# Patient Record
Sex: FEMALE | Race: BLACK OR AFRICAN AMERICAN | NOT HISPANIC OR LATINO | Employment: OTHER | URBAN - METROPOLITAN AREA
[De-identification: names, ages, dates, MRNs, and addresses within clinical notes are randomized per-mention and may not be internally consistent; named-entity substitution may affect disease eponyms.]

---

## 2017-04-04 ENCOUNTER — HOSPITAL ENCOUNTER (EMERGENCY)
Facility: HOSPITAL | Age: 67
Discharge: HOME | End: 2017-04-04
Attending: EMERGENCY MEDICINE
Payer: MEDICARE

## 2017-04-04 ENCOUNTER — NURSE TRIAGE (OUTPATIENT)
Dept: ADMINISTRATIVE | Facility: CLINIC | Age: 67
End: 2017-04-04

## 2017-04-04 VITALS
DIASTOLIC BLOOD PRESSURE: 67 MMHG | OXYGEN SATURATION: 100 % | RESPIRATION RATE: 16 BRPM | TEMPERATURE: 99 F | BODY MASS INDEX: 33.86 KG/M2 | SYSTOLIC BLOOD PRESSURE: 133 MMHG | HEIGHT: 72 IN | WEIGHT: 250 LBS | HEART RATE: 74 BPM

## 2017-04-04 DIAGNOSIS — I48.0 PAROXYSMAL ATRIAL FIBRILLATION: Primary | ICD-10-CM

## 2017-04-04 DIAGNOSIS — R07.9 CHEST PAIN, UNSPECIFIED TYPE: ICD-10-CM

## 2017-04-04 DIAGNOSIS — R00.2 PALPITATIONS: ICD-10-CM

## 2017-04-04 LAB
ALBUMIN SERPL BCP-MCNC: 3.7 G/DL
ALP SERPL-CCNC: 68 U/L
ALT SERPL W/O P-5'-P-CCNC: 33 U/L
ANION GAP SERPL CALC-SCNC: 10 MMOL/L
APTT BLDCRRT: 29.9 SEC
AST SERPL-CCNC: 26 U/L
BASOPHILS # BLD AUTO: 0.01 K/UL
BASOPHILS NFR BLD: 0.2 %
BILIRUB SERPL-MCNC: 0.4 MG/DL
BILIRUB UR QL STRIP: NEGATIVE
BNP SERPL-MCNC: 48 PG/ML
BUN SERPL-MCNC: 28 MG/DL
CALCIUM SERPL-MCNC: 9.6 MG/DL
CHLORIDE SERPL-SCNC: 104 MMOL/L
CLARITY UR: CLEAR
CO2 SERPL-SCNC: 23 MMOL/L
COLOR UR: YELLOW
CREAT SERPL-MCNC: 1.1 MG/DL
DIFFERENTIAL METHOD: ABNORMAL
EOSINOPHIL # BLD AUTO: 0.1 K/UL
EOSINOPHIL NFR BLD: 1.2 %
ERYTHROCYTE [DISTWIDTH] IN BLOOD BY AUTOMATED COUNT: 16.7 %
EST. GFR  (AFRICAN AMERICAN): 60 ML/MIN/1.73 M^2
EST. GFR  (NON AFRICAN AMERICAN): 52 ML/MIN/1.73 M^2
GLUCOSE SERPL-MCNC: 118 MG/DL
GLUCOSE UR QL STRIP: NEGATIVE
HCT VFR BLD AUTO: 33.7 %
HGB BLD-MCNC: 11.6 G/DL
HGB UR QL STRIP: NEGATIVE
INR PPP: 1
KETONES UR QL STRIP: NEGATIVE
LEUKOCYTE ESTERASE UR QL STRIP: NEGATIVE
LYMPHOCYTES # BLD AUTO: 1.4 K/UL
LYMPHOCYTES NFR BLD: 34.8 %
MCH RBC QN AUTO: 28.9 PG
MCHC RBC AUTO-ENTMCNC: 34.4 %
MCV RBC AUTO: 84 FL
MONOCYTES # BLD AUTO: 0.7 K/UL
MONOCYTES NFR BLD: 16.7 %
NEUTROPHILS # BLD AUTO: 1.9 K/UL
NEUTROPHILS NFR BLD: 46.9 %
NITRITE UR QL STRIP: NEGATIVE
PH UR STRIP: 6 [PH] (ref 5–8)
PLATELET # BLD AUTO: 243 K/UL
PMV BLD AUTO: 8.4 FL
POTASSIUM SERPL-SCNC: 4.7 MMOL/L
PROT SERPL-MCNC: 8.2 G/DL
PROT UR QL STRIP: NEGATIVE
PROTHROMBIN TIME: 10.7 SEC
RBC # BLD AUTO: 4.02 M/UL
SODIUM SERPL-SCNC: 137 MMOL/L
SP GR UR STRIP: 1.01 (ref 1–1.03)
TROPONIN I SERPL DL<=0.01 NG/ML-MCNC: 0.01 NG/ML
TROPONIN I SERPL DL<=0.01 NG/ML-MCNC: <0.006 NG/ML
TSH SERPL DL<=0.005 MIU/L-ACNC: 2.79 UIU/ML
URN SPEC COLLECT METH UR: NORMAL
UROBILINOGEN UR STRIP-ACNC: NEGATIVE EU/DL
WBC # BLD AUTO: 4.08 K/UL

## 2017-04-04 PROCEDURE — 85610 PROTHROMBIN TIME: CPT

## 2017-04-04 PROCEDURE — 83880 ASSAY OF NATRIURETIC PEPTIDE: CPT

## 2017-04-04 PROCEDURE — 80053 COMPREHEN METABOLIC PANEL: CPT

## 2017-04-04 PROCEDURE — 84484 ASSAY OF TROPONIN QUANT: CPT | Mod: 91

## 2017-04-04 PROCEDURE — 25000003 PHARM REV CODE 250: Performed by: EMERGENCY MEDICINE

## 2017-04-04 PROCEDURE — 99284 EMERGENCY DEPT VISIT MOD MDM: CPT | Mod: 25

## 2017-04-04 PROCEDURE — 84443 ASSAY THYROID STIM HORMONE: CPT

## 2017-04-04 PROCEDURE — 25000003 PHARM REV CODE 250: Performed by: NURSE PRACTITIONER

## 2017-04-04 PROCEDURE — 96376 TX/PRO/DX INJ SAME DRUG ADON: CPT

## 2017-04-04 PROCEDURE — 85730 THROMBOPLASTIN TIME PARTIAL: CPT

## 2017-04-04 PROCEDURE — 85025 COMPLETE CBC W/AUTO DIFF WBC: CPT

## 2017-04-04 PROCEDURE — 96361 HYDRATE IV INFUSION ADD-ON: CPT

## 2017-04-04 PROCEDURE — 93005 ELECTROCARDIOGRAM TRACING: CPT

## 2017-04-04 PROCEDURE — 96374 THER/PROPH/DIAG INJ IV PUSH: CPT

## 2017-04-04 PROCEDURE — 84484 ASSAY OF TROPONIN QUANT: CPT

## 2017-04-04 PROCEDURE — 81003 URINALYSIS AUTO W/O SCOPE: CPT

## 2017-04-04 RX ORDER — DEXTROMETHORPHAN HYDROBROMIDE, GUAIFENESIN 5; 100 MG/5ML; MG/5ML
650 LIQUID ORAL 2 TIMES DAILY PRN
Status: ON HOLD | COMMUNITY
End: 2020-01-02 | Stop reason: HOSPADM

## 2017-04-04 RX ORDER — DILTIAZEM HYDROCHLORIDE 30 MG/1
60 TABLET, FILM COATED ORAL
Status: COMPLETED | OUTPATIENT
Start: 2017-04-04 | End: 2017-04-04

## 2017-04-04 RX ORDER — ASPIRIN 325 MG
325 TABLET ORAL
Status: COMPLETED | OUTPATIENT
Start: 2017-04-04 | End: 2017-04-04

## 2017-04-04 RX ORDER — SODIUM CHLORIDE 9 MG/ML
1000 INJECTION, SOLUTION INTRAVENOUS
Status: COMPLETED | OUTPATIENT
Start: 2017-04-04 | End: 2017-04-04

## 2017-04-04 RX ORDER — DILTIAZEM HYDROCHLORIDE 5 MG/ML
20 INJECTION INTRAVENOUS
Status: COMPLETED | OUTPATIENT
Start: 2017-04-04 | End: 2017-04-04

## 2017-04-04 RX ORDER — DILTIAZEM HYDROCHLORIDE 60 MG/1
60 TABLET, FILM COATED ORAL EVERY 8 HOURS
Qty: 90 TABLET | Refills: 11 | Status: SHIPPED | OUTPATIENT
Start: 2017-04-04 | End: 2017-04-05

## 2017-04-04 RX ORDER — DILTIAZEM HYDROCHLORIDE 5 MG/ML
25 INJECTION INTRAVENOUS ONCE
Status: COMPLETED | OUTPATIENT
Start: 2017-04-04 | End: 2017-04-04

## 2017-04-04 RX ADMIN — DILTIAZEM HYDROCHLORIDE 20 MG: 5 INJECTION INTRAVENOUS at 01:04

## 2017-04-04 RX ADMIN — ASPIRIN 325 MG ORAL TABLET 325 MG: 325 PILL ORAL at 08:04

## 2017-04-04 RX ADMIN — DILTIAZEM HYDROCHLORIDE 60 MG: 30 TABLET, FILM COATED ORAL at 10:04

## 2017-04-04 RX ADMIN — DILTIAZEM HYDROCHLORIDE 25 MG: 5 INJECTION INTRAVENOUS at 08:04

## 2017-04-04 RX ADMIN — SODIUM CHLORIDE 1000 ML: 0.9 INJECTION, SOLUTION INTRAVENOUS at 08:04

## 2017-04-04 NOTE — ED NOTES
Patient has verified the spelling of their name and  on armband  LOC: The patient is awake, alert, and aware of environment with an appropriate affect, the patient is oriented x 3 and speaking appropriately.   APPEARANCE: Patient resting comfortably and in no acute distress, patient is clean and well groomed, patient's clothing is properly fastened.   SKIN: The skin is warm and dry, color consistent with ethnicity, patient has normal skin turgor and moist mucus membranes, skin intact, no breakdown or bruising noted.   :   Voids without difficulty  MUSCULOSKELETAL: Patient moving all extremities spontaneously, no obvious swelling or deformities noted.   RESPIRATORY: Airway is open and patent, respirations are spontaneous, patient has a normal effort and rate, no accessory muscle use noted, bilateral breath sounds clear, denies SOB   ABDOMEN: Soft and non tender to palpation, no distention noted, normoactive bowel sounds present in all four quadrants.   CARDIAC:   Sinus tach rate and rhythm, no peripheral edema noted, less then 3 second capillary refill, denies chest pain  COMPLAINT: palpitations began this morning, denies chest pain but describes a heaviness to her chest; currently on plavix for PAD and does not have a cardiologist

## 2017-04-04 NOTE — ED AVS SNAPSHOT
[facility]  [address]  [facility] [address]               Jayla Reagan   2017  8:22 AM   ED    Description:  Female : 1950   Department:  [facility]           Your Care was Coordinated By:     Provider Role From To    Guy J. Lefort, MD Attending Provider 17 --    ANAHI Neil Nurse Practitioner 17 --      Reason for Visit     Palpitations           Diagnoses this Visit        Comments    Paroxysmal atrial fibrillation    -  Primary     Palpitations         Chest pain, unspecified type           ED Disposition     None           To Do List           Follow-up Information     Follow up with Nasim Mccall MD In 1 day.    Specialties:  INTERVENTIONAL CARDIOLOGY, Cardiology    Why:  Even if well    Contact information:    [address]  [address]  [facility] [id number] [phone]          Follow up with [facility].    Specialty:  Emergency Medicine    Why:  If symptoms worsen or any other concerns    Contact information:    [address]  [facility] [address] [phone]        Follow up with Nicanor Michel MD In 1 day.    Specialty:  Family Medicine    Why:  If unable to see cardiology provider    Contact information:    [address] [facility] [address]  [phone]        [facility]     [facility] Nurse Care Line - 24/7 Assistance  Unless otherwise directed by your provider, please contact [facility], our nurse care line that is available for 24/7 assistance.     Registered nurses in the [facility] provide: appointment scheduling, clinical advisement, health education, and other advisory services.  Call: [phone] (toll free)               Medications           Message regarding Medications     Verify the changes and/or additions to your medication regime listed below are the same as discussed with your clinician today.  If  any of these changes or additions are incorrect, please notify your healthcare provider.        These medications were administered today        Dose Freq    diltiaZEM injection 25 mg 25 mg Once    Sig: Inject 5 mLs (25 mg total) into the vein once.    Class: Normal    Route: Intravenous    aspirin tablet 325 mg 325 mg ED 1 Time    Sig: Take 1 tablet (325 mg total) by mouth ED 1 Time.    Class: Normal    Route: Oral    0.9%  NaCl infusion 1,000 mL ED 1 Time    Sig: Inject 1,000 mLs into the vein ED 1 Time.    Class: Normal    Route: Intravenous    diltiaZEM tablet 60 mg 60 mg ED 1 Time    Sig: Take 2 tablets (60 mg total) by mouth ED 1 Time.    Class: Normal    Route: Oral    diltiaZEM injection 20 mg 20 mg ED 1 Time    Sig: Inject 4 mLs (20 mg total) into the vein ED 1 Time.    Class: Normal    Route: Intravenous      STOP taking these medications     TRIAMTERENE ORAL Take by mouth.    LISINOPRIL, BULK, MISC by Misc.(Non-Drug; Combo Route) route.    lisinopril (PRINIVIL,ZESTRIL) 20 MG tablet Take 20 mg by mouth once daily.    meloxicam (MOBIC) 7.5 MG tablet Take 2 tablets (15 mg total) by mouth once daily.    diazepam (VALIUM) 5 MG tablet Take 1 tablet (5 mg total) by mouth every 8 (eight) hours.           Verify that the below list of medications is an accurate representation of the medications you are currently taking.  If none reported, the list may be blank. If incorrect, please contact your healthcare provider. Carry this list with you in case of emergency.           Current Medications     acetaminophen (TYLENOL) 650 MG TbSR Take 650 mg by mouth 2 (two) times daily as needed.    clopidogrel (PLAVIX) 75 mg tablet Take 75 mg by mouth once daily.    fosinopril (MONOPRIL) 20 MG tablet Take 20 mg by mouth once daily.    metoprolol succinate (TOPROL-XL) 100 MG 24 hr tablet Take 100 mg by mouth 2 (two) times daily.    triamterene-hydrochlorothiazide 37.5-25 mg (MAXZIDE-25) 37.5-25 mg per tablet Take 1 tablet by  mouth once daily. Not sure of mg           Clinical Reference Information           Your Vitals Were     BP Pulse Temp Resp Height Weight    106/65 79 98.5 °F (36.9 °C) (Oral) 16 6' (1.829 m) 113.4 kg (250 lb)    SpO2 BMI             99% 33.91 kg/m2         Allergies as of 4/4/2017     No Known Allergies      Immunizations Administered on Date of Encounter - 4/4/2017     None      ED Micro, Lab, POCT     Start Ordered       Status Ordering Provider    04/04/17 1021 04/04/17 1021  Troponin I  STAT      Final result     04/04/17 0840 04/04/17 0839  APTT  STAT      Final result     04/04/17 0833 04/04/17 0833  CBC auto differential  STAT      Final result     04/04/17 0833 04/04/17 0833  Comprehensive metabolic panel  STAT      Final result     04/04/17 0833 04/04/17 0833  Protime-INR  STAT      Final result     04/04/17 0833 04/04/17 0833  Troponin I  STAT      Final result     04/04/17 0833 04/04/17 0833  TSH  STAT      Final result     04/04/17 0833 04/04/17 0833  Urinalysis  STAT      Final result     04/04/17 0833 04/04/17 0833  Brain natriuretic peptide  STAT      Final result       ED Imaging Orders     Start Ordered       Status Ordering Provider    04/04/17 0838 04/04/17 0838  X-Ray Chest 1 View  1 time imaging      Final result         Discharge Instructions         Discharge Instructions for Atrial Fibrillation  You have been diagnosed with an abnormal heart rhythm called atrial fibrillation. With this condition, your hearts 2 upper chambers quiver rather than squeeze the blood out in a normal pattern. This leads to an irregular and sometimes rapid heartbeat. Some people will develop associated symptoms such as a flip-flopping heartbeat, chest pain, lightheadedness, or shortness of breath. Other people may have no symptoms at all. Atrial fibrillation is serious because it affects the hearts ability to fill with blood as it should. Blood clots may form. This increases the risk for stroke. Untreated atrial  fibrillation can also lead to heart failure. Atrial fibrillation can be controlled. With treatment, most people with atrial fibrillation lead normal lives.  Treatment options  Recommended treatment for atrial fibrillation depends on your age, symptoms, how long you have had atrial fibrillation, and other factors. You will have a complete evaluation to find out if you have any abnormalities that caused your heart to go into atrial fibrillation. This might be blocked heart arteries or a thyroid problem. Your doctor will assess your particular case and discuss choices with you.  Treatment choices may include:  · Treating an underlying disorder that puts you at risk for atrial fibrillation. For example, correcting an abnormal thyroid or electrolyte problem, or treating a blocked heart artery.  · Restoring a normal heart rhythm with an electrical shock (cardioversion) or with an antiarrhythmic medicine (chemical cardioversion)  · Using medicine to control your heart rate in atrial fibrillation.  · Preventing the risk for blood clot and stroke using blood-thinning medicines. Your doctor will tell you what he or she recommends. Choices may include aspirin, clopidogrel, warfarin, dabigatran, rivaroxaban, apixaban, and edoxaban.  · Doing catheter ablation or a surgical maze procedure. These use different methods to destroy certain areas of heart tissue. This interrupts the electrical signals causing atrial fibrillation. One of these procedures may be a choice when medicines do not work, or as an alternative to long-term medicine.  · Other treatment choices may be recommended for you by your doctor.  Managing risk factors for stroke and preventing heart failure are important parts of any treatment plan for atrial fibrillation.  Home care  · Take your medicines exactly as directed. Dont skip doses.  · Work with your doctor to find the right medicines and doses for you.  · Learn to take your own pulse. Keep a record of your  results. Ask your doctor which pulse rates mean that you need medical attention. Slowing your pulse is often the goal of treatment. Ask your doctor if its OK for you to use an automatic machine to check your pulse at home. Sometimes these machines dont count the pulse correctly when you have atrial fibrillation.  · Limit your intake of coffee, tea, cola, and other beverages with caffeine. Talk with your doctor about whether you should eliminate caffeine.  · Avoid over-the-counter medicines that have caffeine in them.  · Let your doctor know what medicines you take, including prescription and over-the-counter medicines, as well as any supplements. They interfere with some medicines given for atrial fibrillation.  · Ask your doctor about whether you can drink alcohol. Some people need to avoid alcohol to better treat atrial fibrillation. If you are taking blood-thinner medicines, alcohol may interfere with them by increasing their effect.  · Never take stimulants such as amphetamines or cocaine. These drugs can speed up your heart rate and trigger atrial fibrillation.  Follow-up care  Follow up with your doctor, or as advised.     When should I call my healthcare provider  Call your healthcare provider right away if you have any of the following:  · Weakness  · Dizziness  · Fainting  · Fatigue  · Shortness of breath  · Chest pain with increased activity  · A change in the usual regularity of your heartbeat, or an unusually fast heartbeat   Date Last Reviewed: 4/23/2016  © 9446-2352 [employer]. [address] [place] [address], [place] [address]. All rights reserved. This information is not intended as a substitute for professional medical care. Always follow your healthcare professional's instructions.          Uncertain Causes of Chest Pain    Chest pain can happen for a number of reasons. Sometimes the cause can't be determined. If your condition does not seem serious, and your pain does not appear to be  coming from your heart, your healthcare provider may recommend watching it closely. Sometimes the signs of a serious problem take more time to appear. Many problems not related to your heart can cause chest pain.These include:  · Musculoskeletal. Costochondritis, an inflammation of the tissues around the ribs that can occur from trauma or overuse injuries  · Respiratory. Pneumonia, pneumothorax, or pneumonitis (inflammation of the lining of the chest and lungs)  · Gastrointestinal. Esophageal reflux, heartburn, or gallbladder disease  · Anxiety and panic disorders  · Nerve compression and neuritis  · Miscellaneous problems such as aortic aneurysm or pulmonary embolism (a blood clot in the lungs)  Home care  After your visit, follow these recommendations:  · Rest today and avoid strenuous activity.  · Take any prescribed medicine as directed.  · Be aware of any recurrent chest pain and notice any changes  Follow-up care  Follow up with your healthcare provider if you do not start to feel better within 24 hours, or as advised.  Call 911  Call 911 if any of these occur:  · A change in the type of pain: if it feels different, becomes more severe, lasts longer, or begins to spread into your shoulder, arm, neck, jaw or back  · Shortness of breath or increased pain with breathing  · Weakness, dizziness, or fainting  · Rapid heart beat  · Crushing sensation in your chest  When to seek medical advice  Call your healthcare provider right away if any of the following occur:  · Cough with dark colored sputum (phlegm) or blood  · Fever of 100.4ºF (38ºC) or higher, or as directed by your healthcare provider  · Swelling, pain or redness in one leg  · Shortness of breath  Date Last Reviewed: 12/30/2015 © 2000-2016 [employer]. [address] [place] [address], [place] [address]. All rights reserved. This information is not intended as a substitute for professional medical care. Always follow your healthcare professional's  instructions.          [facility] Sign-Up     Activating your [facility] account is as easy as 1-2-3!     1) Visit [email or web address], select Sign Up Now, enter this activation code and your date of birth, then select Next.  [email or web address]  Expires: 5/19/2017  1:58 PM      2) Create a username and password to use when you visit [facility] in the future and select a security question in case you lose your password and select Next.    3) Enter your e-mail address and click Sign Up!    Additional Information  If you have questions, please e-mail [email or web address] or call [phone] to talk to our [facility] staff. Remember, [facility] is NOT to be used for urgent needs. For medical emergencies, dial 911.         Smoking Cessation     If you would like to quit smoking:   You may be eligible for free services if you are a [place] resident and started smoking cigarettes before September 1, 1988.  Call the Smoking Cessation Trust (SCT) toll free at (442) 476-6109 or (560) 889-2717.   Call 7-784-QUIT-NOW if you do not meet the above criteria.   Contact us via email: [email or web address]   View our website for more information: [email or web address]         [facility] complies with applicable Federal civil rights laws and does not discriminate on the basis of race, color, national origin, age, disability, or sex.        Language Assistance Services     ATTENTION: Language assistance services are available, free of charge. Please call [phone].      ATENCIÓN: Si habla español, tiene a guadalupe disposición servicios gratuitos de asistencia lingüística. Llame al [phone].     CHÚ Ý: N?u b?n nói Ti?ng Vi?t, có các d?ch v? h? tr? ngôn ng? mi?n phí dành cho b?n. G?i s? [phone].

## 2017-04-04 NOTE — DISCHARGE INSTRUCTIONS
Discharge Instructions for Atrial Fibrillation  You have been diagnosed with an abnormal heart rhythm called atrial fibrillation. With this condition, your hearts 2 upper chambers quiver rather than squeeze the blood out in a normal pattern. This leads to an irregular and sometimes rapid heartbeat. Some people will develop associated symptoms such as a flip-flopping heartbeat, chest pain, lightheadedness, or shortness of breath. Other people may have no symptoms at all. Atrial fibrillation is serious because it affects the hearts ability to fill with blood as it should. Blood clots may form. This increases the risk for stroke. Untreated atrial fibrillation can also lead to heart failure. Atrial fibrillation can be controlled. With treatment, most people with atrial fibrillation lead normal lives.  Treatment options  Recommended treatment for atrial fibrillation depends on your age, symptoms, how long you have had atrial fibrillation, and other factors. You will have a complete evaluation to find out if you have any abnormalities that caused your heart to go into atrial fibrillation. This might be blocked heart arteries or a thyroid problem. Your doctor will assess your particular case and discuss choices with you.  Treatment choices may include:  · Treating an underlying disorder that puts you at risk for atrial fibrillation. For example, correcting an abnormal thyroid or electrolyte problem, or treating a blocked heart artery.  · Restoring a normal heart rhythm with an electrical shock (cardioversion) or with an antiarrhythmic medicine (chemical cardioversion)  · Using medicine to control your heart rate in atrial fibrillation.  · Preventing the risk for blood clot and stroke using blood-thinning medicines. Your doctor will tell you what he or she recommends. Choices may include aspirin, clopidogrel, warfarin, dabigatran, rivaroxaban, apixaban, and edoxaban.  · Doing catheter ablation or a surgical maze  procedure. These use different methods to destroy certain areas of heart tissue. This interrupts the electrical signals causing atrial fibrillation. One of these procedures may be a choice when medicines do not work, or as an alternative to long-term medicine.  · Other treatment choices may be recommended for you by your doctor.  Managing risk factors for stroke and preventing heart failure are important parts of any treatment plan for atrial fibrillation.  Home care  · Take your medicines exactly as directed. Dont skip doses.  · Work with your doctor to find the right medicines and doses for you.  · Learn to take your own pulse. Keep a record of your results. Ask your doctor which pulse rates mean that you need medical attention. Slowing your pulse is often the goal of treatment. Ask your doctor if its OK for you to use an automatic machine to check your pulse at home. Sometimes these machines dont count the pulse correctly when you have atrial fibrillation.  · Limit your intake of coffee, tea, cola, and other beverages with caffeine. Talk with your doctor about whether you should eliminate caffeine.  · Avoid over-the-counter medicines that have caffeine in them.  · Let your doctor know what medicines you take, including prescription and over-the-counter medicines, as well as any supplements. They interfere with some medicines given for atrial fibrillation.  · Ask your doctor about whether you can drink alcohol. Some people need to avoid alcohol to better treat atrial fibrillation. If you are taking blood-thinner medicines, alcohol may interfere with them by increasing their effect.  · Never take stimulants such as amphetamines or cocaine. These drugs can speed up your heart rate and trigger atrial fibrillation.  Follow-up care  Follow up with your doctor, or as advised.     When should I call my healthcare provider  Call your healthcare provider right away if you have any of the  following:  · Weakness  · Dizziness  · Fainting  · Fatigue  · Shortness of breath  · Chest pain with increased activity  · A change in the usual regularity of your heartbeat, or an unusually fast heartbeat   Date Last Reviewed: 4/23/2016 © 2000-2016 [employer]. [address] [place] [address]. All rights reserved. This information is not intended as a substitute for professional medical care. Always follow your healthcare professional's instructions.          Uncertain Causes of Chest Pain    Chest pain can happen for a number of reasons. Sometimes the cause can't be determined. If your condition does not seem serious, and your pain does not appear to be coming from your heart, your healthcare provider may recommend watching it closely. Sometimes the signs of a serious problem take more time to appear. Many problems not related to your heart can cause chest pain.These include:  · Musculoskeletal. Costochondritis, an inflammation of the tissues around the ribs that can occur from trauma or overuse injuries  · Respiratory. Pneumonia, pneumothorax, or pneumonitis (inflammation of the lining of the chest and lungs)  · Gastrointestinal. Esophageal reflux, heartburn, or gallbladder disease  · Anxiety and panic disorders  · Nerve compression and neuritis  · Miscellaneous problems such as aortic aneurysm or pulmonary embolism (a blood clot in the lungs)  Home care  After your visit, follow these recommendations:  · Rest today and avoid strenuous activity.  · Take any prescribed medicine as directed.  · Be aware of any recurrent chest pain and notice any changes  Follow-up care  Follow up with your healthcare provider if you do not start to feel better within 24 hours, or as advised.  Call 911  Call 911 if any of these occur:  · A change in the type of pain: if it feels different, becomes more severe, lasts longer, or begins to spread into your shoulder, arm, neck, jaw or back  · Shortness of breath  or increased pain with breathing  · Weakness, dizziness, or fainting  · Rapid heart beat  · Crushing sensation in your chest  When to seek medical advice  Call your healthcare provider right away if any of the following occur:  · Cough with dark colored sputum (phlegm) or blood  · Fever of 100.4ºF (38ºC) or higher, or as directed by your healthcare provider  · Swelling, pain or redness in one leg  · Shortness of breath  Date Last Reviewed: 12/30/2015 © 2000-2016 [employer]. [address] [place] [address]. All rights reserved. This information is not intended as a substitute for professional medical care. Always follow your healthcare professional's instructions.

## 2017-04-04 NOTE — ED PROVIDER NOTES
"Encounter Date: 4/4/2017       History     Chief Complaint   Patient presents with    Palpitations     woke up this morning with palpitations, then began feeling midsternal chest heaviness. Afib with RVR on EKG on arrival     Review of patient's allergies indicates:  No Known Allergies  Patient is a 67 y.o. female presenting with the following complaint: chest pain. The history is provided by the patient.   Chest Pain   The current episode started 1 to 2 hours ago. Duration of episode(s) is 2 hours. Chest pain occurs constantly. The chest pain is unchanged. Associated with: palpitations. At its most intense, the chest pain is at 5/10. The chest pain is currently at 5/10. The quality of the pain is described as pressure-like. The pain does not radiate. Primary symptoms include palpitations. Pertinent negatives for primary symptoms include no fever, no syncope, no shortness of breath, no wheezing, no abdominal pain, no nausea, no vomiting, no dizziness and no altered mental status.   The palpitations began just prior to arrival. An episode of palpitations lasts for more than 30 minutes. The palpitations have occurred 1 time(s). The palpitations occur while in bed. The palpitations did not occur with syncope, dizziness or shortness of breath.   Pertinent negatives for associated symptoms include no claudication, no diaphoresis, no lower extremity edema, no near-syncope, no numbness, no orthopnea, no paroxysmal nocturnal dyspnea and no weakness. Treatments tried: "gas-x" Risk factors include obesity.   Her past medical history is significant for arrhythmia (pt reports "irregular heart beat" previously diagnosed) and hypertension.   Pertinent negatives for past medical history include no CAD, no COPD, no CHF, no diabetes and no PE.     Past Medical History:   Diagnosis Date    Cataract     Hypertension     PAD (peripheral artery disease)     Peripheral artery disease      Past Surgical History:   Procedure " Laterality Date    BREAST SURGERY      CHOLECYSTECTOMY      ECTOPIC PREGNANCY SURGERY      SALPINGECTOMY       Family History   Problem Relation Age of Onset    Cataracts Mother     Cataracts Sister      Social History   Substance Use Topics    Smoking status: Former Smoker    Smokeless tobacco: None    Alcohol use No     Review of Systems   Constitutional: Negative for diaphoresis and fever.   Respiratory: Negative for shortness of breath and wheezing.    Cardiovascular: Positive for chest pain and palpitations. Negative for orthopnea, claudication, syncope and near-syncope.   Gastrointestinal: Negative for abdominal pain, nausea and vomiting.   Neurological: Negative for dizziness, syncope, weakness and numbness.   All other systems reviewed and are negative.      Physical Exam   Initial Vitals   BP Pulse Resp Temp SpO2   04/04/17 0831 04/04/17 0831 04/04/17 0831 04/04/17 0836 04/04/17 0831   157/85 123 18 98 °F (36.7 °C) 98 %     Physical Exam    Nursing note and vitals reviewed.  Constitutional: She appears well-developed and well-nourished. She is not diaphoretic. No distress.   HENT:   Head: Normocephalic and atraumatic.   Mouth/Throat: Oropharynx is clear and moist.   Eyes: Conjunctivae and EOM are normal. Pupils are equal, round, and reactive to light.   Neck: Normal range of motion. Neck supple.   Cardiovascular: Normal heart sounds and normal pulses. An irregularly irregular rhythm present.   Pulmonary/Chest: Breath sounds normal. No respiratory distress.   Abdominal: Soft. There is no tenderness.   Musculoskeletal: Normal range of motion. She exhibits no edema or tenderness.   Neurological: She is alert and oriented to person, place, and time. She has normal strength.   Skin: Skin is warm and dry.   Psychiatric: She has a normal mood and affect. Her behavior is normal.         ED Course   Procedures  Labs Reviewed   CBC W/ AUTO DIFFERENTIAL   COMPREHENSIVE METABOLIC PANEL   PROTIME-INR   TROPONIN  I   TSH   URINALYSIS   B-TYPE NATRIURETIC PEPTIDE   APTT     EKG Readings: (Independently Interpreted)   Initial Reading: No STEMI. Heart Rate: 134. Other Impression: AFIB RVR      Imaging Results     None            X-Rays:   Independently Interpreted Readings:   Other Readings:  A Chest X-Ray was ordered. My reading of this film is No acute process. (No comparison films available: pending review by Radiologist.)     Medical Decision Making:   Initial Assessment:   AFIB rvr, with CP.  Given diltiazem with marked improvement in rate. Now stable with rate control without CP.  Differential Diagnosis:   Afib, MI, CHF, PE, PNA, infectious, thyroid  ED Management:  CP free after resolution of tachycardia.  Rate increased, but pain did not return.  Given second dilt bolus after PO dilt.   Discussed with Dr. Mccall, who recommend clinic follow up if pain free, rate controlled, and negative workup; and to place on PO dilt 60 TID.  Discussed findings and plan with patient who will return for sx return and will seek cardiology follow up.                   ED Course     Clinical Impression:   The primary encounter diagnosis was Paroxysmal atrial fibrillation. Diagnoses of Palpitations and Chest pain, unspecified type were also pertinent to this visit.    Disposition:   Disposition: Discharged  Condition: Stable       Guy J. Lefort, MD  04/04/17 2044

## 2017-04-05 ENCOUNTER — OFFICE VISIT (OUTPATIENT)
Dept: CARDIOLOGY | Facility: CLINIC | Age: 67
End: 2017-04-05
Payer: MEDICARE

## 2017-04-05 ENCOUNTER — TELEPHONE (OUTPATIENT)
Dept: CARDIOLOGY | Facility: CLINIC | Age: 67
End: 2017-04-05

## 2017-04-05 ENCOUNTER — ANTI-COAG VISIT (OUTPATIENT)
Dept: CARDIOLOGY | Facility: CLINIC | Age: 67
End: 2017-04-05

## 2017-04-05 VITALS
HEART RATE: 63 BPM | WEIGHT: 250.06 LBS | HEIGHT: 72 IN | BODY MASS INDEX: 33.87 KG/M2 | DIASTOLIC BLOOD PRESSURE: 69 MMHG | SYSTOLIC BLOOD PRESSURE: 108 MMHG | OXYGEN SATURATION: 100 %

## 2017-04-05 DIAGNOSIS — I73.9 PVD (PERIPHERAL VASCULAR DISEASE): ICD-10-CM

## 2017-04-05 DIAGNOSIS — I10 HTN (HYPERTENSION), BENIGN: ICD-10-CM

## 2017-04-05 DIAGNOSIS — I48.0 PAROXYSMAL ATRIAL FIBRILLATION: ICD-10-CM

## 2017-04-05 DIAGNOSIS — I10 ESSENTIAL HYPERTENSION: ICD-10-CM

## 2017-04-05 DIAGNOSIS — Z79.01 LONG TERM (CURRENT) USE OF ANTICOAGULANTS: ICD-10-CM

## 2017-04-05 PROBLEM — I48.19 PERSISTENT ATRIAL FIBRILLATION: Status: ACTIVE | Noted: 2017-04-05

## 2017-04-05 PROCEDURE — 3078F DIAST BP <80 MM HG: CPT | Mod: S$GLB,,, | Performed by: INTERNAL MEDICINE

## 2017-04-05 PROCEDURE — 1160F RVW MEDS BY RX/DR IN RCRD: CPT | Mod: S$GLB,,, | Performed by: INTERNAL MEDICINE

## 2017-04-05 PROCEDURE — 1157F ADVNC CARE PLAN IN RCRD: CPT | Mod: S$GLB,,, | Performed by: INTERNAL MEDICINE

## 2017-04-05 PROCEDURE — 99999 PR PBB SHADOW E&M-EST. PATIENT-LVL III: CPT | Mod: PBBFAC,,, | Performed by: INTERNAL MEDICINE

## 2017-04-05 PROCEDURE — 1125F AMNT PAIN NOTED PAIN PRSNT: CPT | Mod: S$GLB,,, | Performed by: INTERNAL MEDICINE

## 2017-04-05 PROCEDURE — 3074F SYST BP LT 130 MM HG: CPT | Mod: S$GLB,,, | Performed by: INTERNAL MEDICINE

## 2017-04-05 PROCEDURE — 1159F MED LIST DOCD IN RCRD: CPT | Mod: S$GLB,,, | Performed by: INTERNAL MEDICINE

## 2017-04-05 PROCEDURE — 93000 ELECTROCARDIOGRAM COMPLETE: CPT | Mod: S$GLB,,, | Performed by: INTERNAL MEDICINE

## 2017-04-05 PROCEDURE — 99204 OFFICE O/P NEW MOD 45 MIN: CPT | Mod: S$GLB,,, | Performed by: INTERNAL MEDICINE

## 2017-04-05 RX ORDER — FEXOFENADINE HCL AND PSEUDOEPHEDRINE HCI 180; 240 MG/1; MG/1
1 TABLET, EXTENDED RELEASE ORAL DAILY
COMMUNITY
End: 2017-10-20

## 2017-04-05 RX ORDER — ERGOCALCIFEROL 1.25 MG/1
1000 CAPSULE ORAL
COMMUNITY
End: 2018-05-08 | Stop reason: CLARIF

## 2017-04-05 RX ORDER — WARFARIN 4 MG/1
4 TABLET ORAL DAILY
Qty: 30 TABLET | Refills: 11 | Status: SHIPPED | OUTPATIENT
Start: 2017-04-05 | End: 2017-08-02

## 2017-04-05 NOTE — PROGRESS NOTES
66yo F with PMHx: HTN, PAD and cataract.  Pt was seen in the ED on 4/4/2017 for chest pain and was found to be in Afib.  Per the pt and her med card, she was given warfarin 4mg tablets.  She took her last dose of Pradaxa on 4/5 and will start taking coumadin on 4/6.  She knows to take her warfarin in the evenings and has our contact info.  She will come into the [facility] for an INR and new pt education session on 4/10.

## 2017-04-05 NOTE — TELEPHONE ENCOUNTER
"  Reason for Disposition   Patient sounds very sick or weak to the triager    Answer Assessment - Initial Assessment Questions  1. LOCATION: "Where does it hurt?"        Heaviness in the middle of chest  2. RADIATION: "Does the pain go anywhere else?" (e.g., into neck, jaw, arms, back)      no  3. ONSET: "When did the chest pain begin?" (Minutes, hours or days)       Past morning and was seen in ED  4. PATTERN "Does the pain come and go, or has it been constant since it started?"  "Does it get worse with exertion?"       constant  5. DURATION: "How long does it last" (e.g., seconds, minutes, hours)      constant  6. SEVERITY: "How bad is the pain?"  (e.g., Scale 1-10; mild, moderate, or severe)     - MILD (1-3): doesn't interfere with normal activities      - MODERATE (4-7): interferes with normal activities or awakens from sleep     - SEVERE (8-10): excruciating pain, unable to do any normal activities        5  7. CARDIAC RISK FACTORS: "Do you have any history of heart problems or risk factors for heart disease?" (e.g., prior heart attack, angina; high blood pressure, diabetes, being overweight, high cholesterol, smoking, or strong family history of heart disease)      A fib diagnosed today  8. PULMONARY RISK FACTORS: "Do you have any history of lung disease?"  (e.g., blood clots in lung, asthma, emphysema, birth control pills)      no  9. CAUSE: "What do you think is causing the chest pain?"      Medication given today  10. OTHER SYMPTOMS: "Do you have any other symptoms?" (e.g., dizziness, nausea, vomiting, sweating, fever, difficulty breathing, cough)        no  11. PREGNANCY: "Is there any chance you are pregnant?" "When was your last menstrual period?"        no    Protocols used: ST CHEST PAIN-A-AH    "

## 2017-04-05 NOTE — TELEPHONE ENCOUNTER
----- Message from Gianna Jackson sent at 4/4/2017  2:57 PM CDT -----  Contact: Self/[phone]  Patient said she was in the ER for heart palpations. She needs a follow up appointment on 4/5/17. Please advise

## 2017-04-05 NOTE — MR AVS SNAPSHOT
[facility]  [address], [address]  [place] [address]  Phone: [phone]                  Jayla Reagan   2017 1:40 PM   Office Visit    Description:  Female : 1950   Provider:  Minh Hsieh MD   Department:  [facility]           Reason for Visit     Hospital Follow Up           Diagnoses this Visit        Comments    Paroxysmal atrial fibrillation         HTN (hypertension), benign         PVD (peripheral vascular disease)                To Do List           Future Appointments        Provider Department Dept Phone    2017 9:00 AM CARDIOLOGY, ECHO [facility] [phone]    2017 10:30 AM EKG, ANOOPHARJINDER BEARDEN [facility] [phone]    2017 9:00 AM Minh Hsieh MD [facility] [phone]      Goals (5 Years of Data)     None      Follow-Up and Disposition     Return in about 3 months (around 2017).    Follow-up and Disposition History       These Medications        Disp Refills Start End    warfarin (COUMADIN) 4 MG tablet 30 tablet 11 2017    Take 1 tablet (4 mg total) by mouth Daily. - Oral    Pharmacy: [facility] [address] [place] [address] AT [facility] Ph #: [phone]         [facility]     [facility] Nurse Care Line - 24/ Assistance  Unless otherwise directed by your provider, please contact [facility], our nurse care line that is available for  assistance.     Registered nurses in the [facility] provide: appointment scheduling, clinical advisement, health education, and other advisory services.  Call: [phone] (toll free)               Medications           Message regarding Medications     Verify the changes and/or additions to your medication regime listed below are the same as discussed with your clinician today.  If any of these changes or additions are incorrect, please  notify your healthcare provider.        START taking these NEW medications        Refills    warfarin (COUMADIN) 4 MG tablet 11    Sig: Take 1 tablet (4 mg total) by mouth Daily.    Class: Normal    Route: Oral      STOP taking these medications     clopidogrel (PLAVIX) 75 mg tablet Take 75 mg by mouth once daily.    diltiaZEM (CARDIZEM) 60 MG tablet Take 1 tablet (60 mg total) by mouth every 8 (eight) hours.           Verify that the below list of medications is an accurate representation of the medications you are currently taking.  If none reported, the list may be blank. If incorrect, please contact your healthcare provider. Carry this list with you in case of emergency.           Current Medications     acetaminophen (TYLENOL) 650 MG TbSR Take 650 mg by mouth 2 (two) times daily as needed.    ergocalciferol (VITAMIN D2) 50,000 unit Cap Take 1,000 Units by mouth every 7 days.    fexofenadine-pseudoephedrine (ALLEGRA-D 24) 180-240 mg per 24 hr tablet Take 1 tablet by mouth once daily.    fosinopril (MONOPRIL) 20 MG tablet Take 20 mg by mouth once daily.    metoprolol succinate (TOPROL-XL) 100 MG 24 hr tablet Take 100 mg by mouth 2 (two) times daily.    triamterene-hydrochlorothiazide 37.5-25 mg (MAXZIDE-25) 37.5-25 mg per tablet Take 1 tablet by mouth once daily. Not sure of mg    warfarin (COUMADIN) 4 MG tablet Take 1 tablet (4 mg total) by mouth Daily.           Clinical Reference Information           Your Vitals Were     BP Pulse Height Weight SpO2 BMI    108/69 63 6' (1.829 m) 113.4 kg (250 lb 0.8 oz) 100% 33.91 kg/m2      Blood Pressure          Most Recent Value    BP  108/69      Allergies as of 4/5/2017     No Known Allergies      Immunizations Administered on Date of Encounter - 4/5/2017     None      Orders Placed During Today's Visit      Normal Orders This Visit    Ambulatory consult to Anticoagulation Monitoring     Future Labs/Procedures Expected by Expires    2D Echo w/ Color Flow Doppler  As  directed 4/5/2018    Holter monitor - 48 hour  As directed 4/5/2018      [facility] Sign-Up     Activating your [facility] account is as easy as 1-2-3!     1) Visit [email or web address], select Sign Up Now, enter this activation code and your date of birth, then select Next.  [email or web address]  Expires: 5/19/2017  1:58 PM      2) Create a username and password to use when you visit [facility] in the future and select a security question in case you lose your password and select Next.    3) Enter your e-mail address and click Sign Up!    Additional Information  If you have questions, please e-mail [email or web address] or call [phone] to talk to our [facility] staff. Remember, [facility] is NOT to be used for urgent needs. For medical emergencies, dial 911.         Language Assistance Services     ATTENTION: Language assistance services are available, free of charge. Please call [phone].      ATENCIÓN: Si habla español, tiene a guadalupe disposición servicios gratuitos de asistencia lingüística. Llame al [phone].     CHÚ Ý: N?u b?n nói Ti?ng Vi?t, có các d?ch v? h? tr? ngôn ng? mi?n phí dành cho b?n. G?i s? [phone].         [facility] complies with applicable Federal civil rights laws and does not discriminate on the basis of race, color, national origin, age, disability, or sex.

## 2017-04-05 NOTE — PROGRESS NOTES
Subjective:   Patient ID:  Jayla Reagan is a 67 y.o. female who presents for evaluation of Hospital Follow Up      HPI: 68 y/o AA female with PMH of HTN and PAD present to ER last night with recurrent palpitations describe as heart racing. She had associated chest pressure with no radiation and mild in intensity. On presentation to ER she was found to be in Atrial fib with RVR. She was never diagnosed with Atrial fib before but has had palpitations. She was light headed last night on visit to ER but no syncope. She was started on Cardizem 60 mg po tid and HR is now controlled. BP is hypotensive. She was already on metoprolol. It appear patient is taking metoprolol 400 mg po daily as she was told for generic she should double dose.       No history of CAD/MI or CVA. No CHF. She is been treated with plavix for PAD by PCP. No CHANDLER. She denies using a lot of caffeine, etoh or energy drink.     Past Medical History:   Diagnosis Date    Cataract     Hypertension     PAD (peripheral artery disease)     Peripheral artery disease        Past Surgical History:   Procedure Laterality Date    BREAST SURGERY      CHOLECYSTECTOMY      ECTOPIC PREGNANCY SURGERY      SALPINGECTOMY         Social History   Substance Use Topics    Smoking status: Former Smoker    Smokeless tobacco: None    Alcohol use No       Family History   Problem Relation Age of Onset    Cataracts Mother     Cataracts Sister        Patient's Medications   New Prescriptions    WARFARIN (COUMADIN) 4 MG TABLET    Take 1 tablet (4 mg total) by mouth Daily.   Previous Medications    ACETAMINOPHEN (TYLENOL) 650 MG TBSR    Take 650 mg by mouth 2 (two) times daily as needed.    ERGOCALCIFEROL (VITAMIN D2) 50,000 UNIT CAP    Take 1,000 Units by mouth every 7 days.    FEXOFENADINE-PSEUDOEPHEDRINE (ALLEGRA-D 24) 180-240 MG PER 24 HR TABLET    Take 1 tablet by mouth once daily.    FOSINOPRIL (MONOPRIL) 20 MG TABLET    Take 20 mg by mouth once daily.     METOPROLOL SUCCINATE (TOPROL-XL) 100 MG 24 HR TABLET    Take 100 mg by mouth 2 (two) times daily.    TRIAMTERENE-HYDROCHLOROTHIAZIDE 37.5-25 MG (MAXZIDE-25) 37.5-25 MG PER TABLET    Take 1 tablet by mouth once daily. Not sure of mg   Modified Medications    No medications on file   Discontinued Medications    CLOPIDOGREL (PLAVIX) 75 MG TABLET    Take 75 mg by mouth once daily.    DILTIAZEM (CARDIZEM) 60 MG TABLET    Take 1 tablet (60 mg total) by mouth every 8 (eight) hours.        Review of patient's allergies indicates:  No Known Allergies    Review of Systems   Constitution: Negative.   HENT: Negative.    Eyes: Negative.    Cardiovascular: Positive for palpitations. Negative for dyspnea on exertion.   Respiratory: Negative.    Endocrine: Negative.    Hematologic/Lymphatic: Negative.    Skin: Negative.    Musculoskeletal: Negative.    Gastrointestinal: Negative.    Neurological: Negative.    Psychiatric/Behavioral: Negative.      Objective:   Physical Exam   Constitutional: She is oriented to person, place, and time. She appears well-developed and well-nourished. No distress.   Examination of the digits showed no clubbing or cyanosis   HENT:   Head: Normocephalic and atraumatic.   Eyes: Conjunctivae are normal. Pupils are equal, round, and reactive to light. Right eye exhibits no discharge.   Neck: Normal range of motion. Neck supple. No JVD present. No thyromegaly present.   No carotid bruits   Cardiovascular: Normal rate, regular rhythm, S1 normal, S2 normal, normal heart sounds, intact distal pulses and normal pulses.  PMI is not displaced.  Exam reveals no gallop, no friction rub and no opening snap.    No murmur heard.  Pulmonary/Chest: Effort normal and breath sounds normal. No respiratory distress. She has no wheezes. She has no rales. She exhibits no tenderness.   Abdominal: Soft. Bowel sounds are normal. She exhibits no distension and no mass. There is no tenderness. There is no guarding.   No  hepatosplenomegaly   Musculoskeletal: Normal range of motion. She exhibits no edema or tenderness.   Lymphadenopathy:     She has no cervical adenopathy.   Neurological: She is alert and oriented to person, place, and time.   Skin: Skin is warm. No rash noted. She is not diaphoretic. No erythema.   Psychiatric: She has a normal mood and affect.   Nursing note and vitals reviewed.      Lab Results   Component Value Date    WBC 4.08 04/04/2017    HGB 11.6 (L) 04/04/2017    HCT 33.7 (L) 04/04/2017    MCV 84 04/04/2017     04/04/2017         Chemistry        Component Value Date/Time     04/04/2017 0850    K 4.7 04/04/2017 0850     04/04/2017 0850    CO2 23 04/04/2017 0850    BUN 28 (H) 04/04/2017 0850    CREATININE 1.1 04/04/2017 0850     (H) 04/04/2017 0850        Component Value Date/Time    CALCIUM 9.6 04/04/2017 0850    ALKPHOS 68 04/04/2017 0850    AST 26 04/04/2017 0850    ALT 33 04/04/2017 0850    BILITOT 0.4 04/04/2017 0850              Lab Results   Component Value Date    TSH 2.795 04/04/2017       No results found for: HGBA1C    ECGs reviewed  LABS reviewed  Imaging including Echoes reviewed    Assessment:     1. Paroxysmal atrial fibrillation    2. HTN (hypertension), benign    3. PVD (peripheral vascular disease)        Plan:   Patient now in sinus rhythm  Stop cardizem   Continue metoprolol 200 mg po daily   Stop plavix  Start coumadin and enroll in coumadin clinic  2d echo complete  Holter 48 hr to evaluate tachycardic burden just on metoprolol 200 mg po daily  F/u in 3 months

## 2017-04-05 NOTE — TELEPHONE ENCOUNTER
Was seen in ED earlier for palpitations and was given 60mg diltiazem and is wanting to know if they are suppose to keep her awake. She can't sleep, feels weak and still having palpitations. Took medication at 7 pm. Not sure what her heart rate is.Pt instructed to use b/p cuff to get current heart rate.

## 2017-04-06 DIAGNOSIS — I10 ESSENTIAL HYPERTENSION: Primary | ICD-10-CM

## 2017-04-10 ENCOUNTER — ANTI-COAG VISIT (OUTPATIENT)
Dept: CARDIOLOGY | Facility: CLINIC | Age: 67
End: 2017-04-10
Payer: MEDICARE

## 2017-04-10 DIAGNOSIS — Z79.01 LONG TERM (CURRENT) USE OF ANTICOAGULANTS: Primary | ICD-10-CM

## 2017-04-10 DIAGNOSIS — I48.0 PAROXYSMAL ATRIAL FIBRILLATION: ICD-10-CM

## 2017-04-10 LAB — INR PPP: 2.4 (ref 2–3)

## 2017-04-10 PROCEDURE — 85610 PROTHROMBIN TIME: CPT | Mod: QW,S$GLB,,

## 2017-04-10 PROCEDURE — 99211 OFF/OP EST MAY X REQ PHY/QHP: CPT | Mod: 25,S$GLB,,

## 2017-04-10 NOTE — PROGRESS NOTES
Patient presents for new patient education.  Written and verbal instructions given to the patient on the importance of follow-up monitoring, compliance issues, dietary restrictions, and potential for adverse drug reactions and interactions. All questions were answered to her satisfaction and understanding.  She intends to avoid high vit k foods.  She reports rare EtOH (about once every 6 months), infrequent cranberry juice (about once/month), and no grapefruit.  Medications reconciled.  She was counseled on increased bleeding risk with NSAIDs and that APAP is preferred.  She is currently taking APAP about daily.  Correction to prior note, patient was on plavix, not pradaxa.  She confirms stopping plavix as advised.  INR responding quickly with initiation of warfarin.  Hold and lower warfarin dose.  Repeat INR 4/13 with other appointments.  Patient advised to contact clinic with any changes, questions, or concerns.

## 2017-04-11 ENCOUNTER — HOSPITAL ENCOUNTER (OUTPATIENT)
Dept: CARDIOLOGY | Facility: HOSPITAL | Age: 67
Discharge: HOME | End: 2017-04-11
Attending: INTERNAL MEDICINE
Payer: MEDICARE

## 2017-04-11 DIAGNOSIS — I48.0 PAROXYSMAL ATRIAL FIBRILLATION: ICD-10-CM

## 2017-04-11 PROCEDURE — 93225 XTRNL ECG REC<48 HRS REC: CPT

## 2017-04-11 PROCEDURE — 93227 XTRNL ECG REC<48 HR R&I: CPT | Mod: ,,, | Performed by: INTERNAL MEDICINE

## 2017-04-13 ENCOUNTER — TELEPHONE (OUTPATIENT)
Dept: CARDIOLOGY | Facility: CLINIC | Age: 67
End: 2017-04-13

## 2017-04-13 ENCOUNTER — HOSPITAL ENCOUNTER (OUTPATIENT)
Dept: CARDIOLOGY | Facility: HOSPITAL | Age: 67
Discharge: HOME | End: 2017-04-13
Attending: INTERNAL MEDICINE
Payer: MEDICARE

## 2017-04-13 ENCOUNTER — ANTI-COAG VISIT (OUTPATIENT)
Dept: CARDIOLOGY | Facility: CLINIC | Age: 67
End: 2017-04-13

## 2017-04-13 DIAGNOSIS — I48.0 PAROXYSMAL ATRIAL FIBRILLATION: ICD-10-CM

## 2017-04-13 DIAGNOSIS — Z79.01 LONG TERM (CURRENT) USE OF ANTICOAGULANTS: ICD-10-CM

## 2017-04-13 LAB
DIASTOLIC DYSFUNCTION: NO
ESTIMATED PA SYSTOLIC PRESSURE: 25.66
MITRAL VALVE MOBILITY: NORMAL
RETIRED EF AND QEF - SEE NOTES: 55 (ref 55–65)
TRICUSPID VALVE REGURGITATION: NORMAL

## 2017-04-13 PROCEDURE — 93306 TTE W/DOPPLER COMPLETE: CPT

## 2017-04-13 PROCEDURE — 93306 TTE W/DOPPLER COMPLETE: CPT | Mod: 26,,, | Performed by: INTERNAL MEDICINE

## 2017-04-18 ENCOUNTER — LAB VISIT (OUTPATIENT)
Dept: LAB | Facility: HOSPITAL | Age: 67
End: 2017-04-18
Attending: INTERNAL MEDICINE
Payer: MEDICARE

## 2017-04-18 ENCOUNTER — ANTI-COAG VISIT (OUTPATIENT)
Dept: CARDIOLOGY | Facility: CLINIC | Age: 67
End: 2017-04-18

## 2017-04-18 DIAGNOSIS — Z79.01 LONG TERM (CURRENT) USE OF ANTICOAGULANTS: ICD-10-CM

## 2017-04-18 DIAGNOSIS — I48.0 PAROXYSMAL ATRIAL FIBRILLATION: ICD-10-CM

## 2017-04-18 LAB
INR PPP: 4.8
PROTHROMBIN TIME: 48.5 SEC

## 2017-04-18 PROCEDURE — 36415 COLL VENOUS BLD VENIPUNCTURE: CPT

## 2017-04-18 PROCEDURE — 85610 PROTHROMBIN TIME: CPT

## 2017-04-21 ENCOUNTER — TELEPHONE (OUTPATIENT)
Dept: CARDIOLOGY | Facility: CLINIC | Age: 67
End: 2017-04-21

## 2017-04-21 ENCOUNTER — ANTI-COAG VISIT (OUTPATIENT)
Dept: CARDIOLOGY | Facility: CLINIC | Age: 67
End: 2017-04-21

## 2017-04-21 ENCOUNTER — LAB VISIT (OUTPATIENT)
Dept: LAB | Facility: HOSPITAL | Age: 67
End: 2017-04-21
Attending: INTERNAL MEDICINE
Payer: MEDICARE

## 2017-04-21 DIAGNOSIS — Z79.01 LONG TERM (CURRENT) USE OF ANTICOAGULANTS: ICD-10-CM

## 2017-04-21 DIAGNOSIS — Z79.01 LONG TERM (CURRENT) USE OF ANTICOAGULANTS: Primary | ICD-10-CM

## 2017-04-21 DIAGNOSIS — I48.0 PAROXYSMAL ATRIAL FIBRILLATION: ICD-10-CM

## 2017-04-21 LAB
INR PPP: 5.5
PROTHROMBIN TIME: 54.7 SEC

## 2017-04-21 PROCEDURE — 85610 PROTHROMBIN TIME: CPT

## 2017-04-21 PROCEDURE — 36415 COLL VENOUS BLD VENIPUNCTURE: CPT

## 2017-04-21 RX ORDER — WARFARIN 1 MG/1
TABLET ORAL
Qty: 60 TABLET | Refills: 3 | Status: ON HOLD | OUTPATIENT
Start: 2017-04-21 | End: 2017-08-03 | Stop reason: HOSPADM

## 2017-04-21 NOTE — TELEPHONE ENCOUNTER
----- Message from Liv Cespedes sent at 4/21/2017 10:23 AM CDT -----  Contact: self/[phone]  Patient would like to speak with you about some concerns she has about Warfarin she is taking.  Please advise

## 2017-04-21 NOTE — TELEPHONE ENCOUNTER
Patient advised to stop Coumadin immediately per Dr. Hsieh and she verbalized understanding.  Patient complaining of pain in legs and abd since starting the coumadin.  Per Dr. Hsieh patient will come in to follow up with him.

## 2017-04-21 NOTE — PROGRESS NOTES
The pt appears to be retaining her coumadin and sensitive to dose changes.  I am asking her to hold her coumadin until a repeat INR can be done on 4/24 with at least two portions of greens.  I have sent in a 1mg tablet of warfarin to her pharmacy, as the 4mg tablet she has at home appears to be too large for her.

## 2017-04-24 ENCOUNTER — ANTI-COAG VISIT (OUTPATIENT)
Dept: CARDIOLOGY | Facility: CLINIC | Age: 67
End: 2017-04-24
Payer: MEDICARE

## 2017-04-24 DIAGNOSIS — Z79.01 LONG TERM (CURRENT) USE OF ANTICOAGULANTS: Primary | ICD-10-CM

## 2017-04-24 DIAGNOSIS — I48.0 PAROXYSMAL ATRIAL FIBRILLATION: ICD-10-CM

## 2017-04-24 LAB — INR PPP: 4.4 (ref 2–3)

## 2017-04-24 PROCEDURE — 99211 OFF/OP EST MAY X REQ PHY/QHP: CPT | Mod: 25,S$GLB,,

## 2017-04-24 PROCEDURE — 85610 PROTHROMBIN TIME: CPT | Mod: QW,S$GLB,,

## 2017-04-24 NOTE — PROGRESS NOTES
INR remains elevated but is improving.  Patient denies signs/sx of bleeding except gum bleeding when flossing.  She states her gums have bled before but are worse with elevated INR.  Patient did not eat greens but held warfarin as advised.  She picked up warfarin 1mg tablet and brought to clinic today for verification.  Hold warfarin x 2 more days then begin reduced dose.  Patient advised to eat 1-2 servings of greens prior to repeat INR 4/28.  Patient advised to contact clinic with any changes, questions, or concerns.

## 2017-04-25 ENCOUNTER — OFFICE VISIT (OUTPATIENT)
Dept: CARDIOLOGY | Facility: CLINIC | Age: 67
End: 2017-04-25
Payer: MEDICARE

## 2017-04-25 VITALS
SYSTOLIC BLOOD PRESSURE: 124 MMHG | OXYGEN SATURATION: 100 % | BODY MASS INDEX: 34.25 KG/M2 | WEIGHT: 252.88 LBS | DIASTOLIC BLOOD PRESSURE: 73 MMHG | HEIGHT: 72 IN | HEART RATE: 62 BPM

## 2017-04-25 DIAGNOSIS — R79.1 ELEVATED INR: ICD-10-CM

## 2017-04-25 DIAGNOSIS — M79.605 LEG PAIN, BILATERAL: ICD-10-CM

## 2017-04-25 DIAGNOSIS — M79.604 LEG PAIN, BILATERAL: ICD-10-CM

## 2017-04-25 DIAGNOSIS — I73.9 PVD (PERIPHERAL VASCULAR DISEASE): ICD-10-CM

## 2017-04-25 DIAGNOSIS — I10 HTN (HYPERTENSION), BENIGN: ICD-10-CM

## 2017-04-25 DIAGNOSIS — I48.0 PAROXYSMAL ATRIAL FIBRILLATION: Primary | ICD-10-CM

## 2017-04-25 DIAGNOSIS — Z79.01 LONG TERM (CURRENT) USE OF ANTICOAGULANTS: ICD-10-CM

## 2017-04-25 PROCEDURE — 1160F RVW MEDS BY RX/DR IN RCRD: CPT | Mod: S$GLB,,, | Performed by: INTERNAL MEDICINE

## 2017-04-25 PROCEDURE — 3074F SYST BP LT 130 MM HG: CPT | Mod: S$GLB,,, | Performed by: INTERNAL MEDICINE

## 2017-04-25 PROCEDURE — 1125F AMNT PAIN NOTED PAIN PRSNT: CPT | Mod: S$GLB,,, | Performed by: INTERNAL MEDICINE

## 2017-04-25 PROCEDURE — 99214 OFFICE O/P EST MOD 30 MIN: CPT | Mod: S$GLB,,, | Performed by: INTERNAL MEDICINE

## 2017-04-25 PROCEDURE — 3078F DIAST BP <80 MM HG: CPT | Mod: S$GLB,,, | Performed by: INTERNAL MEDICINE

## 2017-04-25 PROCEDURE — 99999 PR PBB SHADOW E&M-EST. PATIENT-LVL III: CPT | Mod: PBBFAC,,, | Performed by: INTERNAL MEDICINE

## 2017-04-25 PROCEDURE — 1159F MED LIST DOCD IN RCRD: CPT | Mod: S$GLB,,, | Performed by: INTERNAL MEDICINE

## 2017-04-25 NOTE — PROGRESS NOTES
Subjective:   Patient ID:  Jayla Reagan is a 67 y.o. female who presents for follow-up of Claudication      Problem List Items Addressed This Visit        Cardiac    Paroxysmal atrial fibrillation - Primary    HTN (hypertension), benign    PVD (peripheral vascular disease)       Other    Long term (current) use of anticoagulants      Other Visit Diagnoses     Elevated INR              HPI: Patient here for brief f/u after supr therapeutic INR. She had abdominal pain and tingling in extremities last week that have since improved after INR decreased. She is being followed by coumadin clinic. No chest pain or dyspnea. BP and HR is controlled.   She has been having pain in legs with exertion describe as aching that is usually brought on by walking 1-2 blocks. Pain is better with rest.     Review of Systems   Constitution: Negative.   HENT: Negative.    Eyes: Negative.    Cardiovascular: Negative.    Respiratory: Negative.    Endocrine: Negative.    Hematologic/Lymphatic: Negative.    Skin: Negative.    Musculoskeletal: Negative.    Gastrointestinal: Negative.    Neurological: Negative.        Patient's Medications   New Prescriptions    No medications on file   Previous Medications    ACETAMINOPHEN (TYLENOL) 650 MG TBSR    Take 650 mg by mouth 2 (two) times daily as needed.    ERGOCALCIFEROL (VITAMIN D2) 50,000 UNIT CAP    Take 1,000 Units by mouth every 7 days.    FEXOFENADINE-PSEUDOEPHEDRINE (ALLEGRA-D 24) 180-240 MG PER 24 HR TABLET    Take 1 tablet by mouth once daily.    FOSINOPRIL (MONOPRIL) 20 MG TABLET    Take 20 mg by mouth once daily.    METOPROLOL SUCCINATE (TOPROL-XL) 100 MG 24 HR TABLET    Take 100 mg by mouth 2 (two) times daily.    TRIAMTERENE-HYDROCHLOROTHIAZIDE 37.5-25 MG (MAXZIDE-25) 37.5-25 MG PER TABLET    Take 1 tablet by mouth once daily. Not sure of mg    WARFARIN (COUMADIN) 1 MG TABLET    Take 1-2 pills by mouth daily or as directed per the [facility]    WARFARIN (COUMADIN) 4 MG TABLET     Take 1 tablet (4 mg total) by mouth Daily.   Modified Medications    No medications on file   Discontinued Medications    No medications on file       Objective:   Physical Exam   Constitutional: She is oriented to person, place, and time. She appears well-developed and well-nourished. No distress.   Examination of the digits showed no clubbing or cyanosis   HENT:   Head: Normocephalic and atraumatic.   Eyes: Conjunctivae are normal. Pupils are equal, round, and reactive to light. Right eye exhibits no discharge.   Neck: Normal range of motion. Neck supple. No JVD present. No thyromegaly present.   No carotid bruits   Cardiovascular: Normal rate, regular rhythm, S1 normal, S2 normal, normal heart sounds, intact distal pulses and normal pulses.  PMI is not displaced.  Exam reveals no gallop, no friction rub and no opening snap.    No murmur heard.  Pulmonary/Chest: Effort normal and breath sounds normal. No respiratory distress. She has no wheezes. She has no rales. She exhibits no tenderness.   Abdominal: Soft. Bowel sounds are normal. She exhibits no distension and no mass. There is no tenderness. There is no guarding.   No hepatosplenomegaly   Musculoskeletal: Normal range of motion. She exhibits no edema or tenderness.   Lymphadenopathy:     She has no cervical adenopathy.   Neurological: She is alert and oriented to person, place, and time.   Skin: Skin is warm. No rash noted. She is not diaphoretic. No erythema.   Psychiatric: She has a normal mood and affect.   Nursing note and vitals reviewed.      ECGs reviewed-NSR  LABS reviewed- INR 4.4 on recent labs  Imaging including Echoes reviewed-normal    Assessment:     1. Paroxysmal atrial fibrillation    2. HTN (hypertension), benign    3. PVD (peripheral vascular disease)    4. Long term (current) use of anticoagulants    5. Elevated INR        Plan:     ABIs  Continue coumadin with coumadin clinic monitoring. If INR continues to be difficult to control will  switch to xarelto or eliquis.  Continue current medications  F/u in 4 months. Phone review of results.

## 2017-04-25 NOTE — MR AVS SNAPSHOT
[facility]  [address], [address]  [place] [address]  Phone: [phone]                  Jayla Reagan   2017 9:00 AM   Office Visit    Description:  Female : 1950   Provider:  Minh Hsieh MD   Department:  [facility]           Reason for Visit     Claudication           Diagnoses this Visit        Comments    Paroxysmal atrial fibrillation    -  Primary     HTN (hypertension), benign         PVD (peripheral vascular disease)         Long term (current) use of anticoagulants         Elevated INR         Leg pain, bilateral                To Do List           Future Appointments        Provider Department Dept Phone    2017 11:30 AM [facility] [place] - Cou[facility] [phone]    2017 9:00 AM Minh Hsieh MD [facility] [phone]      Goals (5 Years of Data)     None      [facility]     [facility] Care [facility] -  Assistance  Unless otherwise directed by your provider, please contact [facility], our nurse care line that is available for  assistance.     Registered nurses in the [facility] Center provide: appointment scheduling, clinical advisement, health education, and other advisory services.  Call: [phone] (toll free)               Medications           Message regarding Medications     Verify the changes and/or additions to your medication regime listed below are the same as discussed with your clinician today.  If any of these changes or additions are incorrect, please notify your healthcare provider.             Verify that the below list of medications is an accurate representation of the medications you are currently taking.  If none reported, the list may be blank. If incorrect, please contact your healthcare provider. Carry this list with you in case of emergency.           Current Medications     acetaminophen (TYLENOL) 650 MG TbSR Take 650 mg by mouth 2 (two) times daily as needed.     ergocalciferol (VITAMIN D2) 50,000 unit Cap Take 1,000 Units by mouth every 7 days.    fexofenadine-pseudoephedrine (ALLEGRA-D 24) 180-240 mg per 24 hr tablet Take 1 tablet by mouth once daily.    fosinopril (MONOPRIL) 20 MG tablet Take 20 mg by mouth once daily.    metoprolol succinate (TOPROL-XL) 100 MG 24 hr tablet Take 100 mg by mouth 2 (two) times daily.    triamterene-hydrochlorothiazide 37.5-25 mg (MAXZIDE-25) 37.5-25 mg per tablet Take 1 tablet by mouth once daily. Not sure of mg    warfarin (COUMADIN) 1 MG tablet Take 1-2 pills by mouth daily or as directed per the [facility] Clinic    warfarin (COUMADIN) 4 MG tablet Take 1 tablet (4 mg total) by mouth Daily.           Clinical Reference Information           Your Vitals Were     BP Pulse Height Weight SpO2 BMI    124/73 62 6' (1.829 m) 114.7 kg (252 lb 14.4 oz) 100% 34.3 kg/m2      Blood Pressure          Most Recent Value    BP  124/73      Allergies as of 4/25/2017     No Known Allergies      Immunizations Administered on Date of Encounter - 4/25/2017     None      Orders Placed During Today's Visit     Future Labs/Procedures Expected by Expires    Cardiology Lab Ankle Brachial Indices W Stress  As directed 4/25/2018      [facility] Sign-Up     Activating your [facility] account is as easy as 1-2-3!     1) Visit [email or web address], select Sign Up Now, enter this activation code and your date of birth, then select Next.  [email or web address]  Expires: 5/19/2017  1:58 PM      2) Create a username and password to use when you visit [facility] in the future and select a security question in case you lose your password and select Next.    3) Enter your e-mail address and click Sign Up!    Additional Information  If you have questions, please e-mail [email or web address] or call [phone] to talk to our [facility] staff. Remember, [facility] is NOT to be used for urgent needs. For medical emergencies, dial 911.         Language Assistance Services     ATTENTION:  Language assistance services are available, free of charge. Please call [phone].      ATENCIÓN: Si habla adriana, tiene a guadalupe disposición servicios gratuitos de asistencia lingüística. Llame al [phone].     CHÚ Ý: N?u b?n nói Ti?ng Vi?t, có các d?ch v? h? tr? ngôn ng? mi?n phí dành cho b?n. G?i s? [phone].         [facility] complies with applicable Federal civil rights laws and does not discriminate on the basis of race, color, national origin, age, disability, or sex.

## 2017-04-28 ENCOUNTER — ANTI-COAG VISIT (OUTPATIENT)
Dept: CARDIOLOGY | Facility: CLINIC | Age: 67
End: 2017-04-28
Payer: MEDICARE

## 2017-04-28 DIAGNOSIS — I48.0 PAROXYSMAL ATRIAL FIBRILLATION: ICD-10-CM

## 2017-04-28 DIAGNOSIS — Z79.01 LONG TERM (CURRENT) USE OF ANTICOAGULANTS: Primary | ICD-10-CM

## 2017-04-28 LAB — INR PPP: 2.4 (ref 2–3)

## 2017-04-28 PROCEDURE — 99211 OFF/OP EST MAY X REQ PHY/QHP: CPT | Mod: 25,S$GLB,,

## 2017-04-28 PROCEDURE — 85610 PROTHROMBIN TIME: CPT | Mod: QW,S$GLB,,

## 2017-04-28 NOTE — PROGRESS NOTES
"Per Dr. Hsieh "Continue coumadin with coumadin clinic monitoring. If INR continues to be difficult to control will switch to xarelto or eliquis."     Today is a quick follow-up following her supra-therapeutic INR 4/24. INR reduced nicely with holding warfarin this past week. She will begin a new weekly dose with close monitoring. I advised her to contact us with any changes or problems.  "

## 2017-05-01 ENCOUNTER — HOSPITAL ENCOUNTER (OUTPATIENT)
Dept: CARDIOLOGY | Facility: HOSPITAL | Age: 67
Discharge: HOME | End: 2017-05-01
Attending: INTERNAL MEDICINE
Payer: MEDICARE

## 2017-05-01 DIAGNOSIS — M79.605 LEG PAIN, BILATERAL: ICD-10-CM

## 2017-05-01 DIAGNOSIS — I73.9 PVD (PERIPHERAL VASCULAR DISEASE): ICD-10-CM

## 2017-05-01 DIAGNOSIS — M79.604 LEG PAIN, BILATERAL: ICD-10-CM

## 2017-05-01 PROCEDURE — 93924 LWR XTR VASC STDY BILAT: CPT | Mod: 26,,, | Performed by: INTERNAL MEDICINE

## 2017-05-01 PROCEDURE — 93924 LWR XTR VASC STDY BILAT: CPT

## 2017-05-02 ENCOUNTER — TELEPHONE (OUTPATIENT)
Dept: CARDIOLOGY | Facility: CLINIC | Age: 67
End: 2017-05-02

## 2017-05-02 LAB — VASCULAR ANKLE BRACHIAL INDEX (ABI) LEFT: 1.16 (ref 0.9–1.2)

## 2017-05-04 ENCOUNTER — ANTI-COAG VISIT (OUTPATIENT)
Dept: CARDIOLOGY | Facility: CLINIC | Age: 67
End: 2017-05-04

## 2017-05-04 ENCOUNTER — LAB VISIT (OUTPATIENT)
Dept: LAB | Facility: HOSPITAL | Age: 67
End: 2017-05-04
Attending: INTERNAL MEDICINE
Payer: MEDICARE

## 2017-05-04 DIAGNOSIS — I48.0 PAROXYSMAL ATRIAL FIBRILLATION: ICD-10-CM

## 2017-05-04 DIAGNOSIS — Z79.01 LONG TERM (CURRENT) USE OF ANTICOAGULANTS: ICD-10-CM

## 2017-05-04 LAB
INR PPP: 2.4
INR PPP: 2.4
PROTHROMBIN TIME: 24.3 SEC

## 2017-05-04 PROCEDURE — 36415 COLL VENOUS BLD VENIPUNCTURE: CPT

## 2017-05-04 PROCEDURE — 85610 PROTHROMBIN TIME: CPT

## 2017-05-10 ENCOUNTER — TELEPHONE (OUTPATIENT)
Dept: CARDIOLOGY | Facility: CLINIC | Age: 67
End: 2017-05-10

## 2017-05-10 NOTE — TELEPHONE ENCOUNTER
----- Message from Ava Barr sent at 5/10/2017 12:45 PM CDT -----  Contact: [phone]  Pt requesting her test results/leg pressure. Please advise.

## 2017-05-12 ENCOUNTER — ANTI-COAG VISIT (OUTPATIENT)
Dept: CARDIOLOGY | Facility: CLINIC | Age: 67
End: 2017-05-12
Payer: MEDICARE

## 2017-05-12 DIAGNOSIS — Z79.01 LONG TERM (CURRENT) USE OF ANTICOAGULANTS: Primary | ICD-10-CM

## 2017-05-12 DIAGNOSIS — I48.0 PAROXYSMAL ATRIAL FIBRILLATION: ICD-10-CM

## 2017-05-12 LAB
CTP QC/QA: YES
INR PPP: 3.1 (ref 2–3)
PROTHROMBIN TIME, POC: NORMAL (ref 2–3)

## 2017-05-12 PROCEDURE — 85610 PROTHROMBIN TIME: CPT | Mod: QW,S$GLB,, | Performed by: INTERNAL MEDICINE

## 2017-05-12 NOTE — PROGRESS NOTES
INR is slightly supra therapeutic today but increased quite a bit since last week with no changes in diet, medications or health. She does not incorporate greens into her diet. Due to the rise in INR and no greens, I am going to decrease her weekly regimen and follow up in 1 week. Patient advised to contact clinic with any changes, questions, or concerns.

## 2017-05-19 ENCOUNTER — ANTI-COAG VISIT (OUTPATIENT)
Dept: CARDIOLOGY | Facility: CLINIC | Age: 67
End: 2017-05-19
Payer: MEDICARE

## 2017-05-19 DIAGNOSIS — Z79.01 LONG TERM (CURRENT) USE OF ANTICOAGULANTS: Primary | ICD-10-CM

## 2017-05-19 DIAGNOSIS — I48.0 PAROXYSMAL ATRIAL FIBRILLATION: ICD-10-CM

## 2017-05-19 LAB — INR PPP: 3.1 (ref 2–3)

## 2017-05-19 PROCEDURE — 99211 OFF/OP EST MAY X REQ PHY/QHP: CPT | Mod: 25,S$GLB,,

## 2017-05-19 PROCEDURE — 85610 PROTHROMBIN TIME: CPT | Mod: QW,S$GLB,,

## 2017-05-19 NOTE — PROGRESS NOTES
INR remained slightly elevated therefore she will begin a reduced weekly dose. She denies any changes to warrant this INR. I advised her to contact us with any changes or problems.

## 2017-06-02 ENCOUNTER — ANTI-COAG VISIT (OUTPATIENT)
Dept: CARDIOLOGY | Facility: CLINIC | Age: 67
End: 2017-06-02
Payer: MEDICARE

## 2017-06-02 DIAGNOSIS — Z79.01 LONG TERM (CURRENT) USE OF ANTICOAGULANTS: Primary | ICD-10-CM

## 2017-06-02 DIAGNOSIS — I48.0 PAROXYSMAL ATRIAL FIBRILLATION: ICD-10-CM

## 2017-06-02 LAB — INR PPP: 2.6 (ref 2–3)

## 2017-06-02 PROCEDURE — 85610 PROTHROMBIN TIME: CPT | Mod: QW,S$GLB,,

## 2017-06-02 PROCEDURE — 99211 OFF/OP EST MAY X REQ PHY/QHP: CPT | Mod: 25,S$GLB,,

## 2017-06-02 NOTE — PROGRESS NOTES
Patient came today for a quick follow-up appointment from her supratherapeutic INR 5/19. Her INR reduced nicely within range. She will continue this dose until follow-up. She denies any complications today. I advised her to contact us with any changes or problems.

## 2017-06-23 ENCOUNTER — ANTI-COAG VISIT (OUTPATIENT)
Dept: CARDIOLOGY | Facility: CLINIC | Age: 67
End: 2017-06-23
Payer: MEDICARE

## 2017-06-23 DIAGNOSIS — I48.0 PAROXYSMAL ATRIAL FIBRILLATION: ICD-10-CM

## 2017-06-23 DIAGNOSIS — Z79.01 LONG TERM (CURRENT) USE OF ANTICOAGULANTS: Primary | ICD-10-CM

## 2017-06-23 LAB — INR PPP: 2.1 (ref 2–3)

## 2017-06-23 PROCEDURE — 85610 PROTHROMBIN TIME: CPT | Mod: QW,S$GLB,,

## 2017-06-23 NOTE — PROGRESS NOTES
INR remains therapeutic.  Patient denies changes to diet, medications, or health that would affect INR.  Patient will continue weekly warfarin regimen at this time.  Repeat INR in another 3 weeks to assess possible downward trend.  Patient advised to contact clinic with any changes, questions, or concerns.

## 2017-06-29 DIAGNOSIS — M70.62 GREATER TROCHANTERIC BURSITIS OF LEFT HIP: Primary | ICD-10-CM

## 2017-06-30 ENCOUNTER — NURSE TRIAGE (OUTPATIENT)
Dept: ADMINISTRATIVE | Facility: CLINIC | Age: 67
End: 2017-06-30

## 2017-06-30 NOTE — TELEPHONE ENCOUNTER
Reason for Disposition   Caller has medication question about med not prescribed by PCP and triager unable to answer question (e.g., compatibility with other med, storage)    Protocols used: ST MEDICATION QUESTION CALL-A-AH  pt started taking mucinex this AM/ now sees on box that if taking coumadin she should consult a physician    Advised pt to contact [facility] pharmacy for information--verbalized understanding    Sugey Gu RN

## 2017-07-14 ENCOUNTER — ANTI-COAG VISIT (OUTPATIENT)
Dept: CARDIOLOGY | Facility: CLINIC | Age: 67
End: 2017-07-14
Payer: MEDICARE

## 2017-07-14 DIAGNOSIS — Z79.01 LONG TERM (CURRENT) USE OF ANTICOAGULANTS: Primary | ICD-10-CM

## 2017-07-14 DIAGNOSIS — I48.0 PAROXYSMAL ATRIAL FIBRILLATION: ICD-10-CM

## 2017-07-14 LAB — INR PPP: 2 (ref 2–3)

## 2017-07-14 PROCEDURE — 99211 OFF/OP EST MAY X REQ PHY/QHP: CPT | Mod: 25,S$GLB,,

## 2017-07-14 PROCEDURE — 85610 PROTHROMBIN TIME: CPT | Mod: QW,S$GLB,,

## 2017-07-14 NOTE — PROGRESS NOTES
INR remains therapeutic.  Patient reports minimal bruising from use.  She is taking tramadol as needed and reports infrequent use.  Continue warfarin dose and continue to assess for possible downward trend.  INR previously elevated on warfarin 6.5 mg/week so will not adjust today.  Patient advised to contact clinic with any changes, questions, or concerns.

## 2017-08-02 ENCOUNTER — HOSPITAL ENCOUNTER (INPATIENT)
Facility: HOSPITAL | Age: 67
LOS: 1 days | Discharge: HOME | DRG: 310 | End: 2017-08-03
Attending: EMERGENCY MEDICINE | Admitting: INTERNAL MEDICINE
Payer: MEDICARE

## 2017-08-02 DIAGNOSIS — I48.91 ATRIAL FIBRILLATION: ICD-10-CM

## 2017-08-02 DIAGNOSIS — I48.0 PAROXYSMAL ATRIAL FIBRILLATION: Primary | ICD-10-CM

## 2017-08-02 DIAGNOSIS — R07.9 CHEST PAIN, UNSPECIFIED TYPE: ICD-10-CM

## 2017-08-02 LAB
ALBUMIN SERPL BCP-MCNC: 3.5 G/DL
ALP SERPL-CCNC: 84 U/L
ALT SERPL W/O P-5'-P-CCNC: 23 U/L
ANION GAP SERPL CALC-SCNC: 9 MMOL/L
AST SERPL-CCNC: 19 U/L
BASOPHILS # BLD AUTO: 0.01 K/UL
BASOPHILS NFR BLD: 0.2 %
BILIRUB SERPL-MCNC: 0.5 MG/DL
BNP SERPL-MCNC: 469 PG/ML
BUN SERPL-MCNC: 18 MG/DL
CALCIUM SERPL-MCNC: 9.1 MG/DL
CHLORIDE SERPL-SCNC: 104 MMOL/L
CO2 SERPL-SCNC: 24 MMOL/L
CREAT SERPL-MCNC: 1.2 MG/DL
DIFFERENTIAL METHOD: ABNORMAL
EOSINOPHIL # BLD AUTO: 0 K/UL
EOSINOPHIL NFR BLD: 0.5 %
ERYTHROCYTE [DISTWIDTH] IN BLOOD BY AUTOMATED COUNT: 17.4 %
EST. GFR  (AFRICAN AMERICAN): 54 ML/MIN/1.73 M^2
EST. GFR  (NON AFRICAN AMERICAN): 47 ML/MIN/1.73 M^2
GLUCOSE SERPL-MCNC: 122 MG/DL
HCT VFR BLD AUTO: 35.3 %
HGB BLD-MCNC: 11.8 G/DL
INR PPP: 1.9
LYMPHOCYTES # BLD AUTO: 1.4 K/UL
LYMPHOCYTES NFR BLD: 34.3 %
MCH RBC QN AUTO: 27.6 PG
MCHC RBC AUTO-ENTMCNC: 33.4 G/DL
MCV RBC AUTO: 83 FL
MONOCYTES # BLD AUTO: 0.5 K/UL
MONOCYTES NFR BLD: 12.3 %
NEUTROPHILS # BLD AUTO: 2.1 K/UL
NEUTROPHILS NFR BLD: 52.5 %
PLATELET # BLD AUTO: 298 K/UL
PMV BLD AUTO: 8.2 FL
POTASSIUM SERPL-SCNC: 3.9 MMOL/L
PROT SERPL-MCNC: 7.7 G/DL
PROTHROMBIN TIME: 20.1 SEC
RBC # BLD AUTO: 4.27 M/UL
SODIUM SERPL-SCNC: 137 MMOL/L
TROPONIN I SERPL DL<=0.01 NG/ML-MCNC: <0.006 NG/ML
TROPONIN I SERPL DL<=0.01 NG/ML-MCNC: <0.006 NG/ML
WBC # BLD AUTO: 4.05 K/UL

## 2017-08-02 PROCEDURE — 96365 THER/PROPH/DIAG IV INF INIT: CPT

## 2017-08-02 PROCEDURE — 85025 COMPLETE CBC W/AUTO DIFF WBC: CPT

## 2017-08-02 PROCEDURE — 85610 PROTHROMBIN TIME: CPT

## 2017-08-02 PROCEDURE — 99285 EMERGENCY DEPT VISIT HI MDM: CPT | Mod: 25

## 2017-08-02 PROCEDURE — 21400001 HC TELEMETRY ROOM

## 2017-08-02 PROCEDURE — 25000003 PHARM REV CODE 250: Performed by: EMERGENCY MEDICINE

## 2017-08-02 PROCEDURE — 84484 ASSAY OF TROPONIN QUANT: CPT

## 2017-08-02 PROCEDURE — 94761 N-INVAS EAR/PLS OXIMETRY MLT: CPT

## 2017-08-02 PROCEDURE — 96376 TX/PRO/DX INJ SAME DRUG ADON: CPT

## 2017-08-02 PROCEDURE — 99223 1ST HOSP IP/OBS HIGH 75: CPT | Mod: ,,, | Performed by: INTERNAL MEDICINE

## 2017-08-02 PROCEDURE — 83880 ASSAY OF NATRIURETIC PEPTIDE: CPT

## 2017-08-02 PROCEDURE — 93005 ELECTROCARDIOGRAM TRACING: CPT

## 2017-08-02 PROCEDURE — 96366 THER/PROPH/DIAG IV INF ADDON: CPT

## 2017-08-02 PROCEDURE — 80053 COMPREHEN METABOLIC PANEL: CPT

## 2017-08-02 RX ORDER — DILTIAZEM HCL/D5W 125 MG/125
10 PLASTIC BAG, INJECTION (ML) INTRAVENOUS CONTINUOUS
Status: DISCONTINUED | OUTPATIENT
Start: 2017-08-02 | End: 2017-08-03

## 2017-08-02 RX ORDER — DILTIAZEM HCL 1 MG/ML
10 INJECTION, SOLUTION INTRAVENOUS
Status: COMPLETED | OUTPATIENT
Start: 2017-08-02 | End: 2017-08-02

## 2017-08-02 RX ORDER — DILTIAZEM HYDROCHLORIDE 30 MG/1
60 TABLET, FILM COATED ORAL
Status: DISPENSED | OUTPATIENT
Start: 2017-08-02 | End: 2017-08-03

## 2017-08-02 RX ORDER — DILTIAZEM HCL 1 MG/ML
5 INJECTION, SOLUTION INTRAVENOUS
Status: COMPLETED | OUTPATIENT
Start: 2017-08-02 | End: 2017-08-02

## 2017-08-02 RX ORDER — DILTIAZEM HYDROCHLORIDE 5 MG/ML
20 INJECTION INTRAVENOUS
Status: COMPLETED | OUTPATIENT
Start: 2017-08-02 | End: 2017-08-02

## 2017-08-02 RX ORDER — TRAMADOL HYDROCHLORIDE 50 MG/1
50 TABLET ORAL EVERY 6 HOURS PRN
COMMUNITY
End: 2017-10-20

## 2017-08-02 RX ADMIN — DILTIAZEM HYDROCHLORIDE 20 MG: 5 INJECTION INTRAVENOUS at 12:08

## 2017-08-02 RX ADMIN — DILTIAZEM HYDROCHLORIDE 10 MG/HR: 5 INJECTION INTRAVENOUS at 03:08

## 2017-08-02 RX ADMIN — DILTIAZEM HYDROCHLORIDE 10 MG/HR: 5 INJECTION INTRAVENOUS at 06:08

## 2017-08-02 RX ADMIN — DILTIAZEM HYDROCHLORIDE 10 MG/HR: 5 INJECTION INTRAVENOUS at 10:08

## 2017-08-02 RX ADMIN — DILTIAZEM HYDROCHLORIDE 20 MG: 5 INJECTION INTRAVENOUS at 01:08

## 2017-08-02 RX ADMIN — DILTIAZEM HYDROCHLORIDE 5 MG/HR: 5 INJECTION INTRAVENOUS at 01:08

## 2017-08-02 NOTE — ED TRIAGE NOTES
"Pt has hx of A fib on coumadin, complains of SOB with fatigue that began last night with "chest heaviness" pt states felt " like gas, then went to sleep woke up with continued chest pressure took gas ex with no relief, increase in SOB with activity.   "

## 2017-08-02 NOTE — ED PROVIDER NOTES
Encounter Date: 8/2/2017       History     Chief Complaint   Patient presents with    Shortness of Breath     Onset last night, worse this morning with heaviness in chest     68 y/o F with pmhx of paroxysmal atrial fib presents with palpitations and chest pressure that began at 2300 last night.  Patient took an extra dose of her metoprolol at 0000 to treat the palpitations and then went to sleep.  Symptoms have been constant and have worsened with exertion.  Chest pain is described as mild pressure, well localized over sternal region of anterior chest and non-radiating.  She felt the symptoms may be due to gas so she took a gas-ex this morning with no relief.   Pt reports fatigue that is exacerbated with exertion.  She denies cough, sob, hemoptysis.  No abd pain.  No BLE swelling.  Pt is on chronic coumadin and has not recently had any change in meds.            Review of patient's allergies indicates:  No Known Allergies  Past Medical History:   Diagnosis Date    Atrial fibrillation     Cataract     Hypertension     PAD (peripheral artery disease)     Peripheral artery disease      Past Surgical History:   Procedure Laterality Date    BREAST SURGERY      CHOLECYSTECTOMY      ECTOPIC PREGNANCY SURGERY      SALPINGECTOMY       Family History   Problem Relation Age of Onset    Cataracts Mother     Cataracts Sister      Social History   Substance Use Topics    Smoking status: Former Smoker    Smokeless tobacco: Never Used    Alcohol use No     Review of Systems   Constitutional: Positive for fatigue. Negative for activity change, appetite change, chills, diaphoresis and fever.   HENT: Negative for congestion and sore throat.    Eyes: Negative for pain and redness.   Respiratory: Positive for chest tightness. Negative for cough and shortness of breath.    Cardiovascular: Positive for chest pain and palpitations. Negative for leg swelling.   Gastrointestinal: Negative for abdominal pain, diarrhea, nausea  and vomiting.   Endocrine: Negative for polydipsia.   Genitourinary: Negative for difficulty urinating and dysuria.   Musculoskeletal: Negative for back pain and neck pain.   Skin: Negative for pallor and rash.   Allergic/Immunologic: Negative for immunocompromised state.   Neurological: Negative for dizziness, syncope, weakness and headaches.   Hematological: Bruises/bleeds easily.        On coumadin   Psychiatric/Behavioral: Negative for agitation and confusion.   All other systems reviewed and are negative.      Physical Exam     Initial Vitals   BP Pulse Resp Temp SpO2   08/02/17 1159 08/02/17 1134 08/02/17 1134 08/02/17 1134 08/02/17 1134   139/87 108 19 97.6 °F (36.4 °C) 98 %      MAP       08/02/17 1159       104.33         Physical Exam    Nursing note and vitals reviewed.  Constitutional: She appears well-developed and well-nourished. She is not diaphoretic. No distress.   HENT:   Head: Normocephalic and atraumatic.   Mouth/Throat: Oropharynx is clear and moist.   Eyes: Conjunctivae and EOM are normal. No scleral icterus.   Neck: Normal range of motion. Neck supple.   Cardiovascular: Normal heart sounds. Exam reveals no gallop and no friction rub.    No murmur heard.  Atrial fib at 135 bpm   Pulmonary/Chest: Breath sounds normal. No respiratory distress. She has no wheezes. She has no rhonchi. She has no rales.   Abdominal: Soft. Bowel sounds are normal. She exhibits no distension. There is no tenderness. There is no rebound and no guarding.   Musculoskeletal: Normal range of motion. She exhibits no edema.   Lymphadenopathy:     She has no cervical adenopathy.   Neurological: She is alert and oriented to person, place, and time. She has normal strength.   Skin: Skin is warm and dry. No rash noted. No erythema.   Psychiatric: She has a normal mood and affect. Her behavior is normal. Judgment and thought content normal.         ED Course   Critical Care  Date/Time: 8/2/2017 3:36 PM  Performed by: JORGE LUIS  YUSUF KAPLAN  Authorized by: YUSUF KANG   Direct patient critical care time: 15 minutes  Additional history critical care time: 5 minutes  Ordering / reviewing critical care time: 5 minutes  Documentation critical care time: 5 minutes  Consulting other physicians critical care time: 5 minutes  Consult with family critical care time: 5 minutes  Total critical care time (exclusive of procedural time) : 40 minutes  Critical care time was exclusive of separately billable procedures and treating other patients and teaching time.  Critical care was necessary to treat or prevent imminent or life-threatening deterioration of the following conditions: cardiac failure.  Critical care was time spent personally by me on the following activities: discussions with consultants, evaluation of patient's response to treatment, obtaining history from patient or surrogate, ordering and review of laboratory studies, pulse oximetry, review of old charts, re-evaluation of patient's condition, ordering and review of radiographic studies, ordering and performing treatments and interventions, examination of patient, development of treatment plan with patient or surrogate and interpretation of cardiac output measurements.        Labs Reviewed   CBC W/ AUTO DIFFERENTIAL   COMPREHENSIVE METABOLIC PANEL   TROPONIN I   TROPONIN I   B-TYPE NATRIURETIC PEPTIDE   PROTIME-INR     EKG Readings: (Independently Interpreted)   Initial Reading: No STEMI. Rhythm: Atrial Fibrillation. Heart Rate: 144. Ectopy: No Ectopy. Clinical Impression: Atrial Fibrillation with RVR          Medical Decision Making:   Initial Assessment:   66 y/o F with pmhx of paroxysmal atrial fib presents with palpitations and chest pressure that began at 2300 last night.  Patient took an extra dose of her metoprolol at 0000 to treat the palpitations and then went to sleep.  Symptoms have been constant and have worsened with exertion.  Chest pain is described as mild  pressure, well localized over sternal region of anterior chest and non-radiating.  She felt the symptoms may be due to gas so she took a gas-ex this morning with no relief.   Pt reports fatigue that is exacerbated with exertion.  She denies cough, sob, hemoptysis.  No abd pain.  No BLE swelling.  Pt is on chronic coumadin and has not recently had any change in meds.      On exam, pt is in no distress, atrial fib at 135 on the  Monitor.          Differential Diagnosis:   DDX: paroxysmal atrial fib, ACS, metabolic derangement, anemia  Independently Interpreted Test(s):   I have ordered and independently interpreted X-rays - see prior notes.  I have ordered and independently interpreted EKG Reading(s) - see prior notes  Clinical Tests:   Lab Tests: Ordered and Reviewed  The following lab test(s) were unremarkable: CBC, CMP and Troponin       <> Summary of Lab: Mildly elevated BNP  Radiological Study: Ordered and Reviewed  Medical Tests: Ordered and Reviewed  ED Management:  Pt treated with IV cardizem bolus in ED and po cardizem, she converted to NSR.  She reports improvement but not resolution of chest pressure.  On repeat EKG, pt is back in atrial fib and will be started on cardizem drip.     After a bolus and drip the patient HR is not controlled.  A second bolus given and the drip is restarted.  She is now controlled with HR of 100 and b/p of 100/ 60.  Pt states that she continues to have intermittent palpitations and very mild chest pressure.  No sob.  Vitals stable.  I have consulted Dr. Mccall (on call for Dr. Hsieh).  He is in a procedure but will call back.  Pt information given to nurse.                    ED Course     Clinical Impression:   The primary encounter diagnosis was Paroxysmal atrial fibrillation. Diagnoses of Atrial fibrillation and Chest pain, unspecified type were also pertinent to this visit.    Disposition:   Disposition: Admitted  Condition: Stable                        Ronna VALENZUELABowen  MD [place]  08/02/17 1536       Ronna Segura MD  08/02/17 6501

## 2017-08-02 NOTE — ED NOTES
Bed rails are up and call light is within patient reach.  Pt aware of plan of care to be admitted into hospital

## 2017-08-03 ENCOUNTER — ANTI-COAG VISIT (OUTPATIENT)
Dept: CARDIOLOGY | Facility: CLINIC | Age: 67
End: 2017-08-03

## 2017-08-03 VITALS
HEART RATE: 57 BPM | DIASTOLIC BLOOD PRESSURE: 53 MMHG | SYSTOLIC BLOOD PRESSURE: 100 MMHG | BODY MASS INDEX: 32.1 KG/M2 | WEIGHT: 237 LBS | TEMPERATURE: 98 F | HEIGHT: 72 IN | OXYGEN SATURATION: 97 % | RESPIRATION RATE: 18 BRPM

## 2017-08-03 DIAGNOSIS — I48.0 PAROXYSMAL ATRIAL FIBRILLATION: ICD-10-CM

## 2017-08-03 DIAGNOSIS — Z79.01 LONG TERM (CURRENT) USE OF ANTICOAGULANTS: ICD-10-CM

## 2017-08-03 PROCEDURE — 94761 N-INVAS EAR/PLS OXIMETRY MLT: CPT

## 2017-08-03 PROCEDURE — 99239 HOSP IP/OBS DSCHRG MGMT >30: CPT | Mod: ,,, | Performed by: INTERNAL MEDICINE

## 2017-08-03 PROCEDURE — 25000003 PHARM REV CODE 250: Performed by: INTERNAL MEDICINE

## 2017-08-03 PROCEDURE — 93005 ELECTROCARDIOGRAM TRACING: CPT

## 2017-08-03 PROCEDURE — 25000003 PHARM REV CODE 250: Performed by: NURSE PRACTITIONER

## 2017-08-03 RX ORDER — METOPROLOL SUCCINATE 50 MG/1
100 TABLET, EXTENDED RELEASE ORAL 2 TIMES DAILY
Status: DISCONTINUED | OUTPATIENT
Start: 2017-08-03 | End: 2017-08-03 | Stop reason: HOSPADM

## 2017-08-03 RX ORDER — ACETAMINOPHEN 325 MG/1
650 TABLET ORAL EVERY 8 HOURS PRN
Status: DISCONTINUED | OUTPATIENT
Start: 2017-08-03 | End: 2017-08-03 | Stop reason: HOSPADM

## 2017-08-03 RX ADMIN — APIXABAN 5 MG: 5 TABLET, FILM COATED ORAL at 09:08

## 2017-08-03 RX ADMIN — ACETAMINOPHEN 650 MG: 325 TABLET ORAL at 11:08

## 2017-08-03 RX ADMIN — METOPROLOL SUCCINATE 100 MG: 50 TABLET, EXTENDED RELEASE ORAL at 09:08

## 2017-08-03 NOTE — PLAN OF CARE
Future Appointments  Date Time Provider Department Center   8/4/2017 10:45 AM [facility] [place] [facility] [place]   8/14/2017 2:00 PM Adriana Gaviria PT [facility] OP [facility] [place]   8/15/2017 10:20 AM Minh Hsieh MD [facility] CARDIO [place] [facility]   8/29/2017 9:20 AM Minh Hsieh MD [facility] C[facility]IO [place] [facility]   9/11/2017 3:40 PM Nico Lagunas MD [facility] ARRHYT [place] [facility]          08/03/17 1146   Final Note   Assessment Type Final Discharge Note   Discharge Disposition Home   Discharge planning education complete? Yes   Hospital Follow Up  Appt(s) scheduled? Yes   Discharge plans and expectations educations in teach back method with documentation complete? Yes   Offered [facility] Bedside Delivery? Yes   Discharge/Hospital Encounter Summary to (non[facility]) PCP No   Referral to Outpatient Case Management complete? No   Referral to / orders for Home Health Complete? No   30 day supply of medicines given at discharge, if documented non-compliance / non-adherence? No   Any social issues identified prior to discharge? No   Right Care Referral Info   Post Acute Recommendation No Care     Yaima Howard, RN, CCM, CMSRN  RN Transition Navigator  [phone]

## 2017-08-03 NOTE — PLAN OF CARE
Problem: Cardiac: Heart Failure (Adult)  Intervention: Support Psychosocial Response to Heart Failure  Cardizem drip dc'd bp dropped  To  90/48.  MD informed.  No new orders.  Pt voiding and stating she does not have the chest heaviness she had before.  Ambulating in room.  Daughter bedside through night.  Pt afib on telemetery with HR in the 70's.  Will continue to monitor.

## 2017-08-03 NOTE — SUBJECTIVE & OBJECTIVE
Past Medical History:   Diagnosis Date    Atrial fibrillation     Cataract     Hypertension     PAD (peripheral artery disease)     Peripheral artery disease        Past Surgical History:   Procedure Laterality Date    BREAST SURGERY      CHOLECYSTECTOMY      ECTOPIC PREGNANCY SURGERY      SALPINGECTOMY         Review of patient's allergies indicates:  No Known Allergies    No current facility-administered medications on file prior to encounter.      Current Outpatient Prescriptions on File Prior to Encounter   Medication Sig    acetaminophen (TYLENOL) 650 MG TbSR Take 650 mg by mouth 2 (two) times daily as needed.    ergocalciferol (VITAMIN D2) 50,000 unit Cap Take 1,000 Units by mouth every 7 days.    fexofenadine-pseudoephedrine (ALLEGRA-D 24) 180-240 mg per 24 hr tablet Take 1 tablet by mouth once daily.    fosinopril (MONOPRIL) 20 MG tablet Take 20 mg by mouth once daily.    metoprolol succinate (TOPROL-XL) 100 MG 24 hr tablet Take 100 mg by mouth 2 (two) times daily.    triamterene-hydrochlorothiazide 37.5-25 mg (MAXZIDE-25) 37.5-25 mg per tablet Take 1 tablet by mouth once daily. Not sure of mg    [DISCONTINUED] warfarin (COUMADIN) 1 MG tablet Take 1-2 pills by mouth daily or as directed per the [facility] (Patient taking differently: Take 1-2 pills by mouth daily or as directed per the [facility].  1mg daily, 0.5mg on M&F)     Family History     Problem Relation (Age of Onset)    Cataracts Mother, Sister        Social History Main Topics    Smoking status: Former Smoker    Smokeless tobacco: Never Used    Alcohol use No    Drug use: Unknown    Sexual activity: Not on file     Review of Systems   Constitution: Positive for malaise/fatigue.   HENT: Negative.    Eyes: Negative.    Cardiovascular: Positive for chest pain (resolved), irregular heartbeat and palpitations. Negative for leg swelling, orthopnea and syncope.   Respiratory: Negative.    Endocrine: Negative.     Hematologic/Lymphatic: Negative.    Skin: Negative.    Musculoskeletal: Negative.    Gastrointestinal: Positive for bloating and flatus.   Neurological: Negative.    Psychiatric/Behavioral: Negative.      Objective:     Vital Signs (Most Recent):  Temp: 98.6 °F (37 °C) (08/03/17 0800)  Pulse: 68 (08/03/17 0915)  Resp: 18 (08/03/17 0915)  BP: (!) 113/55 (08/03/17 0800)  SpO2: 100 % (08/03/17 0836) Vital Signs (24h Range):  Temp:  [97.6 °F (36.4 °C)-98.8 °F (37.1 °C)] 98.6 °F (37 °C)  Pulse:  [] 68  Resp:  [13-24] 18  SpO2:  [93 %-100 %] 100 %  BP: ()/(48-87) 113/55     Weight: 107.5 kg (237 lb)  Body mass index is 32.14 kg/m².    SpO2: 100 %  O2 Device (Oxygen Therapy): room air      Intake/Output Summary (Last 24 hours) at 08/03/17 1126  Last data filed at 08/03/17 0728   Gross per 24 hour   Intake              180 ml   Output                0 ml   Net              180 ml       Lines/Drains/Airways     Peripheral Intravenous Line                 Peripheral IV - Single Lumen 08/02/17 1204 Right Forearm less than 1 day                Physical Exam   Constitutional: She is oriented to person, place, and time. She appears well-developed and well-nourished. No distress.   HENT:   Head: Normocephalic and atraumatic.   Eyes: Right eye exhibits no discharge. Left eye exhibits no discharge.   Neck: No JVD present.   Cardiovascular: Normal rate, regular rhythm and normal heart sounds.    Pulmonary/Chest: Effort normal and breath sounds normal.   Abdominal: Soft. Bowel sounds are normal.   Musculoskeletal: She exhibits no edema.   Neurological: She is alert and oriented to person, place, and time.   Skin: Skin is warm and dry. She is not diaphoretic.   Psychiatric: She has a normal mood and affect. Her behavior is normal. Judgment and thought content normal.       Significant Labs:   BMP:   Recent Labs  Lab 08/02/17  1204   *      K 3.9      CO2 24   BUN 18   CREATININE 1.2   CALCIUM 9.1   ,  CMP   Recent Labs  Lab 08/02/17  1204      K 3.9      CO2 24   *   BUN 18   CREATININE 1.2   CALCIUM 9.1   PROT 7.7   ALBUMIN 3.5   BILITOT 0.5   ALKPHOS 84   AST 19   ALT 23   ANIONGAP 9   ESTGFRAFRICA 54*   EGFRNONAA 47*   , CBC   Recent Labs  Lab 08/02/17  1204   WBC 4.05   HGB 11.8*   HCT 35.3*      , INR   Recent Labs  Lab 08/02/17  1204   INR 1.9*   , Troponin   Recent Labs  Lab 08/02/17  1204 08/02/17  1455   TROPONINI <0.006 <0.006    and All pertinent lab results from the last 24 hours have been reviewed.    Significant Imaging: Echocardiogram:   2D echo with color flow doppler:   Results for orders placed or performed during the hospital encounter of 04/13/17   2D Echo w/ Color Flow Doppler   Result Value Ref Range    EF 55 55 - 65    Diastolic Dysfunction No     Est. PA Systolic Pressure 25.66     Mitral Valve Mobility NORMAL     Tricuspid Valve Regurgitation TRIVIAL     and X-Ray: CXR: X-Ray Chest 1 View (CXR): No results found for this visit on 08/02/17.

## 2017-08-03 NOTE — H&P
[facility]  Cardiology  History and Physical     Patient Name: Jayla Reagan  MRN: [id number]  Admission Date: 8/2/2017  Code Status: Full Code   Attending Provider: Nasim Mccall MD   Primary Care Physician: Rambo Palmer MD  Principal Problem:Paroxysmal atrial fibrillation    Patient information was obtained from patient, past medical records and ER records.     Subjective:     Chief Complaint:  Palpitations and chest pressure      HPI:  68 y/o F with pmhx of paroxysmal atrial fib presented to the ED with palpitations and chest pressure that began at 2300 last night.  Patient took an extra dose of her metoprolol at 0000 to treat the palpitations and then went to sleep.   Symptoms worsen with exertion. Chest pain is described as mild pressure, well localized over sternal region of anterior chest and non-radiating. Symptoms are similar to AF RVR symptoms experienced in April 2017.   She denies cough, sob, diaphoresis, NV.   No BLE swelling.   CHADVASC 3 points. She takes Coumadin and has a labile INR. INR 1.9 today. ECG in ED noted AF RVR. She was given cardizem orally and IV with conversion to SR but AF returned. Patient was placed on a cardizem gtt and admitted to tele overnight. Patient became hypotensive this AM and cardizem was held. She spontaneously converted to SR at 0800 this morning and has maintained SR since that time.      Past Medical History:   Diagnosis Date    Atrial fibrillation     Cataract     Hypertension     PAD (peripheral artery disease)     Peripheral artery disease        Past Surgical History:   Procedure Laterality Date    BREAST SURGERY      CHOLECYSTECTOMY      ECTOPIC PREGNANCY SURGERY      SALPINGECTOMY         Review of patient's allergies indicates:  No Known Allergies    No current facility-administered medications on file prior to encounter.      Current Outpatient Prescriptions on File Prior to Encounter   Medication Sig    acetaminophen  (TYLENOL) 650 MG TbSR Take 650 mg by mouth 2 (two) times daily as needed.    ergocalciferol (VITAMIN D2) 50,000 unit Cap Take 1,000 Units by mouth every 7 days.    fexofenadine-pseudoephedrine (ALLEGRA-D 24) 180-240 mg per 24 hr tablet Take 1 tablet by mouth once daily.    fosinopril (MONOPRIL) 20 MG tablet Take 20 mg by mouth once daily.    metoprolol succinate (TOPROL-XL) 100 MG 24 hr tablet Take 100 mg by mouth 2 (two) times daily.    triamterene-hydrochlorothiazide 37.5-25 mg (MAXZIDE-25) 37.5-25 mg per tablet Take 1 tablet by mouth once daily. Not sure of mg    [DISCONTINUED] warfarin (COUMADIN) 1 MG tablet Take 1-2 pills by mouth daily or as directed per the [facility] (Patient taking differently: Take 1-2 pills by mouth daily or as directed per the [facility].  1mg daily, 0.5mg on M&F)     Family History     Problem Relation (Age of Onset)    Cataracts Mother, Sister        Social History Main Topics    Smoking status: Former Smoker    Smokeless tobacco: Never Used    Alcohol use No    Drug use: Unknown    Sexual activity: Not on file     Review of Systems   Constitution: Positive for malaise/fatigue.   HENT: Negative.    Eyes: Negative.    Cardiovascular: Positive for chest pain (resolved), irregular heartbeat and palpitations. Negative for leg swelling, orthopnea and syncope.   Respiratory: Negative.    Endocrine: Negative.    Hematologic/Lymphatic: Negative.    Skin: Negative.    Musculoskeletal: Negative.    Gastrointestinal: Positive for bloating and flatus.   Neurological: Negative.    Psychiatric/Behavioral: Negative.      Objective:     Vital Signs (Most Recent):  Temp: 98.6 °F (37 °C) (08/03/17 0800)  Pulse: 68 (08/03/17 0915)  Resp: 18 (08/03/17 0915)  BP: (!) 113/55 (08/03/17 0800)  SpO2: 100 % (08/03/17 0836) Vital Signs (24h Range):  Temp:  [97.6 °F (36.4 °C)-98.8 °F (37.1 °C)] 98.6 °F (37 °C)  Pulse:  [] 68  Resp:  [13-24] 18  SpO2:  [93 %-100 %] 100 %  BP:  ()/(48-87) 113/55     Weight: 107.5 kg (237 lb)  Body mass index is 32.14 kg/m².    SpO2: 100 %  O2 Device (Oxygen Therapy): room air      Intake/Output Summary (Last 24 hours) at 08/03/17 1126  Last data filed at 08/03/17 0728   Gross per 24 hour   Intake              180 ml   Output                0 ml   Net              180 ml       Lines/Drains/Airways     Peripheral Intravenous Line                 Peripheral IV - Single Lumen 08/02/17 1204 Right Forearm less than 1 day                Physical Exam   Constitutional: She is oriented to person, place, and time. She appears well-developed and well-nourished. No distress.   HENT:   Head: Normocephalic and atraumatic.   Eyes: Right eye exhibits no discharge. Left eye exhibits no discharge.   Neck: No JVD present.   Cardiovascular: Normal rate, regular rhythm and normal heart sounds.    Pulmonary/Chest: Effort normal and breath sounds normal.   Abdominal: Soft. Bowel sounds are normal.   Musculoskeletal: She exhibits no edema.   Neurological: She is alert and oriented to person, place, and time.   Skin: Skin is warm and dry. She is not diaphoretic.   Psychiatric: She has a normal mood and affect. Her behavior is normal. Judgment and thought content normal.       Significant Labs:   BMP:   Recent Labs  Lab 08/02/17  1204   *      K 3.9      CO2 24   BUN 18   CREATININE 1.2   CALCIUM 9.1   , CMP   Recent Labs  Lab 08/02/17  1204      K 3.9      CO2 24   *   BUN 18   CREATININE 1.2   CALCIUM 9.1   PROT 7.7   ALBUMIN 3.5   BILITOT 0.5   ALKPHOS 84   AST 19   ALT 23   ANIONGAP 9   ESTGFRAFRICA 54*   EGFRNONAA 47*   , CBC   Recent Labs  Lab 08/02/17  1204   WBC 4.05   HGB 11.8*   HCT 35.3*      , INR   Recent Labs  Lab 08/02/17  1204   INR 1.9*   , Troponin   Recent Labs  Lab 08/02/17  1204 08/02/17  1455   TROPONINI <0.006 <0.006    and All pertinent lab results from the last 24 hours have been reviewed.    Significant  Imaging: Echocardiogram:   2D echo with color flow doppler:   Results for orders placed or performed during the hospital encounter of 04/13/17   2D Echo w/ Color Flow Doppler   Result Value Ref Range    EF 55 55 - 65    Diastolic Dysfunction No     Est. PA Systolic Pressure 25.66     Mitral Valve Mobility NORMAL     Tricuspid Valve Regurgitation TRIVIAL     and X-Ray: CXR: X-Ray Chest 1 View (CXR): No results found for this visit on 08/02/17.    Assessment and Plan:     * Paroxysmal atrial fibrillation    Patient presented with AF RVR, 2nd episode since dx in April. She was symptomatic with chest pressure and hypotensive. On Toprol  mg BID and has not tolerated Cardizem in the past; developed hypotension overnight on cardizem gtt- discontinued. INR minimally subtherapeutic at 1.9; INR labile in the past; Coumadin discontinued and Eliquis initiated; Patient spontaneously converted to SR at 0800. Tolerating ambulation and appropriate for discharge. Will have patient follow up with Dr Hsieh and evaluated by Dr Lagunas for AF ablation consideration            VTE Risk Mitigation         Ordered     apixaban tablet 5 mg  2 times daily     Route:  Oral        08/03/17 0843          Carlos Alberto Galeas NP  Cardiology   [facility]

## 2017-08-03 NOTE — HOSPITAL COURSE
Patient admitted with AF RVR and has converted to SR as of 0800. Cardizem poorly tolerated with hypotensive episodes and was discontinued. History of labile INR on Coumadin- discontinued and started on Eliquis. Troponin negative x 3. ECG without acute ischemic changes. She was found appropriate for discharge to follow up with Dr Hsieh and Dr Lagunas for AF ablation consideration.

## 2017-08-03 NOTE — DISCHARGE SUMMARY
[facility]  Cardiology  Discharge Summary      Patient Name: Jayla Reagan  MRN: [id number]  Admission Date: 8/2/2017  Hospital Length of Stay: 1 days  Discharge Date and Time:  08/03/2017 11:35 AM  Attending Physician: Nasim Mccall MD    Discharging Provider: Carlos Alberto Galeas NP  Primary Care Physician: Rambo Palmer MD    HPI:   66 y/o F with pmhx of paroxysmal atrial fib presented to the ED with palpitations and chest pressure that began at 2300 last night.  Patient took an extra dose of her metoprolol at 0000 to treat the palpitations and then went to sleep.   Symptoms worsen with exertion. Chest pain is described as mild pressure, well localized over sternal region of anterior chest and non-radiating. Symptoms are similar to AF RVR symptoms experienced in April 2017.   She denies cough, sob, diaphoresis, NV.   No BLE swelling.   CHADVASC 3 points. She takes Coumadin and has a labile INR. INR 1.9 today. ECG in ED noted AF RVR. She was given cardizem orally and IV with conversion to SR but AF returned. Patient was placed on a cardizem gtt and admitted to tele overnight. Patient became hypotensive this AM and cardizem was held. She spontaneously converted to SR at 0800 this morning and has maintained SR since that time.      * No surgery found *     Indwelling Lines/Drains at time of discharge:  Lines/Drains/Airways          No matching active lines, drains, or airways          Hospital Course:  Patient admitted with AF RVR and has converted to SR as of 0800. Cardizem poorly tolerated with hypotensive episodes and was discontinued. History of labile INR on Coumadin- discontinued and started on Eliquis. Troponin negative x 3. ECG without acute ischemic changes. She was found appropriate for discharge to follow up with Dr Hsieh and Dr Lagunas for AF ablation consideration.     Consults:     Significant Diagnostic Studies: Labs:   BMP:   Recent Labs  Lab 08/02/17  1204   *   NA  137   K 3.9      CO2 24   BUN 18   CREATININE 1.2   CALCIUM 9.1   , CMP   Recent Labs  Lab 08/02/17  1204      K 3.9      CO2 24   *   BUN 18   CREATININE 1.2   CALCIUM 9.1   PROT 7.7   ALBUMIN 3.5   BILITOT 0.5   ALKPHOS 84   AST 19   ALT 23   ANIONGAP 9   ESTGFRAFRICA 54*   EGFRNONAA 47*   , CBC   Recent Labs  Lab 08/02/17  1204   WBC 4.05   HGB 11.8*   HCT 35.3*      , INR   Lab Results   Component Value Date    INR 1.9 (H) 08/02/2017    INR 2.0 07/14/2017    INR 2.1 06/23/2017   , Troponin   Recent Labs  Lab 08/02/17  1455   TROPONINI <0.006    and All labs within the past 24 hours have been reviewed  Radiology: X-Ray: CXR: X-Ray Chest 1 View (CXR): No results found for this visit on 08/02/17.  Cardiac Graphics: Echocardiogram:   2D echo with color flow doppler:   Results for orders placed or performed during the hospital encounter of 04/13/17   2D Echo w/ Color Flow Doppler   Result Value Ref Range    EF 55 55 - 65    Diastolic Dysfunction No     Est. PA Systolic Pressure 25.66     Mitral Valve Mobility NORMAL     Tricuspid Valve Regurgitation TRIVIAL        Pending Diagnostic Studies:     Procedure Component Value Units Date/Time    EKG 12-lead [[id number]]     Order Status:  Sent Lab Status:  No result           Final Active Diagnoses:    Diagnosis Date Noted POA    PRINCIPAL PROBLEM:  Paroxysmal atrial fibrillation [I48.0] 04/05/2017 Yes      Problems Resolved During this Admission:    Diagnosis Date Noted Date Resolved POA     No new Assessment & Plan notes have been filed under this hospital service since the last note was generated.  Service: Cardiology      Discharged Condition: good    Disposition: Home or Self Care    Follow Up:  Follow-up Information     Minh Hsieh MD On 8/15/2017.    Specialty:  Cardiology  Why:  10:20am  Contact information:  [address]  [address]  [place] [address] [phone]                 Patient Instructions:     Diet general    Order Specific Question Answer Comments   Additional restrictions: Cardiac (Low Na/Chol)      Activity as tolerated     Call MD for:  difficulty breathing or increased cough     Call MD for:  persistent dizziness, light-headedness, or visual disturbances       Medications:  Reconciled Home Medications:   Current Discharge Medication List      START taking these medications    Details   apixaban 5 mg Tab Take 1 tablet (5 mg total) by mouth 2 (two) times daily.  Qty: 60 tablet, Refills: 11         CONTINUE these medications which have NOT CHANGED    Details   acetaminophen (TYLENOL) 650 MG TbSR Take 650 mg by mouth 2 (two) times daily as needed.      ergocalciferol (VITAMIN D2) 50,000 unit Cap Take 1,000 Units by mouth every 7 days.      fexofenadine-pseudoephedrine (ALLEGRA-D 24) 180-240 mg per 24 hr tablet Take 1 tablet by mouth once daily.      fosinopril (MONOPRIL) 20 MG tablet Take 20 mg by mouth once daily.      metoprolol succinate (TOPROL-XL) 100 MG 24 hr tablet Take 100 mg by mouth 2 (two) times daily.      tramadol (ULTRAM) 50 mg tablet Take 50 mg by mouth every 6 (six) hours as needed for Pain.      triamterene-hydrochlorothiazide 37.5-25 mg (MAXZIDE-25) 37.5-25 mg per tablet Take 1 tablet by mouth once daily. Not sure of mg         STOP taking these medications       warfarin (COUMADIN) 1 MG tablet Comments:   Reason for Stopping:               Time spent on the discharge of patient: 45 minutes    Carlos Alberto Galeas NP  Cardiology  [facility]

## 2017-08-03 NOTE — PLAN OF CARE
Pt voices no discharge needs. Daughter at bedside and will provide transportation home. LINDSAY Howard informed of case and will follow.    Cardiologist- Dr. Hsieh       08/03/17 1107   Discharge Assessment   Assessment Type Discharge Planning Assessment   Confirmed/corrected address and phone number on facesheet? Yes   Assessment information obtained from? Patient   Expected Length of Stay (days) 1   Communicated expected length of stay with patient/caregiver yes   Prior to hospitilization cognitive status: Alert/Oriented   Prior to hospitalization functional status: Independent   Current cognitive status: Alert/Oriented   Current Functional Status: Independent   Arrived From home or self-care   Lives With alone   Able to Return to Prior Arrangements yes   How many people do you have in your home that can help with your care after discharge? 0   Who are your caregiver(s) and their phone number(s)? Tad Guzman (daughter) [phone]   Patient's perception of discharge disposition home or selfcare   Readmission Within The Last 30 Days no previous admission in last 30 days   Patient currently being followed by outpatient case management? No   Patient currently receives home health services? No   Does the patient currently use HME? No   Patient currently receives private duty nursing? No   Patient currently receives any other outside agency services? No   Equipment Currently Used at Home none   Do you have any problems affording any of your prescribed medications? No   Is the patient taking medications as prescribed? yes   Do you have any financial concerns preventing you from receiving the healthcare you need? No   Does the patient have transportation to healthcare appointments? Yes   Transportation Available car;family or friend will provide   On Dialysis? No   Does the patient receive services at the [facility]? No   Are there any open cases? No   Discharge Plan A Home   Discharge Plan B Home Health    Patient/Family In Agreement With Plan yes

## 2017-08-03 NOTE — NURSING
Patient is AAO, denies any c/o, no signs of distress noted, d/c paperwork given and reviewed. Daughter at the bedside and will transfer pt home

## 2017-08-03 NOTE — ASSESSMENT & PLAN NOTE
Patient presented with AF RVR, 2nd episode since dx in April. She was symptomatic with chest pressure and hypotensive. On Toprol  mg BID and has not tolerated Cardizem in the past; developed hypotension overnight on cardizem gtt- discontinued. INR minimally subtherapeutic at 1.9; INR labile in the past; Coumadin discontinued and Eliquis initiated; Patient spontaneously converted to SR at 0800. Tolerating ambulation and appropriate for discharge. Will have patient follow up with Dr Hsieh and evaluated by Dr Lagunas for AF ablation consideration

## 2017-08-03 NOTE — PLAN OF CARE
Problem: Patient Care Overview  Goal: Plan of Care Review  Outcome: Ongoing (interventions implemented as appropriate)  Pt received on RA.  SPO2  94%.  Pt in no apparent respiratory distress.  Will continue to monitor.

## 2017-08-03 NOTE — HPI
66 y/o F with pmhx of paroxysmal atrial fib presented to the ED with palpitations and chest pressure that began at 2300 last night.  Patient took an extra dose of her metoprolol at 0000 to treat the palpitations and then went to sleep.  Symptoms worsen with exertion. Chest pain is described as mild pressure, well localized over sternal region of anterior chest and non-radiating. Symptoms are similar to AF RVR symptoms experienced in April 2017.   She denies cough, sob, diaphoresis, NV.   No BLE swelling.  CHADVASC 3 points. She takes Coumadin and has a labile INR. INR 1.9 today. ECG in ED noted AF RVR. She was given cardizem orally and IV with conversion to SR but AF returned. Patient was placed on a cardizem gtt and admitted to tele overnight. Patient became hypotensive this AM and cardizem was held. She spontaneously converted to SR at 0800 this morning and has maintained SR since that time.

## 2017-08-03 NOTE — PROGRESS NOTES
The pt was recently admitted from 8/2 through 8/3.  Per her discharge paperwork on 8/3, she was given Eliquis and her warfarin therapy was stopped.  I am now discharging from the [facility] at this time.

## 2017-08-04 ENCOUNTER — PATIENT OUTREACH (OUTPATIENT)
Dept: ADMINISTRATIVE | Facility: CLINIC | Age: 67
End: 2017-08-04

## 2017-08-04 NOTE — PATIENT INSTRUCTIONS
Discharge Instructions for Atrial Fibrillation  You have been diagnosed with an abnormal heart rhythm called atrial fibrillation. With this condition, your hearts 2 upper chambers quiver rather than squeeze the blood out in a normal pattern. This leads to an irregular and sometimes rapid heartbeat. Some people will develop associated symptoms such as a flip-flopping heartbeat, chest pain, lightheadedness, or shortness of breath. Other people may have no symptoms at all. Atrial fibrillation is serious because it affects the hearts ability to fill with blood as it should. Blood clots may form. This increases the risk for stroke. Untreated atrial fibrillation can also lead to heart failure. Atrial fibrillation can be controlled. With treatment, most people with atrial fibrillation lead normal lives.  Treatment options  Recommended treatment for atrial fibrillation depends on your age, symptoms, how long you have had atrial fibrillation, and other factors. You will have a complete evaluation to find out if you have any abnormalities that caused your heart to go into atrial fibrillation. This might be blocked heart arteries or a thyroid problem. Your doctor will assess your particular case and discuss choices with you.  Treatment choices may include:  · Treating an underlying disorder that puts you at risk for atrial fibrillation. For example, correcting an abnormal thyroid or electrolyte problem, or treating a blocked heart artery.  · Restoring a normal heart rhythm with an electrical shock (cardioversion) or with an antiarrhythmic medicine (chemical cardioversion)  · Using medicine to control your heart rate in atrial fibrillation.  · Preventing the risk for blood clot and stroke using blood-thinning medicines. Your doctor will tell you what he or she recommends. Choices may include aspirin, clopidogrel, warfarin, dabigatran, rivaroxaban, apixaban, and edoxaban.  · Doing catheter ablation or a surgical maze  procedure. These use different methods to destroy certain areas of heart tissue. This interrupts the electrical signals causing atrial fibrillation. One of these procedures may be a choice when medicines do not work, or as an alternative to long-term medicine.  · Other treatment choices may be recommended for you by your doctor.  Managing risk factors for stroke and preventing heart failure are important parts of any treatment plan for atrial fibrillation.  Home care  · Take your medicines exactly as directed. Dont skip doses.  · Work with your doctor to find the right medicines and doses for you.  · Learn to take your own pulse. Keep a record of your results. Ask your doctor which pulse rates mean that you need medical attention. Slowing your pulse is often the goal of treatment. Ask your doctor if its OK for you to use an automatic machine to check your pulse at home. Sometimes these machines dont count the pulse correctly when you have atrial fibrillation.  · Limit your intake of coffee, tea, cola, and other beverages with caffeine. Talk with your doctor about whether you should eliminate caffeine.  · Avoid over-the-counter medicines that have caffeine in them.  · Let your doctor know what medicines you take, including prescription and over-the-counter medicines, as well as any supplements. They interfere with some medicines given for atrial fibrillation.  · Ask your doctor about whether you can drink alcohol. Some people need to avoid alcohol to better treat atrial fibrillation. If you are taking blood-thinner medicines, alcohol may interfere with them by increasing their effect.  · Never take stimulants such as amphetamines or cocaine. These drugs can speed up your heart rate and trigger atrial fibrillation.  Follow-up care  Follow up with your doctor, or as advised.     When should I call my healthcare provider  Call your healthcare provider right away if you have any of the  following:  · Weakness  · Dizziness  · Fainting  · Fatigue  · Shortness of breath  · Chest pain with increased activity  · A change in the usual regularity of your heartbeat, or an unusually fast heartbeat   Date Last Reviewed: 4/23/2017  © 0762-6392 [employer]. [address] [place] [address], [place] [address]. All rights reserved. This information is not intended as a substitute for professional medical care. Always follow your healthcare professional's instructions.

## 2017-08-04 NOTE — PROGRESS NOTES
C3 nurse attempted to contact patient. No answer. The following message was left for the patient to return the call:  Good morning I am a nurse calling on behalf of [facility] from the [facility].  This is a Transitional Care Call for Jayla Reagan. When you have a moment please contact us at [phone] or [phone] Monday through Friday, between the hours of 8 am to 4 pm. We look forward to speaking with you. On behalf of [facility] have a nice day.    The patient does not have a scheduled HOSFU appointment within 7-14 days post hospital discharge date 8/3/17.

## 2017-08-09 DIAGNOSIS — I48.0 PAF (PAROXYSMAL ATRIAL FIBRILLATION): Primary | ICD-10-CM

## 2017-08-10 ENCOUNTER — TELEPHONE (OUTPATIENT)
Dept: CARDIOLOGY | Facility: CLINIC | Age: 67
End: 2017-08-10

## 2017-08-10 NOTE — TELEPHONE ENCOUNTER
----- Message from Qian Alberto sent at 8/10/2017  9:15 AM CDT -----  Contact: [phone]  Patient states she is taking eloquis and is having vaginal bleeding

## 2017-08-10 NOTE — TELEPHONE ENCOUNTER
Patient states that her blood thinner was changed from Warfarin to Eliquis and she started taking it Friday and today she has been having small amounts of vaginal bleeding and wanted to know if the Eliquis could be causing this and wants to know what you would advise.

## 2017-08-11 NOTE — TELEPHONE ENCOUNTER
----- Message from Qian Alberto sent at 8/11/2017 10:43 AM CDT -----  Contact: [phone]  Patient states she spoke with you on 08-10-17 regarding blood in her urine and today she has blood in her stool, patient is requesting a call back today

## 2017-08-12 ENCOUNTER — PATIENT MESSAGE (OUTPATIENT)
Dept: CARDIOLOGY | Facility: CLINIC | Age: 67
End: 2017-08-12

## 2017-08-14 ENCOUNTER — TELEPHONE (OUTPATIENT)
Dept: CARDIOLOGY | Facility: CLINIC | Age: 67
End: 2017-08-14

## 2017-08-14 ENCOUNTER — CLINICAL SUPPORT (OUTPATIENT)
Dept: REHABILITATION | Facility: HOSPITAL | Age: 67
End: 2017-08-14
Attending: ORTHOPAEDIC SURGERY
Payer: MEDICARE

## 2017-08-14 DIAGNOSIS — R29.898 WEAKNESS OF BOTH LOWER EXTREMITIES: ICD-10-CM

## 2017-08-14 DIAGNOSIS — M25.552 PAIN OF LEFT HIP JOINT: ICD-10-CM

## 2017-08-14 DIAGNOSIS — R26.89 ANTALGIC GAIT: ICD-10-CM

## 2017-08-14 PROCEDURE — G8978 MOBILITY CURRENT STATUS: HCPCS | Mod: CK,PN

## 2017-08-14 PROCEDURE — 97161 PT EVAL LOW COMPLEX 20 MIN: CPT | Mod: PN

## 2017-08-14 PROCEDURE — G8979 MOBILITY GOAL STATUS: HCPCS | Mod: CJ,PN

## 2017-08-14 NOTE — PLAN OF CARE
"  TIME RECORD    Date: 08/14/2017    Start Time:  2:05  Stop Time:  3:00    PROCEDURES:    TIMED  Procedure Min.   IE 55'                     UNTIMED  Procedure Min.             Total Timed Minutes:  0  Total Timed Units:  0  Total Untimed Units:  1  Charges Billed/# of units:  1 (IE)    OUTPATIENT PHYSICAL THERAPY   PATIENT EVALUATION  Onset Date: L hip pain began one year prior  Primary Diagnosis:   1. Pain of left hip joint     2. Weakness of both lower extremities     3. Antalgic gait       Treatment Diagnosis: Decreased B LE Strength/Flexibility, Decreased/Painful L hip AROM, Pain greater in L than R hip, TTP over L Greater Trochanter, decreased functional mobility  Past Medical History:   Diagnosis Date    Atrial fibrillation     Cataract     Hypertension     PAD (peripheral artery disease)     Peripheral artery disease      Precautions: HTN, Atrial Fibrillation, PAD  Prior Therapy: A few months prior for L hip bursitis. Pt reports stopping therapy due to c/o increased L hip pain.  Medications: Jayla Reagan has a current medication list which includes the following prescription(s): acetaminophen, apixaban, ergocalciferol, fexofenadine-pseudoephedrine, fosinopril, metoprolol succinate, tramadol, and triamterene-hydrochlorothiazide 37.5-25 mg.  Nutrition:  Obese  History of Present Illness: Chronic  Prior Level of Function: Independent  Social History: Lives alone, retired  Place of Residence (Steps/Adaptations): Single story home, no stairs, No other adaptations  Functional Deficits Leading to Referral/Nature of Injury: Prolonged Standing/walking, stair mobility  Patient Therapy Goals: Prolonged standing/walking,    Subjective     Jayla Reagan states that she experiences L hip pain after standing/walking for prolonged periods of time and must rest. Pt also c/o experiecing pain in R hip. Pt states she received a shot about a month prior that decreased her pain for "about one month". Pt states that she " has arthritis in her lumbar region. Pt denies numbness and tingling in B LE    Pain:  Location: Hip  Description: Aching  Activities Which Increase Pain: Prolonged Standing/ walking, Stairs  Activities Which Decrease Pain: ice, rest and Tylenol  Pain Scale: 2/10 at best 2/10 now  10/10 at worst    Objective     Posture: Decreased core activation, Slumped posture, Forward head and rounded shoulders  Palpation: TTP over L greater trochanter  Sensation: NT  DTRs: NT  Range of Motion/Strength:       Hip Right  Left  Pain/Dysfunction with Movement        !=Pain    AROM MMT AROM MMT    Flexion 101 5/5 100! 4/5! Pt c/o pain in L greater trochanter area   Extension 6 4/5 3 4-/5    Abduction 25 4+/5 23! 4/5! Pt c/o pain in L greater trochanter area   Adduction 18 3-/5 15! 3-/5 Pt c/o pain in L greater trochanter area   Internal rotation 30 4+/5 26! 4/5! Pt c/o pain in L greater trochanter area   External rotation 20 4+/5 19 4-/5 Pt c/o pain in L greater trochanter area      Knee Right  Left  Pain/Dysfunction with Movement    AROM MMT AROM MMT    Flexion WFL 4/5 WFL 4/5    Extension WFL 5/5 WFL 4/5      Ankle Right  Left  Pain/Dysfunction with Movement    AROM MMT AROM MMT    Plantarflexion WFL 5/5 WFL 5/5    Dorsiflexion WFL 5/5 WFL 5/5      Lumbar AROM: Pain/Dysfunction with Movement:    !=Pain   Flexion 78! Degrees Pt c/o of pain in Lumbar region   Extension 15! Degrees Pt c/o pain in Lumbar region   Right side bending 43    Left side bending 36    Right rotation 30    Left rotation 38      Flexibility: 90/90 Hamstring test: L (48) R (33)  Gait: Without AD  Analysis: Decreased B step length, Decreased stance time on L LE, Slow ancelmo,  External rotation of L LE  Bed Mobility:Independent  Transfers: Independent  Special Tests: (+) MIRANDA test for decreased B hip flexibility, (+) FADIR for pain in L hip, (+) SHANNON for B decreased flexibility of ITB, (+) Luis test for tight B quads   Functional Limitation Reports: G  codes  Tool: FOTO Hip SURVEY   Category: Mobility ()  Limitation: 53%  Current: CK = at least 40% but < 60% impaired, limited or restricted  Goal: CJ = at least 20% but < 40% impaired, limited or restricted    Treatment: Treatment to consist of STM/MFR to B ITB and surrounding musculature, B hip region and L hip mobilizations. B LE strengthening/stretching, core strengthening, postural muscle strengthening/stretching,  balance and gait training, lumbar strengthening/stabilization/stretching and modalities prn.  POC was discussed with pt and pt verbalized understanding and was agreeable with POC.       Date    VISIT   Gcode   FOTO   Cap Visit   Cap Total   MT   Long Sit HS   Gastroc Str.   Bike   QS   SAQ   SLR   Snow [place]   Heel Slides   Atif Hip Add   Bridges   Clam shells   LAQs   Seated Hip Flex   Cybex   Leg Press   TKE   Hip Abd   Hip Flex   Hip Ext   HS Curls   Knee Ext   Step Ups   Step Downs   Gait   CP   Initials         Assessment       Initial Assessment (Pertinent finding, problem list and factors affecting outcome): Pt is a 66 y/o F that presents with a diagnosis of chronic L hip bursitis.  Pt presents with  B LE Strength/Flexibility, decreased/painful L hip AROM, TTP over L Greater Trochanter region, decreased functional mobility and a FOTO Hip Survey score 53% limited.  Pt will benefit from skilled physical therapy in order to increase B LE strength/flexibility, Increase L hip AROM, increase functional mobility, decrease L hip pain and improve gait and balalnce.      History  Co-morbidities and personal factors that may impact the plan of care Examination  Body Structures and Functions, activity limitations and participation restrictions that may impact the plan of care Clinical Presentation   Decision Making/ Complexity Score   Co-morbidities: HTN, Atrial Fibrillation, PAD                Personal Factors:   High Body Regions: B LE, Lumbar    Body Systems: ROM, Strength, Flexibility,  Gait,            Activity limitations: Prolonged Standing, prolonged Walking, Stair mobility            high           FOTO hip survey 53% limited    Stable and uncomplicated           low         Low Complexity Eval         Rehab Potiential: good  Barriers to Rehab: None  Short Term Goals=Long Term Goals (8 Weeks):     1.) Pt will demonstrate independence with HEP in order to promote wellness.  2.) Pt will report 0/10 pain with L LE AROM in order to improve Functional mobility  3.) Pt will increase L LE gross muscle group strength to be 5/5 on MMT in order to improve gait and balance  4.) Pt will report a FOTO Hip survey to be 25% limited in order to improve functional mobility   5.) Pt will be able to ambulate community distances with 0/10 pain in L hip.     Plan     Certification Period: 8/14/17 to 10/14/17  Recommended Treatment Plan: 2 times per week for 8 weeks: Gait Training, Manual Therapy, Moist Heat/ Ice, Patient Education, Self Care, Therapeutic Activites and Therapeutic Exercise  Other Recommendations: modalities prn, ASTYM prn, kinesiotape prn, Functional Dry Needling prn       Therapist: Adriana Gaviria, PT    I CERTIFY THE NEED FOR THESE SERVICES FURNISHED UNDER THIS PLAN OF TREATMENT AND WHILE UNDER MY CARE    Physician's comments: ________________________________________________________________________________________________________________________________________________      Physician's Name: ___________________________________    I certify that I was present in the room directing the student in service delivery and guiding them using my skilled judgment. As the co-signing therapist I have reviewed the students documentation and am responsible for the treatment, assessment, and plan. Adriana Gaviria, PT

## 2017-08-14 NOTE — TELEPHONE ENCOUNTER
----- Message from Sylvia Ramirez sent at 8/14/2017  3:00 PM CDT -----  No. [phone]   Patient returned your call.

## 2017-08-15 ENCOUNTER — ANTI-COAG VISIT (OUTPATIENT)
Dept: CARDIOLOGY | Facility: CLINIC | Age: 67
End: 2017-08-15

## 2017-08-15 ENCOUNTER — OFFICE VISIT (OUTPATIENT)
Dept: CARDIOLOGY | Facility: CLINIC | Age: 67
End: 2017-08-15
Payer: MEDICARE

## 2017-08-15 VITALS
OXYGEN SATURATION: 98 % | HEIGHT: 72 IN | BODY MASS INDEX: 33 KG/M2 | WEIGHT: 243.63 LBS | HEART RATE: 54 BPM | SYSTOLIC BLOOD PRESSURE: 113 MMHG | DIASTOLIC BLOOD PRESSURE: 75 MMHG

## 2017-08-15 DIAGNOSIS — I48.0 PAROXYSMAL ATRIAL FIBRILLATION: ICD-10-CM

## 2017-08-15 DIAGNOSIS — Z79.01 LONG TERM (CURRENT) USE OF ANTICOAGULANTS: ICD-10-CM

## 2017-08-15 DIAGNOSIS — I73.9 PVD (PERIPHERAL VASCULAR DISEASE): ICD-10-CM

## 2017-08-15 DIAGNOSIS — I10 HTN (HYPERTENSION), BENIGN: ICD-10-CM

## 2017-08-15 DIAGNOSIS — I48.0 PAROXYSMAL ATRIAL FIBRILLATION: Primary | ICD-10-CM

## 2017-08-15 PROCEDURE — 3008F BODY MASS INDEX DOCD: CPT | Mod: S$GLB,,, | Performed by: INTERNAL MEDICINE

## 2017-08-15 PROCEDURE — 3074F SYST BP LT 130 MM HG: CPT | Mod: S$GLB,,, | Performed by: INTERNAL MEDICINE

## 2017-08-15 PROCEDURE — 99999 PR PBB SHADOW E&M-EST. PATIENT-LVL III: CPT | Mod: PBBFAC,,, | Performed by: INTERNAL MEDICINE

## 2017-08-15 PROCEDURE — 1159F MED LIST DOCD IN RCRD: CPT | Mod: S$GLB,,, | Performed by: INTERNAL MEDICINE

## 2017-08-15 PROCEDURE — 1126F AMNT PAIN NOTED NONE PRSNT: CPT | Mod: S$GLB,,, | Performed by: INTERNAL MEDICINE

## 2017-08-15 PROCEDURE — 99214 OFFICE O/P EST MOD 30 MIN: CPT | Mod: S$GLB,,, | Performed by: INTERNAL MEDICINE

## 2017-08-15 PROCEDURE — 3078F DIAST BP <80 MM HG: CPT | Mod: S$GLB,,, | Performed by: INTERNAL MEDICINE

## 2017-08-15 RX ORDER — WARFARIN 1 MG/1
1 TABLET ORAL DAILY
Qty: 30 TABLET | Refills: 11 | Status: SHIPPED | OUTPATIENT
Start: 2017-08-15 | End: 2017-08-22 | Stop reason: SDUPTHER

## 2017-08-15 NOTE — PROGRESS NOTES
Subjective:   Patient ID:  Jayla Reagan is a 67 y.o. female who presents for follow-up of Follow-up and Bleeding/Bruising (Not today.)      Problem List Items Addressed This Visit        Cardiac/Vascular    Paroxysmal atrial fibrillation - Primary    HTN (hypertension), benign    PVD (peripheral vascular disease)       Hematology    Long term (current) use of anticoagulants      Other Visit Diagnoses    None.         HPI: Patient here for after hospital stay for afib with RVR that was cardioverted with IV Cardizem. She is doing better with no complaints. No chest pain, dyspnea, palpitations or dizziness. She was switched from coumadin to Eliquis and had vaginal bleeding so Eliquis was discontinued.     BP and HR is controlled.     Review of Systems   Constitution: Negative.   HENT: Negative.    Eyes: Negative.    Cardiovascular: Negative.    Respiratory: Negative.    Endocrine: Negative.    Hematologic/Lymphatic: Negative.    Skin: Negative.    Musculoskeletal: Negative.    Gastrointestinal: Negative.    Neurological: Negative.        Patient's Medications   New Prescriptions    No medications on file   Previous Medications    ACETAMINOPHEN (TYLENOL) 650 MG TBSR    Take 650 mg by mouth 2 (two) times daily as needed.    APIXABAN 5 MG TAB    Take 1 tablet (5 mg total) by mouth 2 (two) times daily.    ERGOCALCIFEROL (VITAMIN D2) 50,000 UNIT CAP    Take 1,000 Units by mouth every 7 days.    FEXOFENADINE-PSEUDOEPHEDRINE (ALLEGRA-D 24) 180-240 MG PER 24 HR TABLET    Take 1 tablet by mouth once daily.    FOSINOPRIL (MONOPRIL) 20 MG TABLET    Take 20 mg by mouth once daily.    METOPROLOL SUCCINATE (TOPROL-XL) 100 MG 24 HR TABLET    Take 100 mg by mouth 2 (two) times daily.    TRAMADOL (ULTRAM) 50 MG TABLET    Take 50 mg by mouth every 6 (six) hours as needed for Pain.    TRIAMTERENE-HYDROCHLOROTHIAZIDE 37.5-25 MG (MAXZIDE-25) 37.5-25 MG PER TABLET    Take 1 tablet by mouth once daily. Not sure of mg   Modified  Medications    No medications on file   Discontinued Medications    No medications on file       Objective:   Physical Exam   Constitutional: She is oriented to person, place, and time. She appears well-developed and well-nourished. No distress.   Examination of the digits showed no clubbing or cyanosis   HENT:   Head: Normocephalic and atraumatic.   Eyes: Conjunctivae are normal. Pupils are equal, round, and reactive to light. Right eye exhibits no discharge.   Neck: Normal range of motion. Neck supple. No JVD present. No thyromegaly present.   No carotid bruits   Cardiovascular: Normal rate, regular rhythm, S1 normal, S2 normal, normal heart sounds, intact distal pulses and normal pulses.  PMI is not displaced.  Exam reveals no gallop, no friction rub and no opening snap.    No murmur heard.  Pulmonary/Chest: Effort normal and breath sounds normal. No respiratory distress. She has no wheezes. She has no rales. She exhibits no tenderness.   Abdominal: Soft. Bowel sounds are normal. She exhibits no distension and no mass. There is no tenderness. There is no guarding.   No hepatosplenomegaly   Musculoskeletal: Normal range of motion. She exhibits no edema or tenderness.   Lymphadenopathy:     She has no cervical adenopathy.   Neurological: She is alert and oriented to person, place, and time.   Skin: Skin is warm. No rash noted. She is not diaphoretic. No erythema.   Psychiatric: She has a normal mood and affect.   Nursing note and vitals reviewed.      ECGs reviewed-NSR  LABS reviewed  Imaging including Echoes reviewed-normal    Assessment:     1. Paroxysmal atrial fibrillation    2. HTN (hypertension), benign    3. PVD (peripheral vascular disease)    4. Long term (current) use of anticoagulants        Plan:       Continue coumadin with coumadin clinic monitoring. Patient had  bleeding on eliquis that did not occur with coumadin  Continue current medications  Low salt diet  Activity as tolerate  F/u in 4 months.

## 2017-08-15 NOTE — PROGRESS NOTES
66 yo female with history of PAF who was recently d/c from clinic after starting Eliquis. Last week patient started having vaginal bleeding then rectal bleeding. She stopped Eliquis 8/11. She resume warfarin 8/14 at 1mg. She is saw Dr. Hsieh today who reenrolled her back with us. Patient will resume her stable, maintenance dose. Repeat INR next week.

## 2017-08-22 ENCOUNTER — PATIENT MESSAGE (OUTPATIENT)
Dept: CARDIOLOGY | Facility: CLINIC | Age: 67
End: 2017-08-22

## 2017-08-22 ENCOUNTER — ANTI-COAG VISIT (OUTPATIENT)
Dept: CARDIOLOGY | Facility: CLINIC | Age: 67
End: 2017-08-22
Payer: MEDICARE

## 2017-08-22 DIAGNOSIS — I48.0 PAROXYSMAL ATRIAL FIBRILLATION: ICD-10-CM

## 2017-08-22 DIAGNOSIS — Z79.01 LONG TERM (CURRENT) USE OF ANTICOAGULANTS: Primary | ICD-10-CM

## 2017-08-22 LAB — INR PPP: 1.2 (ref 2–3)

## 2017-08-22 PROCEDURE — 85610 PROTHROMBIN TIME: CPT | Mod: QW,S$GLB,,

## 2017-08-22 PROCEDURE — 99211 OFF/OP EST MAY X REQ PHY/QHP: CPT | Mod: 25,S$GLB,,

## 2017-08-22 NOTE — PROGRESS NOTES
INR still pretty low for starting coumadin last week. Patient was stable on this dose prior to stopping. Noted, that she was slow to respond when coumadin was first started and with low dose, she is likely a slow metabolizer.We will increase cautiously and give more time. Reevaluate next week. Patient denies any signs of bleeding. Recommended patient have gyn follow up since she had vaginal bleeding.

## 2017-08-23 ENCOUNTER — TELEPHONE (OUTPATIENT)
Dept: CARDIOLOGY | Facility: CLINIC | Age: 67
End: 2017-08-23

## 2017-08-23 RX ORDER — WARFARIN 1 MG/1
1 TABLET ORAL DAILY
Qty: 90 TABLET | Refills: 3 | Status: SHIPPED | OUTPATIENT
Start: 2017-08-23 | End: 2018-01-19 | Stop reason: SDUPTHER

## 2017-08-23 NOTE — TELEPHONE ENCOUNTER
----- Message from Ainsley Merida LPN sent at 8/23/2017  4:04 PM CDT -----  Contact: [facility]  [phone]      ----- Message -----  From: Fernando Mccarty  Sent: 8/23/2017   1:48 PM  To: Onel Buchanan Staff    Pharmacy requesting to speak with the nurse concerning prescription warfarin (COUMADIN) 1 MG tablet  Please call and advise

## 2017-08-29 NOTE — PROGRESS NOTES
Patient called to reschedule today's appointment due to the bad weather, it was rescheduled to tomorrow

## 2017-08-30 ENCOUNTER — ANTI-COAG VISIT (OUTPATIENT)
Dept: CARDIOLOGY | Facility: CLINIC | Age: 67
End: 2017-08-30
Payer: MEDICARE

## 2017-08-30 DIAGNOSIS — I48.0 PAROXYSMAL ATRIAL FIBRILLATION: ICD-10-CM

## 2017-08-30 DIAGNOSIS — Z79.01 LONG TERM (CURRENT) USE OF ANTICOAGULANTS: Primary | ICD-10-CM

## 2017-08-30 LAB — INR PPP: 1.8 (ref 2–3)

## 2017-08-30 PROCEDURE — 85610 PROTHROMBIN TIME: CPT | Mod: QW,S$GLB,,

## 2017-08-30 PROCEDURE — 99211 OFF/OP EST MAY X REQ PHY/QHP: CPT | Mod: 25,S$GLB,,

## 2017-08-30 NOTE — PROGRESS NOTES
INR subtherapeutic today with no overt complications, previously subtherapeutic at follow up to clinic ~1 week ago. Patient denies any signs/symptoms of bleeding or bruising. Patient denies any changes in diet/vitamin K intake or medications. Patient correctly stated weekly warfarin regimen with little prompting, and denies any issues with missed or doubled warfarin doses. Patient has a history of possibly being slow to respond to warfarin, therefore will gently boost then re-chalennge weekly warfarin regimen with no other changes, with plans to follow-up in 1 week to monitor INR more closely and reassess stability. Patient advised to contact clinic with any changes, questions, or concerns.

## 2017-08-31 ENCOUNTER — CLINICAL SUPPORT (OUTPATIENT)
Dept: REHABILITATION | Facility: HOSPITAL | Age: 67
End: 2017-08-31
Attending: ORTHOPAEDIC SURGERY
Payer: MEDICARE

## 2017-08-31 DIAGNOSIS — R29.898 WEAKNESS OF BOTH LOWER EXTREMITIES: ICD-10-CM

## 2017-08-31 DIAGNOSIS — M25.552 PAIN OF LEFT HIP JOINT: ICD-10-CM

## 2017-08-31 DIAGNOSIS — R26.89 ANTALGIC GAIT: ICD-10-CM

## 2017-08-31 PROCEDURE — 97140 MANUAL THERAPY 1/> REGIONS: CPT | Mod: PN

## 2017-08-31 PROCEDURE — 97110 THERAPEUTIC EXERCISES: CPT | Mod: PN

## 2017-08-31 NOTE — PROGRESS NOTES
"TIME RECORD    Date:  08/31/2017    Start Time:  1000  Stop Time:  1050    PROCEDURES:    TIMED  Procedure Min.   Manual therapy 15   Therex 35         Total Timed Minutes:  50  Total Timed Units:  3  Total Untimed Units:  0  Charges Billed/# of units:  3 MTx1, TEx2      Progress/Current Status    Subjective:     Patient ID: Jayla Reagan is a 67 y.o. female.  Diagnosis:   1. Pain of left hip joint     2. Weakness of both lower extremities     3. Antalgic gait       Patient reports "light pain - ache"    Objective:     Patient received manual therapy x 15 minutes in right sidelying for STM/MFR to lumbosacral paraspinals down to gluteal/piriformis area and ITB.  Patient then instructed in and performed all therex as per log with 1:1 by PTA x 35 minutes total time.    Date  8/31/17   VISIT 2/12 exp 9/25   Gcode 2/10   FOTO 2/5   Cap Visit   Cap Total 93.82  169.42   Bike    MT 15'   Long Sit HS W/strap 30"x3 B   ITB stretch B 30"x3 B   Piriformis str 30"x3 B   Hip flexor str Stand  30"x3 B   TAs 5"x10   QS --   SAQ --   SLR 2x10 B   Snow [place] --   Heel Slides --   Atif Hip Add 5"x10   Bridges 2x10   Clam shells 2x10 B   LAQs 2x10 w/TAs   Seated Hip Flex 2x10 w/TAs   Cybex   Leg Press --   TKE --   Hip Abd --   Hip Flex --   Hip Ext --   HS Curls --   Knee Ext --   Step Ups --   Step Downs --   Gait --   CP --   Initials  1/6         Assessment:     Patient with reports of "tender" with manual therapy along L ITB.  Able to tolerate same and all therex per log without complaint of increased pain or difficulty.  Patient voiced no complaints after treatment.    Patient Education/Response:     Patient given written handout and reviewed with patient for initial HEP including TAs in supine, clamshells and standing hip stretch.  Demonstrated understanding.    Plans and Goals:     Continue PT per POC, Progress as able.    Short Term Goals=Long Term Goals (8 Weeks):      1.) Pt will demonstrate independence with HEP in " order to promote wellness.  2.) Pt will report 0/10 pain with L LE AROM in order to improve Functional mobility  3.) Pt will increase L LE gross muscle group strength to be 5/5 on MMT in order to improve gait and balance  4.) Pt will report a FOTO Hip survey to be 25% limited in order to improve functional mobility   5.) Pt will be able to ambulate community distances with 0/10 pain in L hip.

## 2017-09-05 ENCOUNTER — CLINICAL SUPPORT (OUTPATIENT)
Dept: REHABILITATION | Facility: HOSPITAL | Age: 67
End: 2017-09-05
Attending: ORTHOPAEDIC SURGERY
Payer: MEDICARE

## 2017-09-05 DIAGNOSIS — R29.898 WEAKNESS OF BOTH LOWER EXTREMITIES: ICD-10-CM

## 2017-09-05 DIAGNOSIS — R26.89 ANTALGIC GAIT: ICD-10-CM

## 2017-09-05 DIAGNOSIS — M25.552 PAIN OF LEFT HIP JOINT: ICD-10-CM

## 2017-09-05 PROCEDURE — 97140 MANUAL THERAPY 1/> REGIONS: CPT | Mod: PN

## 2017-09-05 PROCEDURE — 97110 THERAPEUTIC EXERCISES: CPT | Mod: PN

## 2017-09-05 NOTE — PROGRESS NOTES
"TIME RECORD    Date:  09/05/2017    Start Time:  1100  Stop Time:  1155    PROCEDURES:    TIMED  Procedure Min.   Manual therapy 10   Therex 45             Total Timed Minutes:  55  Total Timed Units:  4  Total Untimed Units:  0  Charges Billed/# of units:  34 (MTx1, TEx3)      Progress/Current Status    Subjective:     Patient ID: Jayla Reagan is a 67 y.o. female.  Diagnosis:   1. Pain of left hip joint     2. Weakness of both lower extremities     3. Antalgic gait       Patient reports "light pain - ache" Stated she tolerated last visit well without increased pain following treatment.     Objective:     Patient treatment initiated with Bike warm up then  received manual stretches per log piriformis, gluteal, ITB.   Patient then instructed in and performed all therex as per log with 1:1 by PTA x 45 minutes total time.    Date  9/5/17 8/31/17   VISIT 3/12 exp 9/25 2/12 exp 9/25   Gcode 3/10 2/10   FOTO 3/5 2/5   Cap Visit   Cap Total 125.80  295.22   93.82  169.42   Bike 5' L 1.0         MT 10' 15'   Calf stretch  3x30" fitter/EOS    Supine HSS 3x30" B strap W/strap 30"x3 B   ITB stretch B 3x30" B 30"x3 B   Piriformis str 3x30" B w/OP 30"x3 B   SKC 3x30" B    Hip flexor str Luis 3x30"  w/strap Stand  30"x3 B   TAs 5: x10 5"x10   QS  --   SAQ  --   SLR 2x10 B 2x10 B   [place]  --   Heel Slides  --   Atif Hip Add 5" x10 5"x10   Bridges 2x10 2x10   Clam shells sup 2x10 B GTB 2x10 B   Side lying hip Abd 2x10 B    LAQs  2x10 w/TAs   Seated Hip Flex  2x10 w/TAs   Cybex   Leg Press  --   TKE  --   Hip Abd  --   Hip Flex  --   Hip Ext  --   HS Curls  --   Knee Ext  --   Step Ups  --   Step Downs  --   Gait  --   CP  --   Initials MB 2/6 DH 1/6         Assessment:     Patient tolerated well. Shah abolished following interventions    Patient Education/Response:     Patient given written handout and reviewed with patient for initial HEP including TAs in supine, clamshells and standing hip stretch. Provided printed " copy of new stretches: Luis SIGALA quad and calf stretches.    Demonstrated understanding.    Plans and Goals:     Continue PT per POC, Progress as able.    Short Term Goals=Long Term Goals (8 Weeks):      1.) Pt will demonstrate independence with HEP in order to promote wellness.  2.) Pt will report 0/10 pain with L LE AROM in order to improve Functional mobility  3.) Pt will increase L LE gross muscle group strength to be 5/5 on MMT in order to improve gait and balance  4.) Pt will report a FOTO Hip survey to be 25% limited in order to improve functional mobility   5.) Pt will be able to ambulate community distances with 0/10 pain in L hip.

## 2017-09-06 ENCOUNTER — LAB VISIT (OUTPATIENT)
Dept: LAB | Facility: HOSPITAL | Age: 67
End: 2017-09-06
Attending: INTERNAL MEDICINE
Payer: MEDICARE

## 2017-09-06 ENCOUNTER — ANTI-COAG VISIT (OUTPATIENT)
Dept: CARDIOLOGY | Facility: CLINIC | Age: 67
End: 2017-09-06

## 2017-09-06 DIAGNOSIS — Z79.01 LONG TERM (CURRENT) USE OF ANTICOAGULANTS: ICD-10-CM

## 2017-09-06 DIAGNOSIS — I48.0 PAROXYSMAL ATRIAL FIBRILLATION: ICD-10-CM

## 2017-09-06 DIAGNOSIS — Z79.01 LONG TERM (CURRENT) USE OF ANTICOAGULANTS: Primary | ICD-10-CM

## 2017-09-06 LAB
INR PPP: 2.3
INR PPP: 2.3
PROTHROMBIN TIME: 23.2 SEC

## 2017-09-06 PROCEDURE — 85610 PROTHROMBIN TIME: CPT

## 2017-09-06 PROCEDURE — 36415 COLL VENOUS BLD VENIPUNCTURE: CPT | Mod: PO

## 2017-09-07 ENCOUNTER — CLINICAL SUPPORT (OUTPATIENT)
Dept: REHABILITATION | Facility: HOSPITAL | Age: 67
End: 2017-09-07
Attending: ORTHOPAEDIC SURGERY
Payer: MEDICARE

## 2017-09-07 DIAGNOSIS — R29.898 WEAKNESS OF BOTH LOWER EXTREMITIES: ICD-10-CM

## 2017-09-07 DIAGNOSIS — R26.89 ANTALGIC GAIT: ICD-10-CM

## 2017-09-07 DIAGNOSIS — M25.552 PAIN OF LEFT HIP JOINT: ICD-10-CM

## 2017-09-07 PROCEDURE — 97110 THERAPEUTIC EXERCISES: CPT | Mod: PN

## 2017-09-07 PROCEDURE — 97140 MANUAL THERAPY 1/> REGIONS: CPT | Mod: PN

## 2017-09-07 NOTE — PROGRESS NOTES
"TIME RECORD    Date:  09/07/2017    Start Time:  10:05 am   Stop Time:  10:57 am     PROCEDURES:    TIMED  Procedure Min.   TE supervised 25' NC   TE 17'   MT 10'              UNTIMED  Procedure Min.             Total Timed Minutes:  27'  Total Timed Units:  2  Total Untimed Units:  0  Charges Billed/# of units:  2 ( 1 TE, 1 MT)       Progress/Current Status    Subjective:     Patient ID: Jayla Reagan is a 67 y.o. female.  Diagnosis:   1. Pain of left hip joint     2. Weakness of both lower extremities     3. Antalgic gait       Pain: 5 /10  Pt c/o experiencing 5/10 pain in (L) hip prior to therapy session.     Objective:       Patient treatment initiated treatment session on stationary bike x 5' supervised by PT. Patient then instructed in and performed all therex as per log supervised by 20' and 1:1 by PT x 17 minutes. Pt received MT consisting of STM/MFR and with foam roller to (L) ITB/ glut region x 10'.     Date  9/7/17 9/5/17 8/31/17   VISIT 4/12 exp 9/25 3/12 exp 9/25 2/12 exp 9/25   Gcode 4/10 3/10 2/10   FOTO 4/5 3/5 2/5   Cap Visit   Cap Total 61.84  357.06 125.80  295.22   93.82  169.42   Bike 5' 5' L 1.0          MT 10' 10' 15'   Calf stretch  3x30" fitter 3x30" fitter/EOS    Supine HSS 3x30" B strap 3x30" B strap W/strap 30"x3 B   ITB stretch B 3x30" B 3x30" B 30"x3 B   Piriformis str - 3x30" B w/OP 30"x3 B   SKC - 3x30" B    Hip flexor str Luis 3x30" w/ strap Luis 3x30"  w/strap Stand  30"x3 B   TAs 5"x15 5: x10 5"x10   LTR 5"x10      QS -  --   SAQ -  --   SLR held 2x10 B 2x10 B   [place] -  --   Heel Slides -  --   Atif Hip Add 5"x15 5" x10 5"x10   Bridges 2x15 2x10 2x10   Clam shells sup Clam shells SL 2x15 2x10 B GTB 2x10 B   Side lying hip Abd Supine 2x10 RTB   2x10 B    LAQs 1# 2x10 B  2x10 w/TAs   Seated Hip Flex   2x10 w/TAs   Cybex   Leg Press   --   TKE   --   Hip Abd   --   Hip Flex   --   Hip Ext   --   HS Curls   --   Knee Ext   --   Step Ups   --   Step Downs   --   Gait   -- " "  CP   --   Initials CK MB 2/6 DH 1/6     Assessment:     SLR therex held due to pt report of experiencing increased pain in (L) hip when performing this therex. Pt was able to tolerate remaining therex per log above without any c/o increased pain. Pt reported that her (L) hip felt "a little better" after therapy session. Pt reported 4/10 pain in (L) hip after therapy session compared to 5/10 pain in (L) hip prior to therapy session.     Patient Education/Response:     Continue with HEP     Plans and Goals:     Continue PT per POC, Progress as able.    Short Term Goals=Long Term Goals (8 Weeks):      1.) Pt will demonstrate independence with HEP in order to promote wellness.  2.) Pt will report 0/10 pain with L LE AROM in order to improve Functional mobility  3.) Pt will increase L LE gross muscle group strength to be 5/5 on MMT in order to improve gait and balance  4.) Pt will report a FOTO Hip survey to be 25% limited in order to improve functional mobility   5.) Pt will be able to ambulate community distances with 0/10 pain in L hip.     "

## 2017-09-11 ENCOUNTER — LAB VISIT (OUTPATIENT)
Dept: LAB | Facility: HOSPITAL | Age: 67
End: 2017-09-11
Attending: INTERNAL MEDICINE
Payer: MEDICARE

## 2017-09-11 ENCOUNTER — OFFICE VISIT (OUTPATIENT)
Dept: CARDIOLOGY | Facility: CLINIC | Age: 67
End: 2017-09-11
Payer: MEDICARE

## 2017-09-11 ENCOUNTER — OFFICE VISIT (OUTPATIENT)
Dept: OBSTETRICS AND GYNECOLOGY | Facility: CLINIC | Age: 67
End: 2017-09-11
Payer: MEDICARE

## 2017-09-11 ENCOUNTER — PATIENT MESSAGE (OUTPATIENT)
Dept: CARDIOLOGY | Facility: CLINIC | Age: 67
End: 2017-09-11

## 2017-09-11 ENCOUNTER — ANTI-COAG VISIT (OUTPATIENT)
Dept: CARDIOLOGY | Facility: CLINIC | Age: 67
End: 2017-09-11

## 2017-09-11 ENCOUNTER — NURSE TRIAGE (OUTPATIENT)
Dept: ADMINISTRATIVE | Facility: CLINIC | Age: 67
End: 2017-09-11

## 2017-09-11 VITALS
DIASTOLIC BLOOD PRESSURE: 70 MMHG | HEIGHT: 72 IN | WEIGHT: 247.56 LBS | BODY MASS INDEX: 33.53 KG/M2 | SYSTOLIC BLOOD PRESSURE: 120 MMHG

## 2017-09-11 VITALS
BODY MASS INDEX: 33.32 KG/M2 | WEIGHT: 246 LBS | SYSTOLIC BLOOD PRESSURE: 112 MMHG | DIASTOLIC BLOOD PRESSURE: 68 MMHG | HEART RATE: 55 BPM | HEIGHT: 72 IN

## 2017-09-11 DIAGNOSIS — I48.0 PAROXYSMAL ATRIAL FIBRILLATION: ICD-10-CM

## 2017-09-11 DIAGNOSIS — Z79.01 LONG TERM (CURRENT) USE OF ANTICOAGULANTS: ICD-10-CM

## 2017-09-11 DIAGNOSIS — I48.0 PAF (PAROXYSMAL ATRIAL FIBRILLATION): ICD-10-CM

## 2017-09-11 DIAGNOSIS — N95.0 POSTMENOPAUSAL BLEEDING: Primary | ICD-10-CM

## 2017-09-11 DIAGNOSIS — I10 HTN (HYPERTENSION), BENIGN: ICD-10-CM

## 2017-09-11 DIAGNOSIS — I48.0 PAROXYSMAL ATRIAL FIBRILLATION: Primary | ICD-10-CM

## 2017-09-11 LAB
INR PPP: 2.1
PROTHROMBIN TIME: 21.7 SEC

## 2017-09-11 PROCEDURE — 3078F DIAST BP <80 MM HG: CPT | Mod: S$GLB,,, | Performed by: OBSTETRICS & GYNECOLOGY

## 2017-09-11 PROCEDURE — 36415 COLL VENOUS BLD VENIPUNCTURE: CPT

## 2017-09-11 PROCEDURE — 85610 PROTHROMBIN TIME: CPT

## 2017-09-11 PROCEDURE — 3078F DIAST BP <80 MM HG: CPT | Mod: S$GLB,,, | Performed by: INTERNAL MEDICINE

## 2017-09-11 PROCEDURE — 3008F BODY MASS INDEX DOCD: CPT | Mod: S$GLB,,, | Performed by: OBSTETRICS & GYNECOLOGY

## 2017-09-11 PROCEDURE — 99205 OFFICE O/P NEW HI 60 MIN: CPT | Mod: S$GLB,,, | Performed by: INTERNAL MEDICINE

## 2017-09-11 PROCEDURE — 87086 URINE CULTURE/COLONY COUNT: CPT

## 2017-09-11 PROCEDURE — 1125F AMNT PAIN NOTED PAIN PRSNT: CPT | Mod: S$GLB,,, | Performed by: OBSTETRICS & GYNECOLOGY

## 2017-09-11 PROCEDURE — 1159F MED LIST DOCD IN RCRD: CPT | Mod: S$GLB,,, | Performed by: INTERNAL MEDICINE

## 2017-09-11 PROCEDURE — 3074F SYST BP LT 130 MM HG: CPT | Mod: S$GLB,,, | Performed by: OBSTETRICS & GYNECOLOGY

## 2017-09-11 PROCEDURE — 3008F BODY MASS INDEX DOCD: CPT | Mod: S$GLB,,, | Performed by: INTERNAL MEDICINE

## 2017-09-11 PROCEDURE — 3074F SYST BP LT 130 MM HG: CPT | Mod: S$GLB,,, | Performed by: INTERNAL MEDICINE

## 2017-09-11 PROCEDURE — 99999 PR PBB SHADOW E&M-EST. PATIENT-LVL III: CPT | Mod: PBBFAC,,, | Performed by: OBSTETRICS & GYNECOLOGY

## 2017-09-11 PROCEDURE — 99204 OFFICE O/P NEW MOD 45 MIN: CPT | Mod: S$GLB,,, | Performed by: OBSTETRICS & GYNECOLOGY

## 2017-09-11 PROCEDURE — 1126F AMNT PAIN NOTED NONE PRSNT: CPT | Mod: S$GLB,,, | Performed by: INTERNAL MEDICINE

## 2017-09-11 PROCEDURE — 93000 ELECTROCARDIOGRAM COMPLETE: CPT | Mod: S$GLB,,, | Performed by: INTERNAL MEDICINE

## 2017-09-11 PROCEDURE — 99999 PR PBB SHADOW E&M-EST. PATIENT-LVL III: CPT | Mod: PBBFAC,,, | Performed by: INTERNAL MEDICINE

## 2017-09-11 PROCEDURE — 1159F MED LIST DOCD IN RCRD: CPT | Mod: S$GLB,,, | Performed by: OBSTETRICS & GYNECOLOGY

## 2017-09-11 RX ORDER — FLECAINIDE ACETATE 150 MG/1
TABLET ORAL
Qty: 10 TABLET | Refills: 3 | Status: SHIPPED | OUTPATIENT
Start: 2017-09-11 | End: 2017-10-20

## 2017-09-11 NOTE — TELEPHONE ENCOUNTER
Reason for Disposition   Taking Coumadin (warfarin) or other strong blood thinner, or known bleeding disorder (e.g., thrombocytopenia)    Protocols used: ST VAGINAL BLEEDING - [id number]    Patient states she is on Coumadin and began experiencing vaginal bleeding. Patient held her dose of Coumadin yesterday. She has and appointment with GYN for today. Patient would like to know if should should resume Coumadin. Patient transferred to Coumadin clinic for further assistance.

## 2017-09-11 NOTE — PROGRESS NOTES
The pt called this morning to let us know that she started experiencing vaginal spotting on 9/10.  She reports that it is still occurring today and she has an appt with her OBGYN this afternoon.  She did hold her coumadin on 9/10 and I have advised that she holds her coumadin again tonight.  She will stop by the lab for an INR today.  (INR ara from 9/22 to 9/11.)

## 2017-09-11 NOTE — PROGRESS NOTES
Subjective:    Patient ID:  Jayla Reagan is a 67 y.o. female who presents for evaluation of AF    HPI  67 y.o. F with PAF dx 4/2017; only 2 episodes  HTN    Went to ER 8/17 with palps, exhaustion. Found in AF with RVR. Rx cardizem, with subsequent in/out/in/out of AF.  a/c changed to eliquis at that time, c/b blood spotting on tissue after urinating (not in the urine, though). Placed back on coumadin, and there's been a recurrence of this spotting today. She'll see OB/GYN this afternoon.    Feels well when not in AF.    My interpretation of today's ECG is SB 55 bpm        Review of Systems   Constitution: Negative. Negative for weakness and malaise/fatigue.   HENT: Negative.  Negative for ear pain and tinnitus.    Eyes: Negative for blurred vision.   Cardiovascular: Negative.  Negative for chest pain, dyspnea on exertion, near-syncope, palpitations and syncope.   Respiratory: Negative.  Negative for shortness of breath.    Endocrine: Negative.  Negative for polyuria.   Hematologic/Lymphatic: Does not bruise/bleed easily.   Skin: Negative.  Negative for rash.   Musculoskeletal: Negative.  Negative for joint pain and muscle weakness.   Gastrointestinal: Negative.  Negative for abdominal pain and change in bowel habit.   Genitourinary: Positive for non-menstrual bleeding (on tissue paper, not in urine). Negative for frequency.   Neurological: Negative.  Negative for dizziness.   Psychiatric/Behavioral: Negative.  Negative for depression. The patient is not nervous/anxious.    Allergic/Immunologic: Negative for environmental allergies.        Objective:    Physical Exam   Constitutional: She is oriented to person, place, and time. Vital signs are normal. She appears well-developed and well-nourished. She is active and cooperative.   HENT:   Head: Normocephalic and atraumatic.   Eyes: Conjunctivae and EOM are normal.   Neck: Normal range of motion. Carotid bruit is not present. No tracheal deviation and no edema  present. No thyroid mass and no thyromegaly present.   Cardiovascular: Regular rhythm, normal heart sounds, intact distal pulses and normal pulses.   No extrasystoles are present. Bradycardia present.  PMI is not displaced.  Exam reveals no gallop and no friction rub.    No murmur heard.  Pulmonary/Chest: Effort normal and breath sounds normal. No respiratory distress. She has no wheezes. She has no rales.   Abdominal: Soft. Normal appearance. She exhibits no distension. There is no hepatosplenomegaly.   Musculoskeletal: Normal range of motion.   Neurological: She is alert and oriented to person, place, and time. Coordination normal.   Skin: Skin is warm and dry. No rash noted.   Psychiatric: She has a normal mood and affect. Her speech is normal and behavior is normal. Thought content normal. Cognition and memory are normal.   Nursing note and vitals reviewed.        Assessment:       1. Paroxysmal atrial fibrillation    2. PAF (paroxysmal atrial fibrillation)    3. HTN (hypertension), benign         Plan:       Discussed AF and its basic pathophysiology, including its health implications and treatment options (rate vs. rhythm control, meds vs. procedural/device treatment) as appropriate for the patient.  Discussed options for anticoagulation, including ASA vs coumadin vs dabigatran/rivaroxaban/apixaban (novel anticoagulants). Discussed risks and benefits of each, including dosing, side effects, risk (including no specific reversal agent for some NOACs), and cost. Answered all questions. This process took about 15 minutes.    CHADS-VASc=3; continue anticoagulation.  Pt to be evaluated by OB/GYN today re: blood spotting following urination. May require eval by urology if no lesion evident.  Would like to get pt onto a NOAC in the future if we can.    Pt describes disjointed sleep, frequent arousals. Sleeps alone so we're not sure about apnea.  Sleep clinic eval/tx.    Given fairly rare episodic symptomatic AF, will  start with Pill-In-the-Pocket.  Discussed pill-in-the-pocket approach to AF. Gave pt instructions to present to ER if sustained AF is felt, for the first trial of that approach; Give Flecainide 300 mg PO x 1 in ED. Provided reference to [facility] article (Letty et al. N Engl J Med 2004;351:2384-91). If tolerated and effective, thereafter patient would be able to self-direct pill-in-the-pocket treatment.   If frequent AF in the future, may need to change to suppressive med and/or consider ablation.    Return in 1 year with echo, or earlier prn.

## 2017-09-12 ENCOUNTER — TELEPHONE (OUTPATIENT)
Dept: CARDIOLOGY | Facility: CLINIC | Age: 67
End: 2017-09-12

## 2017-09-12 NOTE — TELEPHONE ENCOUNTER
----- Message from Sindhu Riggs sent at 9/12/2017  8:52 AM CDT -----  Contact: Self [phone]  Calling to talk to nurse has questions on her medication. Please advice

## 2017-09-12 NOTE — PROGRESS NOTES
The pt reports being seen by her OBGYN yesterday and thought that her vaginal spotting occurred from transferring from Eliquis to coumadin.  Her INR yesterday was at the lower part of her therapeutic range - 2.1.  I am advising that she resumes her warfarin and keeps her next INR scheduled on 9/22.  She is scheduled for a vaginal ultrasound on 9/25 p/pt.

## 2017-09-13 ENCOUNTER — OFFICE VISIT (OUTPATIENT)
Dept: OBSTETRICS AND GYNECOLOGY | Facility: CLINIC | Age: 67
End: 2017-09-13
Payer: MEDICARE

## 2017-09-13 ENCOUNTER — PATIENT MESSAGE (OUTPATIENT)
Dept: OBSTETRICS AND GYNECOLOGY | Facility: CLINIC | Age: 67
End: 2017-09-13

## 2017-09-13 DIAGNOSIS — N95.0 POSTMENOPAUSAL BLEEDING: ICD-10-CM

## 2017-09-13 LAB — BACTERIA UR CULT: NO GROWTH

## 2017-09-13 PROCEDURE — 76830 TRANSVAGINAL US NON-OB: CPT | Mod: S$GLB,,, | Performed by: OBSTETRICS & GYNECOLOGY

## 2017-09-13 NOTE — TELEPHONE ENCOUNTER
Returned patients call.   Patient scheduled for ultrasound on 9-13-17. Patient verbalized understanding.

## 2017-09-13 NOTE — PROGRESS NOTES
The pt called today to let me know that the vaginal bleeding has resumed and she feels that it is a little heavier today than prior.  I have lowered her warfarin dose and recommended she contact her MD.  She will try to get her ultrasound moved up to a closer date.  If the bleeding continues/increases, we will contact Dr. Hsieh and see if the pt can hold coumadin x 1 week.

## 2017-09-13 NOTE — TELEPHONE ENCOUNTER
----- Message from Fernando Mccarty sent at 9/13/2017  8:55 AM CDT -----  Contact: [phone]/self  Pt requesting to speak with the nurse concerning vaginal bleeding.  Please call and advise

## 2017-09-14 ENCOUNTER — TELEPHONE (OUTPATIENT)
Dept: CARDIOLOGY | Facility: CLINIC | Age: 67
End: 2017-09-14

## 2017-09-14 ENCOUNTER — DOCUMENTATION ONLY (OUTPATIENT)
Dept: REHABILITATION | Facility: HOSPITAL | Age: 67
End: 2017-09-14

## 2017-09-14 NOTE — TELEPHONE ENCOUNTER
Patient called stating that Dr. Lagunas had reduced her metoprolol from 200mg daily to 50mg twice a day and her blood pressure has been in the 150's/ 91 p. 91  So she went back to her original dose of 200mg that you Rx'd for her and her blood pressure was 134/82 p. 50  She wants to know what you would advise.

## 2017-09-14 NOTE — PROGRESS NOTES
Face to Face PTA Conference performed with Margaux Bardales PTA, Melanie Edgar PTA, Colin Hooker PTA, Shady Guillory PTA regarding patient's current status, overall progress, and plan of care.     Adriana Wiggins, PT    Face to face meeting completed with Adriana wiggins PT on 9/14/17 regarding current status and progress of   Jayla Reagan .  Colin Hooker, PTA     Shady Guillory, PTA    Face to face meeting completed with Adriana wiggins PT on 9/14/17 regarding current status and progress of   Jayla Reagan . Nay Bardales, PTA    Face to face meeting completed with Adriana Wiggins PT on 9/14/17 regarding current status and progress of   Jayal Reagan.  Melanie Edgar, PTA

## 2017-09-21 ENCOUNTER — TELEPHONE (OUTPATIENT)
Dept: OBSTETRICS AND GYNECOLOGY | Facility: CLINIC | Age: 67
End: 2017-09-21

## 2017-09-21 NOTE — TELEPHONE ENCOUNTER
----- Message from Lamar Us sent at 9/21/2017 12:13 PM CDT -----  Contact: [phone]/self  Patient called in returning your call. Please advise.

## 2017-09-21 NOTE — TELEPHONE ENCOUNTER
----- Message from Joel Goode MD sent at 9/18/2017 11:36 PM CDT -----  Urine culture is negative     Joel Goode M.D.   OB/GYN

## 2017-09-22 ENCOUNTER — ANTI-COAG VISIT (OUTPATIENT)
Dept: CARDIOLOGY | Facility: CLINIC | Age: 67
End: 2017-09-22
Payer: MEDICARE

## 2017-09-22 DIAGNOSIS — I48.0 PAROXYSMAL ATRIAL FIBRILLATION: ICD-10-CM

## 2017-09-22 DIAGNOSIS — Z79.01 LONG TERM (CURRENT) USE OF ANTICOAGULANTS: Primary | ICD-10-CM

## 2017-09-22 LAB — INR PPP: 1.7 (ref 2–3)

## 2017-09-22 PROCEDURE — 99211 OFF/OP EST MAY X REQ PHY/QHP: CPT | Mod: 25,S$GLB,,

## 2017-09-22 PROCEDURE — 85610 PROTHROMBIN TIME: CPT | Mod: QW,S$GLB,,

## 2017-09-22 NOTE — PROGRESS NOTES
INR low due to holding doses due to vaginal spotting. Patient has had follow up with gyn and s/p u/s. She has folloe on Monday to discuss results. Vaginal bleeding/spotting stopped. We will try to resume a higher dose. Repeat INR 10/2. Patient advised to call with any changes or if bleeding resumes.

## 2017-09-25 ENCOUNTER — OFFICE VISIT (OUTPATIENT)
Dept: OBSTETRICS AND GYNECOLOGY | Facility: CLINIC | Age: 67
End: 2017-09-25
Payer: MEDICARE

## 2017-09-25 VITALS
HEIGHT: 72 IN | WEIGHT: 247.56 LBS | BODY MASS INDEX: 33.53 KG/M2 | SYSTOLIC BLOOD PRESSURE: 116 MMHG | DIASTOLIC BLOOD PRESSURE: 76 MMHG

## 2017-09-25 DIAGNOSIS — N95.0 POSTMENOPAUSAL BLEEDING: Primary | ICD-10-CM

## 2017-09-25 PROCEDURE — 99213 OFFICE O/P EST LOW 20 MIN: CPT | Mod: S$GLB,,, | Performed by: OBSTETRICS & GYNECOLOGY

## 2017-09-25 PROCEDURE — 99999 PR PBB SHADOW E&M-EST. PATIENT-LVL III: CPT | Mod: PBBFAC,,, | Performed by: OBSTETRICS & GYNECOLOGY

## 2017-09-25 NOTE — PROGRESS NOTES
GYNECOLOGY  :  Jayla Reagan is a 67 y.o.No obstetric history on file.    Here today for  Vaginal bleeding over the last 2 days, minimal amount, but worried since is the first time in 15 years. No cramping   No other symptoms  Was recently started on Eliquis one month ago, switched from coumadin , so wonders if could  be a factor   LMP at age 54 , no bleeding since .    Past Medical History:   Diagnosis Date    Atrial fibrillation     Cataract     Hypertension     PAD (peripheral artery disease)     Peripheral artery disease      Past Surgical History:   Procedure Laterality Date    BREAST SURGERY      CHOLECYSTECTOMY      ECTOPIC PREGNANCY SURGERY      SALPINGECTOMY       Family History   Problem Relation Age of Onset    Cataracts Mother     Cataracts Sister      Social History   Substance Use Topics    Smoking status: Former Smoker    Smokeless tobacco: Never Used    Alcohol use No     OB History   No data available       /70   Ht 6' (1.829 m)   Wt 112.3 kg (247 lb 9.2 oz)   BMI 33.58 kg/m²     Last PAP= > 15 years ago   LMP = age 54  Last Mammogram =   Last Colonoscopy  =n/a      COMPREHENSIVE GYN HISTORY:       PAP History: Denies abnormal Paps.  Infection History: Denies STDs. Denies PID.  Benign History: Denies uterine fibroids. Denies ovarian cysts. Denies endometriosis.   Cancer History: Denies cervical cancer. Denies uterine cancer or hyperplasia. Denies ovarian cancer. Denies vulvar cancer or pre-cancer. Denies vaginal cancer or pre-cancer. Denies breast cancer. Denies colon cancer.  Sexual Activity History: (  ) Yes   ( x )   no       ROS  GENERAL: Denies significant weight gain or weight loss. Feeling well overall.  SKIN: Denies rash or lesions.  Normal skin turgor  HEAD: Denies head injury or headache.   NODES: Denies enlarged lymph nodes.   CHEST: Denies chest pain or shortness of breath.   CARDIOVASCULAR: Denies palpitations or left sided chest pain.   ABDOMEN: No  abdominal pain,no  diarrhea,constipation  nausea, vomiting or rectal bleeding.   URINARY: normal  Frequency,no  Dysuria or burning on urination.   REPRODUCTIVE: Per HPI   BREASTS: The patient sometimes performs breast self-examination and denies pain, lumps, or nipple discharge.   HEMATOLOGIC: No easy bruisability or excessive bleeding.   MUSCULOSKELETAL: Denies joint pain or swelling.   NEUROLOGIC: Denies syncope or weakness.   PSYCHIATRIC: Denies depression, anxiety or mood swings.    Physical Exam     Constitutional: She is oriented to person, place, and time. She appears well-developed and well-nourished. No distress.   HENT:   Head: Normocephalic.   NECK: Neck symmetric without masses or thyromegaly.  Cardiovascular: Normal rate and regular rhythm.   Pulmonary/Chest: Effort normal and breath sounds normal. No respiratory distress. She has no wheezes.   Breasts: Symmetrical, no skin changes or visible lesions. No palpable masses, nipple discharge or adenopathy bilaterally.  Abdominal: Soft not distended. Bowel sounds are normal. She exhibits   no masses . No tenderness to palpation.   Genitourinary: Pelvic exam was performed with patient supine.   External genitalia normal no lesions.Normal hair distribution   Adequate perineal body,normal urethral meatus   Vagina moist and well rugated without lesions, no vaginal  Discharge.   Cervix pink and without lesions. No bleeding. No significant cystocele or rectocele.  Bimanual exam showed uterus normal size, shape and position , mobile and nontender. Adnexa without masses or tenderness. Urethra and bladder normal  Extremities normal no cyanosis ,edema.     Procedures:  Urine culture  TVUS     A/P Jayla Reagan 67 y.o. No obstetric history on file.    Dx=  1- Postmenopausal bleeding   2- Atrial fibrillation   3- HTN    Plan:    New Anticoagulation could play a role in bleeding but endometrial pathology must be ruled out first   TVUS ordered today     Will check for  endometrial pathology and endometrial thickness   Urine culture     FU in 2 weeks with results       Joel Goode M.D.   OB/GYN

## 2017-09-25 NOTE — PROGRESS NOTES
GYNECOLOGY  MC:  Jayla Reagan is a 67 y.o.No obstetric history on file.    Here today for follow up   TVUS :  Endometrial thickness 9mm   Multiple  fibroids,   3.2,4x3 cms and small 2x2 cms , some of them in contact with cavity   Normal ovaries     Seen before x postmenopausal bleeding :  Vaginal bleeding over the last 2 days, minimal amount, but worried since is the first time in 15 years. No cramping   No other symptoms  Was recently started on Eliquis one month ago, switched from coumadin , so wonders if could  be a factor   LMP at age 54 , no bleeding since .    Past Medical History:   Diagnosis Date    Atrial fibrillation     Cataract     Hypertension     PAD (peripheral artery disease)     Peripheral artery disease      Past Surgical History:   Procedure Laterality Date    BREAST SURGERY      CHOLECYSTECTOMY      ECTOPIC PREGNANCY SURGERY      SALPINGECTOMY       Family History   Problem Relation Age of Onset    Cataracts Mother     Cataracts Sister      Social History   Substance Use Topics    Smoking status: Former Smoker    Smokeless tobacco: Never Used    Alcohol use No     OB History   No data available       /76   Ht 6' (1.829 m)   Wt 112.3 kg (247 lb 9.2 oz)   BMI 33.58 kg/m²     Last PAP= > 15 years ago   LMP = age 54  Last Mammogram =   Last Colonoscopy  =n/a      COMPREHENSIVE GYN HISTORY:       PAP History: Denies abnormal Paps.  Infection History: Denies STDs. Denies PID.  Benign History: Denies uterine fibroids. Denies ovarian cysts. Denies endometriosis.   Cancer History: Denies cervical cancer. Denies uterine cancer or hyperplasia. Denies ovarian cancer. Denies vulvar cancer or pre-cancer. Denies vaginal cancer or pre-cancer. Denies breast cancer. Denies colon cancer.  Sexual Activity History: (  ) Yes   ( x )   no       ROS  GENERAL: Denies significant weight gain or weight loss. Feeling well overall.  SKIN: Denies rash or lesions.  Normal skin turgor  HEAD:  Denies head injury or headache.   NODES: Denies enlarged lymph nodes.   CHEST: Denies chest pain or shortness of breath.   CARDIOVASCULAR: Denies palpitations or left sided chest pain.   ABDOMEN: No abdominal pain,no  diarrhea,constipation  nausea, vomiting or rectal bleeding.   URINARY: normal  Frequency,no  Dysuria or burning on urination.   REPRODUCTIVE: Per HPI   BREASTS: The patient sometimes performs breast self-examination and denies pain, lumps, or nipple discharge.   HEMATOLOGIC: No easy bruisability or excessive bleeding.   MUSCULOSKELETAL: Denies joint pain or swelling.   NEUROLOGIC: Denies syncope or weakness.   PSYCHIATRIC: Denies depression, anxiety or mood swings.    Physical Exam     Constitutional: She is oriented to person, place, and time. She appears well-developed and well-nourished. No distress.   HENT:   Head: Normocephalic.   NECK: Neck symmetric without masses or thyromegaly.  Cardiovascular: Normal rate and regular rhythm.   Pulmonary/Chest: Effort normal and breath sounds normal. No respiratory distress. She has no wheezes.   Breasts: Symmetrical, no skin changes or visible lesions. No palpable masses, nipple discharge or adenopathy bilaterally.  Abdominal: Soft not distended. Bowel sounds are normal. She exhibits   no masses . No tenderness to palpation.   Genitourinary: Pelvic exam was performed with patient supine.   External genitalia normal no lesions.Normal hair distribution   Adequate perineal body,normal urethral meatus   Vagina moist and well rugated without lesions, no vaginal  Discharge.   Cervix pink and without lesions. No bleeding. No significant cystocele or rectocele.  Bimanual exam showed uterus normal size, shape and position , mobile and nontender. Adnexa without masses or tenderness. Urethra and bladder normal  Extremities normal no cyanosis ,edema.     Procedures:  Urine culture  TVUS     A/P Jayla Reagan 67 y.o. No obstetric history on file.    Dx=  1-  Postmenopausal bleeding     Uterine  fibroids     2- Atrial fibrillation   3- HTN    Plan:  Options discussed with patient   Due to increased endometrial thickness  Will need  Endometrial sampling  Patient declines attempt of endometrial biopsy in-office     Will schedule Hysteroscopy D/C   Info sent to Lin[facility]   WIll see her for Preop visit   Needs preop clearance         Joel Goode M.D.   OB/GYN

## 2017-10-02 ENCOUNTER — ANTI-COAG VISIT (OUTPATIENT)
Dept: CARDIOLOGY | Facility: CLINIC | Age: 67
End: 2017-10-02
Payer: MEDICARE

## 2017-10-02 DIAGNOSIS — I48.0 PAROXYSMAL ATRIAL FIBRILLATION: ICD-10-CM

## 2017-10-02 DIAGNOSIS — Z79.01 LONG-TERM (CURRENT) USE OF ANTICOAGULANTS: Primary | ICD-10-CM

## 2017-10-02 LAB — INR PPP: 2 (ref 2–3)

## 2017-10-02 PROCEDURE — 99211 OFF/OP EST MAY X REQ PHY/QHP: CPT | Mod: 25,S$GLB,,

## 2017-10-02 PROCEDURE — 85610 PROTHROMBIN TIME: CPT | Mod: QW,S$GLB,,

## 2017-10-02 NOTE — PROGRESS NOTES
Patient will have upcoming d&c for vaginal bleeding, she will let us know when date is set. INR is back within target goal level. We will continue close follow up for last low level and procedure for vaginal bleeding. Patient advised to follow up with any problems or changes.

## 2017-10-10 ENCOUNTER — TELEPHONE (OUTPATIENT)
Dept: OBSTETRICS AND GYNECOLOGY | Facility: CLINIC | Age: 67
End: 2017-10-10

## 2017-10-10 DIAGNOSIS — N95.0 POST-MENOPAUSAL BLEEDING: Primary | ICD-10-CM

## 2017-10-10 NOTE — TELEPHONE ENCOUNTER
----- Message from Joel Goode MD sent at 9/25/2017  1:34 PM CDT -----  Alin Melton.     This lady needs to be scheduled x surgery               Diagnosis-       Postmenopausal bleeding                                              Procedure -        Hysteroscopy D+ C                                               Assistant=  Equipment =       Thanks     Joel Goode M.D.   OB/GYN

## 2017-10-16 ENCOUNTER — ANTI-COAG VISIT (OUTPATIENT)
Dept: CARDIOLOGY | Facility: CLINIC | Age: 67
End: 2017-10-16
Payer: MEDICARE

## 2017-10-16 DIAGNOSIS — I48.0 PAROXYSMAL ATRIAL FIBRILLATION: ICD-10-CM

## 2017-10-16 DIAGNOSIS — Z79.01 LONG-TERM (CURRENT) USE OF ANTICOAGULANTS: Primary | ICD-10-CM

## 2017-10-16 LAB — INR PPP: 2.1 (ref 2–3)

## 2017-10-16 PROCEDURE — 99211 OFF/OP EST MAY X REQ PHY/QHP: CPT | Mod: 25,S$GLB,,

## 2017-10-16 PROCEDURE — 85610 PROTHROMBIN TIME: CPT | Mod: QW,S$GLB,,

## 2017-10-16 NOTE — PROGRESS NOTES
I agree with BOYD Collado's plan. Patient will undergo pre-op 10/20 and will contact us if she requires holding longer than one day for her D&C.

## 2017-10-16 NOTE — PROGRESS NOTES
INR within therapeutic range today. Patient confirmed coumadin dose, will maintain. Will hold dose 10/24 for upcoming procedure hysteroscope 10/25. Patient denies any bleeding,bruising or diet changes. Advised patient to call with any problems or changes.

## 2017-10-20 ENCOUNTER — ANESTHESIA EVENT (OUTPATIENT)
Dept: SURGERY | Facility: HOSPITAL | Age: 67
End: 2017-10-20
Payer: MEDICARE

## 2017-10-20 ENCOUNTER — HOSPITAL ENCOUNTER (OUTPATIENT)
Dept: PREADMISSION TESTING | Facility: HOSPITAL | Age: 67
Discharge: HOME | End: 2017-10-20
Attending: OBSTETRICS & GYNECOLOGY
Payer: MEDICARE

## 2017-10-20 ENCOUNTER — OFFICE VISIT (OUTPATIENT)
Dept: OBSTETRICS AND GYNECOLOGY | Facility: CLINIC | Age: 67
End: 2017-10-20
Payer: MEDICARE

## 2017-10-20 VITALS
WEIGHT: 249.13 LBS | SYSTOLIC BLOOD PRESSURE: 126 MMHG | BODY MASS INDEX: 33.74 KG/M2 | DIASTOLIC BLOOD PRESSURE: 80 MMHG | HEIGHT: 72 IN

## 2017-10-20 DIAGNOSIS — N95.0 POSTMENOPAUSAL BLEEDING: Primary | ICD-10-CM

## 2017-10-20 PROCEDURE — 99999 PR PBB SHADOW E&M-EST. PATIENT-LVL III: CPT | Mod: PBBFAC,,, | Performed by: OBSTETRICS & GYNECOLOGY

## 2017-10-20 PROCEDURE — 99499 UNLISTED E&M SERVICE: CPT | Mod: S$GLB,,, | Performed by: OBSTETRICS & GYNECOLOGY

## 2017-10-20 RX ORDER — FLUTICASONE PROPIONATE 50 MCG
1 SPRAY, SUSPENSION (ML) NASAL DAILY PRN
COMMUNITY
End: 2019-12-26 | Stop reason: CLARIF

## 2017-10-20 RX ORDER — TRIAMTERENE AND HYDROCHLOROTHIAZIDE 37.5; 25 MG/1; MG/1
1 CAPSULE ORAL EVERY MORNING
Refills: 1 | COMMUNITY
Start: 2017-10-02 | End: 2022-06-27 | Stop reason: SDUPTHER

## 2017-10-20 RX ORDER — LIDOCAINE HYDROCHLORIDE 10 MG/ML
1 INJECTION, SOLUTION EPIDURAL; INFILTRATION; INTRACAUDAL; PERINEURAL ONCE
Status: CANCELLED | OUTPATIENT
Start: 2017-10-20 | End: 2017-10-20

## 2017-10-20 RX ORDER — SODIUM CHLORIDE, SODIUM LACTATE, POTASSIUM CHLORIDE, CALCIUM CHLORIDE 600; 310; 30; 20 MG/100ML; MG/100ML; MG/100ML; MG/100ML
INJECTION, SOLUTION INTRAVENOUS CONTINUOUS
Status: CANCELLED | OUTPATIENT
Start: 2017-10-20

## 2017-10-20 NOTE — DISCHARGE INSTRUCTIONS
Your surgery is scheduled for ***.    Please report to Outpatient Surgery Intake Office on the 2nd FLOOR at *** a.m.          INSTRUCTIONS IMPORTANT!!!  ¨ Do not eat or drink after 12 midnight-including water. OK to brush teeth, no   gum, candy or mints!    ¨ Take only these medicines with a small swallow of water-morning of surgery.        ____  Proceed to [facility] on *** for additional blood test.        ____  Do not wear makeup, including mascara.  ____  No powder, lotions or creams to surgical area.  ____  Please remove all jewelry, including piercings and leave at home.  ____  No money or valuables needed. Please leave at home.  ____  Please bring any documents given by your doctor.  ____  If going home the same day, arrange for a ride home. You will not be able to             drive if Anesthesia was used.  ____  Children under 18 years require a parent / guardian present the entire time             they are in surgery / recovery.  ____  Wear loose fitting clothing. Allow for dressings, bandages.  ____  Stop Aspirin, Ibuprofen, Motrin and Aleve at least 3-5 days before surgery, unless otherwise instructed by your doctor, or the nurse.   You MAY use Tylenol/acetaminophen until day of surgery.  ____  Wash the surgical area with Hibiclens the night before surgery, and again the             morning of surgery.  Be sure to rinse hibiclens off completely (if instructed by   nurse).  ____  If you take diabetic medication, do not take am of surgery unless instructed by Doctor.  ____  Call MD for temperature above 101 degrees.  ____ Stop taking any Fish Oil supplement or any Vitamins that contain Vitamin E at least 5 days prior to surgery.  ____ Do Not wear your contact lenses the day of your procedure.  You may wear your glasses.        I have read or had read and explained to me, and understand the above information.  Additional comments or instructions:  For additional questions call  [phone]     Take a Hibiclens shower twice a day for 3 days prior to surgery, including the morning of surgery.   Gargle with Listerine twice a day for 3 days prior to surgery, including the morning of surgery.      Pre-Op Bathing Instructions    Before surgery, you can play an important role in your own health.    Because skin is not sterile, we need to be sure that your skin is as free of germs as possible. By following the instructions below, you can reduce the number of germs on your skin before surgery.    IMPORTANT: You will need to shower with a special soap called Hibiclens*, available at any pharmacy.  If you are allergic to Chlorhexidine (the antiseptic in Hibiclens), use an antibacterial soap such as Dial Soap for your preoperative shower.  You will shower with Hibiclens both the night before your surgery and the morning of your surgery.  Do not use Hibiclens on the head, face or genitals to avoid injury to those areas.    STEP #1: THE NIGHT BEFORE YOUR SURGERY     1. Do not shave the area of your body where your surgery will be performed.  2. Shower and wash your hair and body as usual with your normal soap and shampoo.  3. Rinse your hair and body thoroughly after you shower to remove all soap residue.  4. With your hand, apply one packet of Hibiclens soap to the surgical site.   5. Wash the site gently for five (5) minutes. Do not scrub your skin too hard.   6. Do not wash with your regular soap after Hibiclens is used.  7. Rinse your body thoroughly.  8. Pat yourself dry with a clean, soft towel.  9. Do not use lotion, cream, or powder.  10. Wear clean clothes.    STEP #2: THE MORNING OF YOUR SURGERY     1. Repeat Step #1.    * Not to be used by people allergic to Chlorhexidine.          D&C     After the cervical canal is dilated, a curette is inserted into the uterus to take tissue samples.     Your healthcare provider has recommended you have a D&C (dilation and curettage). This common procedure  helps your healthcare provider learn more about problems inside your uterus or is done to treat a miscarriage. During a D&C, the cervix (opening of the uterus) is widened, or dilated. Tissue samples are then removed from the endometrium (lining of the uterus) with an instrument called a curette or with suction. In many cases, D&C is done to find the cause of abnormal vaginal bleeding. Or you may need a D&C as a form of treatment.  A hysteroscopy is usually done along with the D&C for a gynecological problem. A hysteroscopy uses a small instrument to see the inside of the uterus. Hysteroscopy and D&C can be done in the operating room or in the healthcare provider's office, depending on the healthcare provider who does it.  Preparing for D&C  · Arrange for an adult family member or friend to drive you home.  · Dont eat or drink anything after the midnight before your D&C, unless told otherwise by your healthcare provider.  During your D&C  Just before your D&C, you may get medicine to prevent pain. This may be given through an IV. You may be awake but relaxed during the procedure. Or you may be completely asleep. The type of anesthesia used is different depending on where the procedure takes place. The procedure will not begin until the pain medicine has taken effect. During your D&C:  · Instruments are used to hold the vagina open and to steady the uterus. The cervical canal is widened using tapered instruments called dilators.  · Usually a thin, rigid, or flexible telescope (hysteroscope) is inserted into the vagina to take images of the inside of the uterus. This allows your healthcare provider to see into the uterus.  · The curette or suction is inserted into the uterus. Tissue samples are taken from several areas. These samples are sent to a lab to be studied.  After your D&C  · You will rest for a while in a recovery area.  · You can expect some cramping for a few hours after the D&C. This can be controlled  with an over-the-counter pain reliever.  · You may have some light bleeding for a few weeks. Use pads instead of tampons.  · Take showers instead of baths for about a week. Ask your healthcare provider if you should avoid exercising or having sex for a period of time.  Risks and complications  D&C rarely causes complications. But as with any procedure, D&C has some risks. Before your D&C, your healthcare provider will discuss these with you. You will be asked to sign a consent form. Risks may include:  · Infection  · Heavy bleeding  · Perforation of the uterine wall or damage to nearby organs  · Scar tissue may form causing the lining of the uterus to adhere to itself. This can cause problems with menstrual flow or difficulty getting pregnant in the future. This is called Asherman syndrome.  · The need for additional tests or procedures  · Risks associated with anesthesia (the medicine that makes you sleep during surgery)   Call your healthcare provider   Contact your healthcare provider if you have:  · Heavy bleeding (more than 1 pad an hour)  · A fever of 100.4°F (38°C) or higher, or as directed by your healthcare provider  · Increasing belly pain, tenderness, or cramping  · Foul-smelling discharge   Date Last Reviewed: 12/1/2016  © 3250-9882 [employer]. [address] [place] [address], [place] [address]. All rights reserved. This information is not intended as a substitute for professional medical care. Always follow your healthcare professional's instructions.        Anesthesia: General Anesthesia     You are watched continuously during your procedure by your anesthesia provider.     Youre due to have surgery. During surgery, youll be given medicine called anesthesia or anesthetic. This will keep you comfortable and pain-free. Your anesthesia provider will use general anesthesia.  What is general anesthesia?  General anesthesia puts you into a state like deep sleep. It goes into the bloodstream (IV  anesthetics), into the lungs (gas anesthetics), or both. You feel nothing during the procedure. You will not remember it. During the procedure, the anesthesia provider monitors you continuously. He or she checks your heart rate and rhythm, blood pressure, breathing, and blood oxygen.  · IV anesthetics. IV anesthetics are given through an IV line in your arm. Theyre often given first. This is so you are asleep before a gas anesthetic is started. Some kinds of IV anesthetics relieve pain. Others relax you. Your doctor will decide which kind is best in your case.  · Gas anesthetics. Gas anesthetics are breathed into the lungs. They are often used to keep you asleep. They can be given through a facemask or a tube placed in your larynx or trachea (breathing tube).  ¨ If you have a facemask, your anesthesia provider will most likely place it over your nose and mouth while youre still awake. Youll breathe oxygen through the mask as your IV anesthetic is started. Gas anesthetic may be added through the mask.  ¨ If you have a tube in the larynx or trachea, it will be inserted into your throat after youre asleep.  Anesthesia tools and medicines  You will likely have:  · IV anesthetics. These are put into an IV line into your bloodstream.  · Gas anesthetics. You breathe these anesthetics into your lungs, where they pass into your bloodstream.  · Pulse oximeter. This is a small clip that is attached to the end of your finger. This measures your blood oxygen level.  · Electrocardiography leads (electrodes). These are small sticky pads that are placed on your chest. They record your heart rate and rhythm.  · Blood pressure cuff. This reads your blood pressure.  Risks and possible complications  General anesthesia has some risks. These include:  · Breathing problems  · Nausea and vomiting  · Sore throat or hoarseness (usually temporary)  · Allergic reaction to the anesthetic  · Irregular heartbeat (rare)  · Cardiac arrest  (rare)   Anesthesia safety  · Follow all instructions you are given for how long not to eat or drink before your procedure.  · Be sure your doctor knows what medicines and drugs you take. This includes over-the-counter medicines, herbs, supplements, alcohol or other drugs. You will be asked when those were last taken.  · Have an adult family member or friend drive you home after the procedure.  · For the first 24 hours after your surgery:  ¨ Do not drive or use heavy equipment.  ¨ Do not make important decisions or sign legal documents. If important decisions or signing legal documents is necessary during the first 24 hours after surgery, have a trusted family member or spouse act on your behalf.  ¨ Avoid alcohol.  ¨ Have a responsible adult stay with you. He or she can watch for problems and help keep you safe.  Date Last Reviewed: 12/1/2016  © 8932-4140 [employer]. [address] [place] [address], [place] [address]. All rights reserved. This information is not intended as a substitute for professional medical care. Always follow your healthcare professional's instructions.

## 2017-10-20 NOTE — PROGRESS NOTES
GYNECOLOGY  :  Jayla Reagan is a 67 y.o.No obstetric history on file.    Here today for preop appointment   Scheduled for Hysteroscopy D/C   Next 10/25/17   For postmenopausal bleeding     follow up   TVUS :  Endometrial thickness 9mm   Multiple  fibroids,   3.2,4x3 cms and small 2x2 cms , some of them in contact with cavity   Normal ovaries     Seen before x postmenopausal bleeding :  Vaginal bleeding over the last 2 days, minimal amount, but worried since is the first time in 15 years. No cramping   No other symptoms  Was recently started on Eliquis one month ago, switched from coumadin , so wonders if could  be a factor   LMP at age 54 , no bleeding since .    Past Medical History:   Diagnosis Date    Atrial fibrillation     Cataract     Hypertension     PAD (peripheral artery disease)     Peripheral artery disease      Past Surgical History:   Procedure Laterality Date    BREAST SURGERY      CHOLECYSTECTOMY      ECTOPIC PREGNANCY SURGERY      SALPINGECTOMY       Family History   Problem Relation Age of Onset    Cataracts Mother     Cataracts Sister      Social History   Substance Use Topics    Smoking status: Former Smoker    Smokeless tobacco: Never Used    Alcohol use No     OB History    Para Term  AB Living   3 2 2   1     SAB TAB Ectopic Multiple Live Births       1          # Outcome Date GA Lbr Shravan/2nd Weight Sex Delivery Anes PTL Lv   3 Ectopic            2 Term      Vag-Spont      1 Term      Vag-Spont             /80   Ht 6' (1.829 m)   Wt 113 kg (249 lb 1.9 oz)   BMI 33.79 kg/m²     Last PAP= > 15 years ago   LMP = age 54  Last Mammogram =   Last Colonoscopy  =n/a      COMPREHENSIVE GYN HISTORY:       PAP History: Denies abnormal Paps.  Infection History: Denies STDs. Denies PID.  Benign History: Denies uterine fibroids. Denies ovarian cysts. Denies endometriosis.   Cancer History: Denies cervical cancer. Denies uterine cancer or hyperplasia. Denies  ovarian cancer. Denies vulvar cancer or pre-cancer. Denies vaginal cancer or pre-cancer. Denies breast cancer. Denies colon cancer.  Sexual Activity History: (  ) Yes   ( x )   no       ROS  GENERAL: Denies significant weight gain or weight loss. Feeling well overall.  SKIN: Denies rash or lesions.  Normal skin turgor  HEAD: Denies head injury or headache.   NODES: Denies enlarged lymph nodes.   CHEST: Denies chest pain or shortness of breath.   CARDIOVASCULAR: Denies palpitations or left sided chest pain.   ABDOMEN: No abdominal pain,no  diarrhea,constipation  nausea, vomiting or rectal bleeding.   URINARY: normal  Frequency,no  Dysuria or burning on urination.   REPRODUCTIVE: Per HPI   BREASTS: The patient sometimes performs breast self-examination and denies pain, lumps, or nipple discharge.   HEMATOLOGIC: No easy bruisability or excessive bleeding.   MUSCULOSKELETAL: Denies joint pain or swelling.   NEUROLOGIC: Denies syncope or weakness.   PSYCHIATRIC: Denies depression, anxiety or mood swings.    Physical Exam     Constitutional: She is oriented to person, place, and time. She appears well-developed and well-nourished. No distress.   HENT:   Head: Normocephalic.   NECK: Neck symmetric without masses or thyromegaly.  Cardiovascular: Normal rate and regular rhythm.   Pulmonary/Chest: Effort normal and breath sounds normal. No respiratory distress. She has no wheezes.   Breasts: Symmetrical, no skin changes or visible lesions. No palpable masses, nipple discharge or adenopathy bilaterally.  Abdominal: Soft not distended. Bowel sounds are normal. She exhibits   no masses . No tenderness to palpation.   Genitourinary: Pelvic exam was performed with patient supine.   External genitalia normal no lesions.Normal hair distribution   Adequate perineal body,normal urethral meatus   Vagina moist and well rugated without lesions, no vaginal  Discharge.   Cervix pink and without lesions. No bleeding. No significant  cystocele or rectocele.  Bimanual exam showed uterus normal size, shape and position , mobile and nontender. Adnexa without masses or tenderness. Urethra and bladder normal  Extremities normal no cyanosis ,edema.     Procedures:  Urine culture  TVUS     A/P Jayla Reagan 67 y.o. No obstetric history on file.    Dx=  1- Postmenopausal bleeding     Uterine  fibroids     2- Atrial fibrillation   3- HTN    Plan:  Options discussed with patient   Due to increased endometrial thickness  Will need  Endometrial sampling  Patient declines attempt of endometrial biopsy in-office     Will schedule Hysteroscopy D/C   Procedure next  10/25 /17   Patient agrees with plan  I have discussed the risks, benefits, indications, and alternatives of the procedure in detail.  The patient verbalizes her understanding.  All questions answered.  Consents signed.  The patient agrees to proceed to proceed as planned.        Joel Goode M.D.   OB/GYN

## 2017-10-20 NOTE — PLAN OF CARE
Scheduled for surgery on 10/25/17.  Patient at [facility].  Called and spoke with  by phone.  He states that the patient does not need to stop Coumadin prior to surgery- can continue like normal.  The patient states that she takes one blood pressure pill at night and another in the late morning.   told her to just bring her blood pressure pill that she normally takes in the morning with her when she comes to the hospital and we will have her take it if needed.  Patient going to have preop blood work drawn after [facility] appt.

## 2017-10-20 NOTE — ANESTHESIA PREPROCEDURE EVALUATION
10/20/2017  Jayla Reagan is a 67 y.o., female. Scheduled for hysteroscopy and D&C.    Meds:  Warfarin  Metoprolol  Lisinopril  Triamterine  Vit B12, Vit D    Lab Results   Component Value Date    WBC 4.05 08/02/2017    HGB 11.8 (L) 08/02/2017    HCT 35.3 (L) 08/02/2017    MCV 83 08/02/2017     08/02/2017       Chemistry        Component Value Date/Time     08/02/2017 1204    K 3.9 08/02/2017 1204     08/02/2017 1204    CO2 24 08/02/2017 1204    BUN 18 08/02/2017 1204    CREATININE 1.2 08/02/2017 1204     (H) 08/02/2017 1204        Component Value Date/Time    CALCIUM 9.1 08/02/2017 1204    ALKPHOS 84 08/02/2017 1204    AST 19 08/02/2017 1204    ALT 23 08/02/2017 1204    BILITOT 0.5 08/02/2017 1204    ESTGFRAFRICA 54 (A) 08/02/2017 1204    EGFRNONAA 47 (A) 08/02/2017 1204            Lab Results   Component Value Date    ALBUMIN 3.5 08/02/2017      Lab Results   Component Value Date    TSH 2.795 04/04/2017         Anesthesia Evaluation    I have reviewed the Patient Summary Reports.    I have reviewed the Nursing Notes.   I have reviewed the Medications.     Review of Systems  Anesthesia Hx:  No problems with previous Anesthesia  Denies Family Hx of Anesthesia complications.   Denies Personal Hx of Anesthesia complications.   Social:  Former Smoker, No Alcohol Use    Hematology/Oncology:     Oncology Normal    -- Anemia:   Cardiovascular:   Exercise tolerance: good Hypertension Denies MI.  Dysrhythmias atrial fibrillation PVD    Pulmonary:  Pulmonary Normal    Renal/:  Renal/ Normal     Hepatic/GI:  Hepatic/GI Normal    Neurological:   Denies CVA. Denies Seizures.    Endocrine:  Endocrine Normal Denies Diabetes. Denies Hypothyroidism.  Denies Hyperthyroidism.        Physical Exam  General:  Well nourished    Airway/Jaw/Neck:  Airway Findings: Mouth Opening: Normal Tongue: Normal   General Airway Assessment: Adult  Mallampati: II  TM Distance: Normal, at least 6 cm  Jaw/Neck Findings:  Neck ROM: Normal ROM      Dental:  Dental Findings: upper partial dentures   Chest/Lungs:  Chest/Lungs Findings: Clear to auscultation, Normal Respiratory Rate     Heart/Vascular:  Heart Findings: Rate: Normal  Rhythm: Irregularly Irregular     Abdomen:  Abdomen Findings: Normal    Musculoskeletal:  Musculoskeletal Findings: Normal   Skin:  Skin Findings: Normal    Mental Status:  Mental Status Findings:  Cooperative, Alert and Oriented         Anesthesia Plan  Type of Anesthesia, risks & benefits discussed:  Anesthesia Type:  MAC, general  Patient's Preference:   Intra-op Monitoring Plan: standard ASA monitors  Intra-op Monitoring Plan Comments:   Post Op Pain Control Plan:   Post Op Pain Control Plan Comments:   Induction:   IV  Beta Blocker:  Patient is on a Beta-Blocker and has received one dose within the past 24 hours (No further documentation required).       Informed Consent: Patient understands risks and agrees with Anesthesia plan.  Questions answered. Anesthesia consent signed with patient.  ASA Score: 2     Day of Surgery Review of History & Physical:        Anesthesia Plan Notes: Labs to be drawn - ordered per primary team.        Ready For Surgery From Anesthesia Perspective.

## 2017-10-25 ENCOUNTER — HOSPITAL ENCOUNTER (OUTPATIENT)
Facility: HOSPITAL | Age: 67
Discharge: HOME | End: 2017-10-25
Attending: OBSTETRICS & GYNECOLOGY | Admitting: OBSTETRICS & GYNECOLOGY
Payer: MEDICARE

## 2017-10-25 ENCOUNTER — ANESTHESIA (OUTPATIENT)
Dept: SURGERY | Facility: HOSPITAL | Age: 67
End: 2017-10-25
Payer: MEDICARE

## 2017-10-25 ENCOUNTER — SURGERY (OUTPATIENT)
Age: 67
End: 2017-10-25

## 2017-10-25 DIAGNOSIS — N95.0 POSTMENOPAUSAL BLEEDING: ICD-10-CM

## 2017-10-25 LAB — POCT GLUCOSE: 107 MG/DL (ref 70–110)

## 2017-10-25 PROCEDURE — 25000003 PHARM REV CODE 250: Performed by: STUDENT IN AN ORGANIZED HEALTH CARE EDUCATION/TRAINING PROGRAM

## 2017-10-25 PROCEDURE — 58120 DILATION AND CURETTAGE: CPT | Mod: 52,,, | Performed by: OBSTETRICS & GYNECOLOGY

## 2017-10-25 PROCEDURE — 37000009 HC ANESTHESIA EA ADD 15 MINS: Performed by: OBSTETRICS & GYNECOLOGY

## 2017-10-25 PROCEDURE — 88305 TISSUE EXAM BY PATHOLOGIST: CPT | Mod: 26,,, | Performed by: PATHOLOGY

## 2017-10-25 PROCEDURE — 71000015 HC POSTOP RECOV 1ST HR: Performed by: OBSTETRICS & GYNECOLOGY

## 2017-10-25 PROCEDURE — 36000706: Performed by: OBSTETRICS & GYNECOLOGY

## 2017-10-25 PROCEDURE — 71000033 HC RECOVERY, INTIAL HOUR: Performed by: OBSTETRICS & GYNECOLOGY

## 2017-10-25 PROCEDURE — 63600175 PHARM REV CODE 636 W HCPCS: Performed by: STUDENT IN AN ORGANIZED HEALTH CARE EDUCATION/TRAINING PROGRAM

## 2017-10-25 PROCEDURE — 37000008 HC ANESTHESIA 1ST 15 MINUTES: Performed by: OBSTETRICS & GYNECOLOGY

## 2017-10-25 PROCEDURE — 36000707: Performed by: OBSTETRICS & GYNECOLOGY

## 2017-10-25 PROCEDURE — 88305 TISSUE EXAM BY PATHOLOGIST: CPT | Performed by: PATHOLOGY

## 2017-10-25 RX ORDER — PROPOFOL 10 MG/ML
INJECTION, EMULSION INTRAVENOUS
Status: DISCONTINUED | OUTPATIENT
Start: 2017-10-25 | End: 2017-10-25

## 2017-10-25 RX ORDER — ONDANSETRON 2 MG/ML
INJECTION INTRAMUSCULAR; INTRAVENOUS
Status: DISCONTINUED | OUTPATIENT
Start: 2017-10-25 | End: 2017-10-25

## 2017-10-25 RX ORDER — PHENYLEPHRINE HYDROCHLORIDE 10 MG/ML
INJECTION INTRAVENOUS
Status: DISCONTINUED | OUTPATIENT
Start: 2017-10-25 | End: 2017-10-25

## 2017-10-25 RX ORDER — OXYCODONE AND ACETAMINOPHEN 5; 325 MG/1; MG/1
1 TABLET ORAL
Status: DISCONTINUED | OUTPATIENT
Start: 2017-10-25 | End: 2017-10-25 | Stop reason: HOSPADM

## 2017-10-25 RX ORDER — HYDROMORPHONE HYDROCHLORIDE 2 MG/ML
0.5 INJECTION, SOLUTION INTRAMUSCULAR; INTRAVENOUS; SUBCUTANEOUS EVERY 5 MIN PRN
Status: DISCONTINUED | OUTPATIENT
Start: 2017-10-25 | End: 2017-10-25 | Stop reason: HOSPADM

## 2017-10-25 RX ORDER — FENTANYL CITRATE 50 UG/ML
INJECTION, SOLUTION INTRAMUSCULAR; INTRAVENOUS
Status: DISCONTINUED | OUTPATIENT
Start: 2017-10-25 | End: 2017-10-25

## 2017-10-25 RX ORDER — SODIUM CHLORIDE, SODIUM LACTATE, POTASSIUM CHLORIDE, CALCIUM CHLORIDE 600; 310; 30; 20 MG/100ML; MG/100ML; MG/100ML; MG/100ML
INJECTION, SOLUTION INTRAVENOUS CONTINUOUS PRN
Status: DISCONTINUED | OUTPATIENT
Start: 2017-10-25 | End: 2017-10-25

## 2017-10-25 RX ORDER — LIDOCAINE HCL/PF 100 MG/5ML
SYRINGE (ML) INTRAVENOUS
Status: DISCONTINUED | OUTPATIENT
Start: 2017-10-25 | End: 2017-10-25

## 2017-10-25 RX ORDER — SODIUM CHLORIDE, SODIUM LACTATE, POTASSIUM CHLORIDE, CALCIUM CHLORIDE 600; 310; 30; 20 MG/100ML; MG/100ML; MG/100ML; MG/100ML
INJECTION, SOLUTION INTRAVENOUS CONTINUOUS
Status: DISCONTINUED | OUTPATIENT
Start: 2017-10-25 | End: 2017-10-25 | Stop reason: HOSPADM

## 2017-10-25 RX ORDER — HYDROCODONE BITARTRATE AND ACETAMINOPHEN 5; 325 MG/1; MG/1
1 TABLET ORAL EVERY 4 HOURS PRN
Status: DISCONTINUED | OUTPATIENT
Start: 2017-10-25 | End: 2017-10-25 | Stop reason: HOSPADM

## 2017-10-25 RX ORDER — ACETAMINOPHEN 325 MG/1
650 TABLET ORAL EVERY 4 HOURS PRN
Status: DISCONTINUED | OUTPATIENT
Start: 2017-10-25 | End: 2017-10-25 | Stop reason: HOSPADM

## 2017-10-25 RX ORDER — LIDOCAINE HYDROCHLORIDE 10 MG/ML
1 INJECTION, SOLUTION EPIDURAL; INFILTRATION; INTRACAUDAL; PERINEURAL ONCE
Status: DISCONTINUED | OUTPATIENT
Start: 2017-10-25 | End: 2017-10-25

## 2017-10-25 RX ORDER — SODIUM CHLORIDE, SODIUM LACTATE, POTASSIUM CHLORIDE, CALCIUM CHLORIDE 600; 310; 30; 20 MG/100ML; MG/100ML; MG/100ML; MG/100ML
INJECTION, SOLUTION INTRAVENOUS CONTINUOUS
Status: DISCONTINUED | OUTPATIENT
Start: 2017-10-25 | End: 2017-10-25

## 2017-10-25 RX ORDER — DEXAMETHASONE SODIUM PHOSPHATE 4 MG/ML
INJECTION, SOLUTION INTRA-ARTICULAR; INTRALESIONAL; INTRAMUSCULAR; INTRAVENOUS; SOFT TISSUE
Status: DISCONTINUED | OUTPATIENT
Start: 2017-10-25 | End: 2017-10-25

## 2017-10-25 RX ORDER — ONDANSETRON 2 MG/ML
4 INJECTION INTRAMUSCULAR; INTRAVENOUS DAILY PRN
Status: DISCONTINUED | OUTPATIENT
Start: 2017-10-25 | End: 2017-10-25 | Stop reason: HOSPADM

## 2017-10-25 RX ORDER — DIPHENHYDRAMINE HCL 25 MG
25 CAPSULE ORAL EVERY 4 HOURS PRN
Status: DISCONTINUED | OUTPATIENT
Start: 2017-10-25 | End: 2017-10-25 | Stop reason: HOSPADM

## 2017-10-25 RX ORDER — HYDROMORPHONE HYDROCHLORIDE 2 MG/ML
0.2 INJECTION, SOLUTION INTRAMUSCULAR; INTRAVENOUS; SUBCUTANEOUS EVERY 5 MIN PRN
Status: DISCONTINUED | OUTPATIENT
Start: 2017-10-25 | End: 2017-10-25 | Stop reason: HOSPADM

## 2017-10-25 RX ADMIN — SODIUM CHLORIDE, SODIUM LACTATE, POTASSIUM CHLORIDE, AND CALCIUM CHLORIDE: .6; .31; .03; .02 INJECTION, SOLUTION INTRAVENOUS at 07:10

## 2017-10-25 RX ADMIN — ONDANSETRON 4 MG: 2 INJECTION, SOLUTION INTRAMUSCULAR; INTRAVENOUS at 08:10

## 2017-10-25 RX ADMIN — DEXAMETHASONE SODIUM PHOSPHATE 4 MG: 4 INJECTION, SOLUTION INTRAMUSCULAR; INTRAVENOUS at 08:10

## 2017-10-25 RX ADMIN — PHENYLEPHRINE HYDROCHLORIDE 50 MCG: 10 INJECTION INTRAVENOUS at 08:10

## 2017-10-25 RX ADMIN — LIDOCAINE HYDROCHLORIDE 80 MG: 20 INJECTION, SOLUTION INTRAVENOUS at 08:10

## 2017-10-25 RX ADMIN — PROPOFOL 160 MG: 10 INJECTION, EMULSION INTRAVENOUS at 08:10

## 2017-10-25 RX ADMIN — PHENYLEPHRINE HYDROCHLORIDE 100 MCG: 10 INJECTION INTRAVENOUS at 08:10

## 2017-10-25 RX ADMIN — FENTANYL CITRATE 100 MCG: 50 INJECTION, SOLUTION INTRAMUSCULAR; INTRAVENOUS at 08:10

## 2017-10-25 NOTE — OP NOTE
Jayla Reagan67 y.o.    Date of procedure    10/25/2017    Procedure:D&C Dilatation and Curettage                     Not possible to perform planned hysteroscopy     Preop Dx: postmenopausal bleeding   Post op:  Dx the same   Findings:  Cervix look normal external os is closed, scarred  Not possible to access  Uterine cavity   Endometrial cavity not visualized     Surgeon:  Joel Goode M.D.    Anesthesia :MAC     Bleeding <50cc  .    Specimen to PAthology: endocervical curettings    Indications:  Postmenopausal bleeding       Description of procedure    With the patient in the supine position, under general anesthesia the patients legs were placed in lithotomy position in yellow aric stirrups,   She was prepped and draped in the usual manner , urinary bladder catheterized.  Examination under anesthesia revealed normal size uterus ,   The cervix was grasped with a single tooth tenaculum.   Multiple attempts to dilate  The cervix,  But found to be impossible.   Using the 2mm hegar dilator, it is possible to advance a little (5mm) into the cervical canal, but not more dur to risk of perforation   Endocervical curetting sample sent to pathology\  Procedure is stopped due to inability to enter the endometrial cavity    All instruments removed from vagina, minimal bleeding tenaculum site. Silver nitrate used on tenaculum site .    Patient awakened, transferred to recovery in good condition  No complications     Joel Goode M.D.   OB/GYN    10/25/2017

## 2017-10-25 NOTE — PLAN OF CARE
Patient ambulated in the room and dressed, daughter at bedside. Patient aaox3. Vss. Denies any pain at this time. Instructions given to the patient per MD orders with time for questions, verbalized understanding. Instructed the patient to call Dr. Goode office to make f/u apt. Patient discharged via wheelchair to the car with spouse.

## 2017-10-25 NOTE — TRANSFER OF CARE
Anesthesia Transfer of Care Note    Patient: Jayla Reagan    Procedure(s) Performed: Procedure(s) (LRB):  DILATION-CURETTAGE (N/A)    Patient location: PACU    Anesthesia Type: general    Transport from OR: Transported from OR on 6-10 L/min O2 by face mask with adequate spontaneous ventilation    Post pain: adequate analgesia    Post assessment: no apparent anesthetic complications    Post vital signs: stable    Level of consciousness: awake    Nausea/Vomiting: no nausea/vomiting    Complications: none    Transfer of care protocol was followed      Last vitals:   Visit Vitals  /71   Pulse 66   Temp 37 °C (98.6 °F)   Resp 15   Ht 6' (1.829 m)   Wt 112.9 kg (249 lb)   SpO2 100%   Breastfeeding? No   BMI 33.77 kg/m²

## 2017-10-25 NOTE — DISCHARGE INSTRUCTIONS
DIET: You may resume your home diet. If nausea is present, increase your diet gradually with fluids and bland foods    ACTIVITY LEVEL: You have received sedation or an anesthetic, you may feel sleepy for several hours. Rest until you are more awake. Gradually resume your normal activities    Medications: Pain medication should be taken only if needed and as directed. If antibiotics are prescribed, the medication should be taken until completed. You will be given an updated list of you medications.    No driving, alcoholic beverages or signing legal documents for next 24 hours or while taking pain medication.       CALL THE DOCTOR:    For any obvious bleeding (some dried blood over the incision is normal).      Redness, swelling, foul smell around incision or fever over 101.   Shortness of breath, Coughing up Bloody sputum, Pains or Swelling in your Calves .   Persistent pain or nausea not relieved by medication.    If any unusual problems or difficulties occur contact your doctor. If you cannot contact your doctor but feel your signs and symptoms warrant a physicians attention return to the emergency room.      Discharge Instructions: After Your Surgery  Youve just had surgery. During surgery, you were given medicine called anesthesia to keep you relaxed and free of pain. After surgery, you may have some pain or nausea. This is common. Here are some tips for feeling better and getting well after surgery.     Stay on schedule with your medicine.   Going home  Your healthcare provider will show you how to take care of yourself when you go home. He or she will also answer your questions. Have an adult family member or friend drive you home. For the first 24 hours after your surgery:  · Do not drive or use heavy equipment.  · Do not make important decisions or sign legal papers.  · Do not drink alcohol.  · Have someone stay with you, if needed. He or she can watch for problems and help keep you safe.  Be sure to  go to all follow-up visits with your healthcare provider. And rest after your surgery for as long as your healthcare provider tells you to.  Coping with pain  If you have pain after surgery, pain medicine will help you feel better. Take it as told, before pain becomes severe. Also, ask your healthcare provider or pharmacist about other ways to control pain. This might be with heat, ice, or relaxation. And follow any other instructions your surgeon or nurse gives you.  Tips for taking pain medicine  To get the best relief possible, remember these points:  · Pain medicines can upset your stomach. Taking them with a little food may help.  · Most pain relievers taken by mouth need at least 20 to 30 minutes to start to work.  · Taking medicine on a schedule can help you remember to take it. Try to time your medicine so that you can take it before starting an activity. This might be before you get dressed, go for a walk, or sit down for dinner.  · Constipation is a common side effect of pain medicines. Call your healthcare provider before taking any medicines such as laxatives or stool softeners to help ease constipation. Also ask if you should skip any foods. Drinking lots of fluids and eating foods such as fruits and vegetables that are high in fiber can also help. Remember, do not take laxatives unless your surgeon has prescribed them.  · Drinking alcohol and taking pain medicine can cause dizziness and slow your breathing. It can even be deadly. Do not drink alcohol while taking pain medicine.  · Pain medicine can make you react more slowly to things. Do not drive or run machinery while taking pain medicine.  Your healthcare provider may tell you to take acetaminophen to help ease your pain. Ask him or her how much you are supposed to take each day. Acetaminophen or other pain relievers may interact with your prescription medicines or other over-the-counter (OTC) medicines. Some prescription medicines have  acetaminophen and other ingredients. Using both prescription and OTC acetaminophen for pain can cause you to overdose. Read the labels on your OTC medicines with care. This will help you to clearly know the list of ingredients, how much to take, and any warnings. It may also help you not take too much acetaminophen. If you have questions or do not understand the information, ask your pharmacist or healthcare provider to explain it to you before you take the OTC medicine.  Managing nausea  Some people have an upset stomach after surgery. This is often because of anesthesia, pain, or pain medicine, or the stress of surgery. These tips will help you handle nausea and eat healthy foods as you get better. If you were on a special food plan before surgery, ask your healthcare provider if you should follow it while you get better. These tips may help:  · Do not push yourself to eat. Your body will tell you when to eat and how much.  · Start off with clear liquids and soup. They are easier to digest.  · Next try semi-solid foods, such as mashed potatoes, applesauce, and gelatin, as you feel ready.  · Slowly move to solid foods. Dont eat fatty, rich, or spicy foods at first.  · Do not force yourself to have 3 large meals a day. Instead eat smaller amounts more often.  · Take pain medicines with a small amount of solid food, such as crackers or toast, to avoid nausea.     Call your surgeon if  · You still have pain an hour after taking medicine. The medicine may not be strong enough.  · You feel too sleepy, dizzy, or groggy. The medicine may be too strong.  · You have side effects like nausea, vomiting, or skin changes, such as rash, itching, or hives.       If you have obstructive sleep apnea  You were given anesthesia medicine during surgery to keep you comfortable and free of pain. After surgery, you may have more apnea spells because of this medicine and other medicines you were given. The spells may last longer than  usual.   At home:  · Keep using the continuous positive airway pressure (CPAP) device when you sleep. Unless your healthcare provider tells you not to, use it when you sleep, day or night. CPAP is a common device used to treat obstructive sleep apnea.  · Talk with your provider before taking any pain medicine, muscle relaxants, or sedatives. Your provider will tell you about the possible dangers of taking these medicines.  Date Last Reviewed: 12/1/2016 © 2000-2017 [employer]. [address] [place] [id number]. All rights reserved. This information is not intended as a substitute for professional medical care. Always follow your healthcare professional's instructions.

## 2017-10-25 NOTE — H&P
H&P was completed on 10/20/17  and  has been reviewed,   the patient has been seen today  and:  I concur with the findings and no changes have occurred since H&P was written.    Consents reviewed  All questions answered  Will proceed with scheduled procedure    Joel Goode M.D.   OB/GYN

## 2017-10-26 VITALS
HEART RATE: 55 BPM | RESPIRATION RATE: 13 BRPM | WEIGHT: 249 LBS | SYSTOLIC BLOOD PRESSURE: 130 MMHG | DIASTOLIC BLOOD PRESSURE: 74 MMHG | OXYGEN SATURATION: 99 % | BODY MASS INDEX: 33.72 KG/M2 | HEIGHT: 72 IN | TEMPERATURE: 98 F

## 2017-10-28 ENCOUNTER — TELEPHONE (OUTPATIENT)
Dept: OBSTETRICS AND GYNECOLOGY | Facility: CLINIC | Age: 67
End: 2017-10-28

## 2017-11-13 ENCOUNTER — ANTI-COAG VISIT (OUTPATIENT)
Dept: CARDIOLOGY | Facility: CLINIC | Age: 67
End: 2017-11-13
Payer: MEDICARE

## 2017-11-13 DIAGNOSIS — I48.0 PAROXYSMAL ATRIAL FIBRILLATION: ICD-10-CM

## 2017-11-13 DIAGNOSIS — Z79.01 LONG-TERM (CURRENT) USE OF ANTICOAGULANTS: Primary | ICD-10-CM

## 2017-11-13 LAB — INR PPP: 1.9 (ref 2–3)

## 2017-11-13 PROCEDURE — 99211 OFF/OP EST MAY X REQ PHY/QHP: CPT | Mod: 25,S$GLB,,

## 2017-11-13 PROCEDURE — 85610 PROTHROMBIN TIME: CPT | Mod: QW,S$GLB,,

## 2017-11-13 NOTE — PROGRESS NOTES
Patient denies any changes to warrant this low INR. I am increasing her weekly dose since she is either on the lower end of her goal or right below. I advised her to contact us with any changes or problems.

## 2017-11-14 ENCOUNTER — OFFICE VISIT (OUTPATIENT)
Dept: OBSTETRICS AND GYNECOLOGY | Facility: CLINIC | Age: 67
End: 2017-11-14
Payer: MEDICARE

## 2017-11-14 VITALS
WEIGHT: 249 LBS | HEIGHT: 72 IN | BODY MASS INDEX: 33.72 KG/M2 | SYSTOLIC BLOOD PRESSURE: 120 MMHG | DIASTOLIC BLOOD PRESSURE: 80 MMHG

## 2017-11-14 DIAGNOSIS — N95.0 POSTMENOPAUSAL BLEEDING: Primary | ICD-10-CM

## 2017-11-14 PROCEDURE — 99999 PR PBB SHADOW E&M-EST. PATIENT-LVL III: CPT | Mod: PBBFAC,,, | Performed by: OBSTETRICS & GYNECOLOGY

## 2017-11-14 PROCEDURE — 99213 OFFICE O/P EST LOW 20 MIN: CPT | Mod: S$GLB,,, | Performed by: OBSTETRICS & GYNECOLOGY

## 2017-11-14 NOTE — PROGRESS NOTES
Jayla Reagan is a 67 y.o. female  post-op from a  fractioned D/C  on 10/25/17     Only endocervical sample performed, since internal os was closed and impossible to pass to take endometrial BX  (due t one episode of postmenopausal bleeding , and increased  endometrial thickness on TVUS )    No bleeding  No cramping     Past Medical History:   Diagnosis Date    Atrial fibrillation     Cataract     Hypertension     PAD (peripheral artery disease)     Peripheral artery disease      Past Surgical History:   Procedure Laterality Date    BREAST SURGERY      CHOLECYSTECTOMY      ECTOPIC PREGNANCY SURGERY      SALPINGECTOMY       Family History   Problem Relation Age of Onset    Cataracts Mother     Cataracts Sister      Social History   Substance Use Topics    Smoking status: Former Smoker    Smokeless tobacco: Never Used    Alcohol use No     OB History    Para Term  AB Living   3 2 2   1     SAB TAB Ectopic Multiple Live Births       1          # Outcome Date GA Lbr Shravan/2nd Weight Sex Delivery Anes PTL Lv   3 Ectopic            2 Term      Vag-Spont      1 Term      Vag-Spont             /80   Ht 6' (1.829 m)   Wt 112.9 kg (249 lb)   BMI 33.77 kg/m²     ROS:  GENERAL: No fever, chills, fatigability or weight loss.  VULVAR: No pain, no lesions and no itching.  VAGINAL: No relaxation, no itching, no discharge, no abnormal bleeding and no lesions.  ABDOMEN: No abdominal pain. Denies nausea. Denies vomiting. No diarrhea. No constipation  BREAST: Denies pain. No lumps. No discharge.  URINARY: No incontinence, no nocturia, no frequency and no dysuria.  CARDIOVASCULAR: No chest pain. No shortness of breath. No leg cramps.  NEUROLOGICAL: No headaches. No vision changes.    PE:   ABDOMEN:  Soft , not distended,not tender  BS present. incisions healing, clean, intact.   PELVIC      Normal external genitalia without lesions.  Normal hair distribution.  Adequate perineal body, normal  urethral meatus.  Vagina moist and well rugated without lesions or discharge.  Cervix pink, without lesions, discharge or tenderness.  No significant cystocele or rectocele.  Bimanual exam shows uterus to be normal size, regular, mobile and nontender.  Adnexa without masses or tenderness.        A/P   1- Satisfactory postoperative   2-s/p  Failed fractioned D/C     Intra operative findings and pathology results discussed with patient  Patient is asymptomatic now, but since endometrial thickness was abnormal , might  need an additional surgical procedure   Will follow up in 2 months with TVUS , to follow endometrial thickness   WIll also consult  Gyn oncology for further recommendations       Joel Goode M.D.   OB/GYN

## 2017-11-27 ENCOUNTER — ANTI-COAG VISIT (OUTPATIENT)
Dept: CARDIOLOGY | Facility: CLINIC | Age: 67
End: 2017-11-27
Payer: MEDICARE

## 2017-11-27 DIAGNOSIS — Z79.01 LONG-TERM (CURRENT) USE OF ANTICOAGULANTS: Primary | ICD-10-CM

## 2017-11-27 DIAGNOSIS — I48.0 PAROXYSMAL ATRIAL FIBRILLATION: ICD-10-CM

## 2017-11-27 LAB — INR PPP: 2.1 (ref 2–3)

## 2017-11-27 PROCEDURE — 85610 PROTHROMBIN TIME: CPT | Mod: QW,S$GLB,, | Performed by: PHARMACIST

## 2017-11-27 NOTE — PROGRESS NOTES
Quick follow up from low INR and increased dose 11/13. INR within normal range today. Denies any bleeding, bruising or other changes. Patient will maintain weekly  dose until follow-up in 3 weeks. I advised her to contact us with any changes or problems.

## 2017-11-27 NOTE — PROGRESS NOTES
Pt seen by Terri MURRELL. I have reviewed her initial findings and agree with her assessment and plan.

## 2017-12-18 ENCOUNTER — ANTI-COAG VISIT (OUTPATIENT)
Dept: CARDIOLOGY | Facility: CLINIC | Age: 67
End: 2017-12-18
Payer: MEDICARE

## 2017-12-18 DIAGNOSIS — I48.0 PAROXYSMAL ATRIAL FIBRILLATION: ICD-10-CM

## 2017-12-18 DIAGNOSIS — Z79.01 LONG-TERM (CURRENT) USE OF ANTICOAGULANTS: Primary | ICD-10-CM

## 2017-12-18 LAB — INR PPP: 2.3 (ref 2–3)

## 2017-12-18 PROCEDURE — 85610 PROTHROMBIN TIME: CPT | Mod: QW,S$GLB,, | Performed by: PHARMACIST

## 2017-12-18 NOTE — PROGRESS NOTES
INR within normal range today. Patient with bruise on her hand from use. Denies any bleeding or other changes. Patient is stable on this dose. She will continue this dose until follow-up in 4 weeks. I advised her to contact us with any changes or problems.

## 2017-12-19 ENCOUNTER — OFFICE VISIT (OUTPATIENT)
Dept: CARDIOLOGY | Facility: CLINIC | Age: 67
End: 2017-12-19
Payer: MEDICARE

## 2017-12-19 VITALS
HEART RATE: 59 BPM | WEIGHT: 252.38 LBS | HEIGHT: 72 IN | DIASTOLIC BLOOD PRESSURE: 78 MMHG | BODY MASS INDEX: 34.18 KG/M2 | SYSTOLIC BLOOD PRESSURE: 117 MMHG | OXYGEN SATURATION: 98 %

## 2017-12-19 DIAGNOSIS — Z79.01 LONG TERM CURRENT USE OF ANTICOAGULANT THERAPY: ICD-10-CM

## 2017-12-19 DIAGNOSIS — I48.0 PAROXYSMAL ATRIAL FIBRILLATION: Primary | ICD-10-CM

## 2017-12-19 DIAGNOSIS — I10 HTN (HYPERTENSION), BENIGN: ICD-10-CM

## 2017-12-19 DIAGNOSIS — I73.9 PVD (PERIPHERAL VASCULAR DISEASE): ICD-10-CM

## 2017-12-19 PROCEDURE — 99214 OFFICE O/P EST MOD 30 MIN: CPT | Mod: S$GLB,,, | Performed by: INTERNAL MEDICINE

## 2017-12-19 PROCEDURE — 99999 PR PBB SHADOW E&M-EST. PATIENT-LVL III: CPT | Mod: PBBFAC,,, | Performed by: INTERNAL MEDICINE

## 2017-12-19 NOTE — PROGRESS NOTES
Subjective:   Patient ID:  Jayla Reagan is a 67 y.o. female who presents for follow-up of Follow-up      Problem List Items Addressed This Visit        Cardiac/Vascular    Paroxysmal atrial fibrillation - Primary    HTN (hypertension), benign    PVD (peripheral vascular disease)       Hematology    Long term current use of anticoagulant therapy          HPI: Patient here for f/u of PAF. She recently had RVR but now in NSR. She is doing well since previous visit with no complaints. No chest pain, dyspnea, palpitations or dizziness. No further vaginal bleeding on coumadin.   BP and HR is controlled. She is compliant with her medications. No hospitalizations since previous visit. No planned surgeries.    Review of Systems   Constitution: Negative.   HENT: Negative.    Eyes: Negative.    Cardiovascular: Negative.    Respiratory: Negative.    Endocrine: Negative.    Hematologic/Lymphatic: Negative.    Skin: Negative.    Musculoskeletal: Negative.    Gastrointestinal: Negative.    Neurological: Negative.        Patient's Medications   New Prescriptions    No medications on file   Previous Medications    ACETAMINOPHEN (TYLENOL) 650 MG TBSR    Take 650 mg by mouth 2 (two) times daily as needed.    ERGOCALCIFEROL (VITAMIN D2) 50,000 UNIT CAP    Take 1,000 Units by mouth every 7 days.    FLUTICASONE (FLONASE) 50 MCG/ACTUATION NASAL SPRAY    1 spray by Each Nare route daily as needed for Rhinitis.    FOSINOPRIL (MONOPRIL) 20 MG TABLET    Take 20 mg by mouth once daily.    METOPROLOL SUCCINATE (TOPROL-XL) 100 MG 24 HR TABLET    Take 100 mg by mouth 2 (two) times daily.    TRIAMTERENE-HYDROCHLOROTHIAZIDE 37.5-25 MG (DYAZIDE) 37.5-25 MG PER CAPSULE    Take 1 capsule by mouth once daily.    WARFARIN (COUMADIN) 1 MG TABLET    Take 1 tablet (1 mg total) by mouth Daily. 1 Mg daily except Mondays and Fridays take 0.5 mg   Modified Medications    No medications on file   Discontinued Medications    No medications on file        Objective:   Physical Exam   Constitutional: She is oriented to person, place, and time. She appears well-developed and well-nourished. No distress.   Examination of the digits showed no clubbing or cyanosis   HENT:   Head: Normocephalic and atraumatic.   Eyes: Conjunctivae are normal. Pupils are equal, round, and reactive to light. Right eye exhibits no discharge.   Neck: Normal range of motion. Neck supple. No JVD present. No thyromegaly present.   No carotid bruits   Cardiovascular: Normal rate, regular rhythm, S1 normal, S2 normal, normal heart sounds, intact distal pulses and normal pulses.  PMI is not displaced.  Exam reveals no gallop, no friction rub and no opening snap.    No murmur heard.  Pulmonary/Chest: Effort normal and breath sounds normal. No respiratory distress. She has no wheezes. She has no rales. She exhibits no tenderness.   Abdominal: Soft. Bowel sounds are normal. She exhibits no distension and no mass. There is no tenderness. There is no guarding.   No hepatosplenomegaly   Musculoskeletal: Normal range of motion. She exhibits no edema or tenderness.   Lymphadenopathy:     She has no cervical adenopathy.   Neurological: She is alert and oriented to person, place, and time.   Skin: Skin is warm. No rash noted. She is not diaphoretic. No erythema.   Psychiatric: She has a normal mood and affect.   Nursing note and vitals reviewed.      ECGs reviewed-NSR  LABS reviewed  Imaging including Echoes reviewed-normal    Assessment:     1. Paroxysmal atrial fibrillation    2. HTN (hypertension), benign    3. PVD (peripheral vascular disease)    4. Long term current use of anticoagulant therapy        Plan:       Patient doing well. No changes since previous visit  Continue current medications  Low salt diet  Activity as tolerate  F/u in 6 months.

## 2018-01-15 ENCOUNTER — OFFICE VISIT (OUTPATIENT)
Dept: OBSTETRICS AND GYNECOLOGY | Facility: CLINIC | Age: 68
End: 2018-01-15
Payer: MEDICARE

## 2018-01-15 ENCOUNTER — PROCEDURE VISIT (OUTPATIENT)
Dept: OBSTETRICS AND GYNECOLOGY | Facility: CLINIC | Age: 68
End: 2018-01-15
Payer: MEDICARE

## 2018-01-15 VITALS
SYSTOLIC BLOOD PRESSURE: 120 MMHG | DIASTOLIC BLOOD PRESSURE: 80 MMHG | WEIGHT: 250.44 LBS | BODY MASS INDEX: 33.97 KG/M2

## 2018-01-15 DIAGNOSIS — N95.0 POSTMENOPAUSAL BLEEDING: ICD-10-CM

## 2018-01-15 DIAGNOSIS — D21.9 FIBROIDS: Primary | ICD-10-CM

## 2018-01-15 DIAGNOSIS — Z78.0 POSTMENOPAUSAL: Primary | ICD-10-CM

## 2018-01-15 DIAGNOSIS — N83.209 OVARIAN CYST: ICD-10-CM

## 2018-01-15 PROCEDURE — 76830 TRANSVAGINAL US NON-OB: CPT | Mod: S$GLB,,, | Performed by: OBSTETRICS & GYNECOLOGY

## 2018-01-15 PROCEDURE — 99213 OFFICE O/P EST LOW 20 MIN: CPT | Mod: 25,S$GLB,, | Performed by: OBSTETRICS & GYNECOLOGY

## 2018-01-15 PROCEDURE — 99999 PR PBB SHADOW E&M-EST. PATIENT-LVL III: CPT | Mod: PBBFAC,,, | Performed by: OBSTETRICS & GYNECOLOGY

## 2018-01-16 ENCOUNTER — ANTI-COAG VISIT (OUTPATIENT)
Dept: CARDIOLOGY | Facility: CLINIC | Age: 68
End: 2018-01-16
Payer: MEDICARE

## 2018-01-16 DIAGNOSIS — Z79.01 LONG TERM CURRENT USE OF ANTICOAGULANT THERAPY: Primary | ICD-10-CM

## 2018-01-16 DIAGNOSIS — I48.0 PAROXYSMAL ATRIAL FIBRILLATION: ICD-10-CM

## 2018-01-16 LAB — INR PPP: 2.6 (ref 2–3)

## 2018-01-16 PROCEDURE — 85610 PROTHROMBIN TIME: CPT | Mod: QW,S$GLB,, | Performed by: PHARMACIST

## 2018-01-16 NOTE — PROGRESS NOTES
Jayla Reagan is a 68 y.o. female      Here today for TVUS to check endometrial stripe     post-op from a  fractioned D/C  on 10/25/17     Only endocervical sample performed, since internal os was closed and impossible to pass to take endometrial BX  (due t one episode of postmenopausal bleeding , and increased  endometrial thickness on TVUS )    No bleeding  No cramping       TV US today =  Endometrial thickness 7.2 mm  Small fibroids     Past Medical History:   Diagnosis Date    Atrial fibrillation     Cataract     Hypertension     PAD (peripheral artery disease)     Peripheral artery disease      Past Surgical History:   Procedure Laterality Date    BREAST SURGERY      CHOLECYSTECTOMY      ECTOPIC PREGNANCY SURGERY      SALPINGECTOMY       Family History   Problem Relation Age of Onset    Cataracts Mother     Cataracts Sister      Social History   Substance Use Topics    Smoking status: Former Smoker    Smokeless tobacco: Never Used    Alcohol use No     OB History    Para Term  AB Living   3 2 2   1     SAB TAB Ectopic Multiple Live Births       1          # Outcome Date GA Lbr Shravan/2nd Weight Sex Delivery Anes PTL Lv   3 Ectopic            2 Term      Vag-Spont      1 Term      Vag-Spont             /80   Wt 113.6 kg (250 lb 7.1 oz)   BMI 33.97 kg/m²     ROS:  GENERAL: No fever, chills, fatigability or weight loss.  VULVAR: No pain, no lesions and no itching.  VAGINAL: No relaxation, no itching, no discharge, no abnormal bleeding and no lesions.  ABDOMEN: No abdominal pain. Denies nausea. Denies vomiting. No diarrhea. No constipation  BREAST: Denies pain. No lumps. No discharge.  URINARY: No incontinence, no nocturia, no frequency and no dysuria.  CARDIOVASCULAR: No chest pain. No shortness of breath. No leg cramps.  NEUROLOGICAL: No headaches. No vision changes.    PE:   ABDOMEN:  Soft , not distended,not tender  BS present. incisions healing, clean, intact.   PELVIC       Normal external genitalia without lesions.  Normal hair distribution.  Adequate perineal body, normal urethral meatus.  Vagina moist and well rugated without lesions or discharge.  Cervix pink, without lesions, discharge or tenderness.  No significant cystocele or rectocele.  Bimanual exam shows uterus to be normal size, regular, mobile and nontender.  Adnexa without masses or tenderness.        A/P   1-  Thickened endometrial stripe   2-s/p  Failed fractioned D/C     Patient is asymptomatic   WIll  consult  Gyn oncology for further recommendations       Joel Goode M.D.   OB/GYN

## 2018-01-16 NOTE — PROGRESS NOTES
Patient reports no bleeding or bruising, no new medications and no diet changes.  I reminded the patient to call with any problems, changes or questions before the next visit.

## 2018-01-19 ENCOUNTER — PATIENT MESSAGE (OUTPATIENT)
Dept: CARDIOLOGY | Facility: CLINIC | Age: 68
End: 2018-01-19

## 2018-01-19 DIAGNOSIS — I48.0 PAROXYSMAL ATRIAL FIBRILLATION: ICD-10-CM

## 2018-01-19 RX ORDER — WARFARIN 1 MG/1
1 TABLET ORAL DAILY
Qty: 90 TABLET | Refills: 3 | Status: SHIPPED | OUTPATIENT
Start: 2018-01-19 | End: 2019-04-01 | Stop reason: SDUPTHER

## 2018-01-25 ENCOUNTER — PATIENT MESSAGE (OUTPATIENT)
Dept: CARDIOLOGY | Facility: CLINIC | Age: 68
End: 2018-01-25

## 2018-01-25 ENCOUNTER — TELEPHONE (OUTPATIENT)
Dept: CARDIOLOGY | Facility: CLINIC | Age: 68
End: 2018-01-25

## 2018-01-25 NOTE — TELEPHONE ENCOUNTER
----- Message from Fernando Mccarty sent at 1/25/2018  3:30 PM CST -----  Contact: [phone]/self  Pt states she's returning your call.  Please call and advise

## 2018-02-20 ENCOUNTER — ANTI-COAG VISIT (OUTPATIENT)
Dept: CARDIOLOGY | Facility: CLINIC | Age: 68
End: 2018-02-20
Payer: MEDICARE

## 2018-02-20 DIAGNOSIS — I48.0 PAROXYSMAL ATRIAL FIBRILLATION: ICD-10-CM

## 2018-02-20 DIAGNOSIS — Z79.01 LONG TERM CURRENT USE OF ANTICOAGULANT THERAPY: Primary | ICD-10-CM

## 2018-02-20 LAB — INR PPP: 2.5 (ref 2–3)

## 2018-02-20 PROCEDURE — 85610 PROTHROMBIN TIME: CPT | Mod: QW,S$GLB,, | Performed by: PHARMACIST

## 2018-02-22 ENCOUNTER — PATIENT MESSAGE (OUTPATIENT)
Dept: OBSTETRICS AND GYNECOLOGY | Facility: CLINIC | Age: 68
End: 2018-02-22

## 2018-02-22 NOTE — TELEPHONE ENCOUNTER
Please advise to my chart encounter:    good morning this is wolf rosales,the last visit with you a few weeks ago i was informed that i would receive a call to inform me concerning further treatments after you consult with another doctor  i am waiting to hear from you. thanks

## 2018-02-26 ENCOUNTER — TELEPHONE (OUTPATIENT)
Dept: OBSTETRICS AND GYNECOLOGY | Facility: CLINIC | Age: 68
End: 2018-02-26

## 2018-02-26 NOTE — DISCHARGE SUMMARY
[facility]  Obstetrics & Gynecology  Discharge Summary    Patient Name: Jayla Reagan  MRN: [id number]  Admission Date: 10/25/2017  Hospital Length of Stay: 0 days  Discharge Date and Time:  10/25/2017 9:00 AM  Attending Physician: Joel Goode MD   Discharging Provider: Joel Goode MD  Primary Care Provider: Rambo Palmer MD        Hospital Course:Patient was scheduled for an Outpatient surgical procedure   Procedure:   D/C   ( failed hysteroscopy   )                         Secondary to:  Postmenopausal bleeding     Procedure performed without complications see Op note for findings and details   Patient discharged home in good condition , voiding, ambulating and tolerating PO   Rx for pain control was given   General post op recommendations given .  Follow up at the clinic in 2 weeks for incision check .      Procedure(s) (LRB):  DILATION-CURETTAGE (N/A)     Consults:       Pending Diagnostic Studies:     None        Final Active Diagnoses:    Diagnosis Date Noted POA    Postmenopausal bleeding [N95.0] 10/25/2017 Yes      Problems Resolved During this Admission:    Diagnosis Date Noted Date Resolved POA        Discharged Condition: good    Disposition:     Follow Up: 2 weeks at the clinic       Patient Instructions:   No discharge procedures on file.  Medications:  Reconciled Home Medications:   Current Discharge Medication List      CONTINUE these medications which have NOT CHANGED    Details   acetaminophen (TYLENOL) 650 MG TbSR Take 650 mg by mouth 2 (two) times daily as needed.      ergocalciferol (VITAMIN D2) 50,000 unit Cap Take 1,000 Units by mouth every 7 days.      fluticasone (FLONASE) 50 mcg/actuation nasal spray 1 spray by Each Nare route daily as needed for Rhinitis.      fosinopril (MONOPRIL) 20 MG tablet Take 20 mg by mouth once daily.      metoprolol succinate (TOPROL-XL) 100 MG 24 hr tablet Take 100 mg by mouth 2 (two) times daily.      warfarin (COUMADIN) 1  Patient called and requested a refill on her [place]. Per Manuel Vargas due 03/02/2018. MG tablet Take 1 tablet (1 mg total) by mouth Daily. 1 Mg daily except Mondays and Fridays take 0.5 mg  Qty: 90 tablet, Refills: 3    Associated Diagnoses: Paroxysmal atrial fibrillation      triamterene-hydrochlorothiazide 37.5-25 mg (DYAZIDE) 37.5-25 mg per capsule Take 1 capsule by mouth once daily.  Refills: 1             Joel Goode MD  Obstetrics & Gynecology  [facility]

## 2018-02-26 NOTE — TELEPHONE ENCOUNTER
----- Message from Joel Goode MD sent at 2/26/2018  1:54 PM CST -----  Please call this patient so I can talk to her about management plan,    Thanks     Joel Goode M.D.   OB/GYN    2/26/2018

## 2018-03-06 ENCOUNTER — OFFICE VISIT (OUTPATIENT)
Dept: OBSTETRICS AND GYNECOLOGY | Facility: CLINIC | Age: 68
End: 2018-03-06
Payer: MEDICARE

## 2018-03-06 VITALS
SYSTOLIC BLOOD PRESSURE: 132 MMHG | DIASTOLIC BLOOD PRESSURE: 80 MMHG | BODY MASS INDEX: 33.58 KG/M2 | WEIGHT: 247.56 LBS

## 2018-03-06 DIAGNOSIS — N95.0 POSTMENOPAUSAL BLEEDING: Primary | ICD-10-CM

## 2018-03-06 PROCEDURE — 99213 OFFICE O/P EST LOW 20 MIN: CPT | Mod: S$GLB,,, | Performed by: OBSTETRICS & GYNECOLOGY

## 2018-03-06 PROCEDURE — 3079F DIAST BP 80-89 MM HG: CPT | Mod: S$GLB,,, | Performed by: OBSTETRICS & GYNECOLOGY

## 2018-03-06 PROCEDURE — 3075F SYST BP GE 130 - 139MM HG: CPT | Mod: S$GLB,,, | Performed by: OBSTETRICS & GYNECOLOGY

## 2018-03-06 PROCEDURE — 99999 PR PBB SHADOW E&M-EST. PATIENT-LVL III: CPT | Mod: PBBFAC,,, | Performed by: OBSTETRICS & GYNECOLOGY

## 2018-03-07 NOTE — PROGRESS NOTES
Patient here to discuss recommendations per conversation with Dr Maharaj  (Gyn Oncology)     WIll Discuss hysterectomy with intraop frozen section and possible surgical staging per pathology result     All  questions answered. PAtient agrees with plan   Referral  Placed in GABE Goode M.D.   OB/GYN    3/7/2018

## 2018-03-25 ENCOUNTER — HOSPITAL ENCOUNTER (EMERGENCY)
Facility: HOSPITAL | Age: 68
Discharge: HOME | DRG: 310 | End: 2018-03-27
Attending: EMERGENCY MEDICINE | Admitting: INTERNAL MEDICINE
Payer: MEDICARE

## 2018-03-25 DIAGNOSIS — R00.2 PALPITATIONS: ICD-10-CM

## 2018-03-25 DIAGNOSIS — I10 HTN (HYPERTENSION), BENIGN: ICD-10-CM

## 2018-03-25 DIAGNOSIS — I48.92 ATRIAL FIBRILLATION AND FLUTTER: ICD-10-CM

## 2018-03-25 DIAGNOSIS — Z79.01 LONG TERM CURRENT USE OF ANTICOAGULANT THERAPY: ICD-10-CM

## 2018-03-25 DIAGNOSIS — I48.0 PAROXYSMAL ATRIAL FIBRILLATION: Primary | ICD-10-CM

## 2018-03-25 DIAGNOSIS — I48.91 ATRIAL FIBRILLATION AND FLUTTER: ICD-10-CM

## 2018-03-25 DIAGNOSIS — I49.9 IRREGULAR CARDIAC RHYTHM: ICD-10-CM

## 2018-03-25 LAB
ALBUMIN SERPL BCP-MCNC: 3.3 G/DL
ALP SERPL-CCNC: 78 U/L
ALT SERPL W/O P-5'-P-CCNC: 20 U/L
ANION GAP SERPL CALC-SCNC: 9 MMOL/L
AST SERPL-CCNC: 17 U/L
BASOPHILS # BLD AUTO: 0 K/UL
BASOPHILS NFR BLD: 0 %
BILIRUB SERPL-MCNC: 0.3 MG/DL
BNP SERPL-MCNC: 299 PG/ML
BUN SERPL-MCNC: 18 MG/DL
CALCIUM SERPL-MCNC: 9.2 MG/DL
CHLORIDE SERPL-SCNC: 104 MMOL/L
CO2 SERPL-SCNC: 24 MMOL/L
CREAT SERPL-MCNC: 1 MG/DL
DIFFERENTIAL METHOD: ABNORMAL
EOSINOPHIL # BLD AUTO: 0 K/UL
EOSINOPHIL NFR BLD: 1 %
ERYTHROCYTE [DISTWIDTH] IN BLOOD BY AUTOMATED COUNT: 16.9 %
EST. GFR  (AFRICAN AMERICAN): >60 ML/MIN/1.73 M^2
EST. GFR  (NON AFRICAN AMERICAN): 58 ML/MIN/1.73 M^2
GLUCOSE SERPL-MCNC: 129 MG/DL
HCT VFR BLD AUTO: 32.1 %
HGB BLD-MCNC: 10.9 G/DL
LYMPHOCYTES # BLD AUTO: 1.3 K/UL
LYMPHOCYTES NFR BLD: 33.3 %
MCH RBC QN AUTO: 27.9 PG
MCHC RBC AUTO-ENTMCNC: 34 G/DL
MCV RBC AUTO: 82 FL
MONOCYTES # BLD AUTO: 0.4 K/UL
MONOCYTES NFR BLD: 8.8 %
NEUTROPHILS # BLD AUTO: 2.3 K/UL
NEUTROPHILS NFR BLD: 56.6 %
PLATELET # BLD AUTO: 245 K/UL
PMV BLD AUTO: 8.1 FL
POTASSIUM SERPL-SCNC: 4.4 MMOL/L
PROT SERPL-MCNC: 7.1 G/DL
RBC # BLD AUTO: 3.9 M/UL
SODIUM SERPL-SCNC: 137 MMOL/L
TROPONIN I SERPL DL<=0.01 NG/ML-MCNC: 0.01 NG/ML
TROPONIN I SERPL DL<=0.01 NG/ML-MCNC: 0.01 NG/ML
WBC # BLD AUTO: 3.99 K/UL

## 2018-03-25 PROCEDURE — 85025 COMPLETE CBC W/AUTO DIFF WBC: CPT

## 2018-03-25 PROCEDURE — 99284 EMERGENCY DEPT VISIT MOD MDM: CPT | Mod: 25

## 2018-03-25 PROCEDURE — 93005 ELECTROCARDIOGRAM TRACING: CPT

## 2018-03-25 PROCEDURE — 96366 THER/PROPH/DIAG IV INF ADDON: CPT

## 2018-03-25 PROCEDURE — A4216 STERILE WATER/SALINE, 10 ML: HCPCS | Performed by: EMERGENCY MEDICINE

## 2018-03-25 PROCEDURE — 80053 COMPREHEN METABOLIC PANEL: CPT

## 2018-03-25 PROCEDURE — 83880 ASSAY OF NATRIURETIC PEPTIDE: CPT

## 2018-03-25 PROCEDURE — 96361 HYDRATE IV INFUSION ADD-ON: CPT

## 2018-03-25 PROCEDURE — G0378 HOSPITAL OBSERVATION PER HR: HCPCS

## 2018-03-25 PROCEDURE — 84484 ASSAY OF TROPONIN QUANT: CPT

## 2018-03-25 PROCEDURE — 25000003 PHARM REV CODE 250: Performed by: EMERGENCY MEDICINE

## 2018-03-25 PROCEDURE — 96365 THER/PROPH/DIAG IV INF INIT: CPT

## 2018-03-25 RX ORDER — FLECAINIDE ACETATE 150 MG/1
300 TABLET ORAL ONCE AS NEEDED
COMMUNITY
End: 2018-03-26

## 2018-03-25 RX ORDER — SODIUM CHLORIDE 0.9 % (FLUSH) 0.9 %
3 SYRINGE (ML) INJECTION EVERY 8 HOURS
Status: DISCONTINUED | OUTPATIENT
Start: 2018-03-25 | End: 2018-03-27 | Stop reason: HOSPADM

## 2018-03-25 RX ORDER — DILTIAZEM HCL/D5W 125 MG/125
10 PLASTIC BAG, INJECTION (ML) INTRAVENOUS CONTINUOUS
Status: DISCONTINUED | OUTPATIENT
Start: 2018-03-25 | End: 2018-03-27 | Stop reason: HOSPADM

## 2018-03-25 RX ADMIN — SODIUM CHLORIDE 300 ML: 9 INJECTION, SOLUTION INTRAVENOUS at 06:03

## 2018-03-25 RX ADMIN — DILTIAZEM HYDROCHLORIDE 10 MG/HR: 5 INJECTION INTRAVENOUS at 09:03

## 2018-03-25 RX ADMIN — SODIUM CHLORIDE, PRESERVATIVE FREE 3 ML: 5 INJECTION INTRAVENOUS at 09:03

## 2018-03-25 NOTE — ED TRIAGE NOTES
Pt states this morning began having an irregular heartbeat, pt has hx of a-fib. Pt states earlier this afternoon pt felt like she was going to pass out. Pt states has had similar episodes in the past, and came to er for treatment. Pt states was given rx for flecainide by cardiologist, but did not take it due to md wanted her to be under medical supervision when she took it. Pt denies shortness of breath, and denies chest pain at this time. No acute distress noted. Skin warm and dry. Pt is alert and oriented x 4.

## 2018-03-25 NOTE — ED NOTES
Under Dr. Brown's supervision pt was given home med of Flecainide 150mg. Pt given Flecainide 150 mg tab po.

## 2018-03-25 NOTE — ED PROVIDER NOTES
Encounter Date: 3/25/2018       History     Chief Complaint   Patient presents with    Irregular Heart Beat     Reports having irregular heartbeat on and off since this morning, hx of afib.  Also reports near syncopal episode.     The patient presents the emergency department with irregular heartbeat since this morning.  The patient is a history of atrial fibrillation and was given flecainide prescription but was told that the first time she needs to take the flecainide, she should come to the emergency room before taking it.  The patient denies any chest pain or shortness of breath.  Her only complaint is feeling the palpitations.          Review of patient's allergies indicates:  No Known Allergies  Past Medical History:   Diagnosis Date    Atrial fibrillation     Cataract     Hypertension     PAD (peripheral artery disease)     Peripheral artery disease      Past Surgical History:   Procedure Laterality Date    BREAST SURGERY      CHOLECYSTECTOMY      ECTOPIC PREGNANCY SURGERY      SALPINGECTOMY       Family History   Problem Relation Age of Onset    Cataracts Mother     Cataracts Sister      Social History   Substance Use Topics    Smoking status: Former Smoker    Smokeless tobacco: Never Used    Alcohol use No     Review of Systems   Constitutional: Negative for activity change, appetite change and fever.   HENT: Negative for congestion, ear pain and sore throat.    Eyes: Negative for discharge.   Respiratory: Negative for cough, shortness of breath and wheezing.    Cardiovascular: Positive for palpitations. Negative for chest pain and leg swelling.   Gastrointestinal: Negative for abdominal pain, diarrhea, nausea and vomiting.   Genitourinary: Negative for dysuria, frequency, urgency and vaginal discharge.   Musculoskeletal: Negative for back pain and neck pain.   Skin: Negative for rash.   Neurological: Negative for weakness, light-headedness and headaches.   Psychiatric/Behavioral: Negative  for confusion and suicidal ideas.       Physical Exam     Initial Vitals [03/25/18 1605]   BP Pulse Resp Temp SpO2   130/74 (!) 131 20 98.1 °F (36.7 °C) 100 %      MAP       92.67         Physical Exam    Nursing note and vitals reviewed.  Constitutional: She appears well-developed and well-nourished.   HENT:   Head: Normocephalic and atraumatic.   Mouth/Throat: Oropharynx is clear and moist.   Eyes: Conjunctivae and EOM are normal. Pupils are equal, round, and reactive to light.   Neck: Normal range of motion. Neck supple.   Cardiovascular: Intact distal pulses. Exam reveals no gallop and no friction rub.    No murmur heard.  Elevated HR, irregularly irregular   Pulmonary/Chest: Breath sounds normal.   Abdominal: Soft. Bowel sounds are normal. She exhibits no distension. There is no tenderness. There is no rebound and no guarding.   Musculoskeletal: Normal range of motion. She exhibits no edema or tenderness.   Lymphadenopathy:     She has no cervical adenopathy.   Neurological: She is alert and oriented to person, place, and time. She has normal strength and normal reflexes.   Skin: Skin is warm and dry.   Psychiatric: She has a normal mood and affect. Her behavior is normal. Judgment and thought content normal.         ED Course   Critical Care  Date/Time: 4/11/2018 5:57 PM  Performed by: CHUNG HUTCHISON  Authorized by: CHUNG HUTCHISON   Total critical care time (exclusive of procedural time) : 30 minutes  Critical care time was exclusive of separately billable procedures and treating other patients.  Critical care was necessary to treat or prevent imminent or life-threatening deterioration of the following conditions: cardiac failure and circulatory failure.  Critical care was time spent personally by me on the following activities: discussions with consultants, evaluation of patient's response to treatment, obtaining history from patient or surrogate, ordering and review of laboratory studies, pulse oximetry,  development of treatment plan with patient or surrogate, examination of patient, ordering and performing treatments and interventions and re-evaluation of patient's condition.        Labs Reviewed   CBC W/ AUTO DIFFERENTIAL - Abnormal; Notable for the following:        Result Value    RBC 3.90 (*)     Hemoglobin 10.9 (*)     Hematocrit 32.1 (*)     RDW 16.9 (*)     MPV 8.1 (*)     All other components within normal limits   COMPREHENSIVE METABOLIC PANEL - Abnormal; Notable for the following:     Glucose 129 (*)     Albumin 3.3 (*)     eGFR if non  58 (*)     All other components within normal limits   B-TYPE NATRIURETIC PEPTIDE - Abnormal; Notable for the following:      (*)     All other components within normal limits   TROPONIN I   TROPONIN I     EKG Readings: (Independently Interpreted)   Rhythm: Atrial Fibrillation. Heart Rate: 121. ST Segments: Normal ST Segments. T Waves: Normal. Axis: Left Axis Deviation. Clinical Impression: Atrial Fibrillation with RVR   1558:   Other EKG Interpretations: 1911: Atrial fibrillation, rate 117 bpm. No ST/T wave changes.  2118: Atrila fibrillation, rate 107 bpm. No ST/T wave changes.          Medical Decision Making:   Clinical Tests:   Lab Tests: Ordered and Reviewed  The following lab test(s) were unremarkable: CBC, CMP, Troponin, BNP and PT  Radiological Study: Ordered and Reviewed  Medical Tests: Ordered and Reviewed  ED Management:  1638: Patient given a flecainide 150 mg po.  1911: EKG repeated. A fib persists.  1915: Case discussed with Dr. Delacruz. He recommends giving the patient a second flecainide.  1920: Flecainide 150 mg po given.  2115: EKG done. A fib persists.  2120: Case discussed with Dr. Delacruz. He requests admitting the patient for cardioversion in the AM.  2130: Patient to be started on a diltiazem drip, 10 mg/hr.                      Clinical Impression:   Diagnoses of Irregular cardiac rhythm, Palpitations, and Atrial fibrillation  and gueritaer were pertinent to this visit.                           Sugey Brown MD  03/25/18 5436       Sugey Brown MD  04/11/18 4131

## 2018-03-26 VITALS
WEIGHT: 242 LBS | HEIGHT: 72 IN | TEMPERATURE: 98 F | OXYGEN SATURATION: 100 % | HEART RATE: 80 BPM | SYSTOLIC BLOOD PRESSURE: 114 MMHG | BODY MASS INDEX: 32.78 KG/M2 | RESPIRATION RATE: 14 BRPM | DIASTOLIC BLOOD PRESSURE: 58 MMHG

## 2018-03-26 PROCEDURE — 12000002 HC ACUTE/MED SURGE SEMI-PRIVATE ROOM

## 2018-03-26 PROCEDURE — A4216 STERILE WATER/SALINE, 10 ML: HCPCS | Performed by: EMERGENCY MEDICINE

## 2018-03-26 PROCEDURE — 25000003 PHARM REV CODE 250: Performed by: EMERGENCY MEDICINE

## 2018-03-26 RX ORDER — FLECAINIDE ACETATE 150 MG/1
300 TABLET ORAL ONCE AS NEEDED
Qty: 4 TABLET | Refills: 0 | Status: CANCELLED | OUTPATIENT
Start: 2018-03-26 | End: 2018-03-26

## 2018-03-26 RX ORDER — FLECAINIDE ACETATE 150 MG/1
300 TABLET ORAL ONCE AS NEEDED
Qty: 4 TABLET | Refills: 0 | Status: ON HOLD | OUTPATIENT
Start: 2018-03-26 | End: 2019-12-28 | Stop reason: CLARIF

## 2018-03-26 RX ADMIN — SODIUM CHLORIDE, PRESERVATIVE FREE 3 ML: 5 INJECTION INTRAVENOUS at 06:03

## 2018-03-26 NOTE — ED NOTES
Patient is awake and alert with family at bedside.  Patient denies chest pain, denies shortness of breath.  Sinus rhythm on the monitor. Bed in low locked position with side rails up x 2. Patient on cardiac monitor, automatic blood pressure cuff and pulse oximeter. Pt appears to be resting . Respirations even and unlabored. Equal rise and fall of chest noted. Skin warm and dry. Pt easily aroused to verbal stimulation. Will continue to monitor closely.

## 2018-03-27 ENCOUNTER — OFFICE VISIT (OUTPATIENT)
Dept: CARDIOLOGY | Facility: CLINIC | Age: 68
End: 2018-03-27
Payer: MEDICARE

## 2018-03-27 VITALS
HEART RATE: 62 BPM | HEIGHT: 72 IN | SYSTOLIC BLOOD PRESSURE: 127 MMHG | DIASTOLIC BLOOD PRESSURE: 81 MMHG | BODY MASS INDEX: 33.25 KG/M2 | OXYGEN SATURATION: 97 % | WEIGHT: 245.5 LBS

## 2018-03-27 DIAGNOSIS — Z79.01 LONG TERM CURRENT USE OF ANTICOAGULANT THERAPY: ICD-10-CM

## 2018-03-27 DIAGNOSIS — I48.0 PAROXYSMAL ATRIAL FIBRILLATION: Primary | ICD-10-CM

## 2018-03-27 DIAGNOSIS — I73.9 PVD (PERIPHERAL VASCULAR DISEASE): ICD-10-CM

## 2018-03-27 DIAGNOSIS — I10 HTN (HYPERTENSION), BENIGN: ICD-10-CM

## 2018-03-27 PROCEDURE — 3074F SYST BP LT 130 MM HG: CPT | Mod: CPTII,S$GLB,, | Performed by: INTERNAL MEDICINE

## 2018-03-27 PROCEDURE — 99214 OFFICE O/P EST MOD 30 MIN: CPT | Mod: S$GLB,,, | Performed by: INTERNAL MEDICINE

## 2018-03-27 PROCEDURE — 93000 ELECTROCARDIOGRAM COMPLETE: CPT | Mod: S$GLB,,, | Performed by: INTERNAL MEDICINE

## 2018-03-27 PROCEDURE — 3079F DIAST BP 80-89 MM HG: CPT | Mod: CPTII,S$GLB,, | Performed by: INTERNAL MEDICINE

## 2018-03-27 PROCEDURE — 99999 PR PBB SHADOW E&M-EST. PATIENT-LVL III: CPT | Mod: PBBFAC,,, | Performed by: INTERNAL MEDICINE

## 2018-03-27 NOTE — PROGRESS NOTES
Subjective:   Patient ID:  Jayla Reagan is a 68 y.o. female who presents for follow-up of Follow-up      Problem List Items Addressed This Visit        Cardiac/Vascular    Paroxysmal atrial fibrillation - Primary    HTN (hypertension), benign    PVD (peripheral vascular disease)       Hematology    Long term current use of anticoagulant therapy          HPI: Patient here for f/u of PAF. She had another recent ER visit with palpitation and fatigue and was found to be in RVR. She received flecanide x2 then started on a cardizem drip to lower HR. She was discharge from ER. She is doing better today with no complaints. She denies chest pain, dyspnea, palpitations, dizziness or syncope. HR and BP is controlled today. She denies etoh or caffeine drinks.     Review of Systems   Constitution: Negative.   HENT: Negative.    Eyes: Negative.    Cardiovascular: Negative.    Respiratory: Negative.    Endocrine: Negative.    Hematologic/Lymphatic: Negative.    Skin: Negative.    Musculoskeletal: Negative.    Gastrointestinal: Negative.    Neurological: Negative.        Patient's Medications   New Prescriptions    No medications on file   Previous Medications    ACETAMINOPHEN (TYLENOL) 650 MG TBSR    Take 650 mg by mouth 2 (two) times daily as needed.    ERGOCALCIFEROL (VITAMIN D2) 50,000 UNIT CAP    Take 1,000 Units by mouth every 7 days.    FLECAINIDE (TAMBOCOR) 150 MG TAB    Take 2 tablets (300 mg total) by mouth once as needed (atrial fibrillation).    FLUTICASONE (FLONASE) 50 MCG/ACTUATION NASAL SPRAY    1 spray by Each Nare route daily as needed for Rhinitis.    FOSINOPRIL (MONOPRIL) 20 MG TABLET    Take 20 mg by mouth once daily.    METOPROLOL SUCCINATE (TOPROL-XL) 100 MG 24 HR TABLET    Take 100 mg by mouth 2 (two) times daily.    TRIAMTERENE-HYDROCHLOROTHIAZIDE 37.5-25 MG (DYAZIDE) 37.5-25 MG PER CAPSULE    Take 1 capsule by mouth once daily.    WARFARIN (COUMADIN) 1 MG TABLET    Take 1 tablet (1 mg total) by mouth  Daily. 1 Mg daily except Mondays and Fridays take 0.5 mg   Modified Medications    No medications on file   Discontinued Medications    No medications on file       Objective:   Physical Exam   Constitutional: She is oriented to person, place, and time. She appears well-developed and well-nourished. No distress.   Examination of the digits showed no clubbing or cyanosis   HENT:   Head: Normocephalic and atraumatic.   Eyes: Conjunctivae are normal. Pupils are equal, round, and reactive to light. Right eye exhibits no discharge.   Neck: Normal range of motion. Neck supple. No JVD present. No thyromegaly present.   No carotid bruits   Cardiovascular: Normal rate, regular rhythm, S1 normal, S2 normal, normal heart sounds, intact distal pulses and normal pulses.  PMI is not displaced.  Exam reveals no gallop, no friction rub and no opening snap.    No murmur heard.  Pulmonary/Chest: Effort normal and breath sounds normal. No respiratory distress. She has no wheezes. She has no rales. She exhibits no tenderness.   Abdominal: Soft. Bowel sounds are normal. She exhibits no distension and no mass. There is no tenderness. There is no guarding.   No hepatosplenomegaly   Musculoskeletal: Normal range of motion. She exhibits no edema or tenderness.   Lymphadenopathy:     She has no cervical adenopathy.   Neurological: She is alert and oriented to person, place, and time.   Skin: Skin is warm. No rash noted. She is not diaphoretic. No erythema.   Psychiatric: She has a normal mood and affect.   Nursing note and vitals reviewed.      ECGs reviewed-NSR  LABS reviewed  Imaging including Echoes reviewed-normal    Assessment:     1. Paroxysmal atrial fibrillation    2. HTN (hypertension), benign    3. PVD (peripheral vascular disease)    4. Long term current use of anticoagulant therapy        Plan:     Now back in NSR.   Continue current medications  Low salt diet  If RVR reoccurs with poor response to flecanide will re refer to   [place] or start amiodarone.  Activity as tolerate  F/u in 6 months.

## 2018-04-03 ENCOUNTER — ANTI-COAG VISIT (OUTPATIENT)
Dept: CARDIOLOGY | Facility: CLINIC | Age: 68
End: 2018-04-03
Payer: MEDICARE

## 2018-04-03 DIAGNOSIS — I48.0 PAROXYSMAL ATRIAL FIBRILLATION: ICD-10-CM

## 2018-04-03 DIAGNOSIS — Z79.01 LONG TERM CURRENT USE OF ANTICOAGULANT THERAPY: Primary | ICD-10-CM

## 2018-04-03 LAB — INR PPP: 2 (ref 2–3)

## 2018-04-03 PROCEDURE — 85610 PROTHROMBIN TIME: CPT | Mod: QW,S$GLB,, | Performed by: PHARMACIST

## 2018-04-03 NOTE — PROGRESS NOTES
Patient reports no bleeding or bruising, no new medications and no diet changes.  She reports that she had been in the ED last weekend, due to her Afib.  She had been given flecainide and did miss her warfarin dose due to not being at home.  As her weekly dose of warfarin has worked well in the past, she will remain on this dosing.  I reminded the patient to call with any problems, changes or questions before the next visit.  I have reviewed the student's initial findings and agree with their assessment.  I have personally spoken with and assessed the patient in clinic to devise care plan.

## 2018-04-09 ENCOUNTER — OFFICE VISIT (OUTPATIENT)
Dept: GYNECOLOGIC ONCOLOGY | Facility: CLINIC | Age: 68
End: 2018-04-09
Payer: MEDICARE

## 2018-04-09 VITALS
HEART RATE: 55 BPM | WEIGHT: 244 LBS | BODY MASS INDEX: 33.09 KG/M2 | DIASTOLIC BLOOD PRESSURE: 60 MMHG | SYSTOLIC BLOOD PRESSURE: 123 MMHG

## 2018-04-09 DIAGNOSIS — I73.9 PVD (PERIPHERAL VASCULAR DISEASE): ICD-10-CM

## 2018-04-09 DIAGNOSIS — I48.0 PAROXYSMAL ATRIAL FIBRILLATION: ICD-10-CM

## 2018-04-09 DIAGNOSIS — N95.0 PMB (POSTMENOPAUSAL BLEEDING): Primary | ICD-10-CM

## 2018-04-09 PROCEDURE — 99205 OFFICE O/P NEW HI 60 MIN: CPT | Mod: S$GLB,,, | Performed by: OBSTETRICS & GYNECOLOGY

## 2018-04-09 PROCEDURE — 99999 PR PBB SHADOW E&M-EST. PATIENT-LVL II: CPT | Mod: PBBFAC,,, | Performed by: OBSTETRICS & GYNECOLOGY

## 2018-04-09 NOTE — LETTER
April 9, 2018      Joel Goode MD  [address]  [facility] [address]           [facility] GYN [facility]  [address]  [facility] [address]  Phone: [phone]          Patient: Jayla Reagan   MR Number: [id number]   YOB: 1950   Date of Visit: 4/9/2018       Dear Dr. Joel Goode:    Thank you for referring Jayla Reagan to me for evaluation. Attached you will find relevant portions of my assessment and plan of care.    If you have questions, please do not hesitate to call me. I look forward to following Jayla Reagan along with you.    Sincerely,    Ezra Maharaj MD    Enclosure  CC:  No Recipients    If you would like to receive this communication electronically, please contact [email or web address] or [phone] to request more information on [employer] access.    For providers and/or their staff who would like to refer a patient to [facility], please contact us through our one-stop-shop provider referral line, [facility], at [phone].    If you feel you have received this communication in error or would no longer like to receive these types of communications, please e-mail [email or web address]

## 2018-04-09 NOTE — PROGRESS NOTES
Subjective:      Patient ID: Jayla Reagan is a 68 y.o. female.    Chief Complaint: PMB (Referred by Dr. Goode)      HPI     Ms. Reagan is a 68yr old para 2 followed by Dr. Goode for PMB in Sept 2017 with a failed D&C due to cervical stenosis and continued DELMIS.    9/2017 pelvic ultrasound  Uterus 11 x 6.1 x 8.2cm with ES 9.2mm, five small fibroids 1.6-3.8cm  R ov 3.3cm simple cyst, L ov not visualized  Complex fluid in endometrium and cervix    10/2017 taken to the OR for D&C, unable to proceed due to cervical stenosis  Final pathology  Endocervical curettings:  -Scant atrophic squamous cells and blood clot  -Limited sample  -Correlate clinically    1/2018 repeat pelvic ultrasound  Essentially unchanged, ES 7.2mm    LMP age 54, no PMB until 9/2017    Surgical history: ectopic pregnancy with salpingectomy, cholecystectomy, vag del x 2    Review of Systems   Constitutional: Negative for activity change, appetite change, chills, fatigue and fever.   HENT: Negative for hearing loss, mouth sores, nosebleeds, sore throat and tinnitus.    Eyes: Negative for visual disturbance.   Respiratory: Negative for cough, chest tightness, shortness of breath and wheezing.    Cardiovascular: Negative for chest pain and leg swelling.   Gastrointestinal: Negative for abdominal distention, abdominal pain, blood in stool, constipation, diarrhea, nausea and vomiting.   Genitourinary: Positive for vaginal discharge. Negative for dysuria, flank pain, frequency, hematuria, pelvic pain, vaginal bleeding and vaginal pain.   Musculoskeletal: Negative for arthralgias and back pain.   Skin: Negative for rash.   Neurological: Negative for dizziness, seizures, syncope, weakness and numbness.   Hematological: Does not bruise/bleed easily.   Psychiatric/Behavioral: Negative for confusion and sleep disturbance. The patient is not nervous/anxious.        Past Medical History:   Diagnosis Date    Atrial fibrillation     Cataract     Hypertension      PAD (peripheral artery disease)     Peripheral artery disease      Past Surgical History:   Procedure Laterality Date    BREAST SURGERY      CHOLECYSTECTOMY      ECTOPIC PREGNANCY SURGERY      SALPINGECTOMY       Family History   Problem Relation Age of Onset    Cataracts Mother     Cataracts Sister      Social History     Social History    Marital status: Legally      Spouse name: N/A    Number of children: N/A    Years of education: N/A     Occupational History    Not on file.     Social History Main Topics    Smoking status: Former Smoker    Smokeless tobacco: Never Used    Alcohol use No    Drug use: No    Sexual activity: Not on file     Other Topics Concern    Not on file     Social History Narrative    ** Merged History Encounter **          Current Outpatient Prescriptions   Medication Sig    acetaminophen (TYLENOL) 650 MG TbSR Take 650 mg by mouth 2 (two) times daily as needed.    ergocalciferol (VITAMIN D2) 50,000 unit Cap Take 1,000 Units by mouth every 7 days.    flecainide (TAMBOCOR) 150 MG Tab Take 2 tablets (300 mg total) by mouth once as needed (atrial fibrillation).    fluticasone (FLONASE) 50 mcg/actuation nasal spray 1 spray by Each Nare route daily as needed for Rhinitis.    fosinopril (MONOPRIL) 20 MG tablet Take 20 mg by mouth once daily.    metoprolol succinate (TOPROL-XL) 100 MG 24 hr tablet Take 100 mg by mouth 2 (two) times daily.    triamterene-hydrochlorothiazide 37.5-25 mg (DYAZIDE) 37.5-25 mg per capsule Take 1 capsule by mouth once daily.    warfarin (COUMADIN) 1 MG tablet Take 1 tablet (1 mg total) by mouth Daily. 1 Mg daily except Mondays and Fridays take 0.5 mg     No current facility-administered medications for this visit.      Review of patient's allergies indicates:  No Known Allergies    Objective:   Physical Exam:   Constitutional: She is oriented to person, place, and time. She appears well-developed and well-nourished. No distress.     HENT:   Head: Normocephalic and atraumatic.    Eyes: No scleral icterus.    Neck: Normal range of motion. Neck supple.    Cardiovascular: Normal rate and intact distal pulses.  Exam reveals no cyanosis and no edema.     Pulmonary/Chest: Effort normal. No respiratory distress. She exhibits no tenderness.        Abdominal: Soft. Normal appearance. She exhibits no distension, no fluid wave, no ascites and no mass. There is no tenderness. There is no rigidity, no rebound and no guarding. No hernia.         Genitourinary: Rectum normal, vagina normal and uterus normal. Pelvic exam was performed with patient supine. There is no rash, tenderness or lesion on the right labia. There is no rash, tenderness or lesion on the left labia. Uterus is not deviated, not enlarged, not fixed, not tender, not hosting fibroids and not experiencing uterine prolapse. Cervix is normal. Right adnexum displays no mass, no tenderness and no fullness. Left adnexum displays no mass, no tenderness and no fullness. No bleeding in the vagina. No vaginal discharge found. Labial bartholins normal.Cervix exhibits no motion tenderness, no discharge and no friability.        Uterus Size: 11 cm   Musculoskeletal: Normal range of motion and moves all extremeties. She exhibits no edema.      Lymphadenopathy:     She has no cervical adenopathy.        Right: No inguinal adenopathy present.        Left: No inguinal adenopathy present.    Neurological: She is alert and oriented to person, place, and time.    Skin: Skin is warm. No rash noted. No cyanosis or erythema.    Psychiatric: She has a normal mood and affect. Thought content normal.       Assessment:     1. PMB (postmenopausal bleeding)    2. Paroxysmal atrial fibrillation    3. PVD (peripheral vascular disease)        Plan:       Counseled her on the need for a definitive diagnosis.  At this point, would proceed with robotic hysterectomy with appropriate staging if path is cancer.  Recommended  RALH/BSO/Poss staging.  The risks, benefits, and indications of the procedure were discussed with the patient.  These included bleeding, infection, damage to surrounding tissues, conversion to laparotomy, lymphedema and the possibility of major complications including death.  She voiced understanding, all questions were answered and consents were signed.  Will touch base with her cardiologist regarding her anticoagulation and whether or not she will be able to d/c or need to be bridged.

## 2018-04-10 ENCOUNTER — TELEPHONE (OUTPATIENT)
Dept: GYNECOLOGIC ONCOLOGY | Facility: CLINIC | Age: 68
End: 2018-04-10

## 2018-04-10 DIAGNOSIS — C54.1 ENDOMETRIAL CANCER: Primary | ICD-10-CM

## 2018-04-10 NOTE — TELEPHONE ENCOUNTER
----- Message from Ezra Maharaj MD sent at 4/10/2018 10:25 AM CDT -----  Fazal:  We can schedule her for an RALH/BSO/Poss staging on 5/15.  Per her cards note below, hold coumadin for 5 days prior to surgery.  ----- Message -----  From: Minh Hsieh MD  Sent: 4/9/2018   5:57 PM  To: Ezra Maharaj MD    It is ok to stop coumadin for surgery 5 days before date. Restar 1 day after surgery.    ----- Message -----  From: Ezra Maharaj MD  Sent: 4/9/2018   3:09 PM  To: Minh Hsieh MD    Good afternoon.  Ms. Reagan needs surgery to rule out a potential endometrial cancer.  It will be a laparoscopic hysterectomy.  My question was related to her Coumadin.  Does she need a LMWH bridge, or can I just stop it 5 days prior to surgery?  Thanks in advance.  Ezra Maharaj

## 2018-04-20 NOTE — PROGRESS NOTES
"From Dr. Hsieh: "She is actually ChadsVasc 3 (age, female, HTN). I don't think she need a bridge however. Ok to hold coumadin and restart after procedure. No heparin or lovenox required."  "

## 2018-04-20 NOTE — PROGRESS NOTES
See the above note.  The pt is scheduled for a robotic hysterectomy on 5/15 and will need to hold coumadin x 5 days prior.  She is CHADs = 1 (HTN) and I am therefore recommending that she holds without a lovenox bridge.  I have sent this recommendation to Dr. Hsieh.  See calendar for dosing.

## 2018-04-20 NOTE — PROGRESS NOTES
Pt called today about surgery, So I spoke to (Dr Maharaj/Nurse (Traniece/ext [id number]) ( Oncology). Pt is danish'd on 5/15/18 to have (Robotic Hysterectomy). Pt is to hold questionably 5 days, Please advise

## 2018-05-03 ENCOUNTER — ANESTHESIA EVENT (OUTPATIENT)
Dept: SURGERY | Facility: HOSPITAL | Age: 68
DRG: 743 | End: 2018-05-03
Payer: MEDICARE

## 2018-05-03 DIAGNOSIS — Z01.818 PREOPERATIVE TESTING: Primary | ICD-10-CM

## 2018-05-03 NOTE — ANESTHESIA PREPROCEDURE EVALUATION
Pre Admission Screening  Lachelle Pinedo RN      []Hide copied text  Anesthesia Assessment: Preoperative EQUATION     Planned Procedure: Procedure(s) (LRB):  XI ROBOTIC ASSISTED LAPAROSCOPIC HYSTERECTOMY (N/A)  XI ROBOT ASSISTED LAPAROSCOPIC SALPINGO-OOPHERECTOMY (Bilateral)  STAGING (N/A)  Requested Anesthesia Type:General  Surgeon: Ezra Maharaj MD  Service: OB/GYN  Known or anticipated Date of Surgery:5/15/2018     Surgeon notes: reviewed     Previous anesthesia records:LMA General and No problems 10/25/17; Placement Time: 0809; Inserted by: Anesthesia Resident; Airway Device: LMA; Mask Ventilation: Easy; Airway Device Size: 4.0; Style: Cuffed; Cuff Inflation: Minimal occlusive pressure; Placement Verified By: Auscultation, Capnometry; Complicating Factors: None; Intubation Findings: Positive EtCO2  Airway/Jaw/Neck:  Airway Findings: Mouth Opening: Normal Tongue: Normal  General Airway Assessment: Adult  Mallampati: II  TM Distance: Normal, at least 6 cm  Jaw/Neck Findings:  Neck ROM: Normal ROM      Dental:  Dental Findings: upper partial dentures      Last PCP note: 6-12 months ago , outside [facility]   Subspecialty notes: Cardiology: General 3/27/18 after ER visit for Afib, states to return in 6 months     Other important co-morbidities: per EPIC: HTN, PVD, Afib, endometrial cancer/mild anemia     Tests already available:  Available tests,  within 3 months . 4/3/18 PTINR, 3/25/18 CBC, CMP. 3/2018 EKG. 2017 echo.                            Instructions given. (See in Nurse's note)     Optimization:  Anesthesia Preop Clinic Assessment  Indicated-will attempt to schedule POC appt    Medical Opinion Indicated-will not request clearance since pt had recent cardiology visit                             Plan:    Testing:  T&S, Heme prof (to check H/H D/T postmenopausal bleeding)   Pre-anesthesia  visit                                        Visit focus: concerns in complex and/or prolonged anesthesia               "                                     Patient  has previously scheduled Medical Appointment: none      Navigation: Tests Scheduled. TBD                                    Results will be tracked by Preop Clinic.      4/3/18 Coumadin clinic instructions:  From Dr. Hsieh: "She is actually ChadsVasc 3 (age, female, HTN). I don't think she need a bridge however. Ok to hold coumadin and restart after procedure. No heparin or lovenox required."      Charis Chamorro, PharmD at 4/3/2018  9:30 AM      Status: Signed      See the above note.  The pt is scheduled for a robotic hysterectomy on 5/15 and will need to hold coumadin x 5 days prior.  She is CHADs = 1 (HTN) and I am therefore recommending that she holds without a lovenox bridge.  I have sent this recommendation to Dr. Hsieh.  See calendar for dosing.                                  Electronically signed by Lachelle Pinedo RN at 5/3/2018  2:04 PM        Pre-admit on 5/15/2018            Detailed Report                                                                                                                   05/03/2018  Jayla Reagan is a 68 y.o., female.    Anesthesia Evaluation    I have reviewed the Patient Summary Reports.    I have reviewed the Nursing Notes.   I have reviewed the Medications.     Review of Systems  Anesthesia Hx:  No problems with previous Anesthesia  History of prior surgery of interest to airway management or planning: Previous anesthesia: General D & C 10/2017 with general anesthesia.  Procedure performed at an [facility]. Denies Family Hx of Anesthesia complications.   Denies Personal Hx of Anesthesia complications.   Social:  Former Smoker, No Alcohol Use    Hematology/Oncology:  Hematology Normal   Oncology Normal     EENT/Dental:  EENT/Dental Normal glasses   Cardiovascular:   Exercise tolerance: good Hypertension, well controlled Dysrhythmias: takes coumadin. ECG has been reviewed. Recent ED visit for AF w/RVR () " (3/25/18); had f/u visit with cardiologist, Dr Hsieh 3/27/18;  Denies palpitations Functional Capacity good / => 4 METS, limited walking due to hip bursitis; housework; walking in [employer]; denies CP, reports SOB only when in AF    Pulmonary:  Pulmonary Normal    Renal/:  Renal/ Normal     Hepatic/GI:  Hepatic/GI Normal    Musculoskeletal:  Musculoskeletal Normal Arthritis      Neurological:  Neurology Normal  Pain , onset is chronic , location of bilateral hips , quality of aching/dull, burning , severity is a 5    Endocrine:  Endocrine Normal    Dermatological:  Skin Normal    Psych:  Psychiatric Normal           Physical Exam  General:  Well nourished    Airway/Jaw/Neck:  Airway Findings: Mouth Opening: Normal Tongue: Normal  General Airway Assessment: Adult  Mallampati: I  TM Distance: Normal, at least 6 cm  Jaw/Neck Findings:     Neck ROM: Normal ROM      Dental:  Dental Findings: Periodontal disease, Mild    Chest/Lungs:  Chest/Lungs Findings: Clear to auscultation, Normal Respiratory Rate     Heart/Vascular:  Heart Findings: Rate: Normal  Rhythm: Irregularly Irregular  Sounds: Normal        Mental Status:  Mental Status Findings:  Cooperative, Alert and Oriented       Pt was seen in POC 5/8/18; findings discussed with Dr Leopold-cardiac clearance not needed as pt saw cardiologist, Dr Hsieh, on 3/27/18; please refer to his note/Chanel Jones RN        Anesthesia Plan  Type of Anesthesia, risks & benefits discussed:  Anesthesia Type:  general  Patient's Preference:   Intra-op Monitoring Plan: standard ASA monitors  Intra-op Monitoring Plan Comments:   Post Op Pain Control Plan: per primary service following discharge from PACU, IV/PO Opioids PRN and multimodal analgesia  Post Op Pain Control Plan Comments:   Induction:   IV  Beta Blocker:  Patient is on a Beta-Blocker and has received one dose within the past 24 hours (No further documentation required).       Informed Consent: Patient understands risks  and agrees with Anesthesia plan.  Questions answered. Anesthesia consent signed with patient.  ASA Score: 3     Day of Surgery Review of History & Physical:    H&P update referred to the surgeon.         Ready For Surgery From Anesthesia Perspective.

## 2018-05-03 NOTE — PRE ADMISSION SCREENING
"Anesthesia Assessment: Preoperative EQUATION    Planned Procedure: Procedure(s) (LRB):  XI ROBOTIC ASSISTED LAPAROSCOPIC HYSTERECTOMY (N/A)  XI ROBOT ASSISTED LAPAROSCOPIC SALPINGO-OOPHERECTOMY (Bilateral)  STAGING (N/A)  Requested Anesthesia Type:General  Surgeon: Ezra Maharaj MD  Service: OB/GYN  Known or anticipated Date of Surgery:5/15/2018    Surgeon notes: reviewed    Previous anesthesia records:LMA General and No problems 10/25/17; Placement Time: 0809; Inserted by: Anesthesia Resident; Airway Device: LMA; Mask Ventilation: Easy; Airway Device Size: 4.0; Style: Cuffed; Cuff Inflation: Minimal occlusive pressure; Placement Verified By: Auscultation, Capnometry; Complicating Factors: None; Intubation Findings: Positive EtCO2  Airway/Jaw/Neck:  Airway Findings: Mouth Opening: Normal Tongue: Normal  General Airway Assessment: Adult  Mallampati: II  TM Distance: Normal, at least 6 cm  Jaw/Neck Findings:  Neck ROM: Normal ROM      Dental:  Dental Findings: upper partial dentures     Last PCP note: 6-12 months ago , [facility]   Subspecialty notes: Cardiology: General 3/27/18 after ER visit for Afib, states to return in 6 months    Other important co-morbidities: per EPIC: HTN, PVD, Afib, endometrial cancer     Tests already available:  Available tests,  within 3 months . 4/3/18 PTINR, 3/25/18 CBC, CMP. 3/2018 EKG. 2017 echo.            Instructions given. (See in Nurse's note)    Optimization:  Anesthesia Preop Clinic Assessment  Indicated-will attempt to schedule POC appt    Medical Opinion Indicated-will not request clearance since pt had recent cardiology visit       Plan:    Testing:  T&S   Pre-anesthesia  visit       Visit focus: concerns in complex and/or prolonged anesthesia       Patient  has previously scheduled Medical Appointment: none     Navigation: Tests Scheduled. TBD                      Results will be tracked by Preop Clinic.      4/3/18 Coumadin clinic instructions:  From Dr. Hsihe: "She " "is actually ChadsVasc 3 (age, female, HTN). I don't think she need a bridge however. Ok to hold coumadin and restart after procedure. No heparin or lovenox required."      Charis Chamorro, PharmD at 4/3/2018  9:30 AM     Status: Signed      See the above note.  The pt is scheduled for a robotic hysterectomy on 5/15 and will need to hold coumadin x 5 days prior.  She is CHADs = 1 (HTN) and I am therefore recommending that she holds without a lovenox bridge.  I have sent this recommendation to Dr. Hsieh.  See calendar for dosing.                  "

## 2018-05-08 ENCOUNTER — HOSPITAL ENCOUNTER (OUTPATIENT)
Dept: PREADMISSION TESTING | Facility: HOSPITAL | Age: 68
Discharge: HOME | End: 2018-05-08
Attending: ANESTHESIOLOGY
Payer: MEDICARE

## 2018-05-08 VITALS
HEART RATE: 58 BPM | RESPIRATION RATE: 18 BRPM | BODY MASS INDEX: 33.56 KG/M2 | WEIGHT: 247.81 LBS | TEMPERATURE: 98 F | OXYGEN SATURATION: 100 % | HEIGHT: 72 IN | DIASTOLIC BLOOD PRESSURE: 71 MMHG | SYSTOLIC BLOOD PRESSURE: 134 MMHG

## 2018-05-08 RX ORDER — RESVER/WINE/BFL/GRPSD/PC/C/POM 200MG-60MG
5000 CAPSULE ORAL DAILY
COMMUNITY

## 2018-05-08 NOTE — DISCHARGE INSTRUCTIONS
Your surgery has been scheduled for:__________________________________________    You should report to:  ____[facility], located on the [place] [place] side of the first floor of the           [facility] ([phone])  ____[facility], located on the [facility] side of the            Second floor of the [facility] ([phone])  ____3rd Floor [facility] located on the [facility] side of the [facility] [phone]  Please Note   - Tell your doctor if you take Aspirin, products containing Aspirin, herbal medications  or blood thinners, such as Coumadin, Ticlid, or Plavix.  (Consult your provider regarding holding or stopping before surgery).  - Arrange for someone to drive you home following surgery.  You will not be allowed to leave the surgical facility alone or drive yourself home following sedation and anesthesia.  Before Surgery  - Stop taking all herbal medications 14days prior to surgery  - No Motrin/Advil (Ibuprofen) 7 days before surgery  - No Aleve (Naproxen) 7 days before surgery  - Stop Taking Asprin, products containing Asprin _____days before surgery  - Stop taking blood thinners_______days before surgery  - Refrain from drinking alcoholic beverages for 24hours before and after surgery  - Stop or limit smoking _________days before surgery  Night before Surgery  - DO NOT EAT OR DRINK ANYTHING AFTER MIDNIGHT, INCLUDING GUM, HARD CANDY, MINTS, OR CHEWING TOBACCO.  - Take a shower or bath (shower is recommended).  Bathe with Hibiclens soap or an antibacterial soap from the neck down.  If not supplied by your surgeon, hibiclens soap will need to be purchased over the counter in pharmacy.  Rinse soap off thoroughly.  - Shampoo your hair with your regular shampoo  The Day of Surgery  - Take another bath or shower with hibiclens or any antibacterial soap, to reduce the chance of infection.  - Take heart and blood  pressure medications with a small sip of water, as advised by the perioperative team.  - Do not take fluid pills  - You may brush your teeth and rinse your mouth, but do not swall any additional water.   - Do not apply perfumes, powder, body lotions or deodorant on the day of surgery.  - Nail polish should be removed.  - Do not wear makeup or moisturizer  - Wear comfortable clothes, such as a button front shirt and loose fitting pants.  - Leave all jewelry, including body piercings, and valuables at home.    - Bring any devices you will neeed after surgery such as crutches or canes.  - If you have sleep apnea, please bring your CPAP machine  In the event that your physical condition changes including the onset of a cold or respiratory illness, or if you have to delay or cancel your surgery, please notify your surgeon.Anesthesia: General Anesthesia  Youre due to have surgery. During surgery, youll be given medication called anesthesia. (It is also called anesthetic.) This will keep you comfortable and pain-free. Your anesthesia provider will use general anesthesia. This sheet tells you more about it.  What is general anesthesia?     You are watched continuously during your procedure by the anesthesia provider   General anesthesia puts you into a state like deep sleep. It goes into the bloodstream (IV anesthetics), into the lungs (gas anesthetics), or both. You feel nothing during the procedure. You will not remember it. During the procedure, the anesthesia provider monitors you continuously. He or she checks your heart rate and rhythm, blood pressure, breathing, and blood oxygen.  · IV Anesthetics. IV anesthetics are given through an IV line in your arm. Theyre often given first. This is so you are asleep before a gas anesthetic is started. Some kinds of IV anesthetics relieve pain. Others relax you. Your doctor will decide which kind is best in your case.  · Gas Anesthetics. Gas anesthetics are breathed into the  lungs. They are often used to keep you asleep. They can be given through a facemask or a tube placed in your larynx or trachea (breathing tube).  ? If you have a facemask, your anesthesia provider will most likely place it over your nose and mouth while youre still awake. Youll breathe oxygen through the mask as your IV anesthetic is started. Gas anesthetic may be added through the mask.  ? If you have a tube in the larynx or trachea, it will be inserted into your throat after youre asleep.  Anesthesia tools and medications  You will likely have:  · IV anesthetics. These are put into an IV line into your bloodstream.  · Gas anesthetics. You breathe these anesthetics into your lungs, where they pass into your bloodstream.  · Pulse oximeter. This is a small clip that is attached to the end of your finger. This measures your blood oxygen level.  · Electrocardiography leads (electrodes). These are small sticky pads that are placed on your chest. They record your heart rate and rhythm.  · Blood pressure cuff. This reads your blood pressure.  Risks and possible complications  General anesthesia has some risks. These include:  · Breathing problems  · Nausea and vomiting  · Sore throat or hoarseness (usually temporary)  · Allergic reaction to the anesthetic  · Irregular heartbeat (rare)  · Cardiac arrest (rare)   Anesthesia safety  · Follow all instructions you are given for how long not to eat or drink before your procedure.  · Be sure your doctor knows what medications and drugs you take. This includes over-the-counter medications, herbs, supplements, alcohol or other drugs. You will be asked when those were last taken.  · Have an adult family member or friend drive you home after the procedure.  · For the first 24 hours after your surgery:  ? Do not drive or use heavy equipment.  ? Have a trusted family member or spouse make important decisions or sign documents.  ? Avoid alcohol.  ? Have a responsible adult stay with  you. He or she can watch for problems and help keep you safe.  Date Last Reviewed: 10/16/2014  © 7876-1265 [employer]. [address] [place] [address], [place] [address]. All rights reserved. This information is not intended as a substitute for professional medical care. Always follow your healthcare professional's instructions.

## 2018-05-14 ENCOUNTER — TELEPHONE (OUTPATIENT)
Dept: GYNECOLOGIC ONCOLOGY | Facility: CLINIC | Age: 68
End: 2018-05-14

## 2018-05-15 ENCOUNTER — HOSPITAL ENCOUNTER (INPATIENT)
Facility: HOSPITAL | Age: 68
LOS: 1 days | Discharge: HOME | DRG: 743 | End: 2018-05-16
Attending: OBSTETRICS & GYNECOLOGY | Admitting: OBSTETRICS & GYNECOLOGY
Payer: MEDICARE

## 2018-05-15 ENCOUNTER — ANESTHESIA (OUTPATIENT)
Dept: SURGERY | Facility: HOSPITAL | Age: 68
DRG: 743 | End: 2018-05-15
Payer: MEDICARE

## 2018-05-15 DIAGNOSIS — Z90.710 HISTORY OF ROBOT-ASSISTED LAPAROSCOPIC HYSTERECTOMY: ICD-10-CM

## 2018-05-15 DIAGNOSIS — N95.0 POSTMENOPAUSAL BLEEDING: Primary | ICD-10-CM

## 2018-05-15 DIAGNOSIS — N95.0 POST-MENOPAUSAL BLEEDING: ICD-10-CM

## 2018-05-15 LAB
BASOPHILS # BLD AUTO: 0.02 K/UL
BASOPHILS NFR BLD: 0.5 %
DIFFERENTIAL METHOD: ABNORMAL
EOSINOPHIL # BLD AUTO: 0.1 K/UL
EOSINOPHIL NFR BLD: 1.3 %
ERYTHROCYTE [DISTWIDTH] IN BLOOD BY AUTOMATED COUNT: 17 %
HCT VFR BLD AUTO: 32.7 %
HGB BLD-MCNC: 11.1 G/DL
IMM GRANULOCYTES # BLD AUTO: 0.02 K/UL
IMM GRANULOCYTES NFR BLD AUTO: 0.5 %
INR PPP: 1.3
LYMPHOCYTES # BLD AUTO: 1.2 K/UL
LYMPHOCYTES NFR BLD: 31.8 %
MCH RBC QN AUTO: 28.2 PG
MCHC RBC AUTO-ENTMCNC: 33.9 G/DL
MCV RBC AUTO: 83 FL
MONOCYTES # BLD AUTO: 0.5 K/UL
MONOCYTES NFR BLD: 12.4 %
NEUTROPHILS # BLD AUTO: 2 K/UL
NEUTROPHILS NFR BLD: 53.5 %
NRBC BLD-RTO: 0 /100 WBC
PLATELET # BLD AUTO: 261 K/UL
PMV BLD AUTO: 8.5 FL
POCT GLUCOSE: 102 MG/DL (ref 70–110)
PROTHROMBIN TIME: 13.2 SEC
RBC # BLD AUTO: 3.93 M/UL
WBC # BLD AUTO: 3.8 K/UL

## 2018-05-15 PROCEDURE — 25000003 PHARM REV CODE 250: Performed by: OBSTETRICS & GYNECOLOGY

## 2018-05-15 PROCEDURE — 27201423 OPTIME MED/SURG SUP & DEVICES STERILE SUPPLY: Performed by: OBSTETRICS & GYNECOLOGY

## 2018-05-15 PROCEDURE — 71000039 HC RECOVERY, EACH ADD'L HOUR: Performed by: OBSTETRICS & GYNECOLOGY

## 2018-05-15 PROCEDURE — 88307 TISSUE EXAM BY PATHOLOGIST: CPT | Mod: 26,,, | Performed by: PATHOLOGY

## 2018-05-15 PROCEDURE — 94799 UNLISTED PULMONARY SVC/PX: CPT

## 2018-05-15 PROCEDURE — 63600175 PHARM REV CODE 636 W HCPCS: Performed by: OBSTETRICS & GYNECOLOGY

## 2018-05-15 PROCEDURE — 20600001 HC STEP DOWN PRIVATE ROOM

## 2018-05-15 PROCEDURE — 58573 TLH W/T/O UTERUS OVER 250 G: CPT | Mod: AS,ICN,, | Performed by: NURSE PRACTITIONER

## 2018-05-15 PROCEDURE — 63600175 PHARM REV CODE 636 W HCPCS: Performed by: ANESTHESIOLOGY

## 2018-05-15 PROCEDURE — 82962 GLUCOSE BLOOD TEST: CPT | Performed by: OBSTETRICS & GYNECOLOGY

## 2018-05-15 PROCEDURE — D9220A PRA ANESTHESIA: Mod: ANES,,, | Performed by: ANESTHESIOLOGY

## 2018-05-15 PROCEDURE — 25000003 PHARM REV CODE 250: Performed by: ANESTHESIOLOGY

## 2018-05-15 PROCEDURE — 36000713 HC OR TIME LEV V EA ADD 15 MIN: Performed by: OBSTETRICS & GYNECOLOGY

## 2018-05-15 PROCEDURE — 71000033 HC RECOVERY, INTIAL HOUR: Performed by: OBSTETRICS & GYNECOLOGY

## 2018-05-15 PROCEDURE — 25000003 PHARM REV CODE 250: Performed by: NURSE ANESTHETIST, CERTIFIED REGISTERED

## 2018-05-15 PROCEDURE — 0UT24ZZ RESECTION OF BILATERAL OVARIES, PERCUTANEOUS ENDOSCOPIC APPROACH: ICD-10-PCS | Performed by: OBSTETRICS & GYNECOLOGY

## 2018-05-15 PROCEDURE — 37000008 HC ANESTHESIA 1ST 15 MINUTES: Performed by: OBSTETRICS & GYNECOLOGY

## 2018-05-15 PROCEDURE — 25000003 PHARM REV CODE 250: Performed by: STUDENT IN AN ORGANIZED HEALTH CARE EDUCATION/TRAINING PROGRAM

## 2018-05-15 PROCEDURE — 0UT94ZZ RESECTION OF UTERUS, PERCUTANEOUS ENDOSCOPIC APPROACH: ICD-10-PCS | Performed by: OBSTETRICS & GYNECOLOGY

## 2018-05-15 PROCEDURE — 85025 COMPLETE CBC W/AUTO DIFF WBC: CPT

## 2018-05-15 PROCEDURE — 85610 PROTHROMBIN TIME: CPT

## 2018-05-15 PROCEDURE — 58573 TLH W/T/O UTERUS OVER 250 G: CPT | Mod: ,,, | Performed by: OBSTETRICS & GYNECOLOGY

## 2018-05-15 PROCEDURE — 27000221 HC OXYGEN, UP TO 24 HOURS

## 2018-05-15 PROCEDURE — 94761 N-INVAS EAR/PLS OXIMETRY MLT: CPT

## 2018-05-15 PROCEDURE — 8E0W4CZ ROBOTIC ASSISTED PROCEDURE OF TRUNK REGION, PERCUTANEOUS ENDOSCOPIC APPROACH: ICD-10-PCS | Performed by: OBSTETRICS & GYNECOLOGY

## 2018-05-15 PROCEDURE — D9220A PRA ANESTHESIA: Mod: CRNA,,, | Performed by: NURSE ANESTHETIST, CERTIFIED REGISTERED

## 2018-05-15 PROCEDURE — 36000712 HC OR TIME LEV V 1ST 15 MIN: Performed by: OBSTETRICS & GYNECOLOGY

## 2018-05-15 PROCEDURE — 0UT74ZZ RESECTION OF BILATERAL FALLOPIAN TUBES, PERCUTANEOUS ENDOSCOPIC APPROACH: ICD-10-PCS | Performed by: OBSTETRICS & GYNECOLOGY

## 2018-05-15 PROCEDURE — 88307 TISSUE EXAM BY PATHOLOGIST: CPT | Performed by: PATHOLOGY

## 2018-05-15 PROCEDURE — 37000009 HC ANESTHESIA EA ADD 15 MINS: Performed by: OBSTETRICS & GYNECOLOGY

## 2018-05-15 PROCEDURE — 99900035 HC TECH TIME PER 15 MIN (STAT)

## 2018-05-15 PROCEDURE — 63600175 PHARM REV CODE 636 W HCPCS: Performed by: NURSE ANESTHETIST, CERTIFIED REGISTERED

## 2018-05-15 RX ORDER — SIMETHICONE 80 MG
1 TABLET,CHEWABLE ORAL 3 TIMES DAILY
Status: DISCONTINUED | OUTPATIENT
Start: 2018-05-15 | End: 2018-05-16 | Stop reason: HOSPADM

## 2018-05-15 RX ORDER — NEOSTIGMINE METHYLSULFATE 1 MG/ML
INJECTION, SOLUTION INTRAVENOUS
Status: DISCONTINUED | OUTPATIENT
Start: 2018-05-15 | End: 2018-05-15

## 2018-05-15 RX ORDER — IBUPROFEN 400 MG/1
800 TABLET ORAL 4 TIMES DAILY
Status: DISCONTINUED | OUTPATIENT
Start: 2018-05-15 | End: 2018-05-16 | Stop reason: HOSPADM

## 2018-05-15 RX ORDER — PROPOFOL 10 MG/ML
VIAL (ML) INTRAVENOUS CONTINUOUS PRN
Status: DISCONTINUED | OUTPATIENT
Start: 2018-05-15 | End: 2018-05-15

## 2018-05-15 RX ORDER — ONDANSETRON 8 MG/1
8 TABLET, ORALLY DISINTEGRATING ORAL EVERY 8 HOURS PRN
Status: DISCONTINUED | OUTPATIENT
Start: 2018-05-15 | End: 2018-05-16 | Stop reason: HOSPADM

## 2018-05-15 RX ORDER — HYDROCODONE BITARTRATE AND ACETAMINOPHEN 10; 325 MG/1; MG/1
1 TABLET ORAL EVERY 4 HOURS PRN
Status: DISCONTINUED | OUTPATIENT
Start: 2018-05-15 | End: 2018-05-16 | Stop reason: HOSPADM

## 2018-05-15 RX ORDER — AMOXICILLIN 250 MG
1 CAPSULE ORAL 2 TIMES DAILY
Status: DISCONTINUED | OUTPATIENT
Start: 2018-05-15 | End: 2018-05-16 | Stop reason: HOSPADM

## 2018-05-15 RX ORDER — HYDROCODONE BITARTRATE AND ACETAMINOPHEN 5; 325 MG/1; MG/1
1 TABLET ORAL EVERY 4 HOURS PRN
Status: DISCONTINUED | OUTPATIENT
Start: 2018-05-15 | End: 2018-05-16 | Stop reason: HOSPADM

## 2018-05-15 RX ORDER — METOPROLOL SUCCINATE 50 MG/1
100 TABLET, EXTENDED RELEASE ORAL 2 TIMES DAILY
Status: DISCONTINUED | OUTPATIENT
Start: 2018-05-15 | End: 2018-05-16 | Stop reason: HOSPADM

## 2018-05-15 RX ORDER — IBUPROFEN 800 MG/1
800 TABLET ORAL 4 TIMES DAILY
Qty: 30 TABLET | Refills: 2 | Status: SHIPPED | OUTPATIENT
Start: 2018-05-15 | End: 2018-06-11

## 2018-05-15 RX ORDER — HYDROMORPHONE HYDROCHLORIDE 1 MG/ML
0.5 INJECTION, SOLUTION INTRAMUSCULAR; INTRAVENOUS; SUBCUTANEOUS
Status: DISCONTINUED | OUTPATIENT
Start: 2018-05-15 | End: 2018-05-16 | Stop reason: RX

## 2018-05-15 RX ORDER — LIDOCAINE HCL/PF 100 MG/5ML
SYRINGE (ML) INTRAVENOUS
Status: DISCONTINUED | OUTPATIENT
Start: 2018-05-15 | End: 2018-05-15

## 2018-05-15 RX ORDER — ROCURONIUM BROMIDE 10 MG/ML
INJECTION, SOLUTION INTRAVENOUS
Status: DISCONTINUED | OUTPATIENT
Start: 2018-05-15 | End: 2018-05-15

## 2018-05-15 RX ORDER — CEFAZOLIN SODIUM 1 G/3ML
2 INJECTION, POWDER, FOR SOLUTION INTRAMUSCULAR; INTRAVENOUS
Status: COMPLETED | OUTPATIENT
Start: 2018-05-15 | End: 2018-05-15

## 2018-05-15 RX ORDER — MUPIROCIN 20 MG/G
OINTMENT TOPICAL
Status: DISCONTINUED | OUTPATIENT
Start: 2018-05-15 | End: 2018-05-15

## 2018-05-15 RX ORDER — METOCLOPRAMIDE HYDROCHLORIDE 5 MG/ML
5 INJECTION INTRAMUSCULAR; INTRAVENOUS EVERY 6 HOURS PRN
Status: DISCONTINUED | OUTPATIENT
Start: 2018-05-15 | End: 2018-05-16 | Stop reason: HOSPADM

## 2018-05-15 RX ORDER — PROPOFOL 10 MG/ML
VIAL (ML) INTRAVENOUS
Status: DISCONTINUED | OUTPATIENT
Start: 2018-05-15 | End: 2018-05-15

## 2018-05-15 RX ORDER — HYDROCODONE BITARTRATE AND ACETAMINOPHEN 5; 325 MG/1; MG/1
1 TABLET ORAL EVERY 4 HOURS PRN
Qty: 30 TABLET | Refills: 0 | Status: SHIPPED | OUTPATIENT
Start: 2018-05-15 | End: 2018-06-11

## 2018-05-15 RX ORDER — GLYCOPYRROLATE 0.2 MG/ML
INJECTION INTRAMUSCULAR; INTRAVENOUS
Status: DISCONTINUED | OUTPATIENT
Start: 2018-05-15 | End: 2018-05-15

## 2018-05-15 RX ORDER — ACETAMINOPHEN 10 MG/ML
INJECTION, SOLUTION INTRAVENOUS
Status: DISCONTINUED | OUTPATIENT
Start: 2018-05-15 | End: 2018-05-15

## 2018-05-15 RX ORDER — FLECAINIDE ACETATE 100 MG/1
300 TABLET ORAL ONCE AS NEEDED
Status: ACTIVE | OUTPATIENT
Start: 2018-05-15 | End: 2018-05-15

## 2018-05-15 RX ORDER — FENTANYL CITRATE 50 UG/ML
INJECTION, SOLUTION INTRAMUSCULAR; INTRAVENOUS
Status: DISCONTINUED | OUTPATIENT
Start: 2018-05-15 | End: 2018-05-15

## 2018-05-15 RX ORDER — SODIUM CHLORIDE 0.9 % (FLUSH) 0.9 %
3 SYRINGE (ML) INJECTION
Status: DISCONTINUED | OUTPATIENT
Start: 2018-05-15 | End: 2018-05-15 | Stop reason: HOSPADM

## 2018-05-15 RX ORDER — SODIUM CHLORIDE, SODIUM LACTATE, POTASSIUM CHLORIDE, CALCIUM CHLORIDE 600; 310; 30; 20 MG/100ML; MG/100ML; MG/100ML; MG/100ML
INJECTION, SOLUTION INTRAVENOUS CONTINUOUS
Status: DISCONTINUED | OUTPATIENT
Start: 2018-05-15 | End: 2018-05-16 | Stop reason: HOSPADM

## 2018-05-15 RX ORDER — MIDAZOLAM HYDROCHLORIDE 1 MG/ML
INJECTION, SOLUTION INTRAMUSCULAR; INTRAVENOUS
Status: DISCONTINUED | OUTPATIENT
Start: 2018-05-15 | End: 2018-05-15

## 2018-05-15 RX ORDER — DIPHENHYDRAMINE HCL 25 MG
25 CAPSULE ORAL EVERY 6 HOURS PRN
Status: DISCONTINUED | OUTPATIENT
Start: 2018-05-15 | End: 2018-05-16 | Stop reason: HOSPADM

## 2018-05-15 RX ORDER — SODIUM CHLORIDE 9 MG/ML
INJECTION, SOLUTION INTRAVENOUS CONTINUOUS
Status: DISCONTINUED | OUTPATIENT
Start: 2018-05-15 | End: 2018-05-15

## 2018-05-15 RX ORDER — LIDOCAINE HYDROCHLORIDE 10 MG/ML
1 INJECTION, SOLUTION EPIDURAL; INFILTRATION; INTRACAUDAL; PERINEURAL ONCE
Status: COMPLETED | OUTPATIENT
Start: 2018-05-15 | End: 2018-05-15

## 2018-05-15 RX ORDER — SCOLOPAMINE TRANSDERMAL SYSTEM 1 MG/1
1 PATCH, EXTENDED RELEASE TRANSDERMAL
Status: DISCONTINUED | OUTPATIENT
Start: 2018-05-15 | End: 2018-05-15

## 2018-05-15 RX ORDER — DEXAMETHASONE SODIUM PHOSPHATE 4 MG/ML
INJECTION, SOLUTION INTRA-ARTICULAR; INTRALESIONAL; INTRAMUSCULAR; INTRAVENOUS; SOFT TISSUE
Status: DISCONTINUED | OUTPATIENT
Start: 2018-05-15 | End: 2018-05-15

## 2018-05-15 RX ORDER — FENTANYL CITRATE 50 UG/ML
25 INJECTION, SOLUTION INTRAMUSCULAR; INTRAVENOUS EVERY 5 MIN PRN
Status: DISCONTINUED | OUTPATIENT
Start: 2018-05-15 | End: 2018-05-15 | Stop reason: HOSPADM

## 2018-05-15 RX ORDER — MUPIROCIN 20 MG/G
1 OINTMENT TOPICAL 2 TIMES DAILY
Status: DISCONTINUED | OUTPATIENT
Start: 2018-05-15 | End: 2018-05-16 | Stop reason: HOSPADM

## 2018-05-15 RX ADMIN — SODIUM CHLORIDE: 0.9 INJECTION, SOLUTION INTRAVENOUS at 06:05

## 2018-05-15 RX ADMIN — IBUPROFEN 800 MG: 400 TABLET, FILM COATED ORAL at 04:05

## 2018-05-15 RX ADMIN — SODIUM CHLORIDE, SODIUM LACTATE, POTASSIUM CHLORIDE, AND CALCIUM CHLORIDE: .6; .31; .03; .02 INJECTION, SOLUTION INTRAVENOUS at 09:05

## 2018-05-15 RX ADMIN — HYDROCODONE BITARTRATE AND ACETAMINOPHEN 1 TABLET: 10; 325 TABLET ORAL at 11:05

## 2018-05-15 RX ADMIN — GLYCOPYRROLATE 0.4 MG: 0.2 INJECTION, SOLUTION INTRAMUSCULAR; INTRAVENOUS at 09:05

## 2018-05-15 RX ADMIN — ROCURONIUM BROMIDE 50 MG: 10 INJECTION, SOLUTION INTRAVENOUS at 07:05

## 2018-05-15 RX ADMIN — FENTANYL CITRATE 50 MCG: 50 INJECTION, SOLUTION INTRAMUSCULAR; INTRAVENOUS at 08:05

## 2018-05-15 RX ADMIN — LIDOCAINE HYDROCHLORIDE 2 MG: 10 INJECTION, SOLUTION EPIDURAL; INFILTRATION; INTRACAUDAL; PERINEURAL at 06:05

## 2018-05-15 RX ADMIN — SODIUM CHLORIDE, SODIUM GLUCONATE, SODIUM ACETATE, POTASSIUM CHLORIDE, MAGNESIUM CHLORIDE, SODIUM PHOSPHATE, DIBASIC, AND POTASSIUM PHOSPHATE: .53; .5; .37; .037; .03; .012; .00082 INJECTION, SOLUTION INTRAVENOUS at 08:05

## 2018-05-15 RX ADMIN — STANDARDIZED SENNA CONCENTRATE AND DOCUSATE SODIUM 1 TABLET: 8.6; 5 TABLET, FILM COATED ORAL at 09:05

## 2018-05-15 RX ADMIN — CEFAZOLIN 2 G: 330 INJECTION, POWDER, FOR SOLUTION INTRAMUSCULAR; INTRAVENOUS at 07:05

## 2018-05-15 RX ADMIN — HYDROCODONE BITARTRATE AND ACETAMINOPHEN 1 TABLET: 10; 325 TABLET ORAL at 02:05

## 2018-05-15 RX ADMIN — DEXAMETHASONE SODIUM PHOSPHATE 8 MG: 4 INJECTION, SOLUTION INTRAMUSCULAR; INTRAVENOUS at 07:05

## 2018-05-15 RX ADMIN — ACETAMINOPHEN 1000 MG: 10 INJECTION, SOLUTION INTRAVENOUS at 07:05

## 2018-05-15 RX ADMIN — IBUPROFEN 800 MG: 400 TABLET, FILM COATED ORAL at 12:05

## 2018-05-15 RX ADMIN — SCOPALAMINE 1 PATCH: 1 PATCH, EXTENDED RELEASE TRANSDERMAL at 07:05

## 2018-05-15 RX ADMIN — FENTANYL CITRATE 50 MCG: 50 INJECTION, SOLUTION INTRAMUSCULAR; INTRAVENOUS at 07:05

## 2018-05-15 RX ADMIN — SODIUM CHLORIDE, SODIUM LACTATE, POTASSIUM CHLORIDE, AND CALCIUM CHLORIDE: .6; .31; .03; .02 INJECTION, SOLUTION INTRAVENOUS at 11:05

## 2018-05-15 RX ADMIN — GLYCOPYRROLATE 0.2 MG: 0.2 INJECTION, SOLUTION INTRAMUSCULAR; INTRAVENOUS at 07:05

## 2018-05-15 RX ADMIN — MUPIROCIN 1 G: 20 OINTMENT TOPICAL at 09:05

## 2018-05-15 RX ADMIN — PROPOFOL 25 MCG/KG/MIN: 10 INJECTION, EMULSION INTRAVENOUS at 08:05

## 2018-05-15 RX ADMIN — FENTANYL CITRATE 25 MCG: 50 INJECTION, SOLUTION INTRAMUSCULAR; INTRAVENOUS at 10:05

## 2018-05-15 RX ADMIN — IBUPROFEN 800 MG: 400 TABLET, FILM COATED ORAL at 09:05

## 2018-05-15 RX ADMIN — LIDOCAINE HYDROCHLORIDE 100 MG: 20 INJECTION, SOLUTION INTRAVENOUS at 07:05

## 2018-05-15 RX ADMIN — NEOSTIGMINE METHYLSULFATE 4 MG: 1 INJECTION INTRAVENOUS at 09:05

## 2018-05-15 RX ADMIN — PROPOFOL 150 MG: 10 INJECTION, EMULSION INTRAVENOUS at 07:05

## 2018-05-15 RX ADMIN — FENTANYL CITRATE 50 MCG: 50 INJECTION, SOLUTION INTRAMUSCULAR; INTRAVENOUS at 09:05

## 2018-05-15 RX ADMIN — FENTANYL CITRATE 25 MCG: 50 INJECTION, SOLUTION INTRAMUSCULAR; INTRAVENOUS at 11:05

## 2018-05-15 RX ADMIN — STANDARDIZED SENNA CONCENTRATE AND DOCUSATE SODIUM 1 TABLET: 8.6; 5 TABLET, FILM COATED ORAL at 10:05

## 2018-05-15 RX ADMIN — SODIUM CHLORIDE, SODIUM LACTATE, POTASSIUM CHLORIDE, AND CALCIUM CHLORIDE: .6; .31; .03; .02 INJECTION, SOLUTION INTRAVENOUS at 04:05

## 2018-05-15 RX ADMIN — HYDROCODONE BITARTRATE AND ACETAMINOPHEN 1 TABLET: 10; 325 TABLET ORAL at 06:05

## 2018-05-15 RX ADMIN — MUPIROCIN: 20 OINTMENT TOPICAL at 06:05

## 2018-05-15 RX ADMIN — HYDROCODONE BITARTRATE AND ACETAMINOPHEN 1 TABLET: 10; 325 TABLET ORAL at 10:05

## 2018-05-15 RX ADMIN — MIDAZOLAM HYDROCHLORIDE 2 MG: 1 INJECTION, SOLUTION INTRAMUSCULAR; INTRAVENOUS at 07:05

## 2018-05-15 RX ADMIN — SIMETHICONE CHEW TAB 80 MG 80 MG: 80 TABLET ORAL at 09:05

## 2018-05-15 RX ADMIN — FENTANYL CITRATE 100 MCG: 50 INJECTION, SOLUTION INTRAMUSCULAR; INTRAVENOUS at 07:05

## 2018-05-15 NOTE — ASSESSMENT & PLAN NOTE
- Procedure complications: none  - EBL: 50 cc  - IVF @ 125 cc/h; d/c when tolerating PO  - Diet: CLD > regular  - Pain control: ibuprofen 600 mg, percocet 5/10 mg, 0.5 mg dilaudid BTP  - In/Out: Arreaga in place draining clear yellow urine; d/c Arreaga in AM POD #1  -  cc since admission today; adequate  - Bowel function: -flatus/-BM, as expected no DOS  - PRN: simethicone, zofran, phenergan, benadryl  - Heme: Preop H/h 11.1/32.7; AM CBC, BMP ordered  - PPX: ambulation encouraged, IS encouraged, no anticoagulation indicated, TEDs/SCDs in place    Disposition: Will plan to evaluate the patient as she meets her post-operative milestones. Will tentatively plan for discharge to home tomorrow, POD #1.

## 2018-05-15 NOTE — BRIEF OP NOTE
[facility]  Brief Operative Note    SUMMARY     Surgery Date: 5/15/2018     Surgeon(s) and Role:     * Jocelynn Ulloa MD - Resident - Assisting     * Hilda Macias MD     * Ezra Maharaj MD - Primary    Pre-op Diagnosis:    1. Postmenopausal bleeding   2. Obesity    Post-op Diagnosis:    Same    Procedure(s) (LRB):  XI ROBOTIC ASSISTED LAPAROSCOPIC HYSTERECTOMY (N/A)  XI ROBOT ASSISTED LAPAROSCOPIC SALPINGO-OOPHERECTOMY (Bilateral)    Anesthesia: General    Description of the findings of the procedure:   1. 10 week size uterus on BME, mobile  2. Omental adhesions to the anterior abdominal wall, adhesions between the left adnexa and bowel  3. Fibroid uterus  4. Ureter visualized bilaterally and noted to vermiculate  5. Hemostasis noted at the conclusion of the case    Estimated Blood Loss: 50cc         Specimens:   Specimen (12h ago through future)    Start     Ordered    05/15/18 0915  Specimen to Pathology - Surgery  Once     Comments:  1. Uterus, cervix, bilateral tubes and ovaries - Perm      05/15/18 0914        Jocelynn Ulloa MD  PGY-3 OB/GYN  [phone]

## 2018-05-15 NOTE — H&P
[facility]  Gynecologic Oncology  H&P    Patient Name: Jayla Reagan  MRN: [id number]  Admission Date: 5/15/2018  Primary Care Provider: Rambo Palmer MD   Principal Problem: PMB (postmenopausal bleeding)    Subjective:     Chief Complaint/Reason for Admission: postmenopausal bleeding    History of Present Illness:  Ms. Reagan is a 67yo  who presents for scheduled RA-TLH/BSO/possible staging 2/2 PMB failed D&C 2/2 cervical stenosis.    Last clinic note:   Ms. Reagan is a 68yr old para 2 followed by Dr. Goode for PMB in 2017 with a failed D&C due to cervical stenosis and continued DELMIS.     2017 pelvic ultrasound  Uterus 11 x 6.1 x 8.2cm with ES 9.2mm, five small fibroids 1.6-3.8cm  R ov 3.3cm simple cyst, L ov not visualized  Complex fluid in endometrium and cervix     10/2017 taken to the OR for D&C, unable to proceed due to cervical stenosis  Final pathology  Endocervical curettings:  -Scant atrophic squamous cells and blood clot  -Limited sample  -Correlate clinically     2018 repeat pelvic ultrasound  Essentially unchanged, ES 7.2mm     LMP age 54, no PMB until 2017     Surgical history: ectopic pregnancy with salpingectomy, cholecystectomy, vag del x 2      Hospital Course:  05/15/2018 To OR for scheduled RA-TLH/BSO 2/2 PMB failed D&C    Oncology Treatment Plan:   [No treatment plan]    Interval history:  Patient reports holding warfarin since the . Reports she was told to resume warfarin POD#1 @ 1.5mg. Reports taking 150mg metoprolol XL daily.    Past Medical History:   Diagnosis Date    Atrial fibrillation     Cataract     Hypertension     PAD (peripheral artery disease)     Peripheral artery disease      Past Surgical History:   Procedure Laterality Date    BREAST SURGERY      CHOLECYSTECTOMY      ECTOPIC PREGNANCY SURGERY      SALPINGECTOMY       Family History     Problem Relation (Age of Onset)    Cataracts Mother, Sister        Social History Main  Topics    Smoking status: Former Smoker    Smokeless tobacco: Never Used    Alcohol use No    Drug use: No    Sexual activity: Not on file       PTA Medications   Medication Sig    acetaminophen (TYLENOL) 650 MG TbSR Take 650 mg by mouth 2 (two) times daily as needed.    cholecalciferol, vitamin D3, (VITAMIN D3) 5,000 unit Tab Take 5,000 Units by mouth once a week. Takes on Saturday    cyanocobalamin, vitamin B-12, 50 mcg tablet Take 50 mcg by mouth every morning. Pt will verify the strength    fluticasone (FLONASE) 50 mcg/actuation nasal spray 1 spray by Each Nare route daily as needed for Rhinitis.    fosinopril (MONOPRIL) 20 MG tablet Take 20 mg by mouth every evening.     metoprolol succinate (TOPROL-XL) 100 MG 24 hr tablet Take 100 mg by mouth 2 (two) times daily. TAKES HALF OF TABLET IN AM (50 MG) AND 1 TABLET AT NIGHT (100 MG) FOR TOTAL  MG DAILY    triamterene-hydrochlorothiazide 37.5-25 mg (DYAZIDE) 37.5-25 mg per capsule Take 1 capsule by mouth every morning.     warfarin (COUMADIN) 1 MG tablet Take 1 tablet (1 mg total) by mouth Daily. 1 Mg daily except Mondays and Fridays take 0.5 mg    flecainide (TAMBOCOR) 150 MG Tab Take 2 tablets (300 mg total) by mouth once as needed (atrial fibrillation).       Review of patient's allergies indicates:  No Known Allergies    Review of Systems   Constitutional: Negative for activity change, appetite change, chills, fatigue and fever.   HENT: Negative for hearing loss, mouth sores, nosebleeds, sore throat and tinnitus.    Eyes: Negative for visual disturbance.   Respiratory: Negative for cough, chest tightness, shortness of breath and wheezing.    Cardiovascular: Negative for chest pain and leg swelling.   Gastrointestinal: Negative for abdominal distention, abdominal pain, blood in stool, constipation, diarrhea, nausea and vomiting.   Genitourinary: Positive for vaginal discharge. Negative for dysuria, flank pain, frequency, hematuria, pelvic  pain, vaginal bleeding and vaginal pain.   Musculoskeletal: Negative for arthralgias and back pain.   Skin: Negative for rash.   Neurological: Negative for dizziness, seizures, syncope, weakness and numbness.   Hematological: Does not bruise/bleed easily.   Psychiatric/Behavioral: Negative for confusion and sleep disturbance. The patient is not nervous/anxious.    Objective:     Vital Signs (Most Recent):  Temp: 98.3 °F (36.8 °C) (05/15/18 0607)  Pulse: (!) 57 (05/15/18 0607)  Resp: 16 (05/15/18 0607)  BP: 120/64 (05/15/18 0607)  SpO2: 100 % (05/15/18 0607) Vital Signs (24h Range):  Temp:  [98.3 °F (36.8 °C)] 98.3 °F (36.8 °C)  Pulse:  [57] 57  Resp:  [16] 16  SpO2:  [100 %] 100 %  BP: (120)/(64) 120/64     Weight: 110.7 kg (244 lb)  Body mass index is 33.09 kg/m².    Physical Exam  Constitutional: She is oriented to person, place, and time. She appears well-developed and well-nourished. No distress.    HENT:   Head: Normocephalic and atraumatic.    Eyes: No scleral icterus.    Neck: Normal range of motion. Neck supple.    Cardiovascular: Normal rate and intact distal pulses.  Exam reveals no cyanosis and no edema.     Pulmonary/Chest: Effort normal. No respiratory distress. She exhibits no tenderness.      Abdominal: Soft. Normal appearance. She exhibits no distension, no fluid wave, no ascites and no mass. There is no tenderness. There is no rigidity, no rebound and no guarding. No hernia.         Genitourinary: Rectum normal, vagina normal and uterus normal. Pelvic exam was performed with patient supine. There is no rash, tenderness or lesion on the right labia. There is no rash, tenderness or lesion on the left labia. Uterus is not deviated, not enlarged, not fixed, not tender, not hosting fibroids and not experiencing uterine prolapse. Cervix is normal. Right adnexum displays no mass, no tenderness and no fullness. Left adnexum displays no mass, no tenderness and no fullness. No bleeding in the vagina. No  vaginal discharge found. Labial bartholins normal.Cervix exhibits no motion tenderness, no discharge and no friability.        Uterus Size: 11 cm   Musculoskeletal: Normal range of motion and moves all extremeties. She exhibits no edema.      Lymphadenopathy:     She has no cervical adenopathy.        Right: No inguinal adenopathy present.        Left: No inguinal adenopathy present.    Neurological: She is alert and oriented to person, place, and time.    Skin: Skin is warm. No rash noted. No cyanosis or erythema.    Psychiatric: She has a normal mood and affect. Thought content normal.      Laboratory: pending      Assessment/Plan:     * PMB (postmenopausal bleeding)     To OR for RA-TLH/BSO 2/2 PMB            Jocelynn Ulloa MD  Gynecologic Oncology  [facility]

## 2018-05-15 NOTE — HOSPITAL COURSE
05/15/2018 To OR for scheduled RA-TLH/BSO 2/2 PMB failed D&C. POD #0 s/p TLH/BSO. Doing well. Pain well controlled. Arreaga in place draining clear, yellow urine. Tolerating sips. Requesting diet. Encouraged bland foods in small quantities. -flatus/-BM as expected on DOS.   05/16/2018 Patient is doing well this AM. Pain was well controlled overnight, not requiring IV dilaudid. Arreaga was removed, awaiting spontaneous void. Patient has not yet ambulated but is getting up to go to the bathroom soon. Tolerating PO. + flatus. Per cardiology, ok to restart coumadin upon discharge. Patient meeting all milestones and medically clear for discharge to home today.

## 2018-05-15 NOTE — TRANSFER OF CARE
Anesthesia Transfer of Care Note    Patient: Jayla Reagan    Procedure(s) Performed: Procedure(s) (LRB):  XI ROBOTIC ASSISTED LAPAROSCOPIC HYSTERECTOMY (N/A)  XI ROBOT ASSISTED LAPAROSCOPIC SALPINGO-OOPHERECTOMY (Bilateral)    Patient location: PACU    Anesthesia Type: general    Transport from OR: Transported from OR on 6-10 L/min O2 by face mask with adequate spontaneous ventilation    Post pain: adequate analgesia    Post assessment: no apparent anesthetic complications    Post vital signs: stable    Level of consciousness: awake, oriented and alert    Nausea/Vomiting: no nausea/vomiting    Complications: none    Transfer of care protocol was followed      Last vitals:   Visit Vitals  BP (!) 149/75 (BP Location: Right arm, Patient Position: Lying)   Pulse 65   Temp 36.1 °C (97 °F) (Axillary)   Resp 16   Ht 6' (1.829 m)   Wt 110.7 kg (244 lb)   SpO2 100%   BMI 33.09 kg/m²

## 2018-05-15 NOTE — SUBJECTIVE & OBJECTIVE
Interval History:   POD #0 s/p TLH/BSO. Doing well. Pain well controlled. Arreaga in place draining clear, yellow urine. Tolerating sips. Requesting diet. Encouraged bland foods in small quantities. -flatus/-BM as expected on DOS. No SOB, CP.    Scheduled Meds:   ibuprofen  800 mg Oral QID    metoprolol succinate  100 mg Oral BID    mupirocin  1 g Nasal BID    senna-docusate 8.6-50 mg  1 tablet Oral BID    simethicone  1 tablet Oral TID     Continuous Infusions:   lactated ringers 125 mL/hr at 05/15/18 0947     PRN Meds:diphenhydrAMINE, flecainide, hydrocodone-acetaminophen 10-325mg, hydrocodone-acetaminophen, HYDROmorphone, metoclopramide HCl, ondansetron    Review of patient's allergies indicates:  No Known Allergies    Objective:     Vital Signs (Most Recent):  Temp: 98 °F (36.7 °C) (05/15/18 1518)  Pulse: 63 (05/15/18 1518)  Resp: 18 (05/15/18 1518)  BP: (!) 149/64 (05/15/18 1518)  SpO2: 100 % (05/15/18 1518) Vital Signs (24h Range):  Temp:  [97 °F (36.1 °C)-98.3 °F (36.8 °C)] 98 °F (36.7 °C)  Pulse:  [46-65] 63  Resp:  [10-18] 18  SpO2:  [98 %-100 %] 100 %  BP: (120-160)/(61-82) 149/64     Weight: 110.7 kg (244 lb)  Body mass index is 33.09 kg/m².    Intake/Output - Last 3 Shifts       05/13 0700 - 05/14 0659 05/14 0700 - 05/15 0659 05/15 0700 - 05/16 0659    P.O.   240    I.V. (mL/kg)   2063 (18.6)    Total Intake(mL/kg)   2303 (20.8)    Urine (mL/kg/hr)   810 (0.8)    Blood   50 (0.1)    Total Output     860    Net     +1443                  Physical Exam:   Constitutional: She is oriented to person, place, and time. She appears well-developed and well-nourished.    HENT:   Head: Normocephalic and atraumatic.    Eyes: Conjunctivae and EOM are normal. Pupils are equal, round, and reactive to light.    Neck: Normal range of motion. Neck supple.    Cardiovascular: Normal rate, regular rhythm and normal heart sounds.     Pulmonary/Chest: Effort normal and breath sounds normal. No respiratory distress.         Abdominal: Soft. Bowel sounds are normal. She exhibits distension and abdominal incision. There is tenderness. There is no rebound and no guarding.   5 LSC incisions are c/d/i with steri strips in place. Mild abdominal distention. Soft. Appropriately tender.      Genitourinary:   Genitourinary Comments: Deferred, potts in place draining clear, yellow urine           Musculoskeletal: Normal range of motion.       Neurological: She is alert and oriented to person, place, and time.    Skin: Skin is warm and dry. No rash noted.    Psychiatric: She has a normal mood and affect. Her behavior is normal. Judgment and thought content normal.     Lines/Drains/Airways     Drain                 Urethral Catheter 05/15/18 0738 Non-latex;Straight-tip 16 Fr. less than 1 day          Peripheral Intravenous Line                 Peripheral IV - Single Lumen 05/15/18 0652 Right Wrist less than 1 day         Peripheral IV - Single Lumen 05/15/18 0715 Left Hand less than 1 day              Laboratory:  Preop CBC: 3.8/11.1/32.7/261  AM labs ordered    Diagnostic Results:  Pathology pending

## 2018-05-15 NOTE — PLAN OF CARE
All Davinci instruments were inspected by Woodrow  All instruments appear to be intact. Robot time out completed with pre incision time out.

## 2018-05-15 NOTE — OPERATIVE NOTE ADDENDUM
Certification of Assistant at Surgery       Surgery Date: 5/15/2018     Participating Surgeons:  Surgeon(s) and Role:     * Jocelynn Ulloa MD - Resident - Assisting     * Jenniffer Puentes NP-C, First Assist     * Ezra Maharaj MD - Primary    Procedures:  Procedure(s) (LRB):  XI ROBOTIC ASSISTED LAPAROSCOPIC HYSTERECTOMY (N/A)  XI ROBOT ASSISTED LAPAROSCOPIC SALPINGO-OOPHERECTOMY (Bilateral)    Assistant Surgeon's Certification of Necessity:  I understand that section 1842 (b) (6) (d) of the Social Security Act generally prohibits Medicare Part B reasonable charge payment for the services of assistants at surgery in teaching hospitals when qualified residents are available to furnish such services. I certify that the services for which payment is claimed were medically necessary, and that no qualified resident was available to perform the services. I further understand that these services are subject to post-payment review by the Medicare carrier.      Jenniffer Puentes NP    05/15/2018  9:19 AM

## 2018-05-15 NOTE — PROGRESS NOTES
[facility]  Gynecologic Oncology  Progress Note      Patient Name: Jayla Reagan  MRN: [id number]  Admission Date: 5/15/2018  Hospital Length of Stay: 1 days  Attending Provider: Ezra Maharaj MD  Primary Care Provider: Rambo Palmer MD  Principal Problem: PMB (postmenopausal bleeding)    Follow-up For: Procedure(s) (LRB):  XI ROBOTIC ASSISTED LAPAROSCOPIC HYSTERECTOMY (N/A)  XI ROBOT ASSISTED LAPAROSCOPIC SALPINGO-OOPHERECTOMY (Bilateral)  Post-Operative Day: Day of Surgery  Subjective:      History of Present Illness:  Ms. Reagan is a 69yo  who presents for scheduled RA-TLH/BSO/possible staging 2/2 PMB failed D&C 2/2 cervical stenosis.    Last clinic note:   Ms. Reagan is a 68yr old para 2 followed by Dr. Goode for PMB in 2017 with a failed D&C due to cervical stenosis and continued DLEMIS.     2017 pelvic ultrasound  Uterus 11 x 6.1 x 8.2cm with ES 9.2mm, five small fibroids 1.6-3.8cm  R ov 3.3cm simple cyst, L ov not visualized  Complex fluid in endometrium and cervix     10/2017 taken to the OR for D&C, unable to proceed due to cervical stenosis  Final pathology  Endocervical curettings:  -Scant atrophic squamous cells and blood clot  -Limited sample  -Correlate clinically     2018 repeat pelvic ultrasound  Essentially unchanged, ES 7.2mm     LMP age 54, no PMB until 2017     Surgical history: ectopic pregnancy with salpingectomy, cholecystectomy, vag del x 2      Hospital Course:  05/15/2018 To OR for scheduled RA-TLH/BSO 2/2 PMB failed D&C. POD #0 s/p TLH/BSO. Doing well. Pain well controlled. Arreaga in place draining clear, yellow urine. Tolerating sips. Requesting diet. Encouraged bland foods in small quantities. -flatus/-BM as expected on DOS.     Interval History:   POD #0 s/p TLH/BSO. Doing well. Pain well controlled. Arreaga in place draining clear, yellow urine. Tolerating sips. Requesting diet. Encouraged bland foods in small quantities. -flatus/-BM as expected  on DOS. No SOB, CP.    Scheduled Meds:   ibuprofen  800 mg Oral QID    metoprolol succinate  100 mg Oral BID    mupirocin  1 g Nasal BID    senna-docusate 8.6-50 mg  1 tablet Oral BID    simethicone  1 tablet Oral TID     Continuous Infusions:   lactated ringers 125 mL/hr at 05/15/18 0947     PRN Meds:diphenhydrAMINE, flecainide, hydrocodone-acetaminophen 10-325mg, hydrocodone-acetaminophen, HYDROmorphone, metoclopramide HCl, ondansetron    Review of patient's allergies indicates:  No Known Allergies    Objective:     Vital Signs (Most Recent):  Temp: 98 °F (36.7 °C) (05/15/18 1518)  Pulse: 63 (05/15/18 1518)  Resp: 18 (05/15/18 1518)  BP: (!) 149/64 (05/15/18 1518)  SpO2: 100 % (05/15/18 1518) Vital Signs (24h Range):  Temp:  [97 °F (36.1 °C)-98.3 °F (36.8 °C)] 98 °F (36.7 °C)  Pulse:  [46-65] 63  Resp:  [10-18] 18  SpO2:  [98 %-100 %] 100 %  BP: (120-160)/(61-82) 149/64     Weight: 110.7 kg (244 lb)  Body mass index is 33.09 kg/m².    Intake/Output - Last 3 Shifts       05/13 0700 - 05/14 0659 05/14 0700 - 05/15 0659 05/15 0700 - 05/16 0659    P.O.   240    I.V. (mL/kg)   2063 (18.6)    Total Intake(mL/kg)   2303 (20.8)    Urine (mL/kg/hr)   810 (0.8)    Blood   50 (0.1)    Total Output     860    Net     +1443                  Physical Exam:   Constitutional: She is oriented to person, place, and time. She appears well-developed and well-nourished.    HENT:   Head: Normocephalic and atraumatic.    Eyes: Conjunctivae and EOM are normal. Pupils are equal, round, and reactive to light.    Neck: Normal range of motion. Neck supple.    Cardiovascular: Normal rate, regular rhythm and normal heart sounds.     Pulmonary/Chest: Effort normal and breath sounds normal. No respiratory distress.        Abdominal: Soft. Bowel sounds are normal. She exhibits distension and abdominal incision. There is tenderness. There is no rebound and no guarding.   5 LSC incisions are c/d/i with steri strips in place. Mild abdominal  distention. Soft. Appropriately tender.      Genitourinary:   Genitourinary Comments: Deferred, potts in place draining clear, yellow urine           Musculoskeletal: Normal range of motion.       Neurological: She is alert and oriented to person, place, and time.    Skin: Skin is warm and dry. No rash noted.    Psychiatric: She has a normal mood and affect. Her behavior is normal. Judgment and thought content normal.     Lines/Drains/Airways     Drain                 Urethral Catheter 05/15/18 0738 Non-latex;Straight-tip 16 Fr. less than 1 day          Peripheral Intravenous Line                 Peripheral IV - Single Lumen 05/15/18 0652 Right Wrist less than 1 day         Peripheral IV - Single Lumen 05/15/18 0715 Left Hand less than 1 day              Laboratory:  Preop CBC: 3.8/11.1/32.7/261  AM labs ordered    Diagnostic Results:  Pathology pending    Assessment/Plan:     * PMB (postmenopausal bleeding)     To OR for RA-TLH/BSO 2/2 PMB        s/p RA-TLH/BSO 5/15/2018    - Procedure complications: none  - EBL: 50 cc  - IVF @ 125 cc/h; d/c when tolerating PO  - Diet: CLD > regular  - Pain control: ibuprofen 600 mg, percocet 5/10 mg, 0.5 mg dilaudid BTP  - In/Out: Potts in place draining clear yellow urine; d/c Potts in AM POD #1  -  cc since admission today; adequate  - Bowel function: -flatus/-BM, as expected no DOS  - PRN: simethicone, zofran, phenergan, benadryl  - Heme: Preop H/h 11.1/32.7; AM CBC, BMP ordered  - PPX: ambulation encouraged, IS encouraged, no anticoagulation indicated, TEDs/SCDs in place    Disposition: Will plan to evaluate the patient as she meets her post-operative milestones. Will tentatively plan for discharge to home tomorrow, POD #1.           Post-menopausal bleeding    - s/p RA-TLH/BSO        HTN (hypertension), benign    - BP: (120-160)/(61-82) 149/64   - plan to restart home meds on discharge from hospital         Paroxysmal atrial fibrillation    - holding home warfarin  -  will verify re-starting dose with prescribing physician          VTE Risk Mitigation     None          Was potts catheter removed? No: plan for d/c tomorrow AM    Hilda Macias MD  Gynecologic Oncology  [facility]

## 2018-05-15 NOTE — SUBJECTIVE & OBJECTIVE
Oncology Treatment Plan:   [No treatment plan]    Interval history:  Patient reports holding warfarin since the 9th. Reports she was told to resume warfarin POD#1 @ 1.5mg. Reports taking 150mg metoprolol XL daily.    Past Medical History:   Diagnosis Date    Atrial fibrillation     Cataract     Hypertension     PAD (peripheral artery disease)     Peripheral artery disease      Past Surgical History:   Procedure Laterality Date    BREAST SURGERY      CHOLECYSTECTOMY      ECTOPIC PREGNANCY SURGERY      SALPINGECTOMY       Family History     Problem Relation (Age of Onset)    Cataracts Mother, Sister        Social History Main Topics    Smoking status: Former Smoker    Smokeless tobacco: Never Used    Alcohol use No    Drug use: No    Sexual activity: Not on file       PTA Medications   Medication Sig    acetaminophen (TYLENOL) 650 MG TbSR Take 650 mg by mouth 2 (two) times daily as needed.    cholecalciferol, vitamin D3, (VITAMIN D3) 5,000 unit Tab Take 5,000 Units by mouth once a week. Takes on Saturday    cyanocobalamin, vitamin B-12, 50 mcg tablet Take 50 mcg by mouth every morning. Pt will verify the strength    fluticasone (FLONASE) 50 mcg/actuation nasal spray 1 spray by Each Nare route daily as needed for Rhinitis.    fosinopril (MONOPRIL) 20 MG tablet Take 20 mg by mouth every evening.     metoprolol succinate (TOPROL-XL) 100 MG 24 hr tablet Take 100 mg by mouth 2 (two) times daily. TAKES HALF OF TABLET IN AM (50 MG) AND 1 TABLET AT NIGHT (100 MG) FOR TOTAL  MG DAILY    triamterene-hydrochlorothiazide 37.5-25 mg (DYAZIDE) 37.5-25 mg per capsule Take 1 capsule by mouth every morning.     warfarin (COUMADIN) 1 MG tablet Take 1 tablet (1 mg total) by mouth Daily. 1 Mg daily except Mondays and Fridays take 0.5 mg    flecainide (TAMBOCOR) 150 MG Tab Take 2 tablets (300 mg total) by mouth once as needed (atrial fibrillation).       Review of patient's allergies indicates:  No Known  Allergies    Review of Systems   Constitutional: Negative for activity change, appetite change, chills, fatigue and fever.   HENT: Negative for hearing loss, mouth sores, nosebleeds, sore throat and tinnitus.    Eyes: Negative for visual disturbance.   Respiratory: Negative for cough, chest tightness, shortness of breath and wheezing.    Cardiovascular: Negative for chest pain and leg swelling.   Gastrointestinal: Negative for abdominal distention, abdominal pain, blood in stool, constipation, diarrhea, nausea and vomiting.   Genitourinary: Positive for vaginal discharge. Negative for dysuria, flank pain, frequency, hematuria, pelvic pain, vaginal bleeding and vaginal pain.   Musculoskeletal: Negative for arthralgias and back pain.   Skin: Negative for rash.   Neurological: Negative for dizziness, seizures, syncope, weakness and numbness.   Hematological: Does not bruise/bleed easily.   Psychiatric/Behavioral: Negative for confusion and sleep disturbance. The patient is not nervous/anxious.    Objective:     Vital Signs (Most Recent):  Temp: 98.3 °F (36.8 °C) (05/15/18 0607)  Pulse: (!) 57 (05/15/18 0607)  Resp: 16 (05/15/18 0607)  BP: 120/64 (05/15/18 0607)  SpO2: 100 % (05/15/18 0607) Vital Signs (24h Range):  Temp:  [98.3 °F (36.8 °C)] 98.3 °F (36.8 °C)  Pulse:  [57] 57  Resp:  [16] 16  SpO2:  [100 %] 100 %  BP: (120)/(64) 120/64     Weight: 110.7 kg (244 lb)  Body mass index is 33.09 kg/m².    Physical Exam  Constitutional: She is oriented to person, place, and time. She appears well-developed and well-nourished. No distress.    HENT:   Head: Normocephalic and atraumatic.    Eyes: No scleral icterus.    Neck: Normal range of motion. Neck supple.    Cardiovascular: Normal rate and intact distal pulses.  Exam reveals no cyanosis and no edema.     Pulmonary/Chest: Effort normal. No respiratory distress. She exhibits no tenderness.      Abdominal: Soft. Normal appearance. She exhibits no distension, no fluid wave,  no ascites and no mass. There is no tenderness. There is no rigidity, no rebound and no guarding. No hernia.         Genitourinary: Rectum normal, vagina normal and uterus normal. Pelvic exam was performed with patient supine. There is no rash, tenderness or lesion on the right labia. There is no rash, tenderness or lesion on the left labia. Uterus is not deviated, not enlarged, not fixed, not tender, not hosting fibroids and not experiencing uterine prolapse. Cervix is normal. Right adnexum displays no mass, no tenderness and no fullness. Left adnexum displays no mass, no tenderness and no fullness. No bleeding in the vagina. No vaginal discharge found. Labial bartholins normal.Cervix exhibits no motion tenderness, no discharge and no friability.        Uterus Size: 11 cm   Musculoskeletal: Normal range of motion and moves all extremeties. She exhibits no edema.      Lymphadenopathy:     She has no cervical adenopathy.        Right: No inguinal adenopathy present.        Left: No inguinal adenopathy present.    Neurological: She is alert and oriented to person, place, and time.    Skin: Skin is warm. No rash noted. No cyanosis or erythema.    Psychiatric: She has a normal mood and affect. Thought content normal.     Laboratory: pending

## 2018-05-15 NOTE — NURSING TRANSFER
Nursing Transfer Note      5/15/2018     Transfer To: 855    Transfer via stretcher    Transfer with cardiac monitoring    Transported by PCT    Medicines sent: IVF    Chart send with patient: Yes    Notified: Family    Patient reassessed at: 5/15/18, 1400    Upon arrival to floor: cardiac monitor applied, patient oriented to room, call bell in reach and bed in lowest position

## 2018-05-15 NOTE — ANESTHESIA POSTPROCEDURE EVALUATION
Anesthesia Post Evaluation    Patient: Jayla Reagan    Procedure(s) Performed: Procedure(s) (LRB):  XI ROBOTIC ASSISTED LAPAROSCOPIC HYSTERECTOMY (N/A)  XI ROBOT ASSISTED LAPAROSCOPIC SALPINGO-OOPHERECTOMY (Bilateral)    Final Anesthesia Type: general  Patient location during evaluation: PACU  Patient participation: Yes- Able to Participate  Level of consciousness: awake and alert and oriented  Post-procedure vital signs: reviewed and stable  Pain management: adequate  Airway patency: patent  PONV status at discharge: No PONV  Anesthetic complications: no      Cardiovascular status: blood pressure returned to baseline  Respiratory status: unassisted, room air and spontaneous ventilation  Hydration status: euvolemic  Follow-up not needed.        Visit Vitals  BP (!) 153/82   Pulse (!) 51   Temp 36.1 °C (97 °F) (Axillary)   Resp 11   Ht 6' (1.829 m)   Wt 110.7 kg (244 lb)   SpO2 100%   BMI 33.09 kg/m²       Pain/Edna Score: Pain Assessment Performed: Yes (5/15/2018  9:50 AM)  Presence of Pain: non-verbal indicators absent (5/15/2018  9:50 AM)  Pain Rating Prior to Med Admin: 7 (5/15/2018 10:08 AM)  Edna Score: 8 (5/15/2018  9:50 AM)

## 2018-05-15 NOTE — HPI
Ms. Reagan is a 67yo  who presents for scheduled RA-TLH/BSO/possible staging 2/2 PMB failed D&C 2/2 cervical stenosis.    Last clinic note:   Ms. Reagan is a 68yr old para 2 followed by Dr. Goode for PMB in 2017 with a failed D&C due to cervical stenosis and continued DELMIS.     2017 pelvic ultrasound  Uterus 11 x 6.1 x 8.2cm with ES 9.2mm, five small fibroids 1.6-3.8cm  R ov 3.3cm simple cyst, L ov not visualized  Complex fluid in endometrium and cervix     10/2017 taken to the OR for D&C, unable to proceed due to cervical stenosis  Final pathology  Endocervical curettings:  -Scant atrophic squamous cells and blood clot  -Limited sample  -Correlate clinically     2018 repeat pelvic ultrasound  Essentially unchanged, ES 7.2mm     LMP age 54, no PMB until 2017     Surgical history: ectopic pregnancy with salpingectomy, cholecystectomy, vag del x 2

## 2018-05-15 NOTE — PLAN OF CARE
Problem: Patient Care Overview  Goal: Plan of Care Review  Outcome: Ongoing (interventions implemented as appropriate)  Pt remained free of falls during shift. AAOx4. VS stable. Afebrile. Arreaga in place. Five laparoscopic sites covered in steri strips. No drainage. LR running at 125mL/hr. Marco Antonio hose and SCDs in place. Pt participating in plan of care. All questions answered. Bed locked and in lowest position. Call light within reach. Non skid socks worn. Family at bedside. Will continue to monitor.

## 2018-05-16 VITALS
RESPIRATION RATE: 16 BRPM | BODY MASS INDEX: 32.85 KG/M2 | WEIGHT: 242.5 LBS | OXYGEN SATURATION: 97 % | HEIGHT: 72 IN | HEART RATE: 47 BPM | TEMPERATURE: 98 F | SYSTOLIC BLOOD PRESSURE: 137 MMHG | DIASTOLIC BLOOD PRESSURE: 80 MMHG

## 2018-05-16 LAB
ANION GAP SERPL CALC-SCNC: 7 MMOL/L
BASOPHILS # BLD AUTO: 0 K/UL
BASOPHILS NFR BLD: 0 %
BUN SERPL-MCNC: 19 MG/DL
CALCIUM SERPL-MCNC: 8.8 MG/DL
CHLORIDE SERPL-SCNC: 107 MMOL/L
CO2 SERPL-SCNC: 26 MMOL/L
CREAT SERPL-MCNC: 0.9 MG/DL
DIFFERENTIAL METHOD: ABNORMAL
EOSINOPHIL # BLD AUTO: 0 K/UL
EOSINOPHIL NFR BLD: 0 %
ERYTHROCYTE [DISTWIDTH] IN BLOOD BY AUTOMATED COUNT: 16.7 %
EST. GFR  (AFRICAN AMERICAN): >60 ML/MIN/1.73 M^2
EST. GFR  (NON AFRICAN AMERICAN): >60 ML/MIN/1.73 M^2
GLUCOSE SERPL-MCNC: 113 MG/DL
HCT VFR BLD AUTO: 28 %
HGB BLD-MCNC: 9.5 G/DL
IMM GRANULOCYTES # BLD AUTO: 0.02 K/UL
IMM GRANULOCYTES NFR BLD AUTO: 0.3 %
LYMPHOCYTES # BLD AUTO: 0.9 K/UL
LYMPHOCYTES NFR BLD: 14.4 %
MCH RBC QN AUTO: 28.4 PG
MCHC RBC AUTO-ENTMCNC: 33.9 G/DL
MCV RBC AUTO: 84 FL
MONOCYTES # BLD AUTO: 0.5 K/UL
MONOCYTES NFR BLD: 7.9 %
NEUTROPHILS # BLD AUTO: 5 K/UL
NEUTROPHILS NFR BLD: 77.4 %
NRBC BLD-RTO: 0 /100 WBC
PLATELET # BLD AUTO: 219 K/UL
PMV BLD AUTO: 8.7 FL
POTASSIUM SERPL-SCNC: 4.3 MMOL/L
RBC # BLD AUTO: 3.34 M/UL
SODIUM SERPL-SCNC: 140 MMOL/L
WBC # BLD AUTO: 6.44 K/UL

## 2018-05-16 PROCEDURE — 25000003 PHARM REV CODE 250: Performed by: STUDENT IN AN ORGANIZED HEALTH CARE EDUCATION/TRAINING PROGRAM

## 2018-05-16 PROCEDURE — 36415 COLL VENOUS BLD VENIPUNCTURE: CPT

## 2018-05-16 PROCEDURE — 85025 COMPLETE CBC W/AUTO DIFF WBC: CPT

## 2018-05-16 PROCEDURE — 80048 BASIC METABOLIC PNL TOTAL CA: CPT

## 2018-05-16 PROCEDURE — 99024 POSTOP FOLLOW-UP VISIT: CPT | Mod: GC,,, | Performed by: OBSTETRICS & GYNECOLOGY

## 2018-05-16 PROCEDURE — 63600175 PHARM REV CODE 636 W HCPCS: Performed by: OBSTETRICS & GYNECOLOGY

## 2018-05-16 PROCEDURE — 63600175 PHARM REV CODE 636 W HCPCS: Performed by: STUDENT IN AN ORGANIZED HEALTH CARE EDUCATION/TRAINING PROGRAM

## 2018-05-16 RX ORDER — HYDROMORPHONE HYDROCHLORIDE 1 MG/ML
0.5 INJECTION, SOLUTION INTRAMUSCULAR; INTRAVENOUS; SUBCUTANEOUS
Status: DISCONTINUED | OUTPATIENT
Start: 2018-05-16 | End: 2018-05-16 | Stop reason: HOSPADM

## 2018-05-16 RX ADMIN — HYDROMORPHONE HYDROCHLORIDE 0.5 MG: 1 INJECTION, SOLUTION INTRAMUSCULAR; INTRAVENOUS; SUBCUTANEOUS at 12:05

## 2018-05-16 RX ADMIN — HYDROCODONE BITARTRATE AND ACETAMINOPHEN 1 TABLET: 10; 325 TABLET ORAL at 10:05

## 2018-05-16 RX ADMIN — HYDROCODONE BITARTRATE AND ACETAMINOPHEN 1 TABLET: 10; 325 TABLET ORAL at 05:05

## 2018-05-16 RX ADMIN — STANDARDIZED SENNA CONCENTRATE AND DOCUSATE SODIUM 1 TABLET: 8.6; 5 TABLET, FILM COATED ORAL at 10:05

## 2018-05-16 RX ADMIN — SODIUM CHLORIDE, SODIUM LACTATE, POTASSIUM CHLORIDE, AND CALCIUM CHLORIDE: .6; .31; .03; .02 INJECTION, SOLUTION INTRAVENOUS at 08:05

## 2018-05-16 RX ADMIN — Medication 0.5 MG: at 07:05

## 2018-05-16 RX ADMIN — IBUPROFEN 800 MG: 400 TABLET, FILM COATED ORAL at 10:05

## 2018-05-16 RX ADMIN — SIMETHICONE CHEW TAB 80 MG 80 MG: 80 TABLET ORAL at 10:05

## 2018-05-16 NOTE — ASSESSMENT & PLAN NOTE
- Procedure complications: none  - EBL: 50 cc  - Diet: tolerating regular  - Pain control: ibuprofen 600 mg, percocet 5/10 mg, 0.5 mg dilaudid BTP  - In/Out: Arreaga removed, awaiting spontaneous void  -  cc since admission today; adequate  - Bowel function: +flatus/-BM  - PRN: simethicone, zofran, phenergan, benadryl  - Heme: Preop H/h 11.1/32.7; AM CBC 6.4/9.5.28/219, BMP normal  - PPX: ambulation encouraged, IS encouraged, no anticoagulation indicated, TEDs/SCDs in place    Disposition: Will plan to evaluate the patient as she meets her post-operative milestones. Will tentatively plan for discharge to home today, POD #1.

## 2018-05-16 NOTE — PLAN OF CARE
Problem: Patient Care Overview  Goal: Plan of Care Review  Plan of care reviewed with the patient at the beginning of the shift. She is POD #1 of lap hysterectomy. 5 abdominal lap sites intact with steri strips. She is tolerating a regular diet without nausea. Pain managed with Allen. Arreaga removed this morning at 0530. Teds and SCDs in place. Fall precautions maintained. Pt remained free from falls and injury this shift. Bed locked in lowest position, side rails up x2, call light within reach. Instructed pt to call for assistance as neededP. Pt verbalized understanding. Tele monitoring continued. Pt sinus bridgett. Lopressor held, HR 48-52 overnight. No acute issues. Will continue to monitor.

## 2018-05-16 NOTE — PLAN OF CARE
Problem: Patient Care Overview  Goal: Plan of Care Review  Outcome: Outcome(s) achieved Date Met: 05/16/18  Patient's VSS for shift. Patient is AAOx4, calm, cooperative, and a standby assist for ambulation. Patient urinated 100 ml around 9:30 am. Patient instructed on activity restrictions post surgery. Patient voiced understanding. Free from any complications from surgical sites. Will discharge to home.

## 2018-05-16 NOTE — SUBJECTIVE & OBJECTIVE
Interval History:   POD #1 Patient is doing well this AM. Pain was well controlled overnight, not requiring IV dilaudid. Arreaga was removed, awaiting spontaneous void. Patient has not yet ambulated but is getting up to go to the bathroom soon. Tolerating PO. + flatus. Per cardiology, ok to restart coumadin upon discharge. No lovenox/heparin bridge is needed.    Scheduled Meds:   ibuprofen  800 mg Oral QID    metoprolol succinate  100 mg Oral BID    mupirocin  1 g Nasal BID    senna-docusate 8.6-50 mg  1 tablet Oral BID    simethicone  1 tablet Oral TID     Continuous Infusions:   lactated ringers 125 mL/hr at 05/15/18 2340     PRN Meds:diphenhydrAMINE, hydrocodone-acetaminophen 10-325mg, hydrocodone-acetaminophen, HYDROmorphone, metoclopramide HCl, ondansetron    Review of patient's allergies indicates:  No Known Allergies    Objective:     Vital Signs (Most Recent):  Temp: 97.9 °F (36.6 °C) (05/16/18 0500)  Pulse: (!) 52 (05/16/18 0500)  Resp: 16 (05/16/18 0500)  BP: (!) 129/59 (05/16/18 0500)  SpO2: 96 % (05/16/18 0500) Vital Signs (24h Range):  Temp:  [97 °F (36.1 °C)-98.1 °F (36.7 °C)] 97.9 °F (36.6 °C)  Pulse:  [46-65] 52  Resp:  [10-18] 16  SpO2:  [96 %-100 %] 96 %  BP: (108-160)/(57-82) 129/59     Weight: 110 kg (242 lb 8.1 oz)  Body mass index is 32.89 kg/m².    Intake/Output - Last 3 Shifts       05/14 0700 - 05/15 0659 05/15 0700 - 05/16 0659    P.O.  1440    I.V. (mL/kg)  4017.2 (36.5)    Total Intake(mL/kg)  5457.2 (49.6)    Urine (mL/kg/hr)  2535 (1)    Blood  50 (0)    Total Output   2585    Net   +2872.2                 Physical Exam:   Constitutional: She is oriented to person, place, and time. She appears well-developed and well-nourished.    HENT:   Head: Normocephalic and atraumatic.    Eyes: Conjunctivae and EOM are normal. Pupils are equal, round, and reactive to light.    Neck: Normal range of motion. Neck supple.    Cardiovascular: Normal rate, regular rhythm and normal heart sounds.      Pulmonary/Chest: Effort normal and breath sounds normal. No respiratory distress.        Abdominal: Soft. Bowel sounds are normal. She exhibits abdominal incision. She exhibits no distension. There is no tenderness. There is no rebound and no guarding.   5 LSC incisions are c/d/i with steri strips in place. Mild abdominal distention. Soft. Appropriately tender.      Genitourinary:   Genitourinary Comments: Deferred, potts in place draining clear, yellow urine           Musculoskeletal: Normal range of motion.       Neurological: She is alert and oriented to person, place, and time.    Skin: Skin is warm and dry. No rash noted.    Psychiatric: She has a normal mood and affect. Her behavior is normal. Judgment and thought content normal.       Lines/Drains/Airways     Peripheral Intravenous Line                 Peripheral IV - Single Lumen 05/15/18 0652 Right Wrist 1 day         Peripheral IV - Single Lumen 05/15/18 0715 Left Hand less than 1 day              Laboratory:    Recent Labs  Lab 05/15/18  0650 05/16/18  0433   WBC 3.80* 6.44   HGB 11.1* 9.5*   HCT 32.7* 28.0*   MCV 83 84    219        Recent Labs  Lab 05/16/18  0433      K 4.3      CO2 26   BUN 19   CREATININE 0.9   *      Diagnostic Results:  Pathology pending

## 2018-05-16 NOTE — ASSESSMENT & PLAN NOTE
- holding home warfarin, ok to restart per cardiology, upon discharge to home  - restart at 1 mg daily. No bridge is needed

## 2018-05-16 NOTE — PROGRESS NOTES
[facility]  Gynecologic Oncology  Progress Note      Patient Name: Jayla Reagan  MRN: [id number]  Admission Date: 5/15/2018  Hospital Length of Stay: 1 days  Attending Provider: Ezra Maharaj MD  Primary Care Provider: Rambo Palmer MD  Principal Problem: PMB (postmenopausal bleeding)    Follow-up For: Procedure(s) (LRB):  XI ROBOTIC ASSISTED LAPAROSCOPIC HYSTERECTOMY (N/A)  XI ROBOT ASSISTED LAPAROSCOPIC SALPINGO-OOPHERECTOMY (Bilateral)  Post-Operative Day: 1 Day Post-Op  Subjective:      History of Present Illness:  Ms. Reagan is a 67yo  who presents for scheduled RA-TLH/BSO/possible staging 2/2 PMB failed D&C 2/2 cervical stenosis.    Last clinic note:   Ms. Reagan is a 68yr old para 2 followed by Dr. Goode for PMB in 2017 with a failed D&C due to cervical stenosis and continued DELMIS.     2017 pelvic ultrasound  Uterus 11 x 6.1 x 8.2cm with ES 9.2mm, five small fibroids 1.6-3.8cm  R ov 3.3cm simple cyst, L ov not visualized  Complex fluid in endometrium and cervix     10/2017 taken to the OR for D&C, unable to proceed due to cervical stenosis  Final pathology  Endocervical curettings:  -Scant atrophic squamous cells and blood clot  -Limited sample  -Correlate clinically     2018 repeat pelvic ultrasound  Essentially unchanged, ES 7.2mm     LMP age 54, no PMB until 2017     Surgical history: ectopic pregnancy with salpingectomy, cholecystectomy, vag del x 2      Hospital Course:  05/15/2018 To OR for scheduled RA-TLH/BSO 2/2 PMB failed D&C. POD #0 s/p TLH/BSO. Doing well. Pain well controlled. Arreaga in place draining clear, yellow urine. Tolerating sips. Requesting diet. Encouraged bland foods in small quantities. -flatus/-BM as expected on DOS.   2018 Patient is doing well this AM. Pain was well controlled overnight, not requiring IV dilaudid. Arreaga was removed, awaiting spontaneous void. Patient has not yet ambulated but is getting up to go to the bathroom  soon. Tolerating PO. + flatus. Per cardiology, ok to restart coumadin upon discharge.     Interval History:   POD #1 Patient is doing well this AM. Pain was well controlled overnight, not requiring IV dilaudid. Arreaga was removed, awaiting spontaneous void. Patient has not yet ambulated but is getting up to go to the bathroom soon. Tolerating PO. + flatus. Per cardiology, ok to restart coumadin upon discharge. No lovenox/heparin bridge is needed.    Scheduled Meds:   ibuprofen  800 mg Oral QID    metoprolol succinate  100 mg Oral BID    mupirocin  1 g Nasal BID    senna-docusate 8.6-50 mg  1 tablet Oral BID    simethicone  1 tablet Oral TID     Continuous Infusions:   lactated ringers 125 mL/hr at 05/15/18 2340     PRN Meds:diphenhydrAMINE, hydrocodone-acetaminophen 10-325mg, hydrocodone-acetaminophen, HYDROmorphone, metoclopramide HCl, ondansetron    Review of patient's allergies indicates:  No Known Allergies    Objective:     Vital Signs (Most Recent):  Temp: 97.9 °F (36.6 °C) (05/16/18 0500)  Pulse: (!) 52 (05/16/18 0500)  Resp: 16 (05/16/18 0500)  BP: (!) 129/59 (05/16/18 0500)  SpO2: 96 % (05/16/18 0500) Vital Signs (24h Range):  Temp:  [97 °F (36.1 °C)-98.1 °F (36.7 °C)] 97.9 °F (36.6 °C)  Pulse:  [46-65] 52  Resp:  [10-18] 16  SpO2:  [96 %-100 %] 96 %  BP: (108-160)/(57-82) 129/59     Weight: 110 kg (242 lb 8.1 oz)  Body mass index is 32.89 kg/m².    Intake/Output - Last 3 Shifts       05/14 0700 - 05/15 0659 05/15 0700 - 05/16 0659    P.O.  1440    I.V. (mL/kg)  4017.2 (36.5)    Total Intake(mL/kg)  5457.2 (49.6)    Urine (mL/kg/hr)  2535 (1)    Blood  50 (0)    Total Output   2585    Net   +2872.2                 Physical Exam:   Constitutional: She is oriented to person, place, and time. She appears well-developed and well-nourished.    HENT:   Head: Normocephalic and atraumatic.    Eyes: Conjunctivae and EOM are normal. Pupils are equal, round, and reactive to light.    Neck: Normal range of motion.  Neck supple.    Cardiovascular: Normal rate, regular rhythm and normal heart sounds.     Pulmonary/Chest: Effort normal and breath sounds normal. No respiratory distress.        Abdominal: Soft. Bowel sounds are normal. She exhibits abdominal incision. She exhibits no distension. There is no tenderness. There is no rebound and no guarding.   5 LSC incisions are c/d/i with steri strips in place. Mild abdominal distention. Soft. Appropriately tender.      Genitourinary:   Genitourinary Comments: Deferred, potts in place draining clear, yellow urine           Musculoskeletal: Normal range of motion.       Neurological: She is alert and oriented to person, place, and time.    Skin: Skin is warm and dry. No rash noted.    Psychiatric: She has a normal mood and affect. Her behavior is normal. Judgment and thought content normal.       Lines/Drains/Airways     Peripheral Intravenous Line                 Peripheral IV - Single Lumen 05/15/18 0652 Right Wrist 1 day         Peripheral IV - Single Lumen 05/15/18 0715 Left Hand less than 1 day              Laboratory:    Recent Labs  Lab 05/15/18  0650 05/16/18  0433   WBC 3.80* 6.44   HGB 11.1* 9.5*   HCT 32.7* 28.0*   MCV 83 84    219        Recent Labs  Lab 05/16/18  0433      K 4.3      CO2 26   BUN 19   CREATININE 0.9   *      Diagnostic Results:  Pathology pending    Assessment/Plan:     * PMB (postmenopausal bleeding)     To OR for RA-TLH/BSO 2/2 PMB        s/p RA-TLH/BSO 5/15/2018    - Procedure complications: none  - EBL: 50 cc  - Diet: tolerating regular  - Pain control: ibuprofen 600 mg, percocet 5/10 mg, 0.5 mg dilaudid BTP  - In/Out: Potts removed, awaiting spontaneous void  -  cc since admission today; adequate  - Bowel function: +flatus/-BM  - PRN: simethicone, zofran, phenergan, benadryl  - Heme: Preop H/h 11.1/32.7; AM CBC 6.4/9.5.28/219, BMP normal  - PPX: ambulation encouraged, IS encouraged, no anticoagulation indicated,  TEDs/SCDs in place    Disposition: Will plan to evaluate the patient as she meets her post-operative milestones. Will tentatively plan for discharge to home today, POD #1.           Post-menopausal bleeding    - s/p RA-TLH/BSO        HTN (hypertension), benign    - BP: (120-160)/(61-82) 149/64   - plan to restart home meds on discharge from hospital         Paroxysmal atrial fibrillation    - holding home warfarin, ok to restart per cardiology, upon discharge to home  - restart at 1 mg daily. No bridge is needed          VTE Risk Mitigation     None          Was potts catheter removed? Yes    Hilda Macias MD  Gynecologic Oncology  [facility]

## 2018-05-17 NOTE — DISCHARGE SUMMARY
[facility]  Gynecologic Oncology  Discharge Summary    Patient Name: Jayla Reagan  MRN: [id number]  Admission Date: 5/15/2018  Hospital Length of Stay: 1 days  Discharge Date and Time:  2018 7:52 PM  Attending Physician: No att. providers found   Discharging Provider: Hilda Macias MD  Primary Care Provider: Rambo Palmer MD    HPI:   Ms. Reagan is a 69yo  who presents for scheduled RA-TLH/BSO/possible staging 2/2 PMB failed D&C 2/2 cervical stenosis.    Last clinic note:   Ms. Reagan is a 68yr old para 2 followed by Dr. Goode for PMB in 2017 with a failed D&C due to cervical stenosis and continued DELMIS.     2017 pelvic ultrasound  Uterus 11 x 6.1 x 8.2cm with ES 9.2mm, five small fibroids 1.6-3.8cm  R ov 3.3cm simple cyst, L ov not visualized  Complex fluid in endometrium and cervix     10/2017 taken to the OR for D&C, unable to proceed due to cervical stenosis  Final pathology  Endocervical curettings:  -Scant atrophic squamous cells and blood clot  -Limited sample  -Correlate clinically     2018 repeat pelvic ultrasound  Essentially unchanged, ES 7.2mm     LMP age 54, no PMB until 2017     Surgical history: ectopic pregnancy with salpingectomy, cholecystectomy, vag del x 2      Hospital Course:  05/15/2018 To OR for scheduled RA-TLH/BSO 2/2 PMB failed D&C. POD #0 s/p TLH/BSO. Doing well. Pain well controlled. Arreaga in place draining clear, yellow urine. Tolerating sips. Requesting diet. Encouraged bland foods in small quantities. -flatus/-BM as expected on DOS.   2018 Patient is doing well this AM. Pain was well controlled overnight, not requiring IV dilaudid. Arreaga was removed, awaiting spontaneous void. Patient has not yet ambulated but is getting up to go to the bathroom soon. Tolerating PO. + flatus. Per cardiology, ok to restart coumadin upon discharge. Patient meeting all milestones and medically clear for discharge to home today.    Procedure(s)  (LRB):  XI ROBOTIC ASSISTED LAPAROSCOPIC HYSTERECTOMY (N/A)  XI ROBOT ASSISTED LAPAROSCOPIC SALPINGO-OOPHERECTOMY (Bilateral)         Significant Diagnostic Studies: Labs:   CBC   Recent Labs  Lab 05/15/18  0650 05/16/18  0433   WBC 3.80* 6.44   HGB 11.1* 9.5*   HCT 32.7* 28.0*    219       Pending Diagnostic Studies:     None        Final Active Diagnoses:    Diagnosis Date Noted POA    PRINCIPAL PROBLEM:  PMB (postmenopausal bleeding) [N95.0] 04/09/2018 Yes    Post-menopausal bleeding [N95.0] 05/15/2018 Yes    s/p RA-TLH/BSO 5/15/2018 [Z90.710] 05/15/2018 Not Applicable    Paroxysmal atrial fibrillation [I48.0] 04/05/2017 Yes    Long term current use of anticoagulant therapy [Z79.01] 04/05/2017 Not Applicable    PVD (peripheral vascular disease) [I73.9] 04/05/2017 Yes    HTN (hypertension), benign [I10] 04/05/2017 Yes      Problems Resolved During this Admission:    Diagnosis Date Noted Date Resolved POA        Does this patient meet criteria for extended DVT prophylaxis? No, because not indicated    Discharged Condition: good    Disposition: Home or Self Care    Follow Up:  Follow-up Information     Ezra Maharaj MD In 4 weeks.    Specialties:  Gynecologic Oncology, Gynecology, Oncology  Why:  Post-op checkup in clinic as scheduled  Contact information:  [address]  [facility] [address]  [phone]                 Patient Instructions:     Diet Adult Regular     Activity as tolerated     Notify your health care provider if you experience any of the following:  increased confusion or weakness     Notify your health care provider if you experience any of the following:  persistent dizziness, light-headedness, or visual disturbances     Notify your health care provider if you experience any of the following:  worsening rash     Notify your health care provider if you experience any of the following:  severe persistent headache     Notify your health care provider if you experience any of the  following:  difficulty breathing or increased cough     Notify your health care provider if you experience any of the following:  redness, tenderness, or signs of infection (pain, swelling, redness, odor or green/yellow discharge around incision site)     Notify your health care provider if you experience any of the following:  temperature >100.4     Notify your health care provider if you experience any of the following:  persistent nausea and vomiting or diarrhea     Notify your health care provider if you experience any of the following:  severe uncontrolled pain       Medications:  Reconciled Home Medications:      Medication List      START taking these medications    hydrocodone-acetaminophen 5-325 mg per tablet  Commonly known as:  NORCO  Take 1 tablet by mouth every 4 (four) hours as needed.     ibuprofen 800 MG tablet  Commonly known as:  ADVIL,MOTRIN  Take 1 tablet (800 mg total) by mouth 4 (four) times daily.        CONTINUE taking these medications    acetaminophen 650 MG Tbsr  Commonly known as:  TYLENOL  Take 650 mg by mouth 2 (two) times daily as needed.     cyanocobalamin (vitamin B-12) 50 mcg tablet  Take 50 mcg by mouth every morning. Pt will verify the strength     flecainide 150 MG Tab  Commonly known as:  TAMBOCOR  Take 2 tablets (300 mg total) by mouth once as needed (atrial fibrillation).     fluticasone 50 mcg/actuation nasal spray  Commonly known as:  FLONASE  1 spray by Each Nare route daily as needed for Rhinitis.     fosinopril 20 MG tablet  Commonly known as:  MONOPRIL  Take 20 mg by mouth every evening.     metoprolol succinate 100 MG 24 hr tablet  Commonly known as:  TOPROL-XL  Take 100 mg by mouth 2 (two) times daily. TAKES HALF OF TABLET IN AM (50 MG) AND 1 TABLET AT NIGHT (100 MG) FOR TOTAL  MG DAILY     triamterene-hydrochlorothiazide 37.5-25 mg 37.5-25 mg per capsule  Commonly known as:  DYAZIDE  Take 1 capsule by mouth every morning.     VITAMIN D3 5,000 unit Tab  Generic  drug:  cholecalciferol (vitamin D3)  Take 5,000 Units by mouth once a week. Takes on Saturday     warfarin 1 MG tablet  Commonly known as:  COUMADIN  Take 1 tablet (1 mg total) by mouth Daily. 1 Mg daily except Mondays and Fridays take 0.5 mg            Hilda Macias MD  Gynecologic Oncology  [facility]

## 2018-05-18 NOTE — OP NOTE
DATE OF PROCEDURE:  05/15/2018    SURGEON:  Ezra Maharaj M.D.    ASSISTANT:  Jocelynn Ulloa M.D. (RES) and TABATHA Mason, who served as   first assistant.    PREOPERATIVE DIAGNOSES:  1.  Postmenopausal bleeding, unable to assess endometrium due to cervical   stenosis with failed attempted D and C at outside hospital.  2.  Chronic anticoagulation, now off Coumadin for five days.    POSTOPERATIVE DIAGNOSES:  1.  Postmenopausal bleeding, unable to assess endometrium due to cervical   stenosis with failed attempted D and C at outside hospital.  2.  Chronic anticoagulation, now off Coumadin for five days.    PROCEDURE:  Robotic-assisted laparoscopic hysterectomy, bilateral   salpingo-oophorectomy.    COMPLICATIONS:  None.    ESTIMATED BLOOD LOSS:  Less than 50 mL    ANESTHESIA:  General.    INTRAOPERATIVE FINDINGS:  Included approximately 11 cm uterus with normal   appearing bilateral tubes and ovaries.  Upon inspection of the cavity of the   uterus after removal, there was no obvious pathologic abnormality with what   appeared to be mucus and blood related to her cervical stenosis.    PROCEDURE IN DETAIL:  After informed consent was obtained, the patient was taken   to the Operating Suite.  General anesthesia was administered.  Once this was   felt to be adequate, she was placed in dorsal lithotomy position with her arms   tucked.  The abdomen and pelvis were prepped and draped in the usual sterile   fashion and a speculum was placed in the vagina.  The cervix was visualized,   grasped with single-tooth tenaculum and carefully dilated.  The OS was barely   visible and so we did have to use the very small dilators initially.  Once the   uterine cavity had been entered, the uterus sounded to 10 cm.  Serial dilation   of the cervix was performed and a medium VCare manipulator was placed without   difficulty.  Arreaga catheter was placed to gravity drainage and attention was   turned to the abdominal portion of the  procedure.  Veress needle was placed   through the umbilicus.  Opening pressure was noted to be 0.  Pneumoperitoneum   was obtained with carbon dioxide and once this was felt to be adequate, an 8 mm   incision was made above the umbilicus and a robotic trocar was placed through   this.  Intraperitoneal placement was confirmed with the camera.  Lateral robotic   trocars x3 were placed through 8 mm incisions and a right upper quadrant   accessory port was placed through an 8 mm incision.  The patient was placed in   steep Trendelenburg.  The robot was docked and instruments were passed in the   operative field.  The above stated findings were noted.  Left round ligament was   sacrificed with bipolar cautery and transected.  The anterior and posterior   leaflets of the broad ligament were opened.  A window was made beneath the   infundibulopelvic ligament and this was sacrificed with bipolar cautery.  The   medial fold of the peritoneum was taken down to the level of uterosacral.    Attention was turned to the right side where an identical dissection was   performed.  Anteriorly, the vesicouterine peritoneum was incised and the bladder   was mobilized inferiorly.  The patient did have fibroids anteriorly and care   was taken to adequately dissect the bladder anteriorly.  Anteriorly, a 10   o'clock to 2 o'clock colpotomy was performed.  Posteriorly, a 4 o'clock to 8   o'clock colpotomy was performed and bilateral cardinal ligaments, uterine   vessels skeletonized their peritoneal attachments, sacrificed with bipolar   cautery and transected.  Circumferential colpotomy was completed.  Uterus,   cervix, bilateral tubes and ovaries were removed.  The vaginal cuff was made   hemostatic with electrocautery and the cuff was closed in running nonlocking 0   Vicryl suture tied in midline.  The uterus was opened and the above stated   findings were noted; therefore, needing no staging.  The pedicles were   inspected.  The pelvis  was irrigated.  Once hemostasis was ensured, the   instruments were removed, the robot was undocked and the patient was flattened.   The pneumoperitoneum was evacuated and all trocars were removed.  Port sites   were inspected and made hemostatic with electrocautery and the skin was closed   with subcuticular 4-0 Monocryl suture.  Steri-Strips were applied and the   patient was awoken and taken to the Recovery Room in stable condition.  Of note,   I was present for and performed all key aspects of the procedure.  Jenniffer Puentes's expertise was needed as there was no qualified resident available.      PEYTON/IN  dd: 05/18/2018 12:49:09 (CDT)  td: 05/18/2018 13:23:09 (CDT)  Doc ID   #[id number]  Job ID #[id number]    CC:

## 2018-05-21 ENCOUNTER — TELEPHONE (OUTPATIENT)
Dept: GYNECOLOGIC ONCOLOGY | Facility: CLINIC | Age: 68
End: 2018-05-21

## 2018-05-21 NOTE — TELEPHONE ENCOUNTER
----- Message from Claudette Wheat sent at 5/21/2018  9:00 AM CDT -----  Name of Who is Calling: RASHMI HAYS [[id number]]      What is the request in detail: Pt has been breaking out in hives after taking norco. Please call to further discuss and advise      Can the clinic reply by [facility]:   No       What Number to Call Back if not in [facility]: [phone]

## 2018-05-22 ENCOUNTER — ANTI-COAG VISIT (OUTPATIENT)
Dept: CARDIOLOGY | Facility: CLINIC | Age: 68
End: 2018-05-22
Payer: MEDICARE

## 2018-05-22 DIAGNOSIS — I48.0 PAROXYSMAL ATRIAL FIBRILLATION: ICD-10-CM

## 2018-05-22 DIAGNOSIS — Z79.01 LONG TERM CURRENT USE OF ANTICOAGULANT THERAPY: Primary | ICD-10-CM

## 2018-05-22 LAB — INR PPP: 1.4 (ref 2–3)

## 2018-05-22 PROCEDURE — 85610 PROTHROMBIN TIME: CPT | Mod: QW,S$GLB,, | Performed by: PHARMACIST

## 2018-05-22 NOTE — PROGRESS NOTES
Pt seen by Terri MURRELL. I have reviewed her documentation and agree with her assessment and plan.

## 2018-05-22 NOTE — PROGRESS NOTES
Quick follow-up for low INR 5/15 from 5 day prior procedure hold. INR improving but still low. Patient states that she was taking norco for pain and had a reaction so she took benadryl last night and switched to tylenol, no DDI expected. She has bruises from use, denies any bleeding or other changes. Will boost dose today and then resume previously stable weekly dose until follow-up in 2 weeks. Advised her to call with any changes or concerns.

## 2018-05-23 ENCOUNTER — TELEPHONE (OUTPATIENT)
Dept: GYNECOLOGIC ONCOLOGY | Facility: CLINIC | Age: 68
End: 2018-05-23

## 2018-05-23 NOTE — TELEPHONE ENCOUNTER
05/23/18 advised pt try benadryl cream over the counter. If not any better by tomorrow call office back. TA/MA

## 2018-05-23 NOTE — TELEPHONE ENCOUNTER
----- Message from Trevor Chanel sent at 5/23/2018  9:06 AM CDT -----  Contact: RASHMI HAYS [[id number]]  Name of Who is Calling: RASHMI HAYS [[id number]]      What is the request in detail: Patient states she was on Norco after surgery and she broke out into hives. Been off of medication for the last two days. States she has taking benedryal but it keeps her sleepy. Is there anything OTC she can take to increase the healing.      Can the clinic reply by [facility]: no      What [facility] Back [facility]: [phone]

## 2018-05-31 ENCOUNTER — TELEPHONE (OUTPATIENT)
Dept: GYNECOLOGIC ONCOLOGY | Facility: CLINIC | Age: 68
End: 2018-05-31

## 2018-05-31 NOTE — TELEPHONE ENCOUNTER
Was able to get in touch with her regarding her path being benign.  She is recovering from surgery without issue.

## 2018-06-05 ENCOUNTER — LAB VISIT (OUTPATIENT)
Dept: LAB | Facility: HOSPITAL | Age: 68
End: 2018-06-05
Attending: INTERNAL MEDICINE
Payer: MEDICARE

## 2018-06-05 ENCOUNTER — ANTI-COAG VISIT (OUTPATIENT)
Dept: CARDIOLOGY | Facility: CLINIC | Age: 68
End: 2018-06-05

## 2018-06-05 DIAGNOSIS — I48.0 PAROXYSMAL ATRIAL FIBRILLATION: ICD-10-CM

## 2018-06-05 DIAGNOSIS — Z79.01 LONG TERM CURRENT USE OF ANTICOAGULANT THERAPY: ICD-10-CM

## 2018-06-05 LAB
INR PPP: 1.7
PROTHROMBIN TIME: 17.8 SEC

## 2018-06-05 PROCEDURE — 36415 COLL VENOUS BLD VENIPUNCTURE: CPT

## 2018-06-05 PROCEDURE — 85610 PROTHROMBIN TIME: CPT

## 2018-06-05 NOTE — PROGRESS NOTES
Mrs. Diallo's INR has remained subtherapeutic since coumadin interruption 2 weeks ago. She confirms correct dose and denies any changes related to this unstable INR.  She was very stable on her current dose of coumadin in the past but does seem to require a dose change at this point

## 2018-06-11 ENCOUNTER — OFFICE VISIT (OUTPATIENT)
Dept: GYNECOLOGIC ONCOLOGY | Facility: CLINIC | Age: 68
End: 2018-06-11
Payer: MEDICARE

## 2018-06-11 VITALS
SYSTOLIC BLOOD PRESSURE: 129 MMHG | WEIGHT: 237.44 LBS | DIASTOLIC BLOOD PRESSURE: 62 MMHG | BODY MASS INDEX: 32.2 KG/M2 | HEART RATE: 55 BPM

## 2018-06-11 DIAGNOSIS — Z90.710 HISTORY OF ROBOT-ASSISTED LAPAROSCOPIC HYSTERECTOMY: ICD-10-CM

## 2018-06-11 DIAGNOSIS — Z79.01 LONG TERM CURRENT USE OF ANTICOAGULANT THERAPY: ICD-10-CM

## 2018-06-11 DIAGNOSIS — N95.0 POSTMENOPAUSAL BLEEDING: Primary | ICD-10-CM

## 2018-06-11 PROCEDURE — 99024 POSTOP FOLLOW-UP VISIT: CPT | Mod: S$GLB,,, | Performed by: OBSTETRICS & GYNECOLOGY

## 2018-06-11 PROCEDURE — 99999 PR PBB SHADOW E&M-EST. PATIENT-LVL II: CPT | Mod: PBBFAC,,, | Performed by: OBSTETRICS & GYNECOLOGY

## 2018-06-11 RX ORDER — MELOXICAM 7.5 MG/1
TABLET ORAL
COMMUNITY
End: 2018-06-11

## 2018-06-11 RX ORDER — PREDNISONE 20 MG/1
TABLET ORAL
COMMUNITY
End: 2018-06-11

## 2018-06-11 RX ORDER — DILTIAZEM HYDROCHLORIDE 60 MG/1
TABLET, FILM COATED ORAL
Status: ON HOLD | COMMUNITY
End: 2019-12-27

## 2018-06-11 NOTE — PROGRESS NOTES
Subjective:      Patient ID: Jayla Reagan is a 68 y.o. female.    Chief Complaint: post menopausal bleeding and Post-op Evaluation (Billy. Jaret. Lapar. serge )      HPI  Here today for post-op check after RALH/BSO.  Final path was benign.  Has recovered well form surgery.  Denies VB, F/C, N/V.  Review of Systems   Constitutional: Negative for activity change, appetite change, chills, fatigue and fever.   HENT: Negative for hearing loss, mouth sores, nosebleeds, sore throat and tinnitus.    Eyes: Negative for visual disturbance.   Respiratory: Negative for cough, chest tightness, shortness of breath and wheezing.    Cardiovascular: Negative for chest pain and leg swelling.   Gastrointestinal: Negative for abdominal distention, abdominal pain, blood in stool, constipation, diarrhea, nausea and vomiting.   Genitourinary: Negative for dysuria, flank pain, frequency, hematuria, pelvic pain, vaginal bleeding, vaginal discharge and vaginal pain.   Musculoskeletal: Negative for arthralgias and back pain.   Skin: Negative for rash.   Neurological: Negative for dizziness, seizures, syncope, weakness and numbness.   Hematological: Does not bruise/bleed easily.   Psychiatric/Behavioral: Negative for confusion and sleep disturbance. The patient is not nervous/anxious.        Objective:   Physical Exam:   Constitutional: She appears well-developed and well-nourished. No distress.    HENT:   Head: Normocephalic and atraumatic.    Eyes: No scleral icterus.    Neck: Normal range of motion. Neck supple.    Cardiovascular: Normal rate and intact distal pulses.  Exam reveals no cyanosis and no edema.     Pulmonary/Chest: Effort normal. No respiratory distress. She exhibits no tenderness.        Abdominal: Soft. She exhibits no distension (incisions healing well), no fluid wave, no ascites and no mass. There is no tenderness. There is no rebound and no guarding. No hernia.     Genitourinary: Rectum normal and vagina normal. Pelvic exam  was performed with patient supine. There is no rash, tenderness or lesion on the right labia. There is no rash, tenderness or lesion on the left labia. Uterus is absent. There is an absent adnexa. Right adnexum displays no mass, no tenderness and no fullness. Left adnexum displays no mass, no tenderness and no fullness. No bleeding (cuff healing well) or unspecified prolapse of vaginal walls in the vagina. No vaginal discharge found. Vaginal cuff normal.Labial bartholins normal.Cervix exhibits absence.              Lymphadenopathy:     She has no cervical adenopathy.        Right: No inguinal adenopathy present.        Left: No inguinal adenopathy present.     Skin: No cyanosis.        Assessment:     1. Postmenopausal bleeding    2. Long term current use of anticoagulant therapy    3. s/p RA-TLH/BSO 5/15/2018        Plan:       Doing well post-op with benign final path.  Ok to return to routine gyn care.  RTC prn.

## 2018-06-12 ENCOUNTER — ANTI-COAG VISIT (OUTPATIENT)
Dept: CARDIOLOGY | Facility: CLINIC | Age: 68
End: 2018-06-12

## 2018-06-12 ENCOUNTER — LAB VISIT (OUTPATIENT)
Dept: LAB | Facility: HOSPITAL | Age: 68
End: 2018-06-12
Attending: INTERNAL MEDICINE
Payer: MEDICARE

## 2018-06-12 DIAGNOSIS — I48.0 PAROXYSMAL ATRIAL FIBRILLATION: ICD-10-CM

## 2018-06-12 DIAGNOSIS — Z79.01 LONG TERM CURRENT USE OF ANTICOAGULANT THERAPY: ICD-10-CM

## 2018-06-12 LAB
INR PPP: 2.1
PROTHROMBIN TIME: 21.9 SEC

## 2018-06-12 PROCEDURE — 36415 COLL VENOUS BLD VENIPUNCTURE: CPT

## 2018-06-12 PROCEDURE — 85610 PROTHROMBIN TIME: CPT

## 2018-06-26 ENCOUNTER — ANTI-COAG VISIT (OUTPATIENT)
Dept: CARDIOLOGY | Facility: CLINIC | Age: 68
End: 2018-06-26
Payer: MEDICARE

## 2018-06-26 DIAGNOSIS — Z79.01 LONG TERM CURRENT USE OF ANTICOAGULANT THERAPY: Primary | ICD-10-CM

## 2018-06-26 DIAGNOSIS — I48.0 PAROXYSMAL ATRIAL FIBRILLATION: ICD-10-CM

## 2018-06-26 LAB — INR PPP: 2.8 (ref 2–3)

## 2018-06-26 PROCEDURE — 85610 PROTHROMBIN TIME: CPT | Mod: QW,S$GLB,, | Performed by: INTERNAL MEDICINE

## 2018-07-02 ENCOUNTER — TELEPHONE (OUTPATIENT)
Dept: GYNECOLOGIC ONCOLOGY | Facility: CLINIC | Age: 68
End: 2018-07-02

## 2018-07-02 NOTE — TELEPHONE ENCOUNTER
----- Message from Zeina Mccall sent at 7/2/2018  1:28 PM CDT -----  Ms LISRASHMI [[id number]] can be reached at [phone]     Ms Reagan said she had intercourse today & noticed blood from vaginal area/not sure if it is normal

## 2018-07-02 NOTE — TELEPHONE ENCOUNTER
07/02/18 advised pt some spotting after intercourse can be normal. Cont to watch for any heavy bleeding with clots, fever, cramps and increased pain. If s/sx persist or worsen call office back on Thursday. ADRIAN/MA

## 2018-07-03 ENCOUNTER — PATIENT MESSAGE (OUTPATIENT)
Dept: GYNECOLOGIC ONCOLOGY | Facility: CLINIC | Age: 68
End: 2018-07-03

## 2018-07-17 ENCOUNTER — ANTI-COAG VISIT (OUTPATIENT)
Dept: CARDIOLOGY | Facility: CLINIC | Age: 68
End: 2018-07-17
Payer: MEDICARE

## 2018-07-17 DIAGNOSIS — Z79.01 LONG TERM CURRENT USE OF ANTICOAGULANT THERAPY: Primary | ICD-10-CM

## 2018-07-17 DIAGNOSIS — I48.0 PAROXYSMAL ATRIAL FIBRILLATION: ICD-10-CM

## 2018-07-17 LAB — INR PPP: 2.6 (ref 2–3)

## 2018-07-17 PROCEDURE — 85610 PROTHROMBIN TIME: CPT | Mod: QW,S$GLB,, | Performed by: INTERNAL MEDICINE

## 2018-07-17 NOTE — PROGRESS NOTES
INR within normal range today. Patient with bruises on arms from use, denies any bleeding or changes. Patient is stable on this dose. She will continue this dose until follow-up in 4 weeks. I advised her to contact us with any changes or problems.

## 2018-08-02 ENCOUNTER — OFFICE VISIT (OUTPATIENT)
Dept: CARDIOLOGY | Facility: CLINIC | Age: 68
End: 2018-08-02
Payer: MEDICARE

## 2018-08-02 VITALS
HEIGHT: 72 IN | OXYGEN SATURATION: 99 % | HEART RATE: 60 BPM | BODY MASS INDEX: 32.21 KG/M2 | WEIGHT: 237.81 LBS | DIASTOLIC BLOOD PRESSURE: 79 MMHG | SYSTOLIC BLOOD PRESSURE: 120 MMHG

## 2018-08-02 DIAGNOSIS — I48.0 PAROXYSMAL ATRIAL FIBRILLATION: Primary | ICD-10-CM

## 2018-08-02 DIAGNOSIS — I73.9 PVD (PERIPHERAL VASCULAR DISEASE): ICD-10-CM

## 2018-08-02 DIAGNOSIS — I10 HTN (HYPERTENSION), BENIGN: ICD-10-CM

## 2018-08-02 PROCEDURE — 3074F SYST BP LT 130 MM HG: CPT | Mod: CPTII,S$GLB,, | Performed by: INTERNAL MEDICINE

## 2018-08-02 PROCEDURE — 99214 OFFICE O/P EST MOD 30 MIN: CPT | Mod: S$GLB,,, | Performed by: INTERNAL MEDICINE

## 2018-08-02 PROCEDURE — 3078F DIAST BP <80 MM HG: CPT | Mod: CPTII,S$GLB,, | Performed by: INTERNAL MEDICINE

## 2018-08-02 PROCEDURE — 99999 PR PBB SHADOW E&M-EST. PATIENT-LVL III: CPT | Mod: PBBFAC,,, | Performed by: INTERNAL MEDICINE

## 2018-08-02 NOTE — PROGRESS NOTES
Subjective:   Patient ID:  Jayla Reagan is a 68 y.o. female who presents for follow-up of Follow-up      Problem List Items Addressed This Visit        Cardiac/Vascular    Paroxysmal atrial fibrillation - Primary    HTN (hypertension), benign    PVD (peripheral vascular disease)          HPI: Patient here for f/u of PAF. She is doing well with no concerns. She has had occasional palpitations but nothing persistent requiring use of flecanide. She denies chest pain, dyspnea, palpitations, dizziness or syncope. HR and BP is controlled. She denies etoh or caffeine drinks. She is compliant with medications. No bleeding on coumadin reported.     Review of Systems   Constitution: Negative.   HENT: Negative.    Eyes: Negative.    Cardiovascular: Negative.    Respiratory: Negative.    Endocrine: Negative.    Hematologic/Lymphatic: Negative.    Skin: Negative.    Musculoskeletal: Negative.    Gastrointestinal: Negative.    Neurological: Negative.        Patient's Medications   New Prescriptions    No medications on file   Previous Medications    ACETAMINOPHEN (TYLENOL) 650 MG TBSR    Take 650 mg by mouth 2 (two) times daily as needed.    CHOLECALCIFEROL, VITAMIN D3, (VITAMIN D3) 5,000 UNIT TAB    Take 5,000 Units by mouth once a week. Takes on Saturday    CYANOCOBALAMIN, VITAMIN B-12, 50 MCG TABLET    Take 50 mcg by mouth every morning. Pt will verify the strength    DILTIAZEM (CARDIZEM) 60 MG TABLET    diltiazem 60 mg tablet    FLECAINIDE (TAMBOCOR) 150 MG TAB    Take 2 tablets (300 mg total) by mouth once as needed (atrial fibrillation).    FLUTICASONE (FLONASE) 50 MCG/ACTUATION NASAL SPRAY    1 spray by Each Nare route daily as needed for Rhinitis.    FOSINOPRIL (MONOPRIL) 20 MG TABLET    Take 20 mg by mouth every evening.     METOPROLOL SUCCINATE (TOPROL-XL) 100 MG 24 HR TABLET    Take 100 mg by mouth 2 (two) times daily. TAKES HALF OF TABLET IN AM (50 MG) AND 1 TABLET AT NIGHT (100 MG) FOR TOTAL  MG DAILY     TRIAMTERENE-HYDROCHLOROTHIAZIDE 37.5-25 MG (DYAZIDE) 37.5-25 MG PER CAPSULE    Take 1 capsule by mouth every morning.     WARFARIN (COUMADIN) 1 MG TABLET    Take 1 tablet (1 mg total) by mouth Daily. 1 Mg daily except Mondays and Fridays take 0.5 mg   Modified Medications    No medications on file   Discontinued Medications    No medications on file       Objective:   Physical Exam   Constitutional: She is oriented to person, place, and time. She appears well-developed and well-nourished. No distress.   Examination of the digits showed no clubbing or cyanosis   HENT:   Head: Normocephalic and atraumatic.   Eyes: Conjunctivae are normal. Pupils are equal, round, and reactive to light. Right eye exhibits no discharge.   Neck: Normal range of motion. Neck supple. No JVD present. No thyromegaly present.   No carotid bruits   Cardiovascular: Normal rate, regular rhythm, S1 normal, S2 normal, normal heart sounds, intact distal pulses and normal pulses.  PMI is not displaced.  Exam reveals no gallop, no friction rub and no opening snap.    No murmur heard.  Pulmonary/Chest: Effort normal and breath sounds normal. No respiratory distress. She has no wheezes. She has no rales. She exhibits no tenderness.   Abdominal: Soft. Bowel sounds are normal. She exhibits no distension and no mass. There is no tenderness. There is no guarding.   No hepatosplenomegaly   Musculoskeletal: Normal range of motion. She exhibits no edema or tenderness.   Lymphadenopathy:     She has no cervical adenopathy.   Neurological: She is alert and oriented to person, place, and time.   Skin: Skin is warm. No rash noted. She is not diaphoretic. No erythema.   Psychiatric: She has a normal mood and affect.   Nursing note and vitals reviewed.      ECGs reviewed-NSR  LABS reviewed  Imaging including Echoes reviewed-normal    Assessment:     1. Paroxysmal atrial fibrillation    2. HTN (hypertension), benign    3. PVD (peripheral vascular disease)         Plan:     Continue current medications  Low salt diet  If RVR reoccurs with poor response to flecanide will re refer to Dr. Lagunas or start amiodarone.  Activity as tolerate  F/u in 6 months.

## 2018-08-02 NOTE — LETTER
[facility]  [address] [place] [address], [address]  [place] [address]  Phone: [phone] August 2, 2018    Jayla Reagan  [facility] [facility]  [place] [address]      To Whom It May Concern:    Jayla Reagan is unable to participate in jury duty due to current medical problems and stress related issues. Please excuse.    If you have any questions or concerns, please feel free to call my office.    Sincerely,        Minh Hsieh MD

## 2018-08-14 ENCOUNTER — ANTI-COAG VISIT (OUTPATIENT)
Dept: CARDIOLOGY | Facility: CLINIC | Age: 68
End: 2018-08-14
Payer: MEDICARE

## 2018-08-14 DIAGNOSIS — Z79.01 LONG TERM CURRENT USE OF ANTICOAGULANT THERAPY: Primary | ICD-10-CM

## 2018-08-14 DIAGNOSIS — I48.0 PAROXYSMAL ATRIAL FIBRILLATION: ICD-10-CM

## 2018-08-14 LAB — INR PPP: 2.8 (ref 2–3)

## 2018-08-14 PROCEDURE — 99211 OFF/OP EST MAY X REQ PHY/QHP: CPT | Mod: 25,S$GLB,ICN, | Performed by: INTERNAL MEDICINE

## 2018-08-14 PROCEDURE — 85610 PROTHROMBIN TIME: CPT | Mod: QW,S$GLB,, | Performed by: INTERNAL MEDICINE

## 2018-08-14 NOTE — PROGRESS NOTES
INR within normal range today. Patient with bruises on arms from use, denies any bleeding or changes. Patient is stable on this dose. She will continue this dose until follow-up in 5 weeks. I advised her to contact us with any changes or problems.

## 2018-09-18 ENCOUNTER — ANTI-COAG VISIT (OUTPATIENT)
Dept: CARDIOLOGY | Facility: CLINIC | Age: 68
End: 2018-09-18
Payer: MEDICARE

## 2018-09-18 DIAGNOSIS — I48.0 PAROXYSMAL ATRIAL FIBRILLATION: ICD-10-CM

## 2018-09-18 DIAGNOSIS — Z79.01 LONG TERM CURRENT USE OF ANTICOAGULANT THERAPY: Primary | ICD-10-CM

## 2018-09-18 LAB — INR PPP: 2.3 (ref 2–3)

## 2018-09-18 PROCEDURE — 85610 PROTHROMBIN TIME: CPT | Mod: PBBFAC,PO

## 2018-10-06 ENCOUNTER — PATIENT MESSAGE (OUTPATIENT)
Dept: GYNECOLOGIC ONCOLOGY | Facility: CLINIC | Age: 68
End: 2018-10-06

## 2018-10-23 ENCOUNTER — DOCUMENTATION ONLY (OUTPATIENT)
Dept: REHABILITATION | Facility: HOSPITAL | Age: 68
End: 2018-10-23

## 2018-10-23 ENCOUNTER — ANTI-COAG VISIT (OUTPATIENT)
Dept: CARDIOLOGY | Facility: CLINIC | Age: 68
End: 2018-10-23
Payer: MEDICARE

## 2018-10-23 DIAGNOSIS — I48.0 PAROXYSMAL ATRIAL FIBRILLATION: ICD-10-CM

## 2018-10-23 DIAGNOSIS — R26.89 ANTALGIC GAIT: ICD-10-CM

## 2018-10-23 DIAGNOSIS — R29.898 WEAKNESS OF BOTH LOWER EXTREMITIES: ICD-10-CM

## 2018-10-23 DIAGNOSIS — Z79.01 LONG TERM CURRENT USE OF ANTICOAGULANT THERAPY: Primary | ICD-10-CM

## 2018-10-23 DIAGNOSIS — M25.552 PAIN OF LEFT HIP JOINT: ICD-10-CM

## 2018-10-23 LAB — INR PPP: 2.2 (ref 2–3)

## 2018-10-23 PROCEDURE — 85610 PROTHROMBIN TIME: CPT | Mod: PBBFAC,PO

## 2018-10-23 PROCEDURE — 99211 OFF/OP EST MAY X REQ PHY/QHP: CPT | Mod: S$PBB,25,,

## 2018-10-23 NOTE — PROGRESS NOTES
[facility] OUTPATIENT DISCHARGE SUMMARY  Physical Therapy      Name:  Jayla Reagan  Date:  10/23/18  Date of Evaluation:  8/14/17  Physician:  Nestor Rabago MD  Total # Of Visits:  4  Diagnosis:    1. Pain of left hip joint     2. Weakness of both lower extremities     3. Antalgic gait         Physical/Functional Status:  At time of discharge, patient status unknown secondary to pt not attending PT since 9/7/17.     The patient is to be discharged from our Therapy service for the following reason(s):  Patient has not attended therapy since 9/7/17    Degree of Goal Achievement:  Patient has not met goals secondary to:  Only attending 3 PT follow up sessions after initial evaluation    Discharge Plan:  Other:  Pt is discharged from PT

## 2018-11-01 ENCOUNTER — TELEPHONE (OUTPATIENT)
Dept: CARDIOLOGY | Facility: CLINIC | Age: 68
End: 2018-11-01

## 2018-11-01 NOTE — TELEPHONE ENCOUNTER
----- Message from Cindy Barr sent at 11/1/2018  8:06 AM CDT -----  Contact: self/[phone]  Patient called to speak with your office in regard to her current ankle pain.    Please call and advise.

## 2018-11-01 NOTE — TELEPHONE ENCOUNTER
Patient called about sticky pain in ankle and was advised to contact her primary care doctor.  She expressed understanding.

## 2018-11-14 ENCOUNTER — OFFICE VISIT (OUTPATIENT)
Dept: OPTOMETRY | Facility: CLINIC | Age: 68
End: 2018-11-14
Payer: MEDICARE

## 2018-11-14 DIAGNOSIS — H04.123 DRY EYES, BILATERAL: ICD-10-CM

## 2018-11-14 DIAGNOSIS — H25.13 NUCLEAR SCLEROSIS OF BOTH EYES: Primary | ICD-10-CM

## 2018-11-14 DIAGNOSIS — H52.203 ASTIGMATISM OF BOTH EYES, UNSPECIFIED TYPE: ICD-10-CM

## 2018-11-14 DIAGNOSIS — H52.4 PRESBYOPIA OF BOTH EYES: ICD-10-CM

## 2018-11-14 DIAGNOSIS — H43.393 VITREOUS FLOATERS OF BOTH EYES: ICD-10-CM

## 2018-11-14 PROCEDURE — 99999 PR PBB SHADOW E&M-EST. PATIENT-LVL III: CPT | Mod: PBBFAC,,, | Performed by: OPTOMETRIST

## 2018-11-14 PROCEDURE — 92004 COMPRE OPH EXAM NEW PT 1/>: CPT | Mod: S$GLB,,, | Performed by: OPTOMETRIST

## 2018-11-14 PROCEDURE — 92015 DETERMINE REFRACTIVE STATE: CPT | Mod: S$GLB,,, | Performed by: OPTOMETRIST

## 2018-11-14 NOTE — PROGRESS NOTES
"HPI     Patient's age: 68 y.o. AA female  Occupation: retired  Approximate date of last eye examination:  November 2017  Name of last eye doctor seen: unknown  Wears glasses? yes     If yes, wears  Full-time or part-time?  Full-time  Present glasses are: Bifocal, SV Distance, SV Reading?  Bifocal  Approximate age of present glasses:  1 year   Got new glasses following last exam, or subsequently?:  yes   Any problem with VA with glasses?  Yes seeing like spider webs   Wears CLs?:  no  Headaches?  no  Eye pain/discomfort?  Dry eyes - using OTC artificial tears (Visine   Tears?)                                                                                Flashes?  no  Floaters?    Sees "spots" as noted above  Diplopia/Double vision?  no  Patient's Ocular History:         Any eye surgeries? no         Any eye injury?  no         Any treatment for eye disease?  no  Family history of eye disease?  no  Significant patient medical history:         1. Diabetes?  no       If yes, IDDM or NIDDM? no   2. HBP?  Yes controlled with meds              3. Other (describe):  Poor circulation in her legs    ! OTC eyedrops currently using:  Yes    ! Prescription eye meds currently using:  no   ! Any history of allergy/adverse reaction to any eye meds used   previously?  no    ! Any history of allergy/adverse reaction to eyedrops used during prior   eye exam(s)? no    ! Any history of allergy/adverse reaction to Novacaine or similar meds?   no   ! Any history of allergy/adverse reaction to Epinephrine or similar meds?   no    ! Patient okay with use of anesthetic eyedrops to check eye pressure?    yes        ! Patient okay with use of eyedrops to dilate pupils today?  yes   !  Allergies/Medications/Medical History/Family History reviewed today?    yes      PD =   64/60  Desired reading distance =   18"                                                             Last edited by Magdaleno Dutta, OD on 11/14/2018  8:37 AM. (History)    "         Assessment /Plan     For exam results, see Encounter Report.    1. Nuclear sclerosis of both eyes     2. Vitreous floaters of both eyes     3. Astigmatism of both eyes, unspecified type     4. Presbyopia of both eyes     5. Dry eyes, bilateral                    Nuclear sclerosis of lens of both eyes, consistent with age.  Anterior vitreous floater(s) in both eyes, without evidence of retinal etiology.  Generally, good ocular health in both eyes.     Astigmatic refractive error in each eye, with very satisfactory best-corrected visual acuity in each eye.  Presbyopia consistent with age.  New spectacle lens Rx issued for use as desired.  Recommend full-time wear.    Recheck in one year - or prior if any problems or changes in vision noted in the interim.    Dry eye symptoms.  Suggest regular and diligent use of either Systane or Optive artificial tears for use in both eyes as often as desired throughout the day.  Return if symptoms not adequately relieved for consideration of adjunctive dry eye treatment(s).

## 2018-11-14 NOTE — PATIENT INSTRUCTIONS
Nuclear sclerosis of lens of both eyes, consistent with age.  Anterior vitreous floater(s) in both eyes, without evidence of retinal etiology.  Generally, good ocular health in both eyes.     Astigmatic refractive error in each eye, with very satisfactory best-corrected visual acuity in each eye.  Presbyopia consistent with age.  New spectacle lens Rx issued for use as desired.  Recommend full-time wear.    Recheck in one year - or prior if any problems or changes in vision noted in the interim.    Dry eye symptoms.  Suggest regular and diligent use of either Systane or Optive artificial tears for use in both eyes as often as desired throughout the day.  Return if symptoms not adequately relieved for consideration of adjunctive dry eye treatment(s).

## 2018-11-27 ENCOUNTER — ANTI-COAG VISIT (OUTPATIENT)
Dept: CARDIOLOGY | Facility: CLINIC | Age: 68
End: 2018-11-27
Payer: MEDICARE

## 2018-11-27 DIAGNOSIS — Z79.01 LONG TERM CURRENT USE OF ANTICOAGULANT THERAPY: Primary | ICD-10-CM

## 2018-11-27 DIAGNOSIS — I48.0 PAROXYSMAL ATRIAL FIBRILLATION: ICD-10-CM

## 2018-11-27 LAB — INR PPP: 2.5 (ref 2–3)

## 2018-11-27 PROCEDURE — 99211 OFF/OP EST MAY X REQ PHY/QHP: CPT | Mod: 25,S$GLB,,

## 2018-11-27 PROCEDURE — 85610 PROTHROMBIN TIME: CPT | Mod: QW,S$GLB,, | Performed by: INTERNAL MEDICINE

## 2018-11-27 NOTE — PROGRESS NOTES
INR within normal range today. Patient with bruises on arms from use, denies any bleeding or changes. Patient is stable on this dose. She will continue this dose until follow-up in 6 weeks. I advised her to contact us with any changes or problems.

## 2019-01-08 ENCOUNTER — ANTI-COAG VISIT (OUTPATIENT)
Dept: CARDIOLOGY | Facility: CLINIC | Age: 69
End: 2019-01-08
Payer: MEDICARE

## 2019-01-08 DIAGNOSIS — I48.0 PAROXYSMAL ATRIAL FIBRILLATION: ICD-10-CM

## 2019-01-08 DIAGNOSIS — Z79.01 LONG TERM CURRENT USE OF ANTICOAGULANT THERAPY: Primary | ICD-10-CM

## 2019-01-08 LAB — INR PPP: 2.4 (ref 2–3)

## 2019-01-08 PROCEDURE — 85610 POCT INR: ICD-10-PCS | Mod: QW,S$GLB,,

## 2019-01-08 PROCEDURE — 85610 PROTHROMBIN TIME: CPT | Mod: QW,S$GLB,,

## 2019-01-08 PROCEDURE — 99211 PR OFFICE/OUTPT VISIT, EST, LEVL I: ICD-10-PCS | Mod: 25,S$GLB,,

## 2019-01-08 PROCEDURE — 99211 OFF/OP EST MAY X REQ PHY/QHP: CPT | Mod: 25,S$GLB,,

## 2019-01-08 NOTE — PROGRESS NOTES
INR good. No changes in health or diet. Patient reports starting zantac but stopped and another medication for GERD but cannot remember name and stopped it as well. She uses gas-x prn. No signs or symptoms of bleeding. INR stable. Maintain current dose and repeat INR in 6 weeks.

## 2019-02-19 ENCOUNTER — ANTI-COAG VISIT (OUTPATIENT)
Dept: CARDIOLOGY | Facility: CLINIC | Age: 69
End: 2019-02-19
Payer: MEDICARE

## 2019-02-19 DIAGNOSIS — Z79.01 LONG TERM CURRENT USE OF ANTICOAGULANT THERAPY: Primary | ICD-10-CM

## 2019-02-19 DIAGNOSIS — I48.0 PAROXYSMAL ATRIAL FIBRILLATION: ICD-10-CM

## 2019-02-19 LAB — INR PPP: 2.6 (ref 2–3)

## 2019-02-19 PROCEDURE — 99211 OFF/OP EST MAY X REQ PHY/QHP: CPT | Mod: 25,S$GLB,, | Performed by: INTERNAL MEDICINE

## 2019-02-19 PROCEDURE — 85610 PROTHROMBIN TIME: CPT | Mod: QW,S$GLB,, | Performed by: INTERNAL MEDICINE

## 2019-02-19 PROCEDURE — 85610 POCT INR: ICD-10-PCS | Mod: QW,S$GLB,, | Performed by: INTERNAL MEDICINE

## 2019-02-19 PROCEDURE — 99211 PR OFFICE/OUTPT VISIT, EST, LEVL I: ICD-10-PCS | Mod: 25,S$GLB,, | Performed by: INTERNAL MEDICINE

## 2019-02-19 NOTE — PROGRESS NOTES
INR good. Patient reports cold symptoms and taking coricidin. I recommended she also try zyrtec or claritin. No other changes. No signs or symptoms of bleeding. Continue maintenance dose and Recheck INR in 6 weeks

## 2019-03-25 ENCOUNTER — PATIENT MESSAGE (OUTPATIENT)
Dept: SPORTS MEDICINE | Facility: CLINIC | Age: 69
End: 2019-03-25

## 2019-03-31 ENCOUNTER — PATIENT MESSAGE (OUTPATIENT)
Dept: CARDIOLOGY | Facility: CLINIC | Age: 69
End: 2019-03-31

## 2019-04-01 DIAGNOSIS — I48.0 PAROXYSMAL ATRIAL FIBRILLATION: ICD-10-CM

## 2019-04-01 RX ORDER — WARFARIN 1 MG/1
1 TABLET ORAL DAILY
Qty: 90 TABLET | Refills: 3 | Status: ON HOLD | OUTPATIENT
Start: 2019-04-01 | End: 2019-12-28 | Stop reason: SDUPTHER

## 2019-04-02 ENCOUNTER — ANTI-COAG VISIT (OUTPATIENT)
Dept: CARDIOLOGY | Facility: CLINIC | Age: 69
End: 2019-04-02
Payer: MEDICARE

## 2019-04-02 DIAGNOSIS — Z79.01 LONG TERM CURRENT USE OF ANTICOAGULANT THERAPY: Primary | ICD-10-CM

## 2019-04-02 DIAGNOSIS — I48.0 PAROXYSMAL ATRIAL FIBRILLATION: ICD-10-CM

## 2019-04-02 LAB — INR PPP: 2.2 (ref 2–3)

## 2019-04-02 PROCEDURE — 99211 PR OFFICE/OUTPT VISIT, EST, LEVL I: ICD-10-PCS | Mod: 25,S$GLB,,

## 2019-04-02 PROCEDURE — 85610 POCT INR: ICD-10-PCS | Mod: QW,S$GLB,,

## 2019-04-02 PROCEDURE — 85610 PROTHROMBIN TIME: CPT | Mod: QW,S$GLB,,

## 2019-04-02 PROCEDURE — 99211 OFF/OP EST MAY X REQ PHY/QHP: CPT | Mod: 25,S$GLB,,

## 2019-04-02 NOTE — PROGRESS NOTES
INR good. Patient reports she started eating a little nuts daily since last week. She was advised that nuts are ok in moderation. She reports pulling a muscle possibly this AM. She was advised ice and rest today. No other changes. No signs or symptoms of bleeding. Maintain dose and Recheck INR in 6 weeks

## 2019-04-03 DIAGNOSIS — I48.0 PAROXYSMAL ATRIAL FIBRILLATION: ICD-10-CM

## 2019-04-03 RX ORDER — WARFARIN 1 MG/1
TABLET ORAL
Qty: 90 TABLET | Refills: 3 | Status: SHIPPED | OUTPATIENT
Start: 2019-04-03 | End: 2019-04-09

## 2019-04-03 NOTE — PROGRESS NOTES
Patient reports scant epistaxis from left nare that began this morning which was not due to injury.  She states she has been packing her left nare with tissue but when she removes it she has scant bleeding, she is using Flonase erratically and not as prescribed, and she is having allergy symptoms.  I instructed patient to not pack her nose with tissue as when she removes the tissue it is also removing the formed scabbed causing the scant bleeding, she is to instead hold brief pressure to the outside left nostril of her nose to assist with bleeding control, and if bleeding continues she should be evaluated by a physician immediately which she verbalized understanding.

## 2019-04-09 ENCOUNTER — OFFICE VISIT (OUTPATIENT)
Dept: RHEUMATOLOGY | Facility: CLINIC | Age: 69
End: 2019-04-09
Payer: MEDICARE

## 2019-04-09 ENCOUNTER — HOSPITAL ENCOUNTER (OUTPATIENT)
Dept: RADIOLOGY | Facility: HOSPITAL | Age: 69
Discharge: HOME | End: 2019-04-09
Attending: STUDENT IN AN ORGANIZED HEALTH CARE EDUCATION/TRAINING PROGRAM
Payer: MEDICARE

## 2019-04-09 VITALS
DIASTOLIC BLOOD PRESSURE: 67 MMHG | HEIGHT: 72 IN | HEART RATE: 56 BPM | WEIGHT: 245.38 LBS | SYSTOLIC BLOOD PRESSURE: 117 MMHG | BODY MASS INDEX: 33.23 KG/M2

## 2019-04-09 DIAGNOSIS — M54.41 LOW BACK PAIN WITH BILATERAL SCIATICA, UNSPECIFIED BACK PAIN LATERALITY, UNSPECIFIED CHRONICITY: ICD-10-CM

## 2019-04-09 DIAGNOSIS — M54.42 LOW BACK PAIN WITH BILATERAL SCIATICA, UNSPECIFIED BACK PAIN LATERALITY, UNSPECIFIED CHRONICITY: ICD-10-CM

## 2019-04-09 DIAGNOSIS — M70.62 GREATER TROCHANTERIC BURSITIS OF BOTH HIPS: ICD-10-CM

## 2019-04-09 DIAGNOSIS — M54.41 LOW BACK PAIN WITH BILATERAL SCIATICA, UNSPECIFIED BACK PAIN LATERALITY, UNSPECIFIED CHRONICITY: Primary | ICD-10-CM

## 2019-04-09 DIAGNOSIS — I10 HYPERTENSION, UNSPECIFIED TYPE: ICD-10-CM

## 2019-04-09 DIAGNOSIS — M54.42 LOW BACK PAIN WITH BILATERAL SCIATICA, UNSPECIFIED BACK PAIN LATERALITY, UNSPECIFIED CHRONICITY: Primary | ICD-10-CM

## 2019-04-09 DIAGNOSIS — M70.61 GREATER TROCHANTERIC BURSITIS OF BOTH HIPS: ICD-10-CM

## 2019-04-09 DIAGNOSIS — I48.20 CHRONIC ATRIAL FIBRILLATION: ICD-10-CM

## 2019-04-09 PROBLEM — J30.2 SEASONAL ALLERGIC RHINITIS: Status: ACTIVE | Noted: 2019-04-09

## 2019-04-09 PROBLEM — E78.1 ENDOGENOUS HYPERLIPEMIA: Status: ACTIVE | Noted: 2019-04-09

## 2019-04-09 PROBLEM — I25.10 ATHEROSCLEROSIS OF CORONARY ARTERY: Status: ACTIVE | Noted: 2019-04-09

## 2019-04-09 PROCEDURE — 72100 X-RAY EXAM L-S SPINE 2/3 VWS: CPT | Mod: 26,,, | Performed by: RADIOLOGY

## 2019-04-09 PROCEDURE — 73521 X-RAY EXAM HIPS BI 2 VIEWS: CPT | Mod: 26,,, | Performed by: RADIOLOGY

## 2019-04-09 PROCEDURE — 3078F PR MOST RECENT DIASTOLIC BLOOD PRESSURE < 80 MM HG: ICD-10-PCS | Mod: CPTII,GC,S$GLB, | Performed by: STUDENT IN AN ORGANIZED HEALTH CARE EDUCATION/TRAINING PROGRAM

## 2019-04-09 PROCEDURE — 99999 PR PBB SHADOW E&M-EST. PATIENT-LVL IV: CPT | Mod: PBBFAC,GC,, | Performed by: STUDENT IN AN ORGANIZED HEALTH CARE EDUCATION/TRAINING PROGRAM

## 2019-04-09 PROCEDURE — 3074F PR MOST RECENT SYSTOLIC BLOOD PRESSURE < 130 MM HG: ICD-10-PCS | Mod: CPTII,GC,S$GLB, | Performed by: STUDENT IN AN ORGANIZED HEALTH CARE EDUCATION/TRAINING PROGRAM

## 2019-04-09 PROCEDURE — 99204 OFFICE O/P NEW MOD 45 MIN: CPT | Mod: GC,S$GLB,, | Performed by: STUDENT IN AN ORGANIZED HEALTH CARE EDUCATION/TRAINING PROGRAM

## 2019-04-09 PROCEDURE — 72100 XR LUMBAR SPINE AP AND LATERAL: ICD-10-PCS | Mod: 26,,, | Performed by: RADIOLOGY

## 2019-04-09 PROCEDURE — 3078F DIAST BP <80 MM HG: CPT | Mod: CPTII,GC,S$GLB, | Performed by: STUDENT IN AN ORGANIZED HEALTH CARE EDUCATION/TRAINING PROGRAM

## 2019-04-09 PROCEDURE — 1101F PR PT FALLS ASSESS DOC 0-1 FALLS W/OUT INJ PAST YR: ICD-10-PCS | Mod: CPTII,GC,S$GLB, | Performed by: STUDENT IN AN ORGANIZED HEALTH CARE EDUCATION/TRAINING PROGRAM

## 2019-04-09 PROCEDURE — 73521 X-RAY EXAM HIPS BI 2 VIEWS: CPT | Mod: TC

## 2019-04-09 PROCEDURE — 3074F SYST BP LT 130 MM HG: CPT | Mod: CPTII,GC,S$GLB, | Performed by: STUDENT IN AN ORGANIZED HEALTH CARE EDUCATION/TRAINING PROGRAM

## 2019-04-09 PROCEDURE — 73521 XR HIPS BILATERAL 2 VIEW INCL AP PELVIS: ICD-10-PCS | Mod: 26,,, | Performed by: RADIOLOGY

## 2019-04-09 PROCEDURE — 1101F PT FALLS ASSESS-DOCD LE1/YR: CPT | Mod: CPTII,GC,S$GLB, | Performed by: STUDENT IN AN ORGANIZED HEALTH CARE EDUCATION/TRAINING PROGRAM

## 2019-04-09 PROCEDURE — 99204 PR OFFICE/OUTPT VISIT, NEW, LEVL IV, 45-59 MIN: ICD-10-PCS | Mod: GC,S$GLB,, | Performed by: STUDENT IN AN ORGANIZED HEALTH CARE EDUCATION/TRAINING PROGRAM

## 2019-04-09 PROCEDURE — 99999 PR PBB SHADOW E&M-EST. PATIENT-LVL IV: ICD-10-PCS | Mod: PBBFAC,GC,, | Performed by: STUDENT IN AN ORGANIZED HEALTH CARE EDUCATION/TRAINING PROGRAM

## 2019-04-09 PROCEDURE — 72100 X-RAY EXAM L-S SPINE 2/3 VWS: CPT | Mod: TC

## 2019-04-09 ASSESSMENT — ROUTINE ASSESSMENT OF PATIENT INDEX DATA (RAPID3)
PATIENT GLOBAL ASSESSMENT SCORE: 7
PSYCHOLOGICAL DISTRESS SCORE: 2
MDHAQ FUNCTION SCORE: .6
TOTAL RAPID3 SCORE: 6
FATIGUE SCORE: 5
PAIN SCORE: 9

## 2019-04-09 NOTE — PROGRESS NOTES
Subjective:       Patient ID: Jayla Reagan is a 69 y.o. female.    Chief Complaint: Disease Management  Hip pain     HPI   Ms. Reagan is a 69 year old female with past medical history of Afib, HTN, PVD, b/l greater trochanteric bursitis, OA of lumbar spine here for evaluation of hip pain.     She states that she has been having b/l hip pain for 2 years. Pain is on lateral area of hips. The longer she has the hip pain she says then she gets a burning pain radiating pain down her leg (more so in the right leg). Pain is worse with activity, like when she goes to mall and is walking for an hour or so. Also worse when she lies on that particular side. Heat makes it feel better and she takes tylenol. (she does not takes NSAIDs as she is on warfarin). She has been through physical therapy x2, last in 8mos ago which did not help. At times gets pins and needles in ankles.     She saw rheumatologist 1 year ago in [facility]. Who sent her to therapy.       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   HENT: Negative for mouth sores.         Negative hair loss    Eyes: Negative for pain and redness.   Respiratory: Positive for shortness of breath. Negative for chest tightness.    Cardiovascular: Negative for chest pain and leg swelling.   Gastrointestinal: Negative for abdominal pain, blood in stool, constipation and diarrhea.   Endocrine: Negative for cold intolerance.   Genitourinary: Negative for dysuria.   Musculoskeletal: Positive for back pain. Negative for arthralgias, neck pain and neck stiffness.   Skin: Negative for rash.   Neurological: Positive for numbness and headaches.         Past Medical History:   Diagnosis Date    Atrial fibrillation     Cataract     Hypertension     PAD (peripheral artery disease)     Peripheral artery  disease        Past Surgical History:   Procedure Laterality Date    BREAST SURGERY      CHOLECYSTECTOMY      DILATION-CURETTAGE N/A 10/25/2017    [facility]    ECTOPIC PREGNANCY SURGERY      SALPINGECTOMY      XI ROBOT ASSISTED LAPAROSCOPIC SALPINGO-OOPHERECTOMY Bilateral 5/15/2018    [facility]    XI ROBOTIC ASSISTED LAPAROSCOPIC HYSTERECTOMY N/A 5/15/2018    Performed [facility] at [facility] OR 2ND FLR       Family History   Problem Relation Age of Onset    Cataracts Mother     Cataracts Sister        Social History     Socioeconomic History    Marital status: Legally      Spouse name: Not on file    Number of children: Not on file    Years of education: Not on file    Highest education level: Not on file   Occupational History    Not on file   Social Needs    Financial resource strain: Not on file    Food insecurity:     Worry: Not on file     Inability: Not on file    Transportation needs:     Medical: Not on file     Non-medical: Not on file   Tobacco Use    Smoking status: Former Smoker    Smokeless tobacco: Never Used   Substance and Sexual Activity    Alcohol use: No    Drug use: No    Sexual activity: Not on file   Lifestyle    Physical activity:     Days per week: Not on file     Minutes per session: Not on file    Stress: Not on file   Relationships    Social connections:     Talks on phone: Not on file     Gets together: Not on file     Attends Church service: Not on file     Active member of club or organization: Not on file     Attends meetings of clubs or organizations: Not on file     Relationship status: Not on file   Other Topics Concern    Not on file   Social History Narrative    ** Merged History Encounter **            Current Outpatient Medications   Medication Sig Dispense Refill    acetaminophen (TYLENOL) 650 MG TbSR Take 650 mg by mouth 2 (two) times daily as needed.       cholecalciferol, vitamin D3, (VITAMIN D3) 5,000 unit Tab Take 5,000 Units by mouth once a week. Takes on Saturday      cyanocobalamin, vitamin B-12, 50 mcg tablet Take 50 mcg by mouth every morning. Pt will verify the strength      diltiaZEM (CARDIZEM) 60 MG tablet diltiazem 60 mg tablet      fosinopril (MONOPRIL) 20 MG tablet Take 20 mg by mouth every evening.       metoprolol succinate (TOPROL-XL) 100 MG 24 hr tablet Take 100 mg by mouth 2 (two) times daily. TAKES HALF OF TABLET IN AM (50 MG) AND 1 TABLET AT NIGHT (100 MG) FOR TOTAL  MG DAILY      triamterene-hydrochlorothiazide 37.5-25 mg (DYAZIDE) 37.5-25 mg per capsule Take 1 capsule by mouth every morning.   1    warfarin (COUMADIN) 1 MG tablet Take 1 tablet (1 mg total) by mouth Daily. 1 Mg daily except Mondays and Fridays take 0.5 mg 90 tablet 3    flecainide (TAMBOCOR) 150 MG Tab Take 2 tablets (300 mg total) by mouth once as needed (atrial fibrillation). 4 tablet 0    fluticasone (FLONASE) 50 mcg/actuation nasal spray 1 spray by Each Nare route daily as needed for Rhinitis.       No current facility-administered medications for this visit.        Review of patient's allergies indicates:   Allergen Reactions    Norco [hydrocodone-acetaminophen] Hives         Objective:   /67   Pulse (!) 56   Ht 6' (1.829 m)   Wt 111.3 kg (245 lb 6 oz)   BMI 33.28 kg/m²      Physical Exam   Constitutional: She is oriented to person, place, and time and well-developed, well-nourished, and in no distress. No distress.   HENT:   Head: Normocephalic and atraumatic.   Mouth/Throat: No oropharyngeal exudate.   Eyes: Conjunctivae and EOM are normal. Pupils are equal, round, and reactive to light. No scleral icterus.   Neck: Normal range of motion. Neck supple.   Cardiovascular: Normal rate and regular rhythm.    No murmur heard.  Pulmonary/Chest: Effort normal and breath sounds normal. No respiratory distress. She has no wheezes.   Abdominal: Soft. She  exhibits no distension.   Neurological: She is alert and oriented to person, place, and time.   Skin: Skin is warm and dry. No rash noted. She is not diaphoretic.     Musculoskeletal: Normal range of motion. She exhibits no edema.   No large or small joint synovitis   +straight leg raise  Tenderness on palpation of b/l greater trochanteric bursitis as well as on external rotation of hip  B/l crepitus in knees.            Assessment:       1. Low back pain with bilateral sciatica, unspecified back pain laterality, unspecified chronicity    2. Greater trochanteric bursitis of both hips            Plan:       Ms. Reagan is a 69 year old female with past medical history of Afib, HTN, PVD, b/l greater trochanteric bursitis, OA of lumbar spine here for evaluation of hip pain.   Based on history and physical it seems that patients sciatic burning pain is likely related to lumbar spine DJD. She likely also has a component of b/l greater trochanteric bursitis     #lower back pain/hip pain with sciatica   -will get lumbar and hip xrays   -no NSAIDs due to pt on warfarin   -continue tylenol  -she does not want anymore PT as she says it aggravated her pain in past    #b/l greater trochanteric bursitis   -will get xrays    -she does not want anymore PT as she says it aggravated her pain in past    #Afib  -on coumadin    #HTN   -on fosinopril       Patient seen and discussed with Dr. George     RTC in 4-6 weeks     Hong Akers MD  Rheumatology PGY 4

## 2019-04-23 NOTE — PROGRESS NOTES
I have personally taken the history and examined the patient to verify the fellow's notation, and I agree with the impression and plan stated.   PE c/w trochanteric bursitis but sx are not severe enough to warrant injection.  WD

## 2019-05-13 NOTE — PROGRESS NOTES
Subjective:       Patient ID: Jayla Reagan is a 69 y.o. female.    Chief Complaint: Follow-up  Hip pain     HPI   Ms. Reagan is a 69 year old female with past medical history of Afib, HTN, PVD, b/l greater trochanteric bursitis, OA of lumbar spine here for follow up of bursitis     Initial history 4/2019:    She states that she has been having b/l hip pain for 2 years. Pain is on lateral area of hips. The longer she has the hip pain she says then she gets a burning pain radiating pain down her leg (more so in the right leg). Pain is worse with activity, like when she goes to mall and is walking for an hour or so. Also worse when she lies on that particular side. Heat makes it feel better and she takes tylenol. (she does not takes NSAIDs as she is on warfarin). She has been through physical therapy x2, last in 8mos ago which did not help. At times gets pins and needles in ankles.     She saw rheumatologist 1 year ago in [facility]. Who sent her to therapy.   No autoimmune condition in family history   Retired, cook in past, denies smoking, alcohol use     Interval history :  Still is having b/l hip pain worse on right than left. Worse when she lays on that side. No pain in the groin. Worse when she sits for too long or when she does too much activity. Only taking tylenol. Used [place]-emu which would reduce the pain a little but then it would come back. Heat help while she has it on.     Labs: 4/2019: showing CBC normocytic anemia, normal wct and plt, Cr at 1.0/GFR >60, LFTs wnl   XRAY of hips b/l showing mild DJD  XRAY of lumbar spine DJD and mild lumbar scoliosis.  Grade 1 L3/L4 anterolisthesis.  The disc spaces are narrowed between L3 and L5 vertebral segment.  No fracture or dislocation.  No bone destruction identified    Review of Systems   Constitutional: Negative for activity change, appetite change and fever.   HENT: Negative for mouth sores.         Negative hair loss    Eyes: Negative for pain and  redness.   Respiratory: Positive for shortness of breath. Negative for chest tightness.    Cardiovascular: Negative for chest pain and leg swelling.   Gastrointestinal: Negative for abdominal pain, blood in stool, constipation and diarrhea.   Endocrine: Negative for cold intolerance.   Genitourinary: Negative for dysuria.   Musculoskeletal: Positive for back pain. Negative for arthralgias, neck pain and neck stiffness.   Skin: Negative for rash.   Neurological: Positive for numbness and headaches.         Past Medical History:   Diagnosis Date    Atrial fibrillation     Cataract     Hypertension     PAD (peripheral artery disease)     Peripheral artery disease        Past Surgical History:   Procedure Laterality Date    BREAST SURGERY      CHOLECYSTECTOMY      DILATION-CURETTAGE N/A 10/25/2017    [facility]    ECTOPIC PREGNANCY SURGERY      SALPINGECTOMY      XI ROBOT ASSISTED LAPAROSCOPIC SALPINGO-OOPHERECTOMY Bilateral 5/15/2018    [facility]    XI ROBOTIC ASSISTED LAPAROSCOPIC HYSTERECTOMY N/A 5/15/2018    Performed by [facility] at [facility] OR [facility]       Family History   Problem Relation Age of Onset    Cataracts Mother     Cataracts Sister        Social History     Socioeconomic History    Marital status: Legally      Spouse name: Not on file    Number of children: Not on file    Years of education: Not on file    Highest education level: Not on file   Occupational History    Not on file   Social Needs    Financial resource strain: Not on file    Food insecurity:     Worry: Not on file     Inability: Not on file    Transportation needs:     Medical: Not on file     Non-medical: Not on file   Tobacco Use    Smoking status: Former Smoker    Smokeless tobacco: Never Used   Substance and Sexual Activity    Alcohol use: No    Drug use: No    Sexual activity: Not on file   Lifestyle    Physical  activity:     Days per week: Not on file     Minutes per session: Not on file    Stress: Not on file   Relationships    Social connections:     Talks on phone: Not on file     Gets together: Not on file     Attends Nondenominational service: Not on file     Active member of club or organization: Not on file     Attends meetings of clubs or organizations: Not on file     Relationship status: Not on file   Other Topics Concern    Not on file   Social History Narrative    ** Merged History Encounter **            Current Outpatient Medications   Medication Sig Dispense Refill    acetaminophen (TYLENOL) 650 MG TbSR Take 650 mg by mouth 2 (two) times daily as needed.      cholecalciferol, vitamin D3, (VITAMIN D3) 5,000 unit Tab Take 5,000 Units by mouth once a week. Takes on Saturday      cyanocobalamin, vitamin B-12, 50 mcg tablet Take 50 mcg by mouth every morning. Pt will verify the strength      diltiaZEM (CARDIZEM) 60 MG tablet diltiazem 60 mg tablet      fosinopril (MONOPRIL) 20 MG tablet Take 20 mg by mouth every evening.       metoprolol succinate (TOPROL-XL) 100 MG 24 hr tablet Take 100 mg by mouth 2 (two) times daily. TAKES HALF OF TABLET IN AM (50 MG) AND 1 TABLET AT NIGHT (100 MG) FOR TOTAL  MG DAILY      triamterene-hydrochlorothiazide 37.5-25 mg (DYAZIDE) 37.5-25 mg per capsule Take 1 capsule by mouth every morning.   1    warfarin (COUMADIN) 1 MG tablet Take 1 tablet (1 mg total) by mouth Daily. 1 Mg daily except Mondays and Fridays take 0.5 mg 90 tablet 3    flecainide (TAMBOCOR) 150 MG Tab Take 2 tablets (300 mg total) by mouth once as needed (atrial fibrillation). 4 tablet 0    fluticasone (FLONASE) 50 mcg/actuation nasal spray 1 spray by Each Nare route daily as needed for Rhinitis.       No current facility-administered medications for this visit.        Review of patient's allergies indicates:   Allergen Reactions    Norco [hydrocodone-acetaminophen] Hives         Objective:   BP (!)  151/84   Pulse (!) 57   Ht 6' (1.829 m)   Wt 112 kg (246 lb 14.6 oz)   BMI 33.49 kg/m²      Physical Exam   Constitutional: She is oriented to person, place, and time and well-developed, well-nourished, and in no distress. No distress.   HENT:   Head: Normocephalic and atraumatic.   Mouth/Throat: No oropharyngeal exudate.   Eyes: Conjunctivae and EOM are normal. Pupils are equal, round, and reactive to light. No scleral icterus.   Neck: Normal range of motion. Neck supple.   Cardiovascular: Normal rate and regular rhythm.    No murmur heard.  Pulmonary/Chest: Effort normal and breath sounds normal. No respiratory distress. She has no wheezes.   Abdominal: Soft. She exhibits no distension.   Neurological: She is alert and oriented to person, place, and time.   Skin: Skin is warm and dry. No rash noted. She is not diaphoretic.     Musculoskeletal: Normal range of motion. She exhibits no edema.   No large or small joint synovitis   +straight leg raise  Tenderness on palpation of b/l greater trochanteric bursitis as well as on external rotation of hip  B/l crepitus in knees.          Lab Results   Component Value Date    WBC 4.15 04/09/2019    HGB 10.5 (L) 04/09/2019    HCT 31.7 (L) 04/09/2019    MCV 84 04/09/2019     04/09/2019     CMP  Sodium   Date Value Ref Range Status   04/09/2019 139 136 - 145 mmol/L Final     Potassium   Date Value Ref Range Status   04/09/2019 4.3 3.5 - 5.1 mmol/L Final     Chloride   Date Value Ref Range Status   04/09/2019 106 95 - 110 mmol/L Final     CO2   Date Value Ref Range Status   04/09/2019 27 23 - 29 mmol/L Final     Glucose   Date Value Ref Range Status   04/09/2019 93 70 - 110 mg/dL Final     BUN, Bld   Date Value Ref Range Status   04/09/2019 26 (H) 8 - 23 mg/dL Final     Creatinine   Date Value Ref Range Status   04/09/2019 1.0 0.5 - 1.4 mg/dL Final     Calcium   Date Value Ref Range Status   04/09/2019 10.1 8.7 - 10.5 mg/dL Final     Total Protein   Date Value Ref  Range Status   04/09/2019 8.2 6.0 - 8.4 g/dL Final     Albumin   Date Value Ref Range Status   04/09/2019 3.6 3.5 - 5.2 g/dL Final     Total Bilirubin   Date Value Ref Range Status   04/09/2019 0.5 0.1 - 1.0 mg/dL Final     Comment:     For infants and newborns, interpretation of results should be based  on gestational age, weight and in agreement with clinical  observations.  Premature Infant recommended reference ranges:  Up to 24 hours.............<8.0 mg/dL  Up to 48 hours............<12.0 mg/dL  3-5 days..................<15.0 mg/dL  6-29 days.................<15.0 mg/dL       Alkaline Phosphatase   Date Value Ref Range Status   04/09/2019 86 55 - 135 U/L Final     AST   Date Value Ref Range Status   04/09/2019 19 10 - 40 U/L Final     ALT   Date Value Ref Range Status   04/09/2019 20 10 - 44 U/L Final     Anion Gap   Date Value Ref Range Status   04/09/2019 6 (L) 8 - 16 mmol/L Final     eGFR if    Date Value Ref Range Status   04/09/2019 >60.0 >60 mL/min/1.73 m^2 Final     eGFR if non    Date Value Ref Range Status   04/09/2019 57.2 (A) >60 mL/min/1.73 m^2 Final     Comment:     Calculation used to obtain the estimated glomerular filtration  rate (eGFR) is the CKD-EPI equation.        X-Ray Hips Bilateral 2 View Inc AP Pelvis  Narrative: EXAMINATION:  XR HIPS BILATERAL 2 VIEW INCL AP PELVIS    CLINICAL HISTORY:  Lumbago with sciatica, left side    TECHNIQUE:  AP view of the pelvis and frogleg lateral views of both hips were performed.    COMPARISON:  None.    FINDINGS:  Mild DJD.  No fracture or dislocation.  No bone destruction identified.  Impression: See above    Electronically signed by: Naun Banuelos MD  Date:    04/09/2019  Time:    12:13  X-Ray Lumbar Spine AP And Lateral  Narrative: EXAMINATION:  XR LUMBAR SPINE AP AND LATERAL    CLINICAL HISTORY:  Low back pain, >6wks conservative tx, persistent-progressive sx, surgical candidate;Lumbago with sciatica, left  side    TECHNIQUE:  AP, lateral and spot images were performed of the lumbar spine.    COMPARISON:  None    FINDINGS:  DJD and mild lumbar scoliosis.  Grade 1 L3/L4 anterolisthesis.  The disc spaces are narrowed between L3 and L5 vertebral segment.  No fracture or dislocation.  No bone destruction identified  Impression: See above    Electronically signed by: Naun Banuelos MD  Date:    04/09/2019  Time:    12:13    Xrays from 4/9 of hips and lumbar spine viewed with attending.     Assessment:       1. Greater trochanteric bursitis of both hips    2. Hypertension, unspecified type    3. Chronic atrial fibrillation    4. Primary osteoarthritis of hip, unspecified laterality    5. Osteoarthritis of lumbar spine, unspecified spinal osteoarthritis complication status            Plan:       Ms. Reagan is a 69 year old female with past medical history of Afib, HTN, PVD, b/l greater trochanteric bursitis, OA of lumbar spine here for evaluation of hip pain.   Based on history and physical it seems that patients sciatic burning pain is likely related to lumbar spine DJD. She likely also has a component of b/l greater trochanteric bursitis     #OA of lumbar spine   -XRAY of hips b/l showing mild DJD  -XRAY of lumbar spine DJD and mild lumbar scoliosis.  Grade 1 L3/L4 anterolisthesis.  The disc spaces are narrowed between L3 and L5 vertebral segment.  No fracture or dislocation.  No bone destruction identified  -no NSAIDs due to pt on warfarin   -continue tylenol  -she does not want anymore PT as she says it aggravated her pain in past  -we will refer her to PMR for possible lumbar spinal injections, will defer ordering MRI to them as patient seems guarded about back procedures   -if patient proceeds with procedure they will need to coordinate care with patients cardiologist as they will likely need to hold her coumadin     #b/l greater trochanteric bursitis   -XRAYs as noted above   -she does not want anymore PT as she says it  aggravated her pain in past  -her pain seems to be more referred from her lumbar spine DJD, please see plan as above     #Afib  -on coumadin  -cannot use NSAIDs     #HTN   -on fosinopril       Patient seen and discussed with Dr. George     RTC PRN    Hong Akers MD  Rheumatology PGY 4

## 2019-05-14 ENCOUNTER — OFFICE VISIT (OUTPATIENT)
Dept: RHEUMATOLOGY | Facility: CLINIC | Age: 69
End: 2019-05-14
Payer: MEDICARE

## 2019-05-14 ENCOUNTER — ANTI-COAG VISIT (OUTPATIENT)
Dept: CARDIOLOGY | Facility: CLINIC | Age: 69
End: 2019-05-14
Payer: MEDICARE

## 2019-05-14 VITALS
HEIGHT: 72 IN | WEIGHT: 246.94 LBS | SYSTOLIC BLOOD PRESSURE: 151 MMHG | HEART RATE: 57 BPM | BODY MASS INDEX: 33.45 KG/M2 | DIASTOLIC BLOOD PRESSURE: 84 MMHG

## 2019-05-14 DIAGNOSIS — M16.10 PRIMARY OSTEOARTHRITIS OF HIP, UNSPECIFIED LATERALITY: ICD-10-CM

## 2019-05-14 DIAGNOSIS — M70.61 GREATER TROCHANTERIC BURSITIS OF BOTH HIPS: Primary | ICD-10-CM

## 2019-05-14 DIAGNOSIS — I48.20 CHRONIC ATRIAL FIBRILLATION: ICD-10-CM

## 2019-05-14 DIAGNOSIS — I10 HYPERTENSION, UNSPECIFIED TYPE: ICD-10-CM

## 2019-05-14 DIAGNOSIS — Z79.01 LONG TERM CURRENT USE OF ANTICOAGULANT THERAPY: Primary | ICD-10-CM

## 2019-05-14 DIAGNOSIS — M47.816 OSTEOARTHRITIS OF LUMBAR SPINE, UNSPECIFIED SPINAL OSTEOARTHRITIS COMPLICATION STATUS: ICD-10-CM

## 2019-05-14 DIAGNOSIS — I48.0 PAROXYSMAL ATRIAL FIBRILLATION: ICD-10-CM

## 2019-05-14 DIAGNOSIS — M70.62 GREATER TROCHANTERIC BURSITIS OF BOTH HIPS: Primary | ICD-10-CM

## 2019-05-14 LAB — INR PPP: 2.8 (ref 2–3)

## 2019-05-14 PROCEDURE — 1101F PT FALLS ASSESS-DOCD LE1/YR: CPT | Mod: CPTII,GC,S$GLB, | Performed by: STUDENT IN AN ORGANIZED HEALTH CARE EDUCATION/TRAINING PROGRAM

## 2019-05-14 PROCEDURE — 85610 PROTHROMBIN TIME: CPT | Mod: QW,S$GLB,, | Performed by: INTERNAL MEDICINE

## 2019-05-14 PROCEDURE — 1125F AMNT PAIN NOTED PAIN PRSNT: CPT | Mod: GC,S$GLB,, | Performed by: STUDENT IN AN ORGANIZED HEALTH CARE EDUCATION/TRAINING PROGRAM

## 2019-05-14 PROCEDURE — 99999 PR PBB SHADOW E&M-EST. PATIENT-LVL IV: ICD-10-PCS | Mod: PBBFAC,GC,, | Performed by: STUDENT IN AN ORGANIZED HEALTH CARE EDUCATION/TRAINING PROGRAM

## 2019-05-14 PROCEDURE — 1159F MED LIST DOCD IN RCRD: CPT | Mod: GC,S$GLB,, | Performed by: STUDENT IN AN ORGANIZED HEALTH CARE EDUCATION/TRAINING PROGRAM

## 2019-05-14 PROCEDURE — 99213 PR OFFICE/OUTPT VISIT, EST, LEVL III, 20-29 MIN: ICD-10-PCS | Mod: GC,S$GLB,, | Performed by: STUDENT IN AN ORGANIZED HEALTH CARE EDUCATION/TRAINING PROGRAM

## 2019-05-14 PROCEDURE — 85610 POCT INR: ICD-10-PCS | Mod: QW,S$GLB,, | Performed by: INTERNAL MEDICINE

## 2019-05-14 PROCEDURE — 93793 PR ANTICOAGULANT MGMT FOR PT TAKING WARFARIN: ICD-10-PCS | Mod: S$GLB,,,

## 2019-05-14 PROCEDURE — 1159F PR MEDICATION LIST DOCUMENTED IN MEDICAL RECORD: ICD-10-PCS | Mod: GC,S$GLB,, | Performed by: STUDENT IN AN ORGANIZED HEALTH CARE EDUCATION/TRAINING PROGRAM

## 2019-05-14 PROCEDURE — 1125F PR PAIN SEVERITY QUANTIFIED, PAIN PRESENT: ICD-10-PCS | Mod: GC,S$GLB,, | Performed by: STUDENT IN AN ORGANIZED HEALTH CARE EDUCATION/TRAINING PROGRAM

## 2019-05-14 PROCEDURE — 3077F PR MOST RECENT SYSTOLIC BLOOD PRESSURE >= 140 MM HG: ICD-10-PCS | Mod: CPTII,GC,S$GLB, | Performed by: STUDENT IN AN ORGANIZED HEALTH CARE EDUCATION/TRAINING PROGRAM

## 2019-05-14 PROCEDURE — 99213 OFFICE O/P EST LOW 20 MIN: CPT | Mod: GC,S$GLB,, | Performed by: STUDENT IN AN ORGANIZED HEALTH CARE EDUCATION/TRAINING PROGRAM

## 2019-05-14 PROCEDURE — 99999 PR PBB SHADOW E&M-EST. PATIENT-LVL IV: CPT | Mod: PBBFAC,GC,, | Performed by: STUDENT IN AN ORGANIZED HEALTH CARE EDUCATION/TRAINING PROGRAM

## 2019-05-14 PROCEDURE — 93793 ANTICOAG MGMT PT WARFARIN: CPT | Mod: S$GLB,,,

## 2019-05-14 PROCEDURE — 3077F SYST BP >= 140 MM HG: CPT | Mod: CPTII,GC,S$GLB, | Performed by: STUDENT IN AN ORGANIZED HEALTH CARE EDUCATION/TRAINING PROGRAM

## 2019-05-14 PROCEDURE — 1101F PR PT FALLS ASSESS DOC 0-1 FALLS W/OUT INJ PAST YR: ICD-10-PCS | Mod: CPTII,GC,S$GLB, | Performed by: STUDENT IN AN ORGANIZED HEALTH CARE EDUCATION/TRAINING PROGRAM

## 2019-05-14 PROCEDURE — 3079F DIAST BP 80-89 MM HG: CPT | Mod: CPTII,GC,S$GLB, | Performed by: STUDENT IN AN ORGANIZED HEALTH CARE EDUCATION/TRAINING PROGRAM

## 2019-05-14 PROCEDURE — 3079F PR MOST RECENT DIASTOLIC BLOOD PRESSURE 80-89 MM HG: ICD-10-PCS | Mod: CPTII,GC,S$GLB, | Performed by: STUDENT IN AN ORGANIZED HEALTH CARE EDUCATION/TRAINING PROGRAM

## 2019-05-14 ASSESSMENT — ROUTINE ASSESSMENT OF PATIENT INDEX DATA (RAPID3)
PATIENT GLOBAL ASSESSMENT SCORE: 5
MDHAQ FUNCTION SCORE: .4
WHEN YOU AWAKENED IN THE MORNING OVER THE LAST WEEK, PLEASE INDICATE THE AMOUNT OF TIME IT TAKES UNTIL YOU ARE AS LIMBER AS YOU WILL BE FOR THE DAY: 30MIN
PSYCHOLOGICAL DISTRESS SCORE: 2.2
TOTAL RAPID3 SCORE: 4.78
PAIN SCORE: 8
AM STIFFNESS SCORE: 1, YES
FATIGUE SCORE: 4.5

## 2019-05-15 NOTE — PROGRESS NOTES
I have personally taken the history and examined the patient to verify the fellow's notation, and I agree with the impression and plan stated.    She has back pain for which we can do very little.  WD

## 2019-06-25 ENCOUNTER — PATIENT MESSAGE (OUTPATIENT)
Dept: RHEUMATOLOGY | Facility: CLINIC | Age: 69
End: 2019-06-25

## 2019-06-25 ENCOUNTER — ANTI-COAG VISIT (OUTPATIENT)
Dept: CARDIOLOGY | Facility: CLINIC | Age: 69
End: 2019-06-25
Payer: MEDICARE

## 2019-06-25 DIAGNOSIS — I48.0 PAROXYSMAL ATRIAL FIBRILLATION: ICD-10-CM

## 2019-06-25 DIAGNOSIS — M16.10 PRIMARY OSTEOARTHRITIS OF HIP, UNSPECIFIED LATERALITY: ICD-10-CM

## 2019-06-25 DIAGNOSIS — M47.816 OSTEOARTHRITIS OF LUMBAR SPINE, UNSPECIFIED SPINAL OSTEOARTHRITIS COMPLICATION STATUS: Primary | ICD-10-CM

## 2019-06-25 DIAGNOSIS — Z79.01 LONG TERM CURRENT USE OF ANTICOAGULANT THERAPY: Primary | ICD-10-CM

## 2019-06-25 LAB — INR PPP: 2.6 (ref 2–3)

## 2019-06-25 PROCEDURE — 93793 PR ANTICOAGULANT MGMT FOR PT TAKING WARFARIN: ICD-10-PCS | Mod: S$GLB,,,

## 2019-06-25 PROCEDURE — 85610 PROTHROMBIN TIME: CPT | Mod: QW,S$GLB,, | Performed by: INTERNAL MEDICINE

## 2019-06-25 PROCEDURE — 93793 ANTICOAG MGMT PT WARFARIN: CPT | Mod: S$GLB,,,

## 2019-06-25 PROCEDURE — 85610 POCT INR: ICD-10-PCS | Mod: QW,S$GLB,, | Performed by: INTERNAL MEDICINE

## 2019-06-25 NOTE — PROGRESS NOTES
INR good. No changes in medications, health, or diet. No signs or symptoms of bleeding. INR stable. Maintain current dose and repeat INR in 7 weeks.

## 2019-07-02 ENCOUNTER — TELEPHONE (OUTPATIENT)
Dept: PHYSICAL MEDICINE AND REHAB | Facility: CLINIC | Age: 69
End: 2019-07-02

## 2019-07-02 NOTE — TELEPHONE ENCOUNTER
Called pt to schedule appt for PM&R pt denied appt. Pt was aware of Dr. Akers's referral to PM&R.

## 2019-07-18 ENCOUNTER — OFFICE VISIT (OUTPATIENT)
Dept: DERMATOLOGY | Facility: CLINIC | Age: 69
End: 2019-07-18
Payer: MEDICARE

## 2019-07-18 DIAGNOSIS — L98.9 DERMATOSIS: Primary | ICD-10-CM

## 2019-07-18 PROCEDURE — 99203 OFFICE O/P NEW LOW 30 MIN: CPT | Mod: S$GLB,,, | Performed by: PHYSICIAN ASSISTANT

## 2019-07-18 PROCEDURE — 1101F PT FALLS ASSESS-DOCD LE1/YR: CPT | Mod: CPTII,S$GLB,, | Performed by: PHYSICIAN ASSISTANT

## 2019-07-18 PROCEDURE — 99999 PR PBB SHADOW E&M-EST. PATIENT-LVL II: CPT | Mod: PBBFAC,,, | Performed by: PHYSICIAN ASSISTANT

## 2019-07-18 PROCEDURE — 99203 PR OFFICE/OUTPT VISIT, NEW, LEVL III, 30-44 MIN: ICD-10-PCS | Mod: S$GLB,,, | Performed by: PHYSICIAN ASSISTANT

## 2019-07-18 PROCEDURE — 1101F PR PT FALLS ASSESS DOC 0-1 FALLS W/OUT INJ PAST YR: ICD-10-PCS | Mod: CPTII,S$GLB,, | Performed by: PHYSICIAN ASSISTANT

## 2019-07-18 PROCEDURE — 99999 PR PBB SHADOW E&M-EST. PATIENT-LVL II: ICD-10-PCS | Mod: PBBFAC,,, | Performed by: PHYSICIAN ASSISTANT

## 2019-07-18 RX ORDER — TRIAMCINOLONE ACETONIDE 1 MG/G
OINTMENT TOPICAL
Qty: 80 G | Refills: 0 | Status: ON HOLD | OUTPATIENT
Start: 2019-07-18 | End: 2020-01-02 | Stop reason: HOSPADM

## 2019-07-18 NOTE — PROGRESS NOTES
"  Subjective:       Patient ID:  Jayla Reagan is a 69 y.o. female who presents for   Chief Complaint   Patient presents with    Rash     chest, X1day, itchy, spreading, no tx      Rash  - Initial  Affected locations: chest  Duration: 2 days  Signs / symptoms: redness, spreading and itching  Relieving factors/Treatments tried: OTC hydrocortisone (applied bid x 1 day)  Improvement on treatment: moderate (helped with itching)    Denies associated pain, burning, or blisters.    Just noticed rash when she woke up yesterday morning. Denies doing any yardwork the day/ night prior. Unsure if she had an insect bite but remembers something "hitting" her as she walked out of the door - thinks it may have been "flying red ants."    Review of Systems   Constitutional: Negative for fever and chills.   HENT: Negative for headaches.    Gastrointestinal: Negative for nausea.   Skin: Positive for itching and rash.   Neurological: Negative for headaches.   Hematologic/Lymphatic: Bruises/bleeds easily (on coumadin).   Allergic/Immunologic: Positive for environmental allergies.        Objective:    Physical Exam   Constitutional: She appears well-developed and well-nourished. No distress.   Neurological: She is alert and oriented to person, place, and time. She is not disoriented.   Psychiatric: She has a normal mood and affect.   Skin:   Areas Examined (abnormalities noted in diagram):   Chest / Axilla Inspection Performed              Diagram Legend     Erythematous scaling macule/papule c/w actinic keratosis       Vascular papule c/w angioma      Pigmented verrucoid papule/plaque c/w seborrheic keratosis      Yellow umbilicated papule c/w sebaceous hyperplasia      Irregularly shaped tan macule c/w lentigo     1-2 mm smooth white papules consistent with Milia      Movable subcutaneous cyst with punctum c/w epidermal inclusion cyst      Subcutaneous movable cyst c/w pilar cyst      Firm pink to brown papule c/w dermatofibroma      " Pedunculated fleshy papule(s) c/w skin tag(s)      Evenly pigmented macule c/w junctional nevus     Mildly variegated pigmented, slightly irregular-bordered macule c/w mildly atypical nevus      Flesh colored to evenly pigmented papule c/w intradermal nevus       Pink pearly papule/plaque c/w basal cell carcinoma      Erythematous hyperkeratotic cursted plaque c/w SCC      Surgical scar with no sign of skin cancer recurrence      Open and closed comedones      Inflammatory papules and pustules      Verrucoid papule consistent consistent with wart     Erythematous eczematous patches and plaques     Dystrophic onycholytic nail with subungual debris c/w onychomycosis     Umbilicated papule    Erythematous-base heme-crusted tan verrucoid plaque consistent with inflamed seborrheic keratosis     Erythematous Silvery Scaling Plaque c/w Psoriasis     See annotation    Assessment / Plan:      Dermatosis, nos (most c/w papular urticaria at this time)  -     triamcinolone acetonide 0.1% (KENALOG) 0.1 % ointment; AAA chest bid  Dispense: 80 g; Refill: 0    Low suspicion for herpes zoster - no prodromal or systemic symptoms or vesicles noted on exam. Also, papules are not arranged in a dermatomal pattern.  Pt advised to call/ send message if any pain, blisters, or fever develop. If over the weekend, advised pt to go to Urgent Care for further evaluation.       Follow up if symptoms worsen or fail to improve.

## 2019-07-28 ENCOUNTER — HOSPITAL ENCOUNTER (EMERGENCY)
Facility: HOSPITAL | Age: 69
Discharge: HOME | End: 2019-07-28
Attending: EMERGENCY MEDICINE
Payer: MEDICARE

## 2019-07-28 VITALS
SYSTOLIC BLOOD PRESSURE: 125 MMHG | DIASTOLIC BLOOD PRESSURE: 70 MMHG | TEMPERATURE: 99 F | WEIGHT: 240 LBS | HEART RATE: 100 BPM | BODY MASS INDEX: 32.51 KG/M2 | OXYGEN SATURATION: 100 % | HEIGHT: 72 IN | RESPIRATION RATE: 16 BRPM

## 2019-07-28 DIAGNOSIS — S80.00XA KNEE CONTUSION: Primary | ICD-10-CM

## 2019-07-28 PROCEDURE — 99283 EMERGENCY DEPT VISIT LOW MDM: CPT | Mod: 25

## 2019-07-28 NOTE — ED PROVIDER NOTES
"Encounter Date: 7/28/2019       History     Chief Complaint   Patient presents with    Knee Pain     Pt has been having pain to right knee intermittently for 2 weeks. Today, pt noticed a knot to the outside of her right knee that is painful to touch. Pt takes Coumadin for AFib.      Patient is a 69-year-old female past medical history of AFib, hypertension, and PAD who presents ED for evaluation of intermittent right knee pain x2 weeks.  Denies injury or trauma but does report that she could have hit it on something but does not remember.  Patient states that after Jehov[employer]'s witness today she noticed a "knot" beside her right knee that is painful to touch.  Patient is on Coumadin for Afib.  No treatments tried.  States the pain is worse with movement palpation.  Denies any alleviating factors.  Denies fever, chills, neck pain/stiffness, SOB, chest pain, weakness, tingling, numbness, or any other concerns.    The history is provided by the patient.     Review of patient's allergies indicates:   Allergen Reactions    Norco [hydrocodone-acetaminophen] Hives     Past Medical History:   Diagnosis Date    Atrial fibrillation     Cataract     Hypertension     PAD (peripheral artery disease)     Peripheral artery disease      Past Surgical History:   Procedure Laterality Date    BREAST SURGERY      CHOLECYSTECTOMY      DILATION-CURETTAGE N/A 10/25/2017    [facility]    ECTOPIC PREGNANCY SURGERY      SALPINGECTOMY      XI ROBOT ASSISTED LAPAROSCOPIC SALPINGO-OOPHERECTOMY Bilateral 5/15/2018    Performed by [facility] at [facility] OR [facility]    XI ROBOTIC ASSISTED LAPAROSCOPIC HYSTERECTOMY N/A 5/15/2018    Performed by [facility] at [facility] OR [facility]     Family History   Problem Relation Age of Onset    Cataracts Mother     Cataracts Sister      Social History     Tobacco Use    Smoking status: Former Smoker    Smokeless tobacco: Never Used   Substance Use Topics    Alcohol " use: No    Drug use: No     Review of Systems   Constitutional: Negative for chills and fever.   Musculoskeletal: Positive for arthralgias (right knee pain). Negative for gait problem, neck pain and neck stiffness.   Hematological: Does not bruise/bleed easily.   All other systems reviewed and are negative.      Physical Exam     Initial Vitals [07/28/19 1414]   BP Pulse Resp Temp SpO2   121/79 68 20 98.2 °F (36.8 °C) 99 %      MAP       --         Physical Exam    Vitals reviewed.  Constitutional: She appears well-developed and well-nourished.  Non-toxic appearance. She does not have a sickly appearance.   HENT:   Head: Atraumatic.   Mouth/Throat: Oropharynx is clear and moist.   Eyes: EOM are normal.   Neck: Normal range of motion, full passive range of motion without pain and phonation normal. Neck supple.   Cardiovascular: Regular rhythm.   No calf tenderness or swelling noted bilaterally.  Negative Homans' sign bilaterally.     Pulmonary/Chest: Effort normal and breath sounds normal. No respiratory distress.   Abdominal: Soft.   Musculoskeletal:        Right knee: She exhibits swelling and ecchymosis. She exhibits normal range of motion, no effusion, no deformity, no laceration, no erythema, normal alignment, no LCL laxity, no bony tenderness, normal meniscus and no MCL laxity. Tenderness found. Lateral joint line tenderness noted.   Contusion noted to lateral aspect of right knee that is painful to touch.  No overlying warmth or surrounding erythema.  Sensation and strength intact in BLE.  Full ROM.   Neurological: She is alert and oriented to person, place, and time. She has normal strength. No sensory deficit. Gait normal.   Steady gait.   Skin: Skin is warm. No rash noted.   Psychiatric: She has a normal mood and affect.         ED Course   Procedures  Labs Reviewed - No data to display       Imaging Results          X-Ray Knee 3 View Right (Final result)  Result time 07/28/19 15:10:50    Final result by  Alfredito Lua MD (07/28/19 15:10:50)                 Impression:      No acute fracture or effusion or malalignment.    Two small linear calcifications projected the intercondylar notch potentially intra-articular joint ossicle related.      Electronically signed by: Alfredito Lua  Date:    07/28/2019  Time:    15:10             Narrative:    EXAMINATION:  XR KNEE 3 VIEW RIGHT    CLINICAL HISTORY:  Contusion of unspecified knee, initial encounter    TECHNIQUE:  AP, lateral, and Merchant views of the right knee were performed.    COMPARISON:  None    FINDINGS:  Frontal, oblique and lateral views presented.  Quadriceps tendon and patellar tendon appear normal.  No effusion or fracture.  The mineralization and joint space and alignment are normal.  There are somewhat irregular linear calcifications projecting along the medial and lateral aspect of the intercondylar notch seen best on frontal view.  This is of uncertain etiology and could represent joint bodies, less likely possibility is avulsion injury as no definite corticated margin appreciated and no donor site seen.  This is probably too far superior for meniscal root avulsion injury.                                 Medical Decision Making:   History:   Old Medical Records: I decided to obtain old medical records.  Initial Assessment:   Patient presents ED for evaluation intermittent right knee pain x2 weeks.  Denies injury trauma but does report that she could have hit it on something but does not remember.  Appears well, nontoxic.  Afebrile. VSS.  Contusion noted to lateral aspect of right knee that is painful to touch.  No overlying warmth or surrounding erythema.  Sensation strength intact in BLE.  Full ROM.  No calf tenderness or swelling noted bilaterally. Negative Homans sign bilaterally. Steady gait.  Clinical Tests:   Radiological Study: Reviewed and Ordered  ED Management:  X-ray  - Xray shows no acute abnormalities.  - I do not suspect DVT, Well's  criteria 0.  - No signs of septic joint or cellulitis.  - Patient's signs and symptoms most likely due to contusion.  - Patient is hemodynamically stable and will be discharged home.  - Patient instructed to take over-the-counter Tylenol as labeled as needed for pain and apply ice for comfort.  - Instructed to follow up with PCP in 2-3 days and return to ED for any concerns or worsening symptoms.  - Patient verbalized understanding, compliance, and agreement with treatment plan.  Other:   I discussed test(s) with the performing physician.    Additional MDM:     Well's Criteria Score:  -Clinical symptoms of DVT (leg swelling, pain with palpation) = 0.0  -Other diagnosis less likely than pulmonary embolism =            0.0  -Heart Rate >100 =   0.0  -Immobilization (= or > than 3 days) or surgery in the previous 4 weeks = 0.0  -Previous DVT/PE = 0.0  -Hemoptysis =          0.0  -Malignancy =           0.0  Well's Probability Score =    0                         Clinical Impression:       ICD-10-CM ICD-9-CM   1. Knee contusion S80.00XA 924.11                                Meño Roberto NP  07/28/19 1957

## 2019-07-28 NOTE — DISCHARGE INSTRUCTIONS
Take over-the-counter Tylenol as labeled and as needed for pain. Apply ice for comfort.  Follow up with PCP in 2-3 days and return to ED for any concerns or worsening symptoms.

## 2019-07-28 NOTE — ED NOTES
APPEARANCE: Alert, oriented and in no acute distress.  CARDIAC: Normal rate and rhythm, no murmur heard.   PERIPHERAL VASCULAR: peripheral pulses present. Normal cap refill. No edema. Warm to touch.    RESPIRATORY:Normal rate and effort, breath sounds clear bilaterally throughout chest. Respirations are equal and unlabored no obvious signs of distress.  GASTRO: soft, bowel sounds normal, no tenderness, no abdominal distention.  SKIN: Skin is warm and dry, normal skin turgor, mucous membranes moist.  NEURO: 5/5 strength major flexors/extensors bilaterally. Sensory intact to light touch bilaterally. Interior coma scale: eyes open spontaneously-4, oriented & converses-5, obeys commands-6. No neurological abnormalities.   MENTAL STATUS: awake, alert and aware of environment.  EYE: PERRL, both eyes: pupils brisk and reactive to light. Normal size.  ENT: EARS: no obvious drainage. NOSE: no active bleeding.   BREAST: symmetrical. No masses. No tenderness.  GENITALIA: Normal external genitalia.

## 2019-07-28 NOTE — ED TRIAGE NOTES
Patient reports a right leg pain that's been going on for about a week. Patient states she has been feeling a knot on her leg since yesterday and decided to come in.

## 2019-08-12 ENCOUNTER — PATIENT MESSAGE (OUTPATIENT)
Dept: OPTOMETRY | Facility: CLINIC | Age: 69
End: 2019-08-12

## 2019-08-13 ENCOUNTER — ANTI-COAG VISIT (OUTPATIENT)
Dept: CARDIOLOGY | Facility: CLINIC | Age: 69
End: 2019-08-13
Payer: MEDICARE

## 2019-08-13 DIAGNOSIS — Z79.01 LONG TERM CURRENT USE OF ANTICOAGULANT THERAPY: Primary | ICD-10-CM

## 2019-08-13 DIAGNOSIS — I48.0 PAROXYSMAL ATRIAL FIBRILLATION: ICD-10-CM

## 2019-08-13 LAB — INR PPP: 2.7 (ref 2–3)

## 2019-08-13 PROCEDURE — 85610 PROTHROMBIN TIME: CPT | Mod: QW,S$GLB,,

## 2019-08-13 PROCEDURE — 93793 ANTICOAG MGMT PT WARFARIN: CPT | Mod: S$GLB,,,

## 2019-08-13 PROCEDURE — 85610 POCT INR: ICD-10-PCS | Mod: QW,S$GLB,,

## 2019-08-13 PROCEDURE — 93793 PR ANTICOAGULANT MGMT FOR PT TAKING WARFARIN: ICD-10-PCS | Mod: S$GLB,,,

## 2019-09-19 DIAGNOSIS — M54.16 LUMBAR RADICULOPATHY: Primary | ICD-10-CM

## 2019-09-22 ENCOUNTER — PATIENT MESSAGE (OUTPATIENT)
Dept: CARDIOLOGY | Facility: CLINIC | Age: 69
End: 2019-09-22

## 2019-10-01 ENCOUNTER — ANTI-COAG VISIT (OUTPATIENT)
Dept: CARDIOLOGY | Facility: CLINIC | Age: 69
End: 2019-10-01
Payer: MEDICARE

## 2019-10-01 DIAGNOSIS — Z79.01 LONG TERM CURRENT USE OF ANTICOAGULANT THERAPY: Primary | ICD-10-CM

## 2019-10-01 DIAGNOSIS — I48.0 PAROXYSMAL ATRIAL FIBRILLATION: ICD-10-CM

## 2019-10-01 LAB — INR PPP: 2.6 (ref 2–3)

## 2019-10-01 PROCEDURE — 85610 PROTHROMBIN TIME: CPT | Mod: QW,S$GLB,, | Performed by: INTERNAL MEDICINE

## 2019-10-01 PROCEDURE — 93793 PR ANTICOAGULANT MGMT FOR PT TAKING WARFARIN: ICD-10-PCS | Mod: S$GLB,,,

## 2019-10-01 PROCEDURE — 85610 POCT INR: ICD-10-PCS | Mod: QW,S$GLB,, | Performed by: INTERNAL MEDICINE

## 2019-10-01 PROCEDURE — 93793 ANTICOAG MGMT PT WARFARIN: CPT | Mod: S$GLB,,,

## 2019-10-03 ENCOUNTER — HOSPITAL ENCOUNTER (OUTPATIENT)
Dept: RADIOLOGY | Facility: HOSPITAL | Age: 69
Discharge: HOME | End: 2019-10-03
Attending: FAMILY MEDICINE
Payer: MEDICARE

## 2019-10-03 ENCOUNTER — TELEPHONE (OUTPATIENT)
Dept: CARDIOLOGY | Facility: CLINIC | Age: 69
End: 2019-10-03

## 2019-10-03 DIAGNOSIS — M54.16 LUMBAR RADICULOPATHY: ICD-10-CM

## 2019-10-03 PROCEDURE — 72148 MRI LUMBAR SPINE W/O DYE: CPT | Mod: 26,,, | Performed by: RADIOLOGY

## 2019-10-03 PROCEDURE — 72148 MRI LUMBAR SPINE W/O DYE: CPT | Mod: TC

## 2019-10-03 PROCEDURE — 72148 MRI LUMBAR SPINE WITHOUT CONTRAST: ICD-10-PCS | Mod: 26,,, | Performed by: RADIOLOGY

## 2019-10-09 ENCOUNTER — OFFICE VISIT (OUTPATIENT)
Dept: NEUROSURGERY | Facility: CLINIC | Age: 69
End: 2019-10-09
Payer: MEDICARE

## 2019-10-09 VITALS
WEIGHT: 242.75 LBS | BODY MASS INDEX: 32.88 KG/M2 | SYSTOLIC BLOOD PRESSURE: 136 MMHG | HEIGHT: 72 IN | DIASTOLIC BLOOD PRESSURE: 82 MMHG

## 2019-10-09 DIAGNOSIS — M48.061 FORAMINAL STENOSIS OF LUMBAR REGION: ICD-10-CM

## 2019-10-09 DIAGNOSIS — M48.062 LUMBAR STENOSIS WITH NEUROGENIC CLAUDICATION: Primary | ICD-10-CM

## 2019-10-09 PROCEDURE — 1101F PR PT FALLS ASSESS DOC 0-1 FALLS W/OUT INJ PAST YR: ICD-10-PCS | Mod: CPTII,S$GLB,, | Performed by: NEUROLOGICAL SURGERY

## 2019-10-09 PROCEDURE — 99999 PR PBB SHADOW E&M-EST. PATIENT-LVL IV: ICD-10-PCS | Mod: PBBFAC,,, | Performed by: NEUROLOGICAL SURGERY

## 2019-10-09 PROCEDURE — 3075F SYST BP GE 130 - 139MM HG: CPT | Mod: CPTII,S$GLB,, | Performed by: NEUROLOGICAL SURGERY

## 2019-10-09 PROCEDURE — 99205 PR OFFICE/OUTPT VISIT, NEW, LEVL V, 60-74 MIN: ICD-10-PCS | Mod: S$GLB,,, | Performed by: NEUROLOGICAL SURGERY

## 2019-10-09 PROCEDURE — 99999 PR PBB SHADOW E&M-EST. PATIENT-LVL IV: CPT | Mod: PBBFAC,,, | Performed by: NEUROLOGICAL SURGERY

## 2019-10-09 PROCEDURE — 3079F PR MOST RECENT DIASTOLIC BLOOD PRESSURE 80-89 MM HG: ICD-10-PCS | Mod: CPTII,S$GLB,, | Performed by: NEUROLOGICAL SURGERY

## 2019-10-09 PROCEDURE — 3079F DIAST BP 80-89 MM HG: CPT | Mod: CPTII,S$GLB,, | Performed by: NEUROLOGICAL SURGERY

## 2019-10-09 PROCEDURE — 3075F PR MOST RECENT SYSTOLIC BLOOD PRESS GE 130-139MM HG: ICD-10-PCS | Mod: CPTII,S$GLB,, | Performed by: NEUROLOGICAL SURGERY

## 2019-10-09 PROCEDURE — 1101F PT FALLS ASSESS-DOCD LE1/YR: CPT | Mod: CPTII,S$GLB,, | Performed by: NEUROLOGICAL SURGERY

## 2019-10-09 PROCEDURE — 99205 OFFICE O/P NEW HI 60 MIN: CPT | Mod: S$GLB,,, | Performed by: NEUROLOGICAL SURGERY

## 2019-10-09 RX ORDER — AZELASTINE 1 MG/ML
SPRAY, METERED NASAL
COMMUNITY
Start: 2019-03-12 | End: 2020-05-30 | Stop reason: SDUPTHER

## 2019-10-09 RX ORDER — OMEPRAZOLE 20 MG/1
20 CAPSULE, DELAYED RELEASE ORAL
COMMUNITY
Start: 2018-10-30 | End: 2019-12-26 | Stop reason: CLARIF

## 2019-10-09 RX ORDER — METOPROLOL TARTRATE 100 MG/1
TABLET ORAL
Status: ON HOLD | COMMUNITY
End: 2019-12-27

## 2019-10-09 RX ORDER — MELOXICAM 7.5 MG/1
TABLET ORAL
COMMUNITY
End: 2019-12-18 | Stop reason: CLARIF

## 2019-10-09 RX ORDER — ALPRAZOLAM 0.5 MG/1
TABLET ORAL
COMMUNITY
Start: 2019-09-24 | End: 2019-12-26 | Stop reason: CLARIF

## 2019-10-09 RX ORDER — CLOPIDOGREL BISULFATE 75 MG/1
TABLET ORAL
COMMUNITY
End: 2019-12-18 | Stop reason: CLARIF

## 2019-10-09 RX ORDER — CYANOCOBALAMIN 1000 UG/ML
INJECTION, SOLUTION INTRAMUSCULAR; SUBCUTANEOUS
Status: ON HOLD | COMMUNITY
Start: 2017-05-17 | End: 2020-01-02 | Stop reason: HOSPADM

## 2019-10-09 RX ORDER — PREDNISONE 20 MG/1
TABLET ORAL
COMMUNITY
End: 2019-12-26 | Stop reason: CLARIF

## 2019-10-09 NOTE — LETTER
October 9, 2019      Rambo Palmer MD  [address]  [address]  [facility] [address]           [facility]  [address], [address]  [facility] [address]  Phone: [phone]          Patient: Jayla Reagan   MR Number: [id number]   YOB: 1950   Date of Visit: 10/9/2019       Dear Dr. Rambo Palmer:    Thank you for referring Jayla Reagan to me for evaluation. Attached you will find relevant portions of my assessment and plan of care.    If you have questions, please do not hesitate to call me. I look forward to following Jayla Reagan along with you.    Sincerely,    Nico Alvarez MD    Enclosure  CC:  No Recipients    If you would like to receive this communication electronically, please contact [email or web address] or [phone] to request more information on [employer] Link access.    For providers and/or their staff who would like to refer a patient to [facility], please contact us through our one-stop-shop provider referral line, [facility], at [phone].    If you feel you have received this communication in error or would no longer like to receive these types of communications, please e-mail [email or web address]

## 2019-10-09 NOTE — PROGRESS NOTES
NEUROSURGICAL OUTPATIENT CONSULTATION NOTE    DATE OF SERVICE:  10/09/2019    ATTENDING PHYSICIAN:  Nico Alvarez MD    CONSULT REQUESTED BY:  Rambo Palmer MD    REASON FOR CONSULT:  Low back pain bilateral hip pain    SUBJECTIVE:    HISTORY OF PRESENT ILLNESS:  This is a very pleasant 69 y.o. female who has been complaining of worsening low back pain radiating in her bilateral hips.  She reports that her symptoms are worse when she stands up.  The symptoms are relieved by sitting down.  The pain is interfering with walking.  She is not able to walk as much as she would like to.  She denies having numbness down her legs.  Pain is interfering with quality of life and functional status.  She has difficulty cooking because of the pain.  She tried physical therapy at the 2 occasions without significant pain relief.  She completed more than 6 weeks of PT.  She was contemplating having spinal injections.  Still not interested in taking gabapentin at this time because of the potential side effects.              PAST MEDICAL HISTORY:  Active Ambulatory Problems     Diagnosis Date Noted    Nuclear sclerosis - Both Eyes 02/04/2013    Paroxysmal atrial fibrillation 04/05/2017    HTN (hypertension), benign 04/05/2017    PVD (peripheral vascular disease) 04/05/2017    Long term current use of anticoagulant therapy 04/05/2017    Postmenopausal bleeding 10/25/2017    Irregular cardiac rhythm 03/25/2018    PMB (postmenopausal bleeding) 04/09/2018    Post-menopausal bleeding 05/15/2018    s/p RA-TLH/BSO 5/15/2018 05/15/2018    Atherosclerosis of coronary artery 04/09/2019    Endogenous hyperlipemia 04/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    Seasonal allergic rhinitis 04/09/2019     Resolved Ambulatory Problems     Diagnosis Date Noted    Pain of left hip joint 08/14/2017    Weakness of both lower extremities 08/14/2017    Antalgic gait 08/14/2017     Past Medical History:   Diagnosis Date    Atrial fibrillation     Cataract      Hypertension     PAD (peripheral artery disease)     Peripheral artery disease        PAST SURGICAL HISTORY:  Past Surgical History:   Procedure Laterality Date    BREAST SURGERY      CHOLECYSTECTOMY      ECTOPIC PREGNANCY SURGERY      SALPINGECTOMY         SOCIAL HISTORY:   Social History     Socioeconomic History    Marital status: Legally      Spouse name: Not on file    Number of children: Not on file    Years of education: Not on file    Highest education level: Not on file   Occupational History    Not on file   Social Needs    Financial resource strain: Not on file    Food insecurity:     Worry: Not on file     Inability: Not on file    Transportation needs:     Medical: Not on file     Non-medical: Not on file   Tobacco Use    Smoking status: Former Smoker    Smokeless tobacco: Never Used   Substance and Sexual Activity    Alcohol use: No    Drug use: No    Sexual activity: Not on file   Lifestyle    Physical activity:     Days per week: Not on file     Minutes per session: Not on file    Stress: Not on file   Relationships    Social connections:     Talks on phone: Not on file     Gets together: Not on file     Attends Alevism service: Not on file     Active member of club or organization: Not on file     Attends meetings of clubs or organizations: Not on file     Relationship status: Not on file   Other Topics Concern    Not on file   Social History Narrative    ** Merged History Encounter **            FAMILY HISTORY:  Family History   Problem Relation Age of Onset    Cataracts Mother     Cataracts Sister        CURRENTS MEDICATIONS:  Current Outpatient Medications on File Prior to Visit   Medication Sig Dispense Refill    cholecalciferol, vitamin D3, (VITAMIN D3) 5,000 unit Tab Take 5,000 Units by mouth once a week. Takes on Saturday      cyanocobalamin (VITAMIN B-12) 1,000 mcg/mL injection 1 tablet      fluticasone (FLONASE) 50 mcg/actuation nasal spray 1 spray by  Each Nare route daily as needed for Rhinitis.      fosinopril (MONOPRIL) 20 MG tablet Take 20 mg by mouth every evening.       metoprolol succinate (TOPROL-XL) 100 MG 24 hr tablet Take 100 mg by mouth 2 (two) times daily. TAKES HALF OF TABLET IN AM (50 MG) AND 1 TABLET AT NIGHT (100 MG) FOR TOTAL  MG DAILY      ranitidine (ZANTAC) 150 MG tablet 1 tablet before meals      triamterene-hydrochlorothiazide 37.5-25 mg (DYAZIDE) 37.5-25 mg per capsule Take 1 capsule by mouth every morning.   1    warfarin (COUMADIN) 1 MG tablet Take 1 tablet (1 mg total) by mouth Daily. 1 Mg daily except Mondays and Fridays take 0.5 mg (Patient taking differently: Take 1 mg by mouth Daily. As directed by coumadin clinic) 90 tablet 3    acetaminophen (TYLENOL) 650 MG TbSR Take 650 mg by mouth 2 (two) times daily as needed.      ALPRAZolam (XANAX) 0.5 MG tablet 1 tablet      azelastine (ASTELIN) 137 mcg (0.1 %) nasal spray 1 puff in each nostril      clopidogrel (PLAVIX) 75 mg tablet clopidogrel 75 mg tablet      cyanocobalamin, vitamin B-12, 50 mcg tablet Take 50 mcg by mouth every morning. Pt will verify the strength      diltiaZEM (CARDIZEM) 60 MG tablet diltiazem 60 mg tablet      DIPH,PERTUSS,ACEL,,TET VAC,PF, ADULT (ADACEL,TDAP ADOLESN/ADULT,,PF,) 2 Lf-(2.5-5-3-5 mcg)-5Lf/0.5 mL Syrg Adacel (Tdap Adolesn/Adult)(PF)2 Lf-(2.5-5-3-5)-5 Lf/0.5 mL IM syringe   INJ 0.5ML INTRAMUSCULAR      flecainide (TAMBOCOR) 150 MG Tab Take 2 tablets (300 mg total) by mouth once as needed (atrial fibrillation). (Patient not taking: Reported on 10/9/2019) 4 tablet 0    flu vacc [id number] 65yr up,PF, 180 mcg/0.5 mL Syrg Fluzone High-Dose [id number] (PF) 180 mcg/0.5 mL intramuscular syringe      meloxicam (MOBIC) 7.5 MG tablet meloxicam 7.5 mg tablet      metoprolol tartrate (LOPRESSOR) 100 MG tablet 1 tablet      omeprazole (PRILOSEC) 20 MG capsule Take 20 mg by mouth.      predniSONE (DELTASONE) 20 MG tablet prednisone 20 mg tablet       triamcinolone acetonide 0.1% (KENALOG) 0.1 % ointment AAA chest bid 80 g 0     No current facility-administered medications on file prior to visit.        ALLERGIES:  Review of patient's allergies indicates:   Allergen Reactions    Norco [hydrocodone-acetaminophen] Hives       REVIEW OF SYSTEMS:  Review of Systems   Constitutional: Negative for diaphoresis, fever and weight loss.   Respiratory: Negative for shortness of breath.    Cardiovascular: Negative for chest pain.   Gastrointestinal: Negative for blood in stool.   Genitourinary: Negative for hematuria.   Endo/Heme/Allergies: Does not bruise/bleed easily.   All other systems reviewed and are negative.      OBJECTIVE:    PHYSICAL EXAMINATION:   Vitals:    10/09/19 1317   BP: 136/82       Physical Exam:  Vitals reviewed.    Constitutional: She appears well-developed and well-nourished.     Eyes: Pupils are equal, round, and reactive to light. Conjunctivae and EOM are normal.     Cardiovascular: Normal distal pulses and no edema.     Abdominal: Soft.     Skin: Skin displays no rash on trunk and no rash on extremities. Skin displays no lesions on trunk and no lesions on extremities.     Psych/Behavior: She is alert. She is oriented to person, place, and time. She has a normal mood and affect.     Musculoskeletal:        Neck: Range of motion is full.     Neurological:        DTRs: Tricep reflexes are 2+ on the right side and 2+ on the left side. Bicep reflexes are 2+ on the right side and 2+ on the left side. Brachioradialis reflexes are 2+ on the right side and 2+ on the left side. Patellar reflexes are 2+ on the right side and 2+ on the left side. Achilles reflexes are 2+ on the right side and 2+ on the left side.       Back Exam     Tenderness   The patient is experiencing tenderness in the lumbar.    Range of Motion   Extension: normal   Flexion: abnormal   Lateral bend right: normal   Lateral bend left: normal   Rotation right: normal   Rotation left:  normal     Muscle Strength   Right Quadriceps:  5/5   Left Quadriceps:  5/5   Right Hamstrings:  5/5   Left Hamstrings:  5/5     Tests   Straight leg raise right: negative  Straight leg raise left: negative    Other   Toe walk: normal  Heel walk: normal            SI joint:   Palpation at the right and left SI joints not painful  MIRANDA test is negative bilaterally  Gaenslen test is negative bilaterally  Thigh thrust test is negative bilaterally    Neurologic Exam     Mental Status   Oriented to person, place, and time.   Speech: speech is normal   Level of consciousness: alert    Cranial Nerves   Cranial nerves II through XII intact.     CN III, IV, VI   Pupils are equal, round, and reactive to light.  Extraocular motions are normal.     Motor Exam   Muscle bulk: normal  Overall muscle tone: normal    Strength   Right deltoid: 5/5  Left deltoid: 5/5  Right biceps: 5/5  Left biceps: 5/5  Right triceps: 5/5  Left triceps: 5/5  Right wrist flexion: 5/5  Left wrist flexion: 5/5  Right wrist extension: 5/5  Left wrist extension: 5/5  Right interossei: 5/5  Left interossei: 5/5  Right iliopsoas: 5/5  Left iliopsoas: 5/5  Right quadriceps: 5/5  Left quadriceps: 5/5  Right hamstrin/5  Left hamstrin/5  Right anterior tibial: 5/5  Left anterior tibial: 5/5  Right posterior tibial: 5/5  Left posterior tibial: 5/5  Right peroneal: 5/5  Left peroneal: 5/5  Right gastroc: 5/5  Left gastroc: 5/5    Sensory Exam   Light touch normal.   Pinprick normal.     Gait, Coordination, and Reflexes     Gait  Gait: normal    Coordination   Finger to nose coordination: normal  Tandem walking coordination: normal    Reflexes   Right brachioradialis: 2+  Left brachioradialis: 2+  Right biceps: 2+  Left biceps: 2+  Right triceps: 2+  Left triceps: 2+  Right patellar: 2+  Left patellar: 2+  Right achilles: 2+  Left achilles: 2+  Right plantar: normal  Left plantar: normal  Right Roblero: absent  Left Roblero: absent  Right ankle clonus:  absent  Left ankle clonus: absent        DIAGNOSTIC DATA:  I personally interpreted the following imaging:   Lumbar spine MRI 1019 shows L3-4 spondylosis with left severe foraminal stenosis and bilateral lateral recess stenosis, L4-5 severe central stenosis with right  moderate foraminal stenosis caused by large broad-based disc bulge    ASSESMENT:  This is a 69 y.o. female with     Problem List Items Addressed This Visit     None      Visit Diagnoses     Lumbar stenosis with neurogenic claudication    -  Primary    Foraminal stenosis of lumbar region              PLAN:  I explained the natural history of the disease and all treatment options. I recommended a right L3-4 laminectomy, medial facetectomy and contralateral foraminotomy and a right L4-5 laminectomy,, medial facetectomy and microdiskectomy to improve her bilateral hip pain and walking abilities.     We have discussed the risks of surgery including bleeding, infection, failure of surgery, CSF leak, nerve root injury, spinal cord injury, ureter injury, weakness, paralysis, peripheral neuropathy,  need for reoperation. Patient understands the risks and would like to proceed with surgery.      The patient has increased perioperative risks because of these comorbidities:  Essential hypertension.         Nico Alvarez MD  Cell:[phone]

## 2019-10-15 ENCOUNTER — TELEPHONE (OUTPATIENT)
Dept: NEUROSURGERY | Facility: CLINIC | Age: 69
End: 2019-10-15

## 2019-10-15 DIAGNOSIS — M48.061 FORAMINAL STENOSIS OF LUMBAR REGION: Primary | ICD-10-CM

## 2019-10-15 DIAGNOSIS — M51.26 LUMBAR DISC HERNIATION: ICD-10-CM

## 2019-10-15 DIAGNOSIS — M48.061 SPINAL STENOSIS, LUMBAR REGION, WITHOUT NEUROGENIC CLAUDICATION: ICD-10-CM

## 2019-10-15 DIAGNOSIS — M54.16 LUMBAR RADICULOPATHY: ICD-10-CM

## 2019-11-04 ENCOUNTER — OFFICE VISIT (OUTPATIENT)
Dept: CARDIOLOGY | Facility: CLINIC | Age: 69
End: 2019-11-04
Payer: MEDICARE

## 2019-11-04 VITALS
HEIGHT: 72 IN | BODY MASS INDEX: 32.77 KG/M2 | OXYGEN SATURATION: 99 % | WEIGHT: 241.94 LBS | DIASTOLIC BLOOD PRESSURE: 78 MMHG | HEART RATE: 57 BPM | SYSTOLIC BLOOD PRESSURE: 117 MMHG

## 2019-11-04 DIAGNOSIS — Z01.810 PRE-OPERATIVE CARDIOVASCULAR EXAMINATION: ICD-10-CM

## 2019-11-04 DIAGNOSIS — I48.0 PAROXYSMAL ATRIAL FIBRILLATION: Primary | ICD-10-CM

## 2019-11-04 DIAGNOSIS — I73.9 CLAUDICATION: ICD-10-CM

## 2019-11-04 DIAGNOSIS — I10 HTN (HYPERTENSION), BENIGN: ICD-10-CM

## 2019-11-04 PROCEDURE — 99999 PR PBB SHADOW E&M-EST. PATIENT-LVL V: ICD-10-PCS | Mod: PBBFAC,,, | Performed by: INTERNAL MEDICINE

## 2019-11-04 PROCEDURE — 99214 OFFICE O/P EST MOD 30 MIN: CPT | Mod: S$GLB,,, | Performed by: INTERNAL MEDICINE

## 2019-11-04 PROCEDURE — 3074F SYST BP LT 130 MM HG: CPT | Mod: CPTII,S$GLB,, | Performed by: INTERNAL MEDICINE

## 2019-11-04 PROCEDURE — 1101F PR PT FALLS ASSESS DOC 0-1 FALLS W/OUT INJ PAST YR: ICD-10-PCS | Mod: CPTII,S$GLB,, | Performed by: INTERNAL MEDICINE

## 2019-11-04 PROCEDURE — 93000 EKG 12-LEAD: ICD-10-PCS | Mod: S$GLB,,, | Performed by: INTERNAL MEDICINE

## 2019-11-04 PROCEDURE — 3074F PR MOST RECENT SYSTOLIC BLOOD PRESSURE < 130 MM HG: ICD-10-PCS | Mod: CPTII,S$GLB,, | Performed by: INTERNAL MEDICINE

## 2019-11-04 PROCEDURE — 3078F DIAST BP <80 MM HG: CPT | Mod: CPTII,S$GLB,, | Performed by: INTERNAL MEDICINE

## 2019-11-04 PROCEDURE — 99214 PR OFFICE/OUTPT VISIT, EST, LEVL IV, 30-39 MIN: ICD-10-PCS | Mod: S$GLB,,, | Performed by: INTERNAL MEDICINE

## 2019-11-04 PROCEDURE — 99999 PR PBB SHADOW E&M-EST. PATIENT-LVL V: CPT | Mod: PBBFAC,,, | Performed by: INTERNAL MEDICINE

## 2019-11-04 PROCEDURE — 3078F PR MOST RECENT DIASTOLIC BLOOD PRESSURE < 80 MM HG: ICD-10-PCS | Mod: CPTII,S$GLB,, | Performed by: INTERNAL MEDICINE

## 2019-11-04 PROCEDURE — 1101F PT FALLS ASSESS-DOCD LE1/YR: CPT | Mod: CPTII,S$GLB,, | Performed by: INTERNAL MEDICINE

## 2019-11-04 PROCEDURE — 93000 ELECTROCARDIOGRAM COMPLETE: CPT | Mod: S$GLB,,, | Performed by: INTERNAL MEDICINE

## 2019-11-04 NOTE — PROGRESS NOTES
Subjective:   @Patient ID:  Jayla Reagan is a 69 y.o. female who presents for follow-up of  PAF, PVD, and HTN.       HPI:   Patient stated that she has been doing well since last visit. No chest pain,no palpitations  She is suppose to go for lumbar surgery next month.   She can walk 2 blocks without any chest pain, or CHANDLER.   She didn't have to take flecainude for 3 years   Compliant with Coumadin with therapeutic INR, no bleeding issues.   She has Claudication the LE which believed to be neurogenic.       Pertinent hx: PAF with Pill in the pocket rhythm strategy,    Prior cardiovascular  Hx  --------------------------------      - Exercise CATHY in 5/2017 was WNL    - ECHO 4/2017  EF 55-60%    - EKG 11/2019  Sinus bradycardia, no acute ST-T wave changes          Patient Active Problem List    Diagnosis Date Noted    Lumbar stenosis with neurogenic claudication 10/09/2019    Foraminal stenosis of lumbar region 10/09/2019    Atherosclerosis of coronary artery 04/09/2019    Endogenous hyperlipemia 04/09/2019    Seasonal allergic rhinitis 04/09/2019    Post-menopausal bleeding 05/15/2018    s/p RA-TLH/BSO 5/15/2018 05/15/2018    PMB (postmenopausal bleeding) 04/09/2018    Irregular cardiac rhythm 03/25/2018    Postmenopausal bleeding 10/25/2017    Paroxysmal atrial fibrillation 04/05/2017    HTN (hypertension), benign 04/05/2017    PVD (peripheral vascular disease) 04/05/2017    Long term current use of anticoagulant therapy 04/05/2017    Nuclear sclerosis - Both Eyes 02/04/2013         Left Arm BP - Sitting: (P) 112/75  LAST HbA1c  No results found for: HGBA1C    Lipid panel  No results found for: CHOL  No results found for: HDL  No results found for: LDLCALC  No results found for: TRIG  No results found for: CHOLHDL         Review of Systems   Constitution: Negative for chills and fever.   HENT: Negative for hearing loss and nosebleeds.    Eyes: Negative for blurred vision.   Cardiovascular: Negative  for chest pain and palpitations.   Respiratory: Negative for hemoptysis and shortness of breath.    Hematologic/Lymphatic: Negative for bleeding problem.   Skin: Negative for itching.   Musculoskeletal:        As in HPI   Gastrointestinal: Negative for abdominal pain and hematochezia.   Genitourinary: Negative for hematuria.   Neurological: Negative for dizziness and loss of balance.   Psychiatric/Behavioral: Negative for altered mental status and depression.       Objective:   Physical Exam   Constitutional: She is oriented to person, place, and time. She appears well-developed and well-nourished.   HENT:   Head: Normocephalic and atraumatic.   Eyes: Conjunctivae are normal.   Neck: Neck supple. Carotid bruit is not present.   Cardiovascular: Regular rhythm and normal heart sounds. Bradycardia present. Exam reveals no gallop and no friction rub.   No murmur heard.  Pulmonary/Chest: Effort normal and breath sounds normal. No stridor. No respiratory distress. She has no wheezes.   Neurological: She is alert and oriented to person, place, and time.   Skin: Skin is warm and dry.   Psychiatric: She has a normal mood and affect. Her behavior is normal.       Assessment:     1. Paroxysmal atrial fibrillation    2. HTN (hypertension), benign    3. Claudication    4. Pre-operative cardiovascular examination        Plan:     - Continue current strategy  - Continue OAC  - Check lipid profile  - Claudication believed to be neurogenic, will evaluate after the back surgery.   - Patient Pt has no active cardiac condition (ACS/USA, decompenstated CHF, significant arrhythmias or severe valvular disease). METS >4. Patient has acceptable risk for moderate risk non cardiovascular surgery. No further cardiac work up required prior to  undergoing the surgical procedure.  These recommendations follow the 2014 ACC/AHA Guideline on Perioperative Cardiovascular Evaluation and Management of Patients Undergoing Noncardiac Surgery. (JACC Vol.  64 No. 22, Dec 9, 2014, pp q94-n143).    EKG reviewed, sinus bradycardia, no acute ST-T wave changes     Continue with current medical plan and lifestyle changes.  Return sooner for concerns or questions. If symptoms persist go to the ED  I have reviewed all pertinent data including patient's medical history in detail and updated the computerized patient record.     Orders Placed This Encounter   Procedures    Lipid panel     fasting     Standing Status:   Future     Standing Expiration Date:   11/4/2020    IN OFFICE EKG 12-LEAD (to Muse)     Order Specific Question:   Diagnosis     Answer:   Paroxysmal atrial fibrillation [[id number]]       Follow up as scheduled.     She expressed verbal understanding and agreed with the plan    Patient's Medications   New Prescriptions    No medications on file   Previous Medications    ACETAMINOPHEN (TYLENOL) 650 MG TBSR    Take 650 mg by mouth 2 (two) times daily as needed.    ALPRAZOLAM (XANAX) 0.5 MG TABLET    1 tablet    AZELASTINE (ASTELIN) 137 MCG (0.1 %) NASAL SPRAY    1 puff in each nostril    CHOLECALCIFEROL, VITAMIN D3, (VITAMIN D3) 5,000 UNIT TAB    Take 5,000 Units by mouth once a week. Takes on Saturday    CLOPIDOGREL (PLAVIX) 75 MG TABLET    clopidogrel 75 mg tablet    CYANOCOBALAMIN (VITAMIN B-12) 1,000 MCG/ML INJECTION    1 tablet    CYANOCOBALAMIN, VITAMIN B-12, 50 MCG TABLET    Take 50 mcg by mouth every morning. Pt will verify the strength    DILTIAZEM (CARDIZEM) 60 MG TABLET    diltiazem 60 mg tablet    DIPH,PERTUSS,ACEL,,TET VAC,PF, ADULT (ADACEL,TDAP ADOLESN/ADULT,,PF,) 2 LF-(2.5-5-3-5 MCG)-5LF/0.5 ML SYRG    Adacel (Tdap Adolesn/Adult)(PF)2 Lf-(2.5-5-3-5)-5 Lf/0.5 mL IM syringe   INJ 0.5ML INTRAMUSCULAR    FLECAINIDE (TAMBOCOR) 150 MG TAB    Take 2 tablets (300 mg total) by mouth once as needed (atrial fibrillation).    FLU VACC [id number] 65YR UP,PF, 180 MCG/0.5 ML SYRG    Fluzone High-Dose [id number] (PF) 180 mcg/0.5 mL intramuscular syringe    FLUTICASONE  (FLONASE) 50 MCG/ACTUATION NASAL SPRAY    1 spray by Each Nare route daily as needed for Rhinitis.    FOSINOPRIL (MONOPRIL) 20 MG TABLET    Take 20 mg by mouth every evening.     MELOXICAM (MOBIC) 7.5 MG TABLET    meloxicam 7.5 mg tablet    METOPROLOL SUCCINATE (TOPROL-XL) 100 MG 24 HR TABLET    Take 100 mg by mouth 2 (two) times daily. TAKES HALF OF TABLET IN AM (50 MG) AND 1 TABLET AT NIGHT (100 MG) FOR TOTAL  MG DAILY    METOPROLOL TARTRATE (LOPRESSOR) 100 MG TABLET    1 tablet    OMEPRAZOLE (PRILOSEC) 20 MG CAPSULE    Take 20 mg by mouth.    PREDNISONE (DELTASONE) 20 MG TABLET    prednisone 20 mg tablet    RANITIDINE (ZANTAC) 150 MG TABLET    1 tablet before meals    TRIAMCINOLONE ACETONIDE 0.1% (KENALOG) 0.1 % OINTMENT    AAA chest bid    TRIAMTERENE-HYDROCHLOROTHIAZIDE 37.5-25 MG (DYAZIDE) 37.5-25 MG PER CAPSULE    Take 1 capsule by mouth every morning.     WARFARIN (COUMADIN) 1 MG TABLET    Take 1 tablet (1 mg total) by mouth Daily. 1 Mg daily except Mondays and Fridays take 0.5 mg   Modified Medications    No medications on file   Discontinued Medications    No medications on file

## 2019-11-04 NOTE — PATIENT INSTRUCTIONS

## 2019-11-15 ENCOUNTER — LAB VISIT (OUTPATIENT)
Dept: LAB | Facility: HOSPITAL | Age: 69
End: 2019-11-15
Attending: INTERNAL MEDICINE
Payer: MEDICARE

## 2019-11-15 ENCOUNTER — PATIENT MESSAGE (OUTPATIENT)
Dept: CARDIOLOGY | Facility: CLINIC | Age: 69
End: 2019-11-15

## 2019-11-15 DIAGNOSIS — I48.0 PAROXYSMAL ATRIAL FIBRILLATION: ICD-10-CM

## 2019-11-15 DIAGNOSIS — I10 HTN (HYPERTENSION), BENIGN: ICD-10-CM

## 2019-11-15 LAB
CHOLEST SERPL-MCNC: 208 MG/DL (ref 120–199)
CHOLEST/HDLC SERPL: 2.8 {RATIO} (ref 2–5)
HDLC SERPL-MCNC: 74 MG/DL (ref 40–75)
HDLC SERPL: 35.6 % (ref 20–50)
LDLC SERPL CALC-MCNC: 124.4 MG/DL (ref 63–159)
NONHDLC SERPL-MCNC: 134 MG/DL
TRIGL SERPL-MCNC: 48 MG/DL (ref 30–150)

## 2019-11-15 PROCEDURE — 80061 LIPID PANEL: CPT

## 2019-11-15 PROCEDURE — 36415 COLL VENOUS BLD VENIPUNCTURE: CPT

## 2019-11-18 ENCOUNTER — PATIENT MESSAGE (OUTPATIENT)
Dept: SURGERY | Facility: HOSPITAL | Age: 69
End: 2019-11-18

## 2019-11-19 ENCOUNTER — ANTI-COAG VISIT (OUTPATIENT)
Dept: CARDIOLOGY | Facility: CLINIC | Age: 69
End: 2019-11-19
Payer: MEDICARE

## 2019-11-19 DIAGNOSIS — I48.0 PAROXYSMAL ATRIAL FIBRILLATION: ICD-10-CM

## 2019-11-19 DIAGNOSIS — Z79.01 LONG TERM CURRENT USE OF ANTICOAGULANT THERAPY: Primary | ICD-10-CM

## 2019-11-19 LAB — INR PPP: 3.1 (ref 2–3)

## 2019-11-19 PROCEDURE — 93793 PR ANTICOAGULANT MGMT FOR PT TAKING WARFARIN: ICD-10-PCS | Mod: S$GLB,,,

## 2019-11-19 PROCEDURE — 85610 PROTHROMBIN TIME: CPT | Mod: QW,S$GLB,, | Performed by: INTERNAL MEDICINE

## 2019-11-19 PROCEDURE — 85610 POCT INR: ICD-10-PCS | Mod: QW,S$GLB,, | Performed by: INTERNAL MEDICINE

## 2019-11-19 PROCEDURE — 93793 ANTICOAG MGMT PT WARFARIN: CPT | Mod: S$GLB,,,

## 2019-11-19 NOTE — PROGRESS NOTES
INR a tad high at 3.1. Patient reports increased back pain and taking increased tylenol dosing daily. No other changes. No signs or symptoms of bleeding. She is scheduled for back surgery 12/26. She has been cleared by Dr. Hodge for the surgery. She may hold coumadin 5 days prior. I will check in with surgeon, Dr. Alvarez, on when he thinks we can resume coumadin post op safely. We will repeat INR 12/17 and plan for the surgery. Patient will eat a little greens today to decrease her INR. Dose is normally very stable.     Update: per Dr. Alvarez, wants to hold 2-3 post procedure as well. This is acceptable. I replied to Dr. Alvarez to advise patient after surgery his exact plan. We will be following closely post procedure. Dr. Hodge notified of care plan (he replied 11/20 and is ok with plan).

## 2019-12-03 ENCOUNTER — HOSPITAL ENCOUNTER (OUTPATIENT)
Dept: RADIOLOGY | Facility: HOSPITAL | Age: 69
Discharge: HOME | End: 2019-12-03
Attending: FAMILY MEDICINE
Payer: MEDICARE

## 2019-12-03 DIAGNOSIS — I48.0 PAROXYSMAL ATRIAL FIBRILLATION: ICD-10-CM

## 2019-12-03 DIAGNOSIS — I48.0 PAROXYSMAL ATRIAL FIBRILLATION: Primary | ICD-10-CM

## 2019-12-03 PROCEDURE — 71046 X-RAY EXAM CHEST 2 VIEWS: CPT | Mod: 26,,, | Performed by: RADIOLOGY

## 2019-12-03 PROCEDURE — 71046 XR CHEST PA AND LATERAL: ICD-10-PCS | Mod: 26,,, | Performed by: RADIOLOGY

## 2019-12-03 PROCEDURE — 71046 X-RAY EXAM CHEST 2 VIEWS: CPT | Mod: TC,FY

## 2019-12-12 ENCOUNTER — PATIENT MESSAGE (OUTPATIENT)
Dept: SURGERY | Facility: HOSPITAL | Age: 69
End: 2019-12-12

## 2019-12-12 ENCOUNTER — TELEPHONE (OUTPATIENT)
Dept: NEUROSURGERY | Facility: CLINIC | Age: 69
End: 2019-12-12

## 2019-12-12 NOTE — TELEPHONE ENCOUNTER
----- Message from Ju Luna sent at 12/12/2019  1:44 PM CST -----  Contact: Self [phone]  Patient would like to speak with you about a personal matter. Please advise

## 2019-12-13 ENCOUNTER — TELEPHONE (OUTPATIENT)
Dept: NEUROSURGERY | Facility: CLINIC | Age: 69
End: 2019-12-13

## 2019-12-17 ENCOUNTER — ANTI-COAG VISIT (OUTPATIENT)
Dept: CARDIOLOGY | Facility: CLINIC | Age: 69
End: 2019-12-17
Payer: MEDICARE

## 2019-12-17 DIAGNOSIS — Z79.01 LONG TERM CURRENT USE OF ANTICOAGULANT THERAPY: Primary | ICD-10-CM

## 2019-12-17 DIAGNOSIS — I48.0 PAROXYSMAL ATRIAL FIBRILLATION: ICD-10-CM

## 2019-12-17 LAB — INR PPP: 2.9 (ref 2–3)

## 2019-12-17 PROCEDURE — 93793 PR ANTICOAGULANT MGMT FOR PT TAKING WARFARIN: ICD-10-PCS | Mod: S$GLB,,,

## 2019-12-17 PROCEDURE — 85610 PROTHROMBIN TIME: CPT | Mod: QW,S$GLB,, | Performed by: INTERNAL MEDICINE

## 2019-12-17 PROCEDURE — 93793 ANTICOAG MGMT PT WARFARIN: CPT | Mod: S$GLB,,,

## 2019-12-17 PROCEDURE — 85610 POCT INR: ICD-10-PCS | Mod: QW,S$GLB,, | Performed by: INTERNAL MEDICINE

## 2019-12-17 NOTE — PROGRESS NOTES
INR good. Patient denies any changes. No signs or symptoms of bleeding. She is here to plan for surgery next week. We will be holding 5 days prior and resuming 2-3 days post-op per surgeons recommendation. Patient advised to take 2mg for the first 2 days after given ok to resume. Then, back to maintenance dose. We will repeat INR the following week. She was advised to call with any changes in plans, questions, or concerns.

## 2019-12-18 ENCOUNTER — PATIENT MESSAGE (OUTPATIENT)
Dept: SURGERY | Facility: HOSPITAL | Age: 69
End: 2019-12-18

## 2019-12-18 DIAGNOSIS — Z01.818 PREOPERATIVE TESTING: Primary | ICD-10-CM

## 2019-12-18 DIAGNOSIS — Z79.01 LONG TERM CURRENT USE OF ANTICOAGULANT: ICD-10-CM

## 2019-12-20 ENCOUNTER — TELEPHONE (OUTPATIENT)
Dept: PREADMISSION TESTING | Facility: HOSPITAL | Age: 69
End: 2019-12-20

## 2019-12-20 NOTE — TELEPHONE ENCOUNTER
----- Message from Misa Vincent RN sent at 12/18/2019  2:57 PM CST -----  12/26 SURGERY  Please schedule poc appt and t&s.  Thanks!

## 2019-12-23 ENCOUNTER — TELEPHONE (OUTPATIENT)
Dept: NEUROSURGERY | Facility: CLINIC | Age: 69
End: 2019-12-23

## 2019-12-23 DIAGNOSIS — M48.061 FORAMINAL STENOSIS OF LUMBAR REGION: Primary | ICD-10-CM

## 2019-12-23 DIAGNOSIS — M51.26 LUMBAR DISC HERNIATION: ICD-10-CM

## 2019-12-24 ENCOUNTER — TELEPHONE (OUTPATIENT)
Dept: NEUROSURGERY | Facility: CLINIC | Age: 69
End: 2019-12-24

## 2019-12-24 NOTE — TELEPHONE ENCOUNTER
----- Message from Fanny James sent at 12/24/2019  8:24 AM CST -----  Gm this pt case is still pending review if not approved by the morning of the 26th will the pt have to reschedule I see it is flagged as non medically urgent are will the dr proceed?

## 2019-12-25 ENCOUNTER — ANESTHESIA EVENT (OUTPATIENT)
Dept: SURGERY | Facility: HOSPITAL | Age: 69
End: 2019-12-25
Payer: MEDICARE

## 2019-12-26 ENCOUNTER — TELEPHONE (OUTPATIENT)
Dept: NEUROSURGERY | Facility: CLINIC | Age: 69
End: 2019-12-26

## 2019-12-26 ENCOUNTER — HOSPITAL ENCOUNTER (OUTPATIENT)
Dept: PREADMISSION TESTING | Facility: HOSPITAL | Age: 69
Discharge: HOME | End: 2019-12-26
Attending: ANESTHESIOLOGY
Payer: MEDICARE

## 2019-12-26 VITALS
BODY MASS INDEX: 32.78 KG/M2 | OXYGEN SATURATION: 98 % | DIASTOLIC BLOOD PRESSURE: 60 MMHG | RESPIRATION RATE: 16 BRPM | TEMPERATURE: 98 F | HEIGHT: 72 IN | WEIGHT: 242 LBS | HEART RATE: 60 BPM | SYSTOLIC BLOOD PRESSURE: 118 MMHG

## 2019-12-26 PROBLEM — I25.10 ATHEROSCLEROSIS OF CORONARY ARTERY: Status: RESOLVED | Noted: 2019-04-09 | Resolved: 2019-12-26

## 2019-12-26 PROBLEM — I49.9 IRREGULAR CARDIAC RHYTHM: Status: RESOLVED | Noted: 2018-03-25 | Resolved: 2019-12-26

## 2019-12-26 NOTE — TELEPHONE ENCOUNTER
Left message to contact clinic re: surgery case on 12/27. Procedure will be at the [place] location vs. [facility] campus - per MD.     Scheduling notified.

## 2019-12-26 NOTE — TELEPHONE ENCOUNTER
Spoke with Ms Reagan, pt is aware sx will be at the [place] location.     Informed scheduling will contact her with exact time to arrive.

## 2019-12-26 NOTE — DISCHARGE INSTRUCTIONS
.Kaia surgery has been scheduled for:__________________________________________    You should report to:  ____[facility], located on the [place] side of the first floor of the           [facility] ([phone])  ____[facility], located on the [facility] side of the            Second floor of the [facility] ([phone])  ____3rd Floor [facility] located on the [facility] side of the [facility] [phone]  Please Note   - Tell your doctor if you take Aspirin, products containing Aspirin, herbal medications  or blood thinners, such as Coumadin, Ticlid, or Plavix.  (Consult your provider regarding holding or stopping before surgery).  - Arrange for someone to drive you home following surgery.  You will not be allowed to leave the surgical facility alone or drive yourself home following sedation and anesthesia.  Before Surgery  - Stop taking all vitamins/ herbal medications 14days prior to surgery  - No Motrin/Advil (Ibuprofen) 1 day before surgery  - No Aleve (Naproxen) 7 days before surgery  - Stop Taking Asprin, products containing Asprin _____days before surgery  - Stop taking blood thinners_______days before surgery  - No Goody's/BC  Powder 7 days before surgery  - Refrain from drinking alcoholic beverages for 24hours before and after surgery  - Stop or limit smoking _________days before surgery  - You may take Tylenol for pain  Night before Surgery   Stop ALL solid food, gum, candy (including vitamins) 8 hours before arrival time.  (Please note: If your surgeon gives you different eating and drinking instructions, please follow surgeon's directions.)   Stop all CLOUDY liquids: coffee with creamer, formula, tube feeds, cloudy juices, non-human milk and breast milk with additives, 6 hours prior to arrival time.   Stop plain breast milk 4 hours prior to arrival time.   The patient should be ENCOURAGED to  drink carbohydrate-rich clear liquids (sports drinks, clear juices) until 2 hours prior to arrival time.   CLEAR liquids include only water, black coffee NO creamer, clear oral rehydration drinks, clear sports drinks or clear fruit juices (no orange juice, no pulpy juices, no apple cider). Advise patients if they can read newsprint through the liquid, it qualifies as clear liquid.    IF IN DOUBT, drink water instead.   - Take a shower or bath (shower is recommended).  Bathe with Hibiclens soap or an antibacterial soap from the neck down.  If not supplied by your surgeon, hibiclens soap will need to be purchased over the counter in pharmacy.  Rinse soap off thoroughly.  - Shampoo your hair with your regular shampoo  The Day of Surgery  · NOTHING TO  DRINK 2 hours before arrival time. If you are told to take medication on the morning of surgery, it may be taken with a sip of water.   - Take another bath or shower with hibiclens or any antibacterial soap, to reduce the chance of infection.  - Take heart and blood pressure medications with a small sip of water, as advised by the perioperative team.  - Do not take fluid pills  - You may brush your teeth and rinse your mouth, but do not swall any additional water.   - Do not apply perfumes, powder, body lotions or deodorant on the day of surgery.  - Nail polish should be removed.  - Do not wear makeup or moisturizer  - Wear comfortable clothes, such as a button front shirt and loose fitting pants.  - Leave all jewelry, including body piercings, and valuables at home.    - Bring any devices you will neeed after surgery such as crutches or canes.  - If you have sleep apnea, please bring your CPAP machine  In the event that your physical condition changes including the onset of a cold or respiratory illness, or if you have to delay or cancel your surgery, please notify your surgeon.    Anesthesia: General Anesthesia     You are watched continuously during your procedure by  your anesthesia provider.     Youre due to have surgery. During surgery, youll be given medicine called anesthesia or anesthetic. This will keep you comfortable and pain-free. Your anesthesia provider will use general anesthesia.  What is general anesthesia?  General anesthesia puts you into a state like deep sleep. It goes into the bloodstream (IV anesthetics), into the lungs (gas anesthetics), or both. You feel nothing during the procedure. You will not remember it. During the procedure, the anesthesia provider monitors you continuously. He or she checks your heart rate and rhythm, blood pressure, breathing, and blood oxygen.  · IV anesthetics. IV anesthetics are given through an IV line in your arm. Theyre often given first. This is so you are asleep before a gas anesthetic is started. Some kinds of IV anesthetics relieve pain. Others relax you. Your doctor will decide which kind is best in your case.  · Gas anesthetics. Gas anesthetics are breathed into the lungs. They are often used to keep you asleep. They can be given through a facemask or a tube placed in your larynx or trachea (breathing tube).  ? If you have a facemask, your anesthesia provider will most likely place it over your nose and mouth while youre still awake. Youll breathe oxygen through the mask as your IV anesthetic is started. Gas anesthetic may be added through the mask.  ? If you have a tube in the larynx or trachea, it will be inserted into your throat after youre asleep.  Anesthesia tools and medicines  You will likely have:  · IV anesthetics. These are put into an IV line into your bloodstream.  · Gas anesthetics. You breathe these anesthetics into your lungs, where they pass into your bloodstream.  · Pulse oximeter. This is a small clip that is attached to the end of your finger. This measures your blood oxygen level.  · Electrocardiography leads (electrodes). These are small sticky pads that are placed on your chest. They record  your heart rate and rhythm.  · Blood pressure cuff. This reads your blood pressure.  Risks and possible complications  General anesthesia has some risks. These include:  · Breathing problems  · Nausea and vomiting  · Sore throat or hoarseness (usually temporary)  · Allergic reaction to the anesthetic  · Irregular heartbeat (rare)  · Cardiac arrest (rare)   Anesthesia safety  · Follow all instructions you are given for how long not to eat or drink before your procedure.  · Be sure your doctor knows what medicines and drugs you take. This includes over-the-counter medicines, herbs, supplements, alcohol or other drugs. You will be asked when those were last taken.  · Have an adult family member or friend drive you home after the procedure.  · For the first 24 hours after your surgery:  ? Do not drive or use heavy equipment.  ? Do not make important decisions or sign legal documents. If important decisions or signing legal documents is necessary during the first 24 hours after surgery, have a trusted family member or spouse act on your behalf.  ? Avoid alcohol.  ? Have a responsible adult stay with you. He or she can watch for problems and help keep you safe.  Date Last Reviewed: 12/1/2016  © 6182-7044 [employer]. [address] [place] [address], [place] [address]. All rights reserved. This information is not intended as a substitute for professional medical care. Always follow your healthcare professional's instructions

## 2019-12-26 NOTE — PLAN OF CARE
Called Dr. Palmer's office for clearance and pre-op testing, office is closed today. Communicated with Dr. Alvarez office to find out if they received clearance from Dr. Palmer. TABBY Rodriguez responded that they have not received it in their office. Dr. Whitehead notified and orders rec'd and placed in Epic for labs on arrival

## 2019-12-26 NOTE — TELEPHONE ENCOUNTER
----- Message from Shania Mendoza sent at 12/26/2019 10:55 AM CST -----  Contact: Patient   Type:  Patient Returning Call    Who Called: Jayla  Who Left Message for Patient: Nurse  Does the patient know what this is regarding?: procedure  Would the patient rather a call back or a response via [facility]? Call back  Best Call Back Number: [phone]   Additional Information:

## 2019-12-26 NOTE — PLAN OF CARE
Instructions that were given to pt by coumadin clinic regarding surgery:    Progress Notes     Yennifer Baum, PharmD at 12/17/2019  9:15 AM     Status: Signed      INR good. Patient denies any changes. No signs or symptoms of bleeding. She is here to plan for surgery next week. We will be holding 5 days prior and resuming 2-3 days post-op per surgeons recommendation. Patient advised to take 2mg for the first 2 days after given ok to resume. Then, back to maintenance dose. We will repeat INR the following week. She was advised to call with any changes in plans, questions, or concerns.

## 2019-12-26 NOTE — ANESTHESIA PREPROCEDURE EVALUATION
12/26/2019  Jayla Reagan is a 69 y.o., female.  12/26/2019    Jayla Reagan is a 69 y.o. female w/ a significant PMHx of HLD, Afib (on warfarin and metoprolol), HTN, and PAD (previously on Plavix).   - Has been C/O lower back pain, with no cervical spine issues.   - No Previous Hx of cardiac nor pulmonary diseases     Patient with robotic hysterectomy in 2018 with [facility]    Images:  - MRI lumbar spine = Advanced lumbar degenerative changes most pronounced at L3-L4 and L4-L5 noting moderate to severe spinal canal stenosis and neural foraminal narrowing   - CXR = normal no evidence of cardiac or lung disease      LDA: None documented.       Peripheral IV - Single Lumen 05/15/18 0715 Left Hand (Active)   Number of days: 589            Peripheral IV - Single Lumen 05/15/18 0652 Right Wrist (Active)   Number of days: 589       Prev airway: : Direct laryngoscopy; Mask Ventilation: Easy; Intubated: Marie #2; Airway Device Size: 7.5: Grade I    Drips: None documented.      Patient Active Problem List   Diagnosis    Nuclear sclerosis - Both Eyes    Paroxysmal atrial fibrillation    HTN (hypertension), benign    PVD (peripheral vascular disease)    Long term current use of anticoagulant therapy    Postmenopausal bleeding    PMB (postmenopausal bleeding)    Post-menopausal bleeding    s/p RA-TLH/BSO 5/15/2018    Endogenous hyperlipemia    Seasonal allergic rhinitis    Lumbar stenosis with neurogenic claudication    Foraminal stenosis of lumbar region       Review of patient's allergies indicates:   Allergen Reactions    Norco [hydrocodone-acetaminophen] Hives     Past Surgical History:   Procedure Laterality Date    BREAST SURGERY      CHOLECYSTECTOMY      ECTOPIC PREGNANCY SURGERY      SALPINGECTOMY       Significant Labs:  Lab Results   Component Value Date    WBC 4.15 04/09/2019    HGB 10.5 (L)  04/09/2019    HCT 31.7 (L) 04/09/2019     04/09/2019    CHOL 208 (H) 11/15/2019    TRIG 48 11/15/2019    HDL 74 11/15/2019    ALT 20 04/09/2019    AST 19 04/09/2019     04/09/2019    K 4.3 04/09/2019     04/09/2019    CREATININE 1.0 04/09/2019    BUN 26 (H) 04/09/2019    CO2 27 04/09/2019    TSH 2.795 04/04/2017    INR 2.9 12/17/2019       Diagnostic Studies: No relevant studies.    EKG:   Results for orders placed or performed in visit on 11/04/19   IN OFFICE EKG 12-LEAD (to [employer])    Collection Time: 11/04/19  4:44 PM    Narrative    Test Reason : I48.0,I48.0,    Vent. Rate : 055 BPM     Atrial Rate : 055 BPM     P-R Int : 182 ms          QRS Dur : 084 ms      QT Int : 420 ms       P-R-T Axes : 050 -19 028 degrees     QTc Int : 401 ms    Sinus bradycardia  Possible Anterior infarct (cited on or before 04-NOV-2019)  Abnormal ECG  When compared with ECG of 27-MAR-2018 09:21,  No significant change was found  Confirmed by Nasim Mccall MD ([id number]) on 11/5/2019 5:00:33 PM    Referred By: UNKNOWN REFERRING           Confirmed By:Nasim Mccall MD       2D ECHO:  TTE: 2017    1 - Normal left ventricular systolic function (EF 55-60%).     2 - Normal left ventricular diastolic function.     3 - Normal right ventricular systolic function .     4 - No wall motion abnormalities.     5 - The estimated PA systolic pressure is 26 mmHg.     ASSESSMENT/PLAN:     Anesthesia Evaluation    I have reviewed the Patient Summary Reports.     I have reviewed the Medications.     Review of Systems  Anesthesia Hx:  No problems with previous Anesthesia Denies Hx of Anesthetic complications  History of prior surgery of interest to airway management or planning: Previous anesthesia: General Airway issues documented on chart review include mask, easy, easy direct laryngoscopy , view on direct laryngoscopy Grade I  Denies Family Hx of Anesthesia complications.   Denies Personal Hx of Anesthesia complications.    Social:  Former Smoker    Hematology/Oncology:         -- Denies Anemia: Denies Current/Recent Cancer   EENT/Dental:   denies chronic allergic rhinitis  Denies Otitis Media Denies Chronic Tonsillitis   Cardiovascular:   Denies Pacemaker. Hypertension  Denies MI.  Denies CAD.    Denies CABG/stent. Dysrhythmias   Denies Angina.             no hyperlipidemia  Functional Capacity good / => 4 METS    Pulmonary:   Denies Pneumonia Denies COPD.  Denies Asthma.  Denies Shortness of breath.  Denies Recent URI.  Denies Sleep Apnea.    Renal/:   Denies Chronic Renal Disease.     Hepatic/GI:   Denies PUD. Denies Hiatal Hernia.  Denies GERD.  Denies Hepatitis.    Musculoskeletal:   Denies Arthritis.   Ankylosing Spondylitis    Neurological:   Denies TIA. Denies CVA.  Denies Headaches. Denies Seizures. C-spine cleared.     Denies Chronic Pain Syndrome   Endocrine:   Denies Diabetes. Denies Hypothyroidism. Denies Hyperthyroidism.    Psych:   Denies Psychiatric History.          Physical Exam  General:  Well nourished    Airway/Jaw/Neck:  Airway Findings: Mouth Opening: Normal Tongue: Normal  General Airway Assessment: Adult  Mallampati: I  Improves to I with phonation.  TM Distance: 4 - 6 cm      Dental:  Dental Findings: In tact    Chest/Lungs:  Chest/Lungs Findings: Clear to auscultation     Heart/Vascular:  Heart Findings: Rate: Normal  Rhythm: Regular Rhythm  Sounds: Normal  Heart murmur: negative    Abdomen:  Abdomen Findings:  Normal, Soft       Mental Status:  Mental Status Findings:  Cooperative, Alert and Oriented         Anesthesia Plan  Type of Anesthesia, risks & benefits discussed:  Anesthesia Type:  general  Patient's Preference:   Intra-op Monitoring Plan: standard ASA monitors  Intra-op Monitoring Plan Comments:   Post Op Pain Control Plan: multimodal analgesia, IV/PO Opioids PRN and per primary service following discharge from PACU  Post Op Pain Control Plan Comments:   Induction:   IV  Beta Blocker:  Patient  is on a Beta-Blocker and has received one dose within the past 24 hours (No further documentation required).       Informed Consent: Patient understands risks and agrees with Anesthesia plan.  Questions answered. Anesthesia consent signed with patient.  ASA Score: 3     Day of Surgery Review of History & Physical:        Anesthesia Plan Notes: Patient saw cardiology in November who cleared patient for surgery.    Patient stopped coumadin on 12/21/19    INR 1.4 this morning. Discussed with surgeon about being elevated. Surgeon would like to proceed with surgery.        Ready For Surgery From Anesthesia Perspective.

## 2019-12-27 ENCOUNTER — ANESTHESIA (OUTPATIENT)
Dept: SURGERY | Facility: HOSPITAL | Age: 69
End: 2019-12-27
Payer: MEDICARE

## 2019-12-27 ENCOUNTER — HOSPITAL ENCOUNTER (OUTPATIENT)
Facility: HOSPITAL | Age: 69
Discharge: HOME HEALTH | End: 2019-12-28
Attending: NEUROLOGICAL SURGERY | Admitting: NEUROLOGICAL SURGERY
Payer: MEDICARE

## 2019-12-27 DIAGNOSIS — M48.062 LUMBAR STENOSIS WITH NEUROGENIC CLAUDICATION: Primary | ICD-10-CM

## 2019-12-27 DIAGNOSIS — M54.16 SPINAL STENOSIS OF LUMBAR REGION WITH RADICULOPATHY: ICD-10-CM

## 2019-12-27 DIAGNOSIS — I48.0 PAROXYSMAL ATRIAL FIBRILLATION: ICD-10-CM

## 2019-12-27 DIAGNOSIS — M48.061 SPINAL STENOSIS OF LUMBAR REGION WITH RADICULOPATHY: ICD-10-CM

## 2019-12-27 LAB
ABO + RH BLD: NORMAL
ANION GAP SERPL CALC-SCNC: 11 MMOL/L (ref 8–16)
BASOPHILS # BLD AUTO: 0 K/UL (ref 0–0.2)
BASOPHILS NFR BLD: 0 % (ref 0–1.9)
BLD GP AB SCN CELLS X3 SERPL QL: NORMAL
BUN SERPL-MCNC: 26 MG/DL (ref 8–23)
CALCIUM SERPL-MCNC: 9.9 MG/DL (ref 8.7–10.5)
CHLORIDE SERPL-SCNC: 104 MMOL/L (ref 95–110)
CO2 SERPL-SCNC: 26 MMOL/L (ref 23–29)
CREAT SERPL-MCNC: 1 MG/DL (ref 0.5–1.4)
DIFFERENTIAL METHOD: ABNORMAL
EOSINOPHIL # BLD AUTO: 0.1 K/UL (ref 0–0.5)
EOSINOPHIL NFR BLD: 1.7 % (ref 0–8)
ERYTHROCYTE [DISTWIDTH] IN BLOOD BY AUTOMATED COUNT: 16.5 % (ref 11.5–14.5)
EST. GFR  (AFRICAN AMERICAN): >60 ML/MIN/1.73 M^2
EST. GFR  (NON AFRICAN AMERICAN): 57 ML/MIN/1.73 M^2
GLUCOSE SERPL-MCNC: 112 MG/DL (ref 70–110)
HCT VFR BLD AUTO: 31.4 % (ref 37–48.5)
HGB BLD-MCNC: 10.4 G/DL (ref 12–16)
INR PPP: 1.4 (ref 0.8–1.2)
LYMPHOCYTES # BLD AUTO: 1.2 K/UL (ref 1–4.8)
LYMPHOCYTES NFR BLD: 33.5 % (ref 18–48)
MCH RBC QN AUTO: 27.8 PG (ref 27–31)
MCHC RBC AUTO-ENTMCNC: 33.1 G/DL (ref 32–36)
MCV RBC AUTO: 84 FL (ref 82–98)
MONOCYTES # BLD AUTO: 0.5 K/UL (ref 0.3–1)
MONOCYTES NFR BLD: 14.7 % (ref 4–15)
NEUTROPHILS # BLD AUTO: 1.7 K/UL (ref 1.8–7.7)
NEUTROPHILS NFR BLD: 50.1 % (ref 38–73)
PLATELET # BLD AUTO: 231 K/UL (ref 150–350)
PMV BLD AUTO: 8.1 FL (ref 9.2–12.9)
POTASSIUM SERPL-SCNC: 3.9 MMOL/L (ref 3.5–5.1)
PROTHROMBIN TIME: 14.9 SEC (ref 9–12.5)
RBC # BLD AUTO: 3.74 M/UL (ref 4–5.4)
SODIUM SERPL-SCNC: 141 MMOL/L (ref 136–145)
WBC # BLD AUTO: 3.46 K/UL (ref 3.9–12.7)

## 2019-12-27 PROCEDURE — 36000711: Performed by: NEUROLOGICAL SURGERY

## 2019-12-27 PROCEDURE — 63048 LAM FACETEC &FORAMOT EA ADDL: CPT | Mod: ,,, | Performed by: NEUROLOGICAL SURGERY

## 2019-12-27 PROCEDURE — 71000033 HC RECOVERY, INTIAL HOUR: Performed by: NEUROLOGICAL SURGERY

## 2019-12-27 PROCEDURE — 36415 COLL VENOUS BLD VENIPUNCTURE: CPT

## 2019-12-27 PROCEDURE — 94799 UNLISTED PULMONARY SVC/PX: CPT

## 2019-12-27 PROCEDURE — 25000003 PHARM REV CODE 250: Performed by: NEUROLOGICAL SURGERY

## 2019-12-27 PROCEDURE — 63600175 PHARM REV CODE 636 W HCPCS: Performed by: NEUROLOGICAL SURGERY

## 2019-12-27 PROCEDURE — 97161 PT EVAL LOW COMPLEX 20 MIN: CPT

## 2019-12-27 PROCEDURE — 85610 PROTHROMBIN TIME: CPT

## 2019-12-27 PROCEDURE — 63600175 PHARM REV CODE 636 W HCPCS: Performed by: ANESTHESIOLOGY

## 2019-12-27 PROCEDURE — C1729 CATH, DRAINAGE: HCPCS | Performed by: NEUROLOGICAL SURGERY

## 2019-12-27 PROCEDURE — 85025 COMPLETE CBC W/AUTO DIFF WBC: CPT

## 2019-12-27 PROCEDURE — 63047 LAM FACETEC & FORAMOT LUMBAR: CPT | Mod: ,,, | Performed by: NEUROLOGICAL SURGERY

## 2019-12-27 PROCEDURE — 94761 N-INVAS EAR/PLS OXIMETRY MLT: CPT

## 2019-12-27 PROCEDURE — 80048 BASIC METABOLIC PNL TOTAL CA: CPT

## 2019-12-27 PROCEDURE — 97165 OT EVAL LOW COMPLEX 30 MIN: CPT

## 2019-12-27 PROCEDURE — 63600175 PHARM REV CODE 636 W HCPCS: Performed by: NURSE ANESTHETIST, CERTIFIED REGISTERED

## 2019-12-27 PROCEDURE — 25000003 PHARM REV CODE 250: Performed by: ANESTHESIOLOGY

## 2019-12-27 PROCEDURE — 27201423 OPTIME MED/SURG SUP & DEVICES STERILE SUPPLY: Performed by: NEUROLOGICAL SURGERY

## 2019-12-27 PROCEDURE — 37000009 HC ANESTHESIA EA ADD 15 MINS: Performed by: NEUROLOGICAL SURGERY

## 2019-12-27 PROCEDURE — 63048 PR LAMINECT/FACETECT/FORAMINOT, EA ADDTL VERTEBRAL SEGM: ICD-10-PCS | Mod: ,,, | Performed by: NEUROLOGICAL SURGERY

## 2019-12-27 PROCEDURE — 25000003 PHARM REV CODE 250: Performed by: NURSE ANESTHETIST, CERTIFIED REGISTERED

## 2019-12-27 PROCEDURE — 63047 PR LAMINEC/FACETECT/FORAMIN,LUMBAR 1 SEG: ICD-10-PCS | Mod: ,,, | Performed by: NEUROLOGICAL SURGERY

## 2019-12-27 PROCEDURE — 36000710: Performed by: NEUROLOGICAL SURGERY

## 2019-12-27 PROCEDURE — 86850 RBC ANTIBODY SCREEN: CPT

## 2019-12-27 PROCEDURE — 37000008 HC ANESTHESIA 1ST 15 MINUTES: Performed by: NEUROLOGICAL SURGERY

## 2019-12-27 PROCEDURE — 27000221 HC OXYGEN, UP TO 24 HOURS

## 2019-12-27 PROCEDURE — 71000039 HC RECOVERY, EACH ADD'L HOUR: Performed by: NEUROLOGICAL SURGERY

## 2019-12-27 RX ORDER — HYDROMORPHONE HYDROCHLORIDE 1 MG/ML
1 INJECTION, SOLUTION INTRAMUSCULAR; INTRAVENOUS; SUBCUTANEOUS
Status: DISCONTINUED | OUTPATIENT
Start: 2019-12-27 | End: 2019-12-28 | Stop reason: HOSPADM

## 2019-12-27 RX ORDER — CEFAZOLIN SODIUM 2 G/50ML
2 SOLUTION INTRAVENOUS ONCE
Status: COMPLETED | OUTPATIENT
Start: 2019-12-27 | End: 2019-12-27

## 2019-12-27 RX ORDER — SODIUM CHLORIDE, SODIUM LACTATE, POTASSIUM CHLORIDE, CALCIUM CHLORIDE 600; 310; 30; 20 MG/100ML; MG/100ML; MG/100ML; MG/100ML
INJECTION, SOLUTION INTRAVENOUS CONTINUOUS PRN
Status: DISCONTINUED | OUTPATIENT
Start: 2019-12-27 | End: 2019-12-27

## 2019-12-27 RX ORDER — LIDOCAINE HCL/PF 100 MG/5ML
SYRINGE (ML) INTRAVENOUS
Status: DISCONTINUED | OUTPATIENT
Start: 2019-12-27 | End: 2019-12-27

## 2019-12-27 RX ORDER — ONDANSETRON 2 MG/ML
4 INJECTION INTRAMUSCULAR; INTRAVENOUS DAILY PRN
Status: DISCONTINUED | OUTPATIENT
Start: 2019-12-27 | End: 2019-12-27 | Stop reason: HOSPADM

## 2019-12-27 RX ORDER — MAG HYDROX/ALUMINUM HYD/SIMETH 200-200-20
30 SUSPENSION, ORAL (FINAL DOSE FORM) ORAL EVERY 4 HOURS PRN
Status: DISCONTINUED | OUTPATIENT
Start: 2019-12-27 | End: 2019-12-28 | Stop reason: HOSPADM

## 2019-12-27 RX ORDER — PREGABALIN 25 MG/1
25 CAPSULE ORAL 2 TIMES DAILY
Status: DISCONTINUED | OUTPATIENT
Start: 2019-12-27 | End: 2019-12-28 | Stop reason: HOSPADM

## 2019-12-27 RX ORDER — CELECOXIB 100 MG/1
200 CAPSULE ORAL
Status: COMPLETED | OUTPATIENT
Start: 2019-12-27 | End: 2019-12-27

## 2019-12-27 RX ORDER — OXYCODONE HCL 10 MG/1
10 TABLET, FILM COATED, EXTENDED RELEASE ORAL
Status: COMPLETED | OUTPATIENT
Start: 2019-12-27 | End: 2019-12-27

## 2019-12-27 RX ORDER — FLECAINIDE ACETATE 50 MG/1
300 TABLET ORAL ONCE AS NEEDED
Status: DISCONTINUED | OUTPATIENT
Start: 2019-12-27 | End: 2019-12-28 | Stop reason: HOSPADM

## 2019-12-27 RX ORDER — FENTANYL CITRATE 50 UG/ML
INJECTION, SOLUTION INTRAMUSCULAR; INTRAVENOUS
Status: DISCONTINUED | OUTPATIENT
Start: 2019-12-27 | End: 2019-12-27

## 2019-12-27 RX ORDER — EPHEDRINE SULFATE 50 MG/ML
INJECTION, SOLUTION INTRAVENOUS
Status: DISCONTINUED | OUTPATIENT
Start: 2019-12-27 | End: 2019-12-27

## 2019-12-27 RX ORDER — METHOCARBAMOL 500 MG/1
500 TABLET, FILM COATED ORAL 3 TIMES DAILY
Status: DISCONTINUED | OUTPATIENT
Start: 2019-12-27 | End: 2019-12-28 | Stop reason: HOSPADM

## 2019-12-27 RX ORDER — DEXTROSE MONOHYDRATE, SODIUM CHLORIDE, AND POTASSIUM CHLORIDE 50; 1.49; 9 G/1000ML; G/1000ML; G/1000ML
INJECTION, SOLUTION INTRAVENOUS CONTINUOUS
Status: DISCONTINUED | OUTPATIENT
Start: 2019-12-27 | End: 2019-12-28 | Stop reason: HOSPADM

## 2019-12-27 RX ORDER — DILTIAZEM HYDROCHLORIDE 30 MG/1
60 TABLET, FILM COATED ORAL EVERY 12 HOURS
Status: DISCONTINUED | OUTPATIENT
Start: 2019-12-27 | End: 2019-12-27

## 2019-12-27 RX ORDER — ACETAMINOPHEN 325 MG/1
650 TABLET ORAL EVERY 6 HOURS
Status: DISCONTINUED | OUTPATIENT
Start: 2019-12-27 | End: 2019-12-28 | Stop reason: HOSPADM

## 2019-12-27 RX ORDER — OXYCODONE HYDROCHLORIDE 5 MG/1
5 TABLET ORAL
Status: DISCONTINUED | OUTPATIENT
Start: 2019-12-27 | End: 2019-12-27 | Stop reason: HOSPADM

## 2019-12-27 RX ORDER — BACITRACIN 50000 [IU]/1
INJECTION, POWDER, FOR SOLUTION INTRAMUSCULAR
Status: DISCONTINUED | OUTPATIENT
Start: 2019-12-27 | End: 2019-12-27 | Stop reason: HOSPADM

## 2019-12-27 RX ORDER — BISACODYL 10 MG
10 SUPPOSITORY, RECTAL RECTAL DAILY
Status: DISCONTINUED | OUTPATIENT
Start: 2019-12-27 | End: 2019-12-28 | Stop reason: HOSPADM

## 2019-12-27 RX ORDER — CYCLOBENZAPRINE HCL 10 MG
10 TABLET ORAL
Status: COMPLETED | OUTPATIENT
Start: 2019-12-27 | End: 2019-12-27

## 2019-12-27 RX ORDER — TRIAMTERENE AND HYDROCHLOROTHIAZIDE 37.5; 25 MG/1; MG/1
1 CAPSULE ORAL EVERY MORNING
Status: DISCONTINUED | OUTPATIENT
Start: 2019-12-28 | End: 2019-12-28 | Stop reason: HOSPADM

## 2019-12-27 RX ORDER — PROPOFOL 10 MG/ML
VIAL (ML) INTRAVENOUS
Status: DISCONTINUED | OUTPATIENT
Start: 2019-12-27 | End: 2019-12-27

## 2019-12-27 RX ORDER — FOSINOPIRL SODIUM 10 MG/1
10 TABLET ORAL NIGHTLY
Status: DISCONTINUED | OUTPATIENT
Start: 2019-12-27 | End: 2019-12-28 | Stop reason: HOSPADM

## 2019-12-27 RX ORDER — MUPIROCIN 20 MG/G
1 OINTMENT TOPICAL 2 TIMES DAILY
Status: DISCONTINUED | OUTPATIENT
Start: 2019-12-27 | End: 2019-12-28 | Stop reason: HOSPADM

## 2019-12-27 RX ORDER — ACETAMINOPHEN 325 MG/1
650 TABLET ORAL
Status: COMPLETED | OUTPATIENT
Start: 2019-12-27 | End: 2019-12-27

## 2019-12-27 RX ORDER — MIDAZOLAM HYDROCHLORIDE 1 MG/ML
INJECTION INTRAMUSCULAR; INTRAVENOUS
Status: DISCONTINUED | OUTPATIENT
Start: 2019-12-27 | End: 2019-12-27

## 2019-12-27 RX ORDER — HEPARIN SODIUM 5000 [USP'U]/ML
5000 INJECTION, SOLUTION INTRAVENOUS; SUBCUTANEOUS EVERY 12 HOURS
Status: DISCONTINUED | OUTPATIENT
Start: 2019-12-27 | End: 2019-12-28 | Stop reason: HOSPADM

## 2019-12-27 RX ORDER — METOPROLOL SUCCINATE 50 MG/1
100 TABLET, EXTENDED RELEASE ORAL DAILY
Status: DISCONTINUED | OUTPATIENT
Start: 2019-12-28 | End: 2019-12-28 | Stop reason: HOSPADM

## 2019-12-27 RX ORDER — VANCOMYCIN HYDROCHLORIDE 500 MG/10ML
INJECTION, POWDER, LYOPHILIZED, FOR SOLUTION INTRAVENOUS
Status: DISCONTINUED | OUTPATIENT
Start: 2019-12-27 | End: 2019-12-27 | Stop reason: HOSPADM

## 2019-12-27 RX ORDER — GLYCOPYRROLATE 0.2 MG/ML
INJECTION INTRAMUSCULAR; INTRAVENOUS
Status: DISCONTINUED | OUTPATIENT
Start: 2019-12-27 | End: 2019-12-27

## 2019-12-27 RX ORDER — LIDOCAINE HYDROCHLORIDE AND EPINEPHRINE 10; 10 MG/ML; UG/ML
INJECTION, SOLUTION INFILTRATION; PERINEURAL
Status: DISCONTINUED | OUTPATIENT
Start: 2019-12-27 | End: 2019-12-27 | Stop reason: HOSPADM

## 2019-12-27 RX ORDER — NEOSTIGMINE METHYLSULFATE 1 MG/ML
INJECTION, SOLUTION INTRAVENOUS
Status: DISCONTINUED | OUTPATIENT
Start: 2019-12-27 | End: 2019-12-27

## 2019-12-27 RX ORDER — ONDANSETRON 8 MG/1
8 TABLET, ORALLY DISINTEGRATING ORAL EVERY 6 HOURS PRN
Status: DISCONTINUED | OUTPATIENT
Start: 2019-12-27 | End: 2019-12-28 | Stop reason: HOSPADM

## 2019-12-27 RX ORDER — AZELASTINE 1 MG/ML
1 SPRAY, METERED NASAL 2 TIMES DAILY
Status: DISCONTINUED | OUTPATIENT
Start: 2019-12-27 | End: 2019-12-28 | Stop reason: HOSPADM

## 2019-12-27 RX ORDER — METOPROLOL SUCCINATE 50 MG/1
100 TABLET, EXTENDED RELEASE ORAL 2 TIMES DAILY
Status: DISCONTINUED | OUTPATIENT
Start: 2019-12-27 | End: 2019-12-27

## 2019-12-27 RX ORDER — AMOXICILLIN 250 MG
2 CAPSULE ORAL NIGHTLY PRN
Status: DISCONTINUED | OUTPATIENT
Start: 2019-12-27 | End: 2019-12-28 | Stop reason: HOSPADM

## 2019-12-27 RX ORDER — TRAMADOL HYDROCHLORIDE 50 MG/1
50 TABLET ORAL EVERY 8 HOURS
Status: DISCONTINUED | OUTPATIENT
Start: 2019-12-27 | End: 2019-12-28 | Stop reason: HOSPADM

## 2019-12-27 RX ORDER — HYDROMORPHONE HYDROCHLORIDE 2 MG/ML
0.5 INJECTION, SOLUTION INTRAMUSCULAR; INTRAVENOUS; SUBCUTANEOUS EVERY 5 MIN PRN
Status: DISCONTINUED | OUTPATIENT
Start: 2019-12-27 | End: 2019-12-27 | Stop reason: HOSPADM

## 2019-12-27 RX ORDER — DEXAMETHASONE SODIUM PHOSPHATE 4 MG/ML
INJECTION, SOLUTION INTRA-ARTICULAR; INTRALESIONAL; INTRAMUSCULAR; INTRAVENOUS; SOFT TISSUE
Status: DISCONTINUED | OUTPATIENT
Start: 2019-12-27 | End: 2019-12-27

## 2019-12-27 RX ORDER — PHENYLEPHRINE HYDROCHLORIDE 10 MG/ML
INJECTION INTRAVENOUS
Status: DISCONTINUED | OUTPATIENT
Start: 2019-12-27 | End: 2019-12-27

## 2019-12-27 RX ORDER — ROCURONIUM BROMIDE 10 MG/ML
INJECTION, SOLUTION INTRAVENOUS
Status: DISCONTINUED | OUTPATIENT
Start: 2019-12-27 | End: 2019-12-27

## 2019-12-27 RX ORDER — ONDANSETRON HYDROCHLORIDE 2 MG/ML
INJECTION, SOLUTION INTRAMUSCULAR; INTRAVENOUS
Status: DISCONTINUED | OUTPATIENT
Start: 2019-12-27 | End: 2019-12-27

## 2019-12-27 RX ORDER — PREGABALIN 75 MG/1
75 CAPSULE ORAL
Status: COMPLETED | OUTPATIENT
Start: 2019-12-27 | End: 2019-12-27

## 2019-12-27 RX ADMIN — EPHEDRINE SULFATE 5 MG: 50 INJECTION, SOLUTION INTRAMUSCULAR; INTRAVENOUS; SUBCUTANEOUS at 09:12

## 2019-12-27 RX ADMIN — FENTANYL CITRATE 25 MCG: 50 INJECTION, SOLUTION INTRAMUSCULAR; INTRAVENOUS at 07:12

## 2019-12-27 RX ADMIN — PHENYLEPHRINE HYDROCHLORIDE 50 MCG: 10 INJECTION INTRAVENOUS at 07:12

## 2019-12-27 RX ADMIN — AZELASTINE HYDROCHLORIDE 137 MCG: 137 SPRAY, METERED NASAL at 09:12

## 2019-12-27 RX ADMIN — POTASSIUM CHLORIDE, DEXTROSE MONOHYDRATE AND SODIUM CHLORIDE: 150; 5; 900 INJECTION, SOLUTION INTRAVENOUS at 10:12

## 2019-12-27 RX ADMIN — SODIUM CHLORIDE, SODIUM LACTATE, POTASSIUM CHLORIDE, AND CALCIUM CHLORIDE: .6; .31; .03; .02 INJECTION, SOLUTION INTRAVENOUS at 06:12

## 2019-12-27 RX ADMIN — EPHEDRINE SULFATE 10 MG: 50 INJECTION, SOLUTION INTRAMUSCULAR; INTRAVENOUS; SUBCUTANEOUS at 08:12

## 2019-12-27 RX ADMIN — METHOCARBAMOL TABLETS 500 MG: 500 TABLET, COATED ORAL at 09:12

## 2019-12-27 RX ADMIN — ROCURONIUM BROMIDE 15 MG: 10 INJECTION, SOLUTION INTRAVENOUS at 07:12

## 2019-12-27 RX ADMIN — PROPOFOL 120 MG: 10 INJECTION, EMULSION INTRAVENOUS at 07:12

## 2019-12-27 RX ADMIN — ROCURONIUM BROMIDE 35 MG: 10 INJECTION, SOLUTION INTRAVENOUS at 07:12

## 2019-12-27 RX ADMIN — PHENYLEPHRINE HYDROCHLORIDE 100 MCG: 10 INJECTION INTRAVENOUS at 07:12

## 2019-12-27 RX ADMIN — MUPIROCIN 1 G: 20 OINTMENT TOPICAL at 09:12

## 2019-12-27 RX ADMIN — HEPARIN SODIUM 5000 UNITS: 5000 INJECTION, SOLUTION INTRAVENOUS; SUBCUTANEOUS at 11:12

## 2019-12-27 RX ADMIN — CYCLOBENZAPRINE 10 MG: 10 TABLET, FILM COATED ORAL at 06:12

## 2019-12-27 RX ADMIN — DEXAMETHASONE SODIUM PHOSPHATE 8 MG: 4 INJECTION, SOLUTION INTRAMUSCULAR; INTRAVENOUS at 08:12

## 2019-12-27 RX ADMIN — HYDROMORPHONE HYDROCHLORIDE 0.5 MG: 2 INJECTION, SOLUTION INTRAMUSCULAR; INTRAVENOUS; SUBCUTANEOUS at 12:12

## 2019-12-27 RX ADMIN — FENTANYL CITRATE 25 MCG: 50 INJECTION, SOLUTION INTRAMUSCULAR; INTRAVENOUS at 09:12

## 2019-12-27 RX ADMIN — EPHEDRINE SULFATE 5 MG: 50 INJECTION, SOLUTION INTRAMUSCULAR; INTRAVENOUS; SUBCUTANEOUS at 07:12

## 2019-12-27 RX ADMIN — FOSINOPRIL 10 MG: 10 TABLET ORAL at 09:12

## 2019-12-27 RX ADMIN — MUPIROCIN 1 G: 20 OINTMENT TOPICAL at 11:12

## 2019-12-27 RX ADMIN — CELECOXIB 200 MG: 100 CAPSULE ORAL at 06:12

## 2019-12-27 RX ADMIN — PREGABALIN 75 MG: 75 CAPSULE ORAL at 06:12

## 2019-12-27 RX ADMIN — FENTANYL CITRATE 50 MCG: 50 INJECTION, SOLUTION INTRAMUSCULAR; INTRAVENOUS at 07:12

## 2019-12-27 RX ADMIN — CEFAZOLIN SODIUM 2 G: 2 SOLUTION INTRAVENOUS at 07:12

## 2019-12-27 RX ADMIN — NEOSTIGMINE METHYLSULFATE 3 MG: 1 INJECTION INTRAVENOUS at 09:12

## 2019-12-27 RX ADMIN — HEPARIN SODIUM 5000 UNITS: 5000 INJECTION, SOLUTION INTRAVENOUS; SUBCUTANEOUS at 09:12

## 2019-12-27 RX ADMIN — EPHEDRINE SULFATE 10 MG: 50 INJECTION, SOLUTION INTRAMUSCULAR; INTRAVENOUS; SUBCUTANEOUS at 07:12

## 2019-12-27 RX ADMIN — SODIUM CHLORIDE, SODIUM LACTATE, POTASSIUM CHLORIDE, AND CALCIUM CHLORIDE: .6; .31; .03; .02 INJECTION, SOLUTION INTRAVENOUS at 09:12

## 2019-12-27 RX ADMIN — GLYCOPYRROLATE 0.4 MG: 0.2 INJECTION, SOLUTION INTRAMUSCULAR; INTRAVENOUS at 09:12

## 2019-12-27 RX ADMIN — METHOCARBAMOL TABLETS 500 MG: 500 TABLET, COATED ORAL at 11:12

## 2019-12-27 RX ADMIN — OXYCODONE HYDROCHLORIDE 5 MG: 5 TABLET ORAL at 11:12

## 2019-12-27 RX ADMIN — PREGABALIN 25 MG: 25 CAPSULE ORAL at 09:12

## 2019-12-27 RX ADMIN — POTASSIUM CHLORIDE, DEXTROSE MONOHYDRATE AND SODIUM CHLORIDE: 150; 5; 900 INJECTION, SOLUTION INTRAVENOUS at 12:12

## 2019-12-27 RX ADMIN — ACETAMINOPHEN 650 MG: 325 TABLET ORAL at 05:12

## 2019-12-27 RX ADMIN — ACETAMINOPHEN 650 MG: 325 TABLET ORAL at 06:12

## 2019-12-27 RX ADMIN — SODIUM CHLORIDE, SODIUM LACTATE, POTASSIUM CHLORIDE, AND CALCIUM CHLORIDE: .6; .31; .03; .02 INJECTION, SOLUTION INTRAVENOUS at 07:12

## 2019-12-27 RX ADMIN — HYDROMORPHONE HYDROCHLORIDE 0.5 MG: 2 INJECTION, SOLUTION INTRAMUSCULAR; INTRAVENOUS; SUBCUTANEOUS at 10:12

## 2019-12-27 RX ADMIN — LIDOCAINE HYDROCHLORIDE 75 MG: 20 INJECTION, SOLUTION INTRAVENOUS at 07:12

## 2019-12-27 RX ADMIN — TRAMADOL HYDROCHLORIDE 50 MG: 50 TABLET, FILM COATED ORAL at 09:12

## 2019-12-27 RX ADMIN — ONDANSETRON 8 MG: 2 INJECTION, SOLUTION INTRAMUSCULAR; INTRAVENOUS at 09:12

## 2019-12-27 RX ADMIN — OXYCODONE HYDROCHLORIDE 10 MG: 10 TABLET, FILM COATED, EXTENDED RELEASE ORAL at 06:12

## 2019-12-27 RX ADMIN — MIDAZOLAM HYDROCHLORIDE 1 MG: 1 INJECTION, SOLUTION INTRAMUSCULAR; INTRAVENOUS at 06:12

## 2019-12-27 NOTE — PLAN OF CARE
Problem: Occupational Therapy Goal  Goal: Occupational Therapy Goal  Description  Goals to be met by: 12/29/19     Patient will increase functional independence with ADLs by performing:    LE Dressing with Stand-by Assistance.  Grooming while standing with Stand-by Assistance.  Toileting from toilet with Stand-by Assistance for hygiene and clothing management.   Supine to sit with Stand-by Assistance.  Step transfer with Stand-by Assistance  Toilet transfer to toilet with Stand-by Assistance.     Outcome: Ongoing, Progressing     Pt would benefit from cont OT services in order to maximize functional independence. Recommending RW and shower chair at d/c

## 2019-12-27 NOTE — ANESTHESIA POSTPROCEDURE EVALUATION
Anesthesia Post Evaluation    Patient: Jayla Reagan    Procedure(s) Performed: Procedure(s) (LRB):  LAMINECTOMY, SPINE, LUMBAR Right L3-4 Laminectomy Medial Facetectomy and Contralateral Foraminotomy right L4-5 Laminectomy medial facetectomy and microdiscectomy (Right)    Final Anesthesia Type: general    Patient location during evaluation: PACU  Patient participation: Yes- Able to Participate  Level of consciousness: awake and alert  Post-procedure vital signs: reviewed and stable  Pain management: adequate  Airway patency: patent  NHI mitigation strategies: Multimodal analgesia  PONV status at discharge: No PONV  Anesthetic complications: no      Cardiovascular status: hemodynamically stable and blood pressure returned to baseline  Respiratory status: room air, unassisted and spontaneous ventilation  Hydration status: euvolemic  Follow-up not needed.          Vitals Value Taken Time   /69 12/27/2019  1:13 PM   Temp 36.6 °C (97.9 °F) 12/27/2019 12:50 PM   Pulse 56 12/27/2019  1:13 PM   Resp 18 12/27/2019  1:13 PM   SpO2 94 % 12/27/2019  1:13 PM         Event Time     Out of Recovery 12:50:00          Pain/Edna Score: Pain Rating Prior to Med Admin: 6 (12/27/2019 12:20 PM)  Edna Score: 9 (12/27/2019 12:30 PM)

## 2019-12-27 NOTE — NURSING
Cued into patients room and introduced myself as the VN and explained I would be working with the bedside nurse. Bed low, locked and call bell within reach. Patient verbalized understanding to call for any needs or assistance with ambulation. PRN pain medication administered prior to entering room. Will continue to monitor patient.

## 2019-12-27 NOTE — PT/OT/SLP EVAL
"Physical Therapy Evaluation    Patient Name:  Jayla Reagan   MRN:  [id number]    Recommendations:     Discharge Recommendations:  home with home health   Discharge Equipment Recommendations: walker, rolling, shower chair   Barriers to discharge: None    Assessment:     Jayla Reagan is a 69 y.o. female admitted with a medical diagnosis of The primary encounter diagnosis was Lumbar stenosis with neurogenic claudication. A diagnosis of Spinal stenosis of lumbar region with radiculopathy was also pertinent to this visit.  .  She presents with the following impairments/functional limitations:  gait instability, weakness, decreased lower extremity function, impaired balance, impaired endurance, pain, impaired skin, impaired self care skills, decreased ROM . Patient without pain complaint sensation 'I feel doped up". Able to come supine <> sidelying <> sit with min assist. Sit <> stand using RW with CG to min assist. Gait limited 2/2 dizziness complaints. Able to take ~ 5 to 7 steps sideways toward head of be. Would benefit from Home Health post hospitalization..    Rehab Prognosis: Good; patient would benefit from acute skilled PT services to address these deficits and reach maximum level of function.    Recent Surgery: Procedure(s) (LRB):  LAMINECTOMY, SPINE, LUMBAR Right L3-4 Laminectomy Medial Facetectomy and Contralateral Foraminotomy right L4-5 Laminectomy medial facetectomy and microdiscectomy (Right) Day of Surgery    Plan:     During this hospitalization, patient to be seen daily to address the identified rehab impairments via gait training, therapeutic activities, therapeutic exercises and progress toward the following goals:    · Plan of Care Expires:  01/27/20    Subjective     Chief Complaint: dizziness  Patient/Family Comments/goals: go home  Pain/Comfort:  · Pain Rating 1: 0/10  · Pain Rating Post-Intervention 1: 0/10    Patients cultural, spiritual, Caodaism conflicts given the current situation:  "     Living Environment:  Lives [facility] no steps to enter with t/s combo  Prior to admission, patients level of function was independent.  Equipment used at home: none.  DME owned (not currently used): none.  Upon discharge, patient will have assistance from family.    Objective:     Communicated with primary nurse prior to session.  Patient found HOB elevated with oxygen, bed alarm  upon PT entry to room.    General Precautions: Standard, fall   Orthopedic Precautions:spinal precautions   Braces: N/A     Exams:  · RLE ROM: WFL  · RLE Strength: WFL  · LLE ROM: WFL  · LLE Strength: WFL    Functional Mobility:  · Bed Mobility:     · Supine to Sit: minimum assistance  · Sit to Supine: minimum assistance  · Transfers:     · Sit to Stand:  contact guard assistance and minimum assistance with rolling walker  · Gait: 5 to 7 steps side ways toward HOB using RW and min to CG assist  · Balance: fair with AD      Therapeutic Activities and Exercises:   Reviewed proper posture and body mechanics including log roll and spinal precautions    AM-PAC 6 CLICK MOBILITY  Total Score:18     Patient left HOB elevated with all lines intact, call button in reach, bed alarm on and family present.    GOALS:   Multidisciplinary Problems     Physical Therapy Goals        Problem: Physical Therapy Goal    Goal Priority Disciplines Outcome Goal Variances Interventions   Physical Therapy Goal     PT, PT/OT Ongoing, Progressing     Description:  Goals to be met by: 2020     Patient will increase functional independence with mobility by performin. Supine to sit with Modified [place]  2. Sit to stand transfer with Modified [place]  3. Gait  x 150 feet with Modified [place] using Rolling Walker.                       History:     Past Medical History:   Diagnosis Date    Atrial fibrillation     Cataract     Hypertension     PAD (peripheral artery disease)     Peripheral artery disease        Past Surgical History:    Procedure Laterality Date    BREAST SURGERY      CHOLECYSTECTOMY      ECTOPIC PREGNANCY SURGERY      SALPINGECTOMY         Time Tracking:     PT Received On: 12/27/19  PT Start Time: 1503     PT Stop Time: 1519  PT Total Time (min): 16 min     Billable Minutes: Evaluation 16      Nico Sin, PT  12/27/2019

## 2019-12-27 NOTE — PLAN OF CARE
Problem: Physical Therapy Goal  Goal: Physical Therapy Goal  Description  Goals to be met by: 2020     Patient will increase functional independence with mobility by performin. Supine to sit with Modified [place]  2. Sit to stand transfer with Modified [place]  3. Gait  x 150 feet with Modified [place] using Rolling Walker.      Outcome: Ongoing, Progressing   Recommend Home with LOUISE GARRETT and shower chair

## 2019-12-27 NOTE — PLAN OF CARE
Signed out from pacu per Dr De Leon. Re[port called to [facility] [place]/5A. Transported to room 516 via stretcher,sr upx2.

## 2019-12-27 NOTE — TRANSFER OF CARE
Anesthesia Transfer of Care Note    Patient: Jayla Reagan    Procedure(s) Performed: Procedure(s) (LRB):  LAMINECTOMY, SPINE, LUMBAR Right L3-4 Laminectomy Medial Facetectomy and Contralateral Foraminotomy right L4-5 Laminectomy medial facetectomy and microdiscectomy (Right)    Patient location: PACU    Anesthesia Type: general    Transport from OR: Transported from OR on 6-10 L/min O2 by face mask with adequate spontaneous ventilation    Post pain: adequate analgesia    Post assessment: no apparent anesthetic complications    Post vital signs: stable    Level of consciousness: sedated    Nausea/Vomiting: no nausea/vomiting    Complications: none    Transfer of care protocol was followed      Last vitals:   Visit Vitals  BP (!) 118/58   Pulse (!) 55   Temp 36.1 °C (97 °F) (Temporal)   Resp 18   Ht 6' (1.829 m)   Wt 109.8 kg (242 lb)   SpO2 98%   BMI 32.82 kg/m²

## 2019-12-27 NOTE — H&P
NEUROSURGICAL OUTPATIENT CONSULTATION NOTE     DATE OF SERVICE:  12/27/2019     ATTENDING PHYSICIAN:  Nico Alvarez MD     CONSULT REQUESTED BY:  Rambo Palmer MD     REASON FOR CONSULT:  Low back pain bilateral hip pain     SUBJECTIVE:     HISTORY OF PRESENT ILLNESS:  This is a very pleasant 69 y.o. female who has been complaining of worsening low back pain radiating in her bilateral hips.  She reports that her symptoms are worse when she stands up.  The symptoms are relieved by sitting down.  The pain is interfering with walking.  She is not able to walk as much as she would like to.  She denies having numbness down her legs.  Pain is interfering with quality of life and functional status.  She has difficulty cooking because of the pain.  She tried physical therapy at the 2 occasions without significant pain relief.  She completed more than 6 weeks of PT.  She was contemplating having spinal injections.  Still not interested in taking gabapentin at this time because of the potential side effects.           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    Seasonal allergic rhinitis 04/09/2019           Resolved Ambulatory Problems     Diagnosis Date Noted    Pain of left hip joint 08/14/2017    Weakness of both lower extremities 08/14/2017    Antalgic gait 08/14/2017           Past Medical History:   Diagnosis Date    Atrial fibrillation       Cataract      Hypertension      PAD (peripheral artery disease)      Peripheral artery disease           PAST SURGICAL HISTORY:        Past Surgical History:   Procedure Laterality Date    BREAST SURGERY        CHOLECYSTECTOMY        ECTOPIC PREGNANCY SURGERY        SALPINGECTOMY             SOCIAL HISTORY:   Social History               Socioeconomic History    Marital status: Legally        Spouse name: Not on file    Number of children: Not on file    Years of education: Not on file    Highest education level: Not on file   Occupational History    Not on file   Social Needs    Financial resource strain: Not on file    Food insecurity:       Worry: Not on file       Inability: Not on file    Transportation needs:       Medical: Not on file       Non-medical: Not on file   Tobacco Use    Smoking status: Former Smoker    Smokeless tobacco: Never Used   Substance and Sexual Activity    Alcohol use: No    Drug use: No    Sexual activity: Not on file   Lifestyle    Physical activity:       Days per week: Not on file       Minutes per session: Not on file    Stress: Not on file   Relationships    Social connections:       Talks on phone: Not on file       Gets together: Not on file       Attends Zoroastrianism service: Not on file       Active member of club or organization: Not on file       Attends meetings of clubs or organizations: Not on file       Relationship status: Not on file   Other Topics Concern    Not on file   Social History Narrative     ** Merged History Encounter **                  FAMILY HISTORY:        Family History   Problem Relation Age of Onset    Cataracts Mother      Cataracts Sister           CURRENTS MEDICATIONS:         Current Outpatient Medications on File Prior to Visit   Medication Sig Dispense Refill    cholecalciferol, vitamin D3, (VITAMIN D3) 5,000 unit Tab Take 5,000 Units by mouth once a week. Takes on Saturday        cyanocobalamin (VITAMIN B-12)  1,000 mcg/mL injection 1 tablet        fluticasone (FLONASE) 50 mcg/actuation nasal spray 1 spray by Each Nare route daily as needed for Rhinitis.        fosinopril (MONOPRIL) 20 MG tablet Take 20 mg by mouth every evening.         metoprolol succinate (TOPROL-XL) 100 MG 24 hr tablet Take 100 mg by mouth 2 (two) times daily. TAKES HALF OF TABLET IN AM (50 MG) AND 1 TABLET AT NIGHT (100 MG) FOR TOTAL  MG DAILY        ranitidine (ZANTAC) 150 MG tablet 1 tablet before meals        triamterene-hydrochlorothiazide 37.5-25 mg (DYAZIDE) 37.5-25 mg per capsule Take 1 capsule by mouth every morning.    1    warfarin (COUMADIN) 1 MG tablet Take 1 tablet (1 mg total) by mouth Daily. 1 Mg daily except Mondays and Fridays take 0.5 mg (Patient taking differently: Take 1 mg by mouth Daily. As directed by coumadin clinic) 90 tablet 3    acetaminophen (TYLENOL) 650 MG TbSR Take 650 mg by mouth 2 (two) times daily as needed.        ALPRAZolam (XANAX) 0.5 MG tablet 1 tablet        azelastine (ASTELIN) 137 mcg (0.1 %) nasal spray 1 puff in each nostril        clopidogrel (PLAVIX) 75 mg tablet clopidogrel 75 mg tablet        cyanocobalamin, vitamin B-12, 50 mcg tablet Take 50 mcg by mouth every morning. Pt will verify the strength        diltiaZEM (CARDIZEM) 60 MG tablet diltiazem 60 mg tablet        DIPH,PERTUSS,ACEL,,TET VAC,PF, ADULT (ADACEL,TDAP ADOLESN/ADULT,,PF,) 2 Lf-(2.5-5-3-5 mcg)-5Lf/0.5 mL Syrg Adacel (Tdap Adolesn/Adult)(PF)2 Lf-(2.5-5-3-5)-5 Lf/0.5 mL IM syringe   INJ 0.5ML INTRAMUSCULAR        flecainide (TAMBOCOR) 150 MG Tab Take 2 tablets (300 mg total) by mouth once as needed (atrial fibrillation). (Patient not taking: Reported on 10/9/2019) 4 tablet 0    flu vacc [id number] 65yr up,PF, 180 mcg/0.5 mL Syrg Fluzone High-Dose [id number] (PF) 180 mcg/0.5 mL intramuscular syringe        meloxicam (MOBIC) 7.5 MG tablet meloxicam 7.5 mg tablet        metoprolol tartrate (LOPRESSOR) 100 MG tablet 1 tablet         omeprazole (PRILOSEC) 20 MG capsule Take 20 mg by mouth.        predniSONE (DELTASONE) 20 MG tablet prednisone 20 mg tablet        triamcinolone acetonide 0.1% (KENALOG) 0.1 % ointment AAA chest bid 80 g 0      No current facility-administered medications on file prior to visit.          ALLERGIES:       Review of patient's allergies indicates:   Allergen Reactions    Norco [hydrocodone-acetaminophen] Hives         REVIEW OF SYSTEMS:  Review of Systems   Constitutional: Negative for diaphoresis, fever and weight loss.   Respiratory: Negative for shortness of breath.    Cardiovascular: Negative for chest pain.   Gastrointestinal: Negative for blood in stool.   Genitourinary: Negative for hematuria.   Endo/Heme/Allergies: Does not bruise/bleed easily.   All other systems reviewed and are negative.        OBJECTIVE:     PHYSICAL EXAMINATION:       Vitals:     10/09/19 1317   BP: 136/82         Physical Exam:  Vitals reviewed.     Constitutional: She appears well-developed and well-nourished.      Eyes: Pupils are equal, round, and reactive to light. Conjunctivae and EOM are normal.      Cardiovascular: Normal distal pulses and no edema.      Abdominal: Soft.      Skin: Skin displays no rash on trunk and no rash on extremities. Skin displays no lesions on trunk and no lesions on extremities.     Psych/Behavior: She is alert. She is oriented to person, place, and time. She has a normal mood and affect.     Musculoskeletal:        Neck: Range of motion is full.     Neurological:        DTRs: Tricep reflexes are 2+ on the right side and 2+ on the left side. Bicep reflexes are 2+ on the right side and 2+ on the left side. Brachioradialis reflexes are 2+ on the right side and 2+ on the left side. Patellar reflexes are 2+ on the right side and 2+ on the left side. Achilles reflexes are 2+ on the right side and 2+ on the left side.         Back Exam      Tenderness   The patient is experiencing tenderness in the  lumbar.     Range of Motion   Extension: normal   Flexion: abnormal   Lateral bend right: normal   Lateral bend left: normal   Rotation right: normal   Rotation left: normal      Muscle Strength   Right Quadriceps:  5/5   Left Quadriceps:  5/5   Right Hamstrings:  5/5   Left Hamstrings:  5/5      Tests   Straight leg raise right: negative  Straight leg raise left: negative     Other   Toe walk: normal  Heel walk: normal                 SI joint:   Palpation at the right and left SI joints not painful  MIRANDA test is negative bilaterally  Gaenslen test is negative bilaterally  Thigh thrust test is negative bilaterally     Neurologic Exam      Mental Status   Oriented to person, place, and time.   Speech: speech is normal   Level of consciousness: alert     Cranial Nerves   Cranial nerves II through XII intact.      CN III, IV, VI   Pupils are equal, round, and reactive to light.  Extraocular motions are normal.      Motor Exam   Muscle bulk: normal  Overall muscle tone: normal     Strength   Right deltoid: 5/5  Left deltoid: 5/5  Right biceps: 5/5  Left biceps: 5/5  Right triceps: 5/5  Left triceps: 5/5  Right wrist flexion: 5/5  Left wrist flexion: 5/5  Right wrist extension: 5/5  Left wrist extension: 5/5  Right interossei: 5/5  Left interossei: 5/5  Right iliopsoas: 5/5  Left iliopsoas: 5/5  Right quadriceps: 5/5  Left quadriceps: 5/5  Right hamstrin/5  Left hamstrin/5  Right anterior tibial: 5/5  Left anterior tibial: 5/5  Right posterior tibial: 5/5  Left posterior tibial: 5/5  Right peroneal: 5/5  Left peroneal: 5/5  Right gastroc: 5/5  Left gastroc: 5/5     Sensory Exam   Light touch normal.   Pinprick normal.      Gait, Coordination, and Reflexes      Gait  Gait: normal     Coordination   Finger to nose coordination: normal  Tandem walking coordination: normal     Reflexes   Right brachioradialis: 2+  Left brachioradialis: 2+  Right biceps: 2+  Left biceps: 2+  Right triceps: 2+  Left triceps:  2+  Right patellar: 2+  Left patellar: 2+  Right achilles: 2+  Left achilles: 2+  Right plantar: normal  Left plantar: normal  Right Roblero: absent  Left Roblero: absent  Right ankle clonus: absent  Left ankle clonus: absent           DIAGNOSTIC DATA:  I personally interpreted the following imaging:   Lumbar spine MRI 1019 shows L3-4 spondylosis with left severe foraminal stenosis and bilateral lateral recess stenosis, L4-5 severe central stenosis with right  moderate foraminal stenosis caused by large broad-based disc bulge     ASSESMENT:  This is a 69 y.o. female with          Problem List Items Addressed This Visit      None               Visit Diagnoses      Lumbar stenosis with neurogenic claudication    -  Primary     Foraminal stenosis of lumbar region                 PLAN:  I explained the natural history of the disease and all treatment options. I recommended a right L3-4 laminectomy, medial facetectomy and contralateral foraminotomy and a right L4-5 laminectomy,, medial facetectomy and microdiskectomy to improve her bilateral hip pain and walking abilities.      We have discussed the risks of surgery including bleeding, infection, failure of surgery, CSF leak, nerve root injury, spinal cord injury, ureter injury, weakness, paralysis, peripheral neuropathy,  need for reoperation. Patient understands the risks and would like to proceed with surgery.        The patient has increased perioperative risks because of these comorbidities:  Essential hypertension.           Nico Alvarez MD  Cell:[phone]

## 2019-12-27 NOTE — OP NOTE
DATE OF PROCEDURE: 12/27/2019     PREOPERATIVE DIAGNOSES:  1.  Left L3-4 severe foraminal stenosis, severe L4-5 central canal and bilateral lateral recess stenosis  2.  Left more than right lumbar radiculopathy     POSTOPERATIVE DIAGNOSES:  1.  Left L3-4 severe foraminal stenosis, severe L4-5 central canal and bilateral lateral recess stenosis  2.  Left more than right lumbar radiculopathy     PROCEDURES:     1.  Right MIS approach to the L3-4, L4-5 levels   2.  L3-4 bilateral laminectomy, contralateral medial facetectomy and foraminotomy  3.  L4-5 bilateral laminectomy, bilateral medial facetectomy  4. Microscope assistance  5. Fluoroscopy     PRIMARY SURGEON: Nico Alvarez M.D.     ASSISTANT SURGEON:  MARISSA      INDICATIONS: This is a 69-year-old female, with refractory left more than right leg pain and difficulty walking. The risks of the procedure include hemorrhage, infection, paralysis, loss of sensation, loss of sphincter functions, death and postoperative medical complication.     DESCRIPTION OF THE PROCEDURE: The patient was intubated under general   anesthesia. He was placed prone on the Martinez table frame. All pressure points   were carefully padded.      Using fluoroscopy, 1 x 36 mm incision was planned between L3 and L5 15 mm right to the midline. Incision was made with # 15 blade. Subcutaneous tissue hemostasis was completed and the thoracolumbar fascia was opened with a k-wire. We then inserted the serial dilators and a final 18 mm x 5 cm tubular retractor was docked at the junction of the inferior lamina and the left inferior facet of L3 and the retractor was fixed on the table and then using a microscope, the multifidus muscle was subperiosteally dissected using the monopolar. We then exposed the base of the spinous process, the lamina, and the medial facets. Using the high speed cierra, we drilled the base of the spinous process, the ipsilateral and contralateral lamina, as well as the  contralateral one-half of the medial facet. The yellow ligament insertion was exposed and  the ligament was resected using Kerrisons contralaterally. We then drilled the superior facet of L4 and decompressed the foramen.  The left L3 exiting nerve root was identified and fully decompressed.  Hemostasis was completed.       The left L4 nerve roots in the lateral recesses was decompressed by removing 1/4 of the medial superior articulating process of L4.      We then moved the retractor inferiorly at L4-5. Subcutaneous tissue hemostasis was completed and the thoracolumbar fascia was opened with a k-wire. We then inserted the serial dilators and a final 18 mm x 5 cm tubular retractor was docked at the junction of the inferior lamina and the right inferior facet of L4 and the retractor was fixed on the table and then using a microscope, the multifidus muscle was subperiosteally dissected using the monopolar. We then exposed the base of the spinous process, the lamina, and the medial facets. Using the high speed cierra, we drilled the base of the spinous process, the ipsilateral and contralateral lamina, as well as the bilateral one-half of the medial facet. The yellow ligament insertion was exposed and the ligament was resected using Kerrisons contralaterally. The bilateral L5 traversing nerve roots were decompressed by removing the inferior medial facet of L4 and part of the medial superior facet of L5 bilaterally.  While we dissected the scar tissue we notice a defect in the dura and his defect was closed primarily with 0 silk.      Hemostasis and abundant irrigation was done. Vancomycin powder was applied.     We left 2 TLS drains in the epidural space drain that was tunneled through the skin and fixated with a 3 0 nylon.       The thoracolumbar fascia was closed with 0 Vicryl. The wound's subcutaneous layer was closed with inverted 2-0 Vicryl and the skin was closed with a running 3-0 Nylon. The wound was dressed with  Steri-Strips and the patient was transferred to the Recovery Room under the care of the anesthesiologist. Blood loss was 100 mL. There was no complication.

## 2019-12-28 VITALS
RESPIRATION RATE: 18 BRPM | HEIGHT: 72 IN | BODY MASS INDEX: 33.29 KG/M2 | OXYGEN SATURATION: 98 % | DIASTOLIC BLOOD PRESSURE: 68 MMHG | TEMPERATURE: 98 F | WEIGHT: 245.81 LBS | HEART RATE: 57 BPM | SYSTOLIC BLOOD PRESSURE: 138 MMHG

## 2019-12-28 PROCEDURE — 97530 THERAPEUTIC ACTIVITIES: CPT

## 2019-12-28 PROCEDURE — 63600175 PHARM REV CODE 636 W HCPCS: Performed by: NEUROLOGICAL SURGERY

## 2019-12-28 PROCEDURE — 94761 N-INVAS EAR/PLS OXIMETRY MLT: CPT

## 2019-12-28 PROCEDURE — 25000003 PHARM REV CODE 250: Performed by: NEUROLOGICAL SURGERY

## 2019-12-28 PROCEDURE — 97116 GAIT TRAINING THERAPY: CPT | Mod: 59

## 2019-12-28 PROCEDURE — 27000221 HC OXYGEN, UP TO 24 HOURS

## 2019-12-28 RX ORDER — OXYCODONE AND ACETAMINOPHEN 5; 325 MG/1; MG/1
1 TABLET ORAL EVERY 8 HOURS PRN
Qty: 42 TABLET | Refills: 0 | Status: ON HOLD | OUTPATIENT
Start: 2019-12-28 | End: 2020-01-02 | Stop reason: HOSPADM

## 2019-12-28 RX ORDER — WARFARIN 1 MG/1
1 TABLET ORAL DAILY
Qty: 90 TABLET | Refills: 3 | Status: SHIPPED | OUTPATIENT
Start: 2019-12-28 | End: 2020-04-06 | Stop reason: SDUPTHER

## 2019-12-28 RX ADMIN — TRAMADOL HYDROCHLORIDE 50 MG: 50 TABLET, FILM COATED ORAL at 02:12

## 2019-12-28 RX ADMIN — AZELASTINE HYDROCHLORIDE 137 MCG: 137 SPRAY, METERED NASAL at 08:12

## 2019-12-28 RX ADMIN — ACETAMINOPHEN 650 MG: 325 TABLET ORAL at 12:12

## 2019-12-28 RX ADMIN — ACETAMINOPHEN 650 MG: 325 TABLET ORAL at 11:12

## 2019-12-28 RX ADMIN — POTASSIUM CHLORIDE, DEXTROSE MONOHYDRATE AND SODIUM CHLORIDE: 150; 5; 900 INJECTION, SOLUTION INTRAVENOUS at 08:12

## 2019-12-28 RX ADMIN — PREGABALIN 25 MG: 25 CAPSULE ORAL at 08:12

## 2019-12-28 RX ADMIN — ACETAMINOPHEN 650 MG: 325 TABLET ORAL at 05:12

## 2019-12-28 RX ADMIN — ALUMINUM HYDROXIDE, MAGNESIUM HYDROXIDE, AND SIMETHICONE 30 ML: 200; 200; 20 SUSPENSION ORAL at 11:12

## 2019-12-28 RX ADMIN — METHOCARBAMOL TABLETS 500 MG: 500 TABLET, COATED ORAL at 08:12

## 2019-12-28 RX ADMIN — METHOCARBAMOL TABLETS 500 MG: 500 TABLET, COATED ORAL at 02:12

## 2019-12-28 RX ADMIN — METOPROLOL SUCCINATE 100 MG: 50 TABLET, EXTENDED RELEASE ORAL at 08:12

## 2019-12-28 RX ADMIN — MUPIROCIN 1 G: 20 OINTMENT TOPICAL at 08:12

## 2019-12-28 RX ADMIN — TRAMADOL HYDROCHLORIDE 50 MG: 50 TABLET, FILM COATED ORAL at 05:12

## 2019-12-28 RX ADMIN — HEPARIN SODIUM 5000 UNITS: 5000 INJECTION, SOLUTION INTRAVENOUS; SUBCUTANEOUS at 08:12

## 2019-12-28 RX ADMIN — TRIAMTERENE AND HYDROCHLOROTHIAZIDE 1 CAPSULE: 25; 37.5 CAPSULE ORAL at 07:12

## 2019-12-28 RX ADMIN — HYDROMORPHONE HYDROCHLORIDE 1 MG: 1 INJECTION, SOLUTION INTRAMUSCULAR; INTRAVENOUS; SUBCUTANEOUS at 12:12

## 2019-12-28 RX ADMIN — ALUMINUM HYDROXIDE, MAGNESIUM HYDROXIDE, AND SIMETHICONE 30 ML: 200; 200; 20 SUSPENSION ORAL at 03:12

## 2019-12-28 NOTE — PT/OT/SLP PROGRESS
"Physical Therapy Treatment    Patient Name:  Jayla Reagan   MRN:  [id number]    Recommendations:     Discharge Recommendations:  home with home health, home health PT, home health OT   Discharge Equipment Recommendations: shower chair, walker, rolling   Barriers to discharge: None    Assessment:     Jayla Reagan is a 69 y.o. female admitted with a medical diagnosis of <principal problem not specified>.  She presents with the following impairments/functional limitations:  weakness, impaired endurance, impaired self care skills, impaired functional mobilty, gait instability, decreased coordination, impaired balance, decreased upper extremity function, decreased lower extremity function, decreased safety awareness, pain, impaired coordination, impaired skin, orthopedic precautions, decreased ROM. Pt able to increase ambulation distance covered this session. Performed ambulation training ~50 ft with RW, IV pole in tow, and CGA. Demonstrates a slow ancelmo and step to gait pattern. Recommending home with home health PT/OT upon discharge.    Rehab Prognosis: Good; patient would benefit from acute skilled PT services to address these deficits and reach maximum level of function.    Recent Surgery: Procedure(s) (LRB):  LAMINECTOMY, SPINE, LUMBAR Right L3-4 Laminectomy Medial Facetectomy and Contralateral Foraminotomy right L4-5 Laminectomy medial facetectomy and microdiscectomy (Right) 1 Day Post-Op    Plan:     During this hospitalization, patient to be seen daily to address the identified rehab impairments via gait training, therapeutic activities, therapeutic exercises and progress toward the following goals:    · Plan of Care Expires:  01/27/20    Subjective     Chief Complaint: None Expressed  Patient/Family Comments/goals: "I think I am going home today".  Pain/Comfort:  · Pain Rating 1: 3/10  · Location - Side 1: Bilateral  · Location - Orientation 1: generalized  · Location 1: back  · Pain Addressed 1: " Distraction, Reposition  · Pain Rating Post-Intervention 1: 3/10      Objective:     Communicated with  nurse prior to session.  Patient found supine with peripheral IV upon PT entry to room.     General Precautions: Standard, fall   Orthopedic Precautions:spinal precautions   Braces: N/A     Functional Mobility:  · Bed Mobility:     · Rolling Left:  contact guard assistance  · Scooting: stand by assistance and contact guard assistance  · Supine to Sit: contact guard assistance, minimum assistance and  reiterated log roll technique  · Sit to Supine: contact guard assistance, minimum assistance and  reiterated log roll technique  · Transfers:     · Sit to Stand:  contact guard assistance and minimum assistance with rolling walker  · Gait:  ~50 ft with RW, IV pole in tow, and CGA.      AM-PAC 6 CLICK MOBILITY  Turning over in bed (including adjusting bedclothes, sheets and blankets)?: 3  Sitting down on and standing up from a chair with arms (e.g., wheelchair, bedside commode, etc.): 3  Moving from lying on back to sitting on the side of the bed?: 3  Moving to and from a bed to a chair (including a wheelchair)?: 3  Need to walk in hospital room?: 3  Climbing 3-5 steps with a railing?: 3  Basic Mobility Total Score: 18       Therapeutic Activities and Exercises:   Reiterated to pt when transferring supine to sit and sit to supine the log roll techniques to be performed. Pt able to demonstrate. Ambulation training ~50 ft with RW, IV pole in tow, and CGA. Verbal/tactile cueing to decrease trunk flexion and shoulder elevation. Demonstrates slow ancelmo and a step to gait pattern. Requested to use bathroom prior to returning supine. Able to perform self hygiene when finished.    Patient left supine with all lines intact, call button in reach, bed alarm on,  nurse  notified and  daughter present..    GOALS:   Multidisciplinary Problems     Physical Therapy Goals        Problem: Physical Therapy Goal    Goal Priority  Disciplines Outcome Goal Variances Interventions   Physical Therapy Goal     PT, PT/OT Ongoing, Progressing     Description:  Goals to be met by: 2020     Patient will increase functional independence with mobility by performin. Supine to sit with Modified [place]  2. Sit to stand transfer with Modified [place]  3. Gait  x 150 feet with Modified [place] using Rolling Walker.                       Time Tracking:     PT Received On: 19  PT Start Time: 1228     PT Stop Time: 1307  PT Total Time (min): 39 min  31 billable minutes as Dr. Alvarez in room ~8 minutes taking out drains during session.    Billable Minutes: Gait Training  20 and Therapeutic Activity  11    Treatment Type: Treatment  PT/PTA: PTA     PTA Visit Number: 1     Umm Banerjee, PTA  2019

## 2019-12-28 NOTE — NURSING
Cued in to patients room and adjusted camera with patients permission. Introduced myself as the VN for today and educated patient that I would be working with the bedside nurse. Bed low, locked and call bell within reach. Patient and patients family verbalized understanding to call for any needs or assistance ambulating. Patient rating pain 3/10. PRN pain medication as been administered. Will continue to monitor patient.

## 2019-12-28 NOTE — NURSING
Patient discharged per MD orders. Patient verbalized understanding of discharge instructions and discharge medications. Awaiting delivery of equipment.

## 2019-12-28 NOTE — PLAN OF CARE
Problem: Physical Therapy Goal  Goal: Physical Therapy Goal  Description  Goals to be met by: 2020     Patient will increase functional independence with mobility by performin. Supine to sit with Modified [place]  2. Sit to stand transfer with Modified [place]  3. Gait  x 150 feet with Modified [place] using Rolling Walker.      Outcome: Ongoing, Progressing   Pt continues to work toward all established goals.

## 2019-12-28 NOTE — PROGRESS NOTES
NEUROSURGICAL POST-OPERATIVE PROGRESS NOTE    DATE OF SERVICE:  12/28/2019      ATTENDING PHYSICIAN:  Nico Alvarez MD    SUBJECTIVE:    INTERIM HISTORY:    This is a very pleasant 69 y.o. y.o. female, who is status post-op day one L3-4 and L4-5 laminectomy for lumbar stenosis, foraminal stenosis with radiculopathy. Pre-operative left leg pain has resolved since the surgery.               OBJECTIVE:    PHYSICAL EXAMINATION:   Vitals:    12/28/19 1223   BP: 138/68   Pulse: (!) 57   Resp:    Temp:        Neurosurgery Physical Exam    Ortho Exam    Neurologic Exam     Motor Exam     Strength   Strength 5/5 throughout.       Incision CDI    DIAGNOSTIC DATA:    NA    ASSESMENT:    This is a 69 y.o. female who is s/p day one L3-4 and L4-5 laminectomy, medial facetectomy, foraminotomy.     Problem List Items Addressed This Visit        Neuro    Lumbar stenosis with neurogenic claudication - Primary    Relevant Orders    Place in Outpatient - Extended Recovery    Ambulatory referral to Home Health    Spinal stenosis of lumbar region with radiculopathy    Relevant Orders    Vital signs    Neurovascular checks    Intake and output    Chlorohexidine Gluconate Bath    PT evaluate and treat    OT evaluate and treat    Pulse Oximetry Q4H    Incentive spirometry    Place in Outpatient - Extended Recovery    PT assistance with ambulation    Drain - full suction    Discontinue Arreaga Catheter now (Completed)    Diet Adult Regular (IDDSI Level 7)          PLAN:    DC home today with home health PT-OT  Remain active with walking and other light activities daily.  No heavy lifting, no extreme bending or twisting.  Limit upright sitting to 15 minutes per hour.   Walker and shower chair  Percocet 5/325 q8h prn as needed,  FU in 2 weeks for wound check          Nico Alvarez MD  Pager: [phone]

## 2019-12-28 NOTE — PLAN OF CARE
Pt AAOx4. Pt with complaints of back pain with moderate relief from scheduled tylenol, tramadol and PRN dilaudid. IVF infusing at 100 ml/hr. Back dressings remain intact with dried drainage. TLS drains remain intact, output documented in flowsheets. Pt on 2L NC. Pt up to bedside commode with 1 person assist. Safety maintained. Will continue to monitor.

## 2019-12-28 NOTE — PROGRESS NOTES
HH: SN/PT/OT and DME: RW& Shower Chair orders were entered    Bath/Shower Chairs are not covered by patients insurance (PHN), one can be purchased out of pocket at [employer] and most drug [employer] for an approximate cost of $45-$65.    HH & RW orders sent to Corrie MEJIA [phone]- fax [phone]    Awaiting return call from [facility] once she has found an accepting HH agency and DME Company

## 2019-12-28 NOTE — PLAN OF CARE
5:50 pm, [place] contacted Yaima Faith [phone],  will be at least another hour, (N had not called when they faxed the orders)- Pt requested the RW be delivered to her home at [address] [place] [address]. Per Yaima,  will bring RW to pt's home    HH: SN/PT/OT and DME: RW& Shower Chair orders were entered     Bath/Shower Chairs are not covered by patients insurance (PHN), one can be purchased out of pocket at [employer] and [employer] for an approximate cost of $45-$65.     [phone]- fax [phone]    Tamir  to contact pt to arrange a visit for Sunday, 12/29/19 [phone]    [facility] to [facility]- [phone]      Future Appointments   Date Time Provider Department Center   1/7/2020 10:15 AM COUMADIN, [place] MET COU [place]   1/10/2020 10:00 AM Amador Baker PA-C [facility] NEUROSU [place] [facility]   2/4/2020  9:40 AM Kaiden Hodge MD [facility] CARDIO [place] [facility]   2/10/2020  3:30 PM Nico Alvarez MD [facility] NEUROSU [place] [facility]       Pt's nurse will go over medications/signs and symptoms prior to discharge       12/28/19 3488   Final Note   Assessment Type Final Discharge Note   Anticipated Discharge Disposition Home-Health  (Tam[facility] GIL/JACKIE)   What phone number can be called within the next 1-3 days to see how you are doing after discharge? [phone]   Hospital Follow Up  Appt(s) scheduled? No  (Offices closed for Weekend. Patient to schedule own follow up appointment. )   Right Care Referral Info   Post Acute Recommendation Home-care   Referral Type Tamir GIL/PHN   Facility Name Tam[facility] /MICHELLE Garay, RN Transitional Navigator  [phone]

## 2019-12-29 ENCOUNTER — HOSPITAL ENCOUNTER (OUTPATIENT)
Facility: HOSPITAL | Age: 69
Discharge: NURSING, REHAB OR LONG-TERM CARE | End: 2020-01-03
Attending: EMERGENCY MEDICINE | Admitting: NEUROLOGICAL SURGERY
Payer: MEDICARE

## 2019-12-29 DIAGNOSIS — G89.18 POST-OP PAIN: Primary | ICD-10-CM

## 2019-12-29 DIAGNOSIS — M79.89 LEG SWELLING: ICD-10-CM

## 2019-12-29 DIAGNOSIS — Z79.01 LONG TERM CURRENT USE OF ANTICOAGULANT: ICD-10-CM

## 2019-12-29 LAB
ALBUMIN SERPL BCP-MCNC: 2.9 G/DL (ref 3.5–5.2)
ALP SERPL-CCNC: 64 U/L (ref 55–135)
ALT SERPL W/O P-5'-P-CCNC: 46 U/L (ref 10–44)
ANION GAP SERPL CALC-SCNC: 10 MMOL/L (ref 8–16)
APTT BLDCRRT: 49.4 SEC (ref 21–32)
AST SERPL-CCNC: 28 U/L (ref 10–40)
BASOPHILS # BLD AUTO: 0 K/UL (ref 0–0.2)
BASOPHILS NFR BLD: 0 % (ref 0–1.9)
BILIRUB SERPL-MCNC: 0.5 MG/DL (ref 0.1–1)
BNP SERPL-MCNC: 236 PG/ML (ref 0–99)
BUN SERPL-MCNC: 17 MG/DL (ref 8–23)
CALCIUM SERPL-MCNC: 9 MG/DL (ref 8.7–10.5)
CHLORIDE SERPL-SCNC: 106 MMOL/L (ref 95–110)
CO2 SERPL-SCNC: 23 MMOL/L (ref 23–29)
CREAT SERPL-MCNC: 0.9 MG/DL (ref 0.5–1.4)
DIFFERENTIAL METHOD: ABNORMAL
EOSINOPHIL # BLD AUTO: 0.1 K/UL (ref 0–0.5)
EOSINOPHIL NFR BLD: 1.4 % (ref 0–8)
ERYTHROCYTE [DISTWIDTH] IN BLOOD BY AUTOMATED COUNT: 16.8 % (ref 11.5–14.5)
EST. GFR  (AFRICAN AMERICAN): >60 ML/MIN/1.73 M^2
EST. GFR  (NON AFRICAN AMERICAN): >60 ML/MIN/1.73 M^2
GLUCOSE SERPL-MCNC: 115 MG/DL (ref 70–110)
HCT VFR BLD AUTO: 26 % (ref 37–48.5)
HGB BLD-MCNC: 8.5 G/DL (ref 12–16)
INR PPP: 1.4 (ref 0.8–1.2)
LYMPHOCYTES # BLD AUTO: 1.3 K/UL (ref 1–4.8)
LYMPHOCYTES NFR BLD: 22.5 % (ref 18–48)
MCH RBC QN AUTO: 27.6 PG (ref 27–31)
MCHC RBC AUTO-ENTMCNC: 32.7 G/DL (ref 32–36)
MCV RBC AUTO: 84 FL (ref 82–98)
MONOCYTES # BLD AUTO: 0.5 K/UL (ref 0.3–1)
MONOCYTES NFR BLD: 8.5 % (ref 4–15)
NEUTROPHILS # BLD AUTO: 3.8 K/UL (ref 1.8–7.7)
NEUTROPHILS NFR BLD: 67.6 % (ref 38–73)
PLATELET # BLD AUTO: 216 K/UL (ref 150–350)
PMV BLD AUTO: 8 FL (ref 9.2–12.9)
POTASSIUM SERPL-SCNC: 4 MMOL/L (ref 3.5–5.1)
PROT SERPL-MCNC: 6.8 G/DL (ref 6–8.4)
PROTHROMBIN TIME: 14.8 SEC (ref 9–12.5)
RBC # BLD AUTO: 3.08 M/UL (ref 4–5.4)
SODIUM SERPL-SCNC: 139 MMOL/L (ref 136–145)
TROPONIN I SERPL DL<=0.01 NG/ML-MCNC: <0.006 NG/ML (ref 0–0.03)
WBC # BLD AUTO: 5.64 K/UL (ref 3.9–12.7)

## 2019-12-29 PROCEDURE — 85730 THROMBOPLASTIN TIME PARTIAL: CPT

## 2019-12-29 PROCEDURE — 85025 COMPLETE CBC W/AUTO DIFF WBC: CPT

## 2019-12-29 PROCEDURE — 84484 ASSAY OF TROPONIN QUANT: CPT

## 2019-12-29 PROCEDURE — 83880 ASSAY OF NATRIURETIC PEPTIDE: CPT

## 2019-12-29 PROCEDURE — 25000003 PHARM REV CODE 250: Performed by: EMERGENCY MEDICINE

## 2019-12-29 PROCEDURE — 99285 EMERGENCY DEPT VISIT HI MDM: CPT | Mod: 25

## 2019-12-29 PROCEDURE — 93010 ELECTROCARDIOGRAM REPORT: CPT | Mod: ,,, | Performed by: INTERNAL MEDICINE

## 2019-12-29 PROCEDURE — 93010 EKG 12-LEAD: ICD-10-PCS | Mod: ,,, | Performed by: INTERNAL MEDICINE

## 2019-12-29 PROCEDURE — 93005 ELECTROCARDIOGRAM TRACING: CPT

## 2019-12-29 PROCEDURE — 85610 PROTHROMBIN TIME: CPT

## 2019-12-29 PROCEDURE — 80053 COMPREHEN METABOLIC PANEL: CPT

## 2019-12-29 RX ORDER — OXYCODONE AND ACETAMINOPHEN 5; 325 MG/1; MG/1
1 TABLET ORAL
Status: COMPLETED | OUTPATIENT
Start: 2019-12-29 | End: 2019-12-29

## 2019-12-29 RX ORDER — OXYCODONE AND ACETAMINOPHEN 5; 325 MG/1; MG/1
1 TABLET ORAL EVERY 8 HOURS PRN
Qty: 5 TABLET | Refills: 0 | Status: SHIPPED | OUTPATIENT
Start: 2019-12-29 | End: 2020-01-02 | Stop reason: HOSPADM

## 2019-12-29 RX ADMIN — OXYCODONE AND ACETAMINOPHEN 1 TABLET: 5; 325 TABLET ORAL at 10:12

## 2019-12-29 NOTE — DISCHARGE SUMMARY
[facility]  Neurosurgery  Discharge Summary      Patient Name: Jayla Reagan  MRN: [id number]  Admission Date: 12/27/2019  Hospital Length of Stay: 0 days  Discharge Date and Time: 12/28/2019  6:29 PM  Attending Physician: No att. providers found   Discharging Provider: Nico Alvarez MD  Primary Care Provider: Rambo Palmer MD     HPI: Patient with severe lumbar stenosis and left more than right leg pain.    Procedure(s) (LRB):  LAMINECTOMY, SPINE, LUMBAR Right L3-4 Laminectomy Medial Facetectomy and Contralateral Foraminotomy right L4-5 Laminectomy medial facetectomy and microdiscectomy (Right)     Hospital Course: Surgery was uncomplicated and post-operative course uneventful    Consults: PT-OT    Significant Diagnostic Studies: NA    Pending Diagnostic Studies:     Procedure Component Value Units Date/Time    FL ERCP Biliary And Pancreatic [[id number]] Resulted:  12/27/19 1032    Order Status:  Sent Lab Status:  In process Updated:  12/27/19 1033        Final Active Diagnoses:    Diagnosis Date Noted POA    Spinal stenosis of lumbar region with radiculopathy [M48.061, M54.16] 12/27/2019 Yes    Lumbar stenosis with neurogenic claudication [M48.062] 10/09/2019 Yes      Problems Resolved During this Admission:      Discharged Condition: good    Disposition: Home-Health Care [facility]    Follow Up:  Follow-up Information     Amador Baker PA-C On 1/10/2020.    Specialty:  Neurosurgery  Why:  .@ 10:00 am  Contact information:  [address]  [address]  [facility] [address]  [phone]             [facility].    Specialties:  DME Provider, Home Health Services  Why:   aranged through [facility], Home Health nurse to contact patient to arrange a visit for Sunday 12/29/19  Contact information:  [address] [place] [address]  [phone]             Please follow up.    Why:  DME: Bath/Shower Chair orders were entered. Bath/Shower Chairs are not covered by patient's  "insurance, one can be purchased out of pocket at [employer] and most drug [employer] for an approximate cost of $45-$65.           Vianney Will  [place].    Specialty:  DME Provider  Why:  Provider of Rolling Walker, to be delivered bedside to patient prior to discharge  Contact information:  [address] [place] [address]  [place] [address]  [phone]                 Patient Instructions:      WALKER FOR HOME USE     Order Specific Question Answer Comments   Type of Walker: Adult (5'4"-6'6")    With wheels? Yes    Height: 6' (1.829 m)    Weight: 111.5 kg (245 lb 13 oz)    Length of need (1-99 months): 1    Does patient have medical equipment at home? none    Please check all that apply: Patient's condition impairs ambulation.      BATH/SHOWER CHAIR FOR HOME USE     Order Specific Question Answer Comments   Height: 6' (1.829 m)    Weight: 111.5 kg (245 lb 13 oz)    Does patient have medical equipment at home? none    Length of need (1-99 months): 1    Type: With back      Medications:  Reconciled Home Medications:      Medication List      START taking these medications    oxyCODONE-acetaminophen 5-325 mg per tablet  Commonly known as:  PERCOCET  Take 1 tablet by mouth every 8 (eight) hours as needed for Pain.        CHANGE how you take these medications    warfarin 1 MG tablet  Commonly known as:  COUMADIN  Take 1 tablet (1 mg total) by mouth Daily. 1 Mg daily except Mondays and Fridays take 0.5 mg  What changed:  additional instructions        CONTINUE taking these medications    acetaminophen 650 MG Tbsr  Commonly known as:  TYLENOL  Take 650 mg by mouth 2 (two) times daily as needed.     Adacel(Tdap Adolesn/Adult)(PF) 2 Lf-(2.5-5-3-5 mcg)-5Lf/0.5 mL Syrg  Generic drug:  DIPH,PERTUSS(ACEL),TET VAC(PF) ADULT  Adacel (Tdap Adolesn/Adult)(PF)2 Lf-(2.5-5-3-5)-5 Lf/0.5 mL IM syringe   INJ 0.5ML INTRAMUSCULAR     azelastine 137 mcg (0.1 %) nasal spray  Commonly known as:  ASTELIN  1 puff in each nostril     * cyanocobalamin (vitamin " B-12) 50 mcg tablet  Take 50 mcg by mouth every morning. Pt will verify the strength     * Vitamin B-12 1,000 mcg/mL injection  Generic drug:  cyanocobalamin  1 tablet     flu vacc [id number] 65yr up(PF) 180 mcg/0.5 mL Syrg  Fluzone High-Dose [id number] (PF) 180 mcg/0.5 mL intramuscular syringe     fosinopril 20 MG tablet  Commonly known as:  MONOPRIL  Take 20 mg by mouth every evening.     metoprolol succinate 100 MG 24 hr tablet  Commonly known as:  TOPROL-XL  Take 100 mg by mouth 2 (two) times daily. TAKES HALF OF TABLET IN AM (50 MG) AND 1 TABLET AT NIGHT (100 MG) FOR TOTAL  MG DAILY     triamcinolone acetonide 0.1% 0.1 % ointment  Commonly known as:  KENALOG  AAA chest bid     triamterene-hydrochlorothiazide 37.5-25 mg 37.5-25 mg per capsule  Commonly known as:  DYAZIDE  Take 1 capsule by mouth every morning.     Vitamin D3 125 mcg (5,000 unit) Tab  Generic drug:  cholecalciferol (vitamin D3)  Take 5,000 Units by mouth once a week. Takes on Saturday         * This list has 2 medication(s) that are the same as other medications prescribed for you. Read the directions carefully, and ask your doctor or other care provider to review them with you.            STOP taking these medications    diltiaZEM 60 MG tablet  Commonly known as:  CARDIZEM     metoprolol tartrate 100 MG tablet  Commonly known as:  LOPRESSOR            Nico Alvarez MD  Neurosurgery [facility]

## 2019-12-29 NOTE — PLAN OF CARE
0805 12/29/19   Weekend CM received a call from Ms Reagan, in reference to having a shower bench delivered to her home.  CM let her know that a shower chair was not covered by PHN, or any insurance and the charge for that would be $45-$65.  CM also let her know that she would be glad to order this, and have it delivered, but there would be a charge.  Pt stated if there was a charge, she did not want it.  CM let her know that she also could order from [employer] or [employer].

## 2019-12-29 NOTE — PROGRESS NOTES
Patient AAOx4, VSS, patient denies any pain at the moment. AVS printed and given to patient. IV removed. wheelchair provided for patient

## 2019-12-30 PROBLEM — G89.18 POST-OP PAIN: Status: ACTIVE | Noted: 2019-12-30

## 2019-12-30 PROCEDURE — 63600175 PHARM REV CODE 636 W HCPCS: Performed by: EMERGENCY MEDICINE

## 2019-12-30 PROCEDURE — 96376 TX/PRO/DX INJ SAME DRUG ADON: CPT

## 2019-12-30 PROCEDURE — 96372 THER/PROPH/DIAG INJ SC/IM: CPT | Mod: 59

## 2019-12-30 PROCEDURE — 25000003 PHARM REV CODE 250: Performed by: EMERGENCY MEDICINE

## 2019-12-30 PROCEDURE — 96374 THER/PROPH/DIAG INJ IV PUSH: CPT

## 2019-12-30 PROCEDURE — G0378 HOSPITAL OBSERVATION PER HR: HCPCS

## 2019-12-30 PROCEDURE — 94761 N-INVAS EAR/PLS OXIMETRY MLT: CPT

## 2019-12-30 RX ORDER — ONDANSETRON 8 MG/1
8 TABLET, ORALLY DISINTEGRATING ORAL EVERY 8 HOURS PRN
Status: DISCONTINUED | OUTPATIENT
Start: 2019-12-30 | End: 2020-01-03 | Stop reason: HOSPADM

## 2019-12-30 RX ORDER — ACETAMINOPHEN 325 MG/1
650 TABLET ORAL EVERY 8 HOURS PRN
Status: DISCONTINUED | OUTPATIENT
Start: 2019-12-30 | End: 2020-01-03 | Stop reason: HOSPADM

## 2019-12-30 RX ORDER — AMOXICILLIN 250 MG
1 CAPSULE ORAL 2 TIMES DAILY
Status: DISCONTINUED | OUTPATIENT
Start: 2019-12-30 | End: 2020-01-03 | Stop reason: HOSPADM

## 2019-12-30 RX ORDER — SODIUM CHLORIDE 0.9 % (FLUSH) 0.9 %
10 SYRINGE (ML) INJECTION
Status: DISCONTINUED | OUTPATIENT
Start: 2019-12-30 | End: 2020-01-03 | Stop reason: HOSPADM

## 2019-12-30 RX ORDER — POLYETHYLENE GLYCOL 3350 17 G/17G
17 POWDER, FOR SOLUTION ORAL DAILY
Status: DISCONTINUED | OUTPATIENT
Start: 2019-12-30 | End: 2020-01-03 | Stop reason: HOSPADM

## 2019-12-30 RX ORDER — FAMOTIDINE 20 MG/1
20 TABLET, FILM COATED ORAL 2 TIMES DAILY
Status: DISCONTINUED | OUTPATIENT
Start: 2019-12-30 | End: 2019-12-31

## 2019-12-30 RX ORDER — OXYCODONE HYDROCHLORIDE 5 MG/1
5 TABLET ORAL EVERY 4 HOURS PRN
Status: DISCONTINUED | OUTPATIENT
Start: 2019-12-30 | End: 2019-12-31

## 2019-12-30 RX ORDER — HEPARIN SODIUM 5000 [USP'U]/ML
5000 INJECTION, SOLUTION INTRAVENOUS; SUBCUTANEOUS EVERY 8 HOURS
Status: DISCONTINUED | OUTPATIENT
Start: 2019-12-30 | End: 2020-01-03 | Stop reason: HOSPADM

## 2019-12-30 RX ORDER — MORPHINE SULFATE 4 MG/ML
4 INJECTION, SOLUTION INTRAMUSCULAR; INTRAVENOUS EVERY 4 HOURS PRN
Status: DISCONTINUED | OUTPATIENT
Start: 2019-12-30 | End: 2020-01-03 | Stop reason: HOSPADM

## 2019-12-30 RX ORDER — POLYETHYLENE GLYCOL 3350 17 G/17G
17 POWDER, FOR SOLUTION ORAL DAILY
Status: DISCONTINUED | OUTPATIENT
Start: 2019-12-30 | End: 2019-12-30

## 2019-12-30 RX ORDER — ONDANSETRON 2 MG/ML
4 INJECTION INTRAMUSCULAR; INTRAVENOUS EVERY 8 HOURS PRN
Status: DISCONTINUED | OUTPATIENT
Start: 2019-12-30 | End: 2020-01-03 | Stop reason: HOSPADM

## 2019-12-30 RX ADMIN — MORPHINE SULFATE 4 MG: 4 INJECTION INTRAVENOUS at 08:12

## 2019-12-30 RX ADMIN — OXYCODONE HYDROCHLORIDE 5 MG: 5 TABLET ORAL at 05:12

## 2019-12-30 RX ADMIN — HEPARIN SODIUM 5000 UNITS: 5000 INJECTION, SOLUTION INTRAVENOUS; SUBCUTANEOUS at 09:12

## 2019-12-30 RX ADMIN — HEPARIN SODIUM 5000 UNITS: 5000 INJECTION, SOLUTION INTRAVENOUS; SUBCUTANEOUS at 02:12

## 2019-12-30 RX ADMIN — SENNOSIDES AND DOCUSATE SODIUM 1 TABLET: 8.6; 5 TABLET ORAL at 09:12

## 2019-12-30 RX ADMIN — HEPARIN SODIUM 5000 UNITS: 5000 INJECTION, SOLUTION INTRAVENOUS; SUBCUTANEOUS at 06:12

## 2019-12-30 RX ADMIN — OXYCODONE HYDROCHLORIDE 5 MG: 5 TABLET ORAL at 09:12

## 2019-12-30 RX ADMIN — MORPHINE SULFATE 4 MG: 4 INJECTION INTRAVENOUS at 02:12

## 2019-12-30 RX ADMIN — FAMOTIDINE 20 MG: 20 TABLET ORAL at 08:12

## 2019-12-30 RX ADMIN — OXYCODONE HYDROCHLORIDE 5 MG: 5 TABLET ORAL at 10:12

## 2019-12-30 RX ADMIN — POLYETHYLENE GLYCOL 3350 17 G: 17 POWDER, FOR SOLUTION ORAL at 09:12

## 2019-12-30 RX ADMIN — MORPHINE SULFATE 4 MG: 4 INJECTION INTRAVENOUS at 03:12

## 2019-12-30 RX ADMIN — FAMOTIDINE 20 MG: 20 TABLET ORAL at 09:12

## 2019-12-30 RX ADMIN — SENNOSIDES AND DOCUSATE SODIUM 1 TABLET: 8.6; 5 TABLET ORAL at 08:12

## 2019-12-30 NOTE — ED NOTES
Ultrasound has been completed. Waiting on results at this time. Patient does not appear distressed. Will continue to monitor.

## 2019-12-30 NOTE — PROGRESS NOTES
NEUROSURGICAL INPATIENT CONSULTATION NOTE    DATE OF SERVICE:  12/30/2019    ATTENDING PHYSICIAN:  Nico Alvarez MD          SUBJECTIVE:    HISTORY OF PRESENT ILLNESS:  This is a very pleasant 69 y.o. female who had a recent laminectomy at L3-4 and L4-5 3 days ago.  She was discharged on postop day 1.  She was able to mobilize with a walker.  On postop day 2 she had bilateral leg pain while standing up and was not able to walk safely at home.  Her sister brought to the emergency room.  She was kept overnight.  When she is lying down she reports no significant pain.  She stands up she reports bilateral severe leg no new weakness or numbness.  No sphincter dysfunction symptoms.  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    Seasonal allergic rhinitis 04/09/2019    Lumbar stenosis with neurogenic claudication 10/09/2019    Foraminal stenosis of lumbar region 10/09/2019    Spinal stenosis of lumbar region with radiculopathy 12/27/2019     Resolved Ambulatory Problems     Diagnosis Date Noted    Pain of left hip joint 08/14/2017    Weakness of both lower extremities 08/14/2017    Antalgic gait 08/14/2017    Irregular cardiac rhythm 03/25/2018    Atherosclerosis of coronary artery 04/09/2019     Past Medical History:   Diagnosis Date    Atrial fibrillation     Cataract     Hypertension     PAD (peripheral artery disease)     Peripheral artery disease        PAST SURGICAL HISTORY:  Past Surgical History:   Procedure  Laterality Date    BREAST SURGERY      CHOLECYSTECTOMY      ECTOPIC PREGNANCY SURGERY      LUMBAR LAMINECTOMY Right 12/27/2019    Procedure: LAMINECTOMY, SPINE, LUMBAR Right L3-4 Laminectomy Medial Facetectomy and Contralateral Foraminotomy right L4-5 Laminectomy medial facetectomy and microdiscectomy;  Surgeon: Nico Alvarez MD;  Location: [facility] OR;  Service: Neurosurgery;  Laterality: Right;  Procedure: Right L3-4 Laminectomy Medial Facetectomy and Contralateral Foraminotomy right L4-5 Laminectomy medial facetectomy and m    SALPINGECTOMY         SOCIAL HISTORY:   Social History     Socioeconomic History    Marital status: Legally      Spouse name: Not on file    Number of children: Not on file    Years of education: Not on file    Highest education level: Not on file   Occupational History    Not on file   Social Needs    Financial resource strain: Not on file    Food insecurity:     Worry: Not on file     Inability: Not on file    Transportation needs:     Medical: Not on file     Non-medical: Not on file   Tobacco Use    Smoking status: Former Smoker    Smokeless tobacco: Never Used   Substance and Sexual Activity    Alcohol use: No    Drug use: No    Sexual activity: Not on file   Lifestyle    Physical activity:     Days per week: Not on file     Minutes per session: Not on file    Stress: Not on file   Relationships    Social connections:     Talks on phone: Not on file     Gets together: Not on file     Attends Restorationist service: Not on file     Active member of club or organization: Not on file     Attends meetings of clubs or organizations: Not on file     Relationship status: Not on file   Other Topics Concern    Not on file   Social History Narrative    ** Merged History Encounter **            FAMILY HISTORY:  Family History   Problem Relation Age of Onset    Cataracts Mother     Cataracts Sister        CURRENTS MEDICATIONS:    No current facility-administered  medications on file prior to encounter.      Current Outpatient Medications on File Prior to Encounter   Medication Sig Dispense Refill    acetaminophen (TYLENOL) 650 MG TbSR Take 650 mg by mouth 2 (two) times daily as needed.      azelastine (ASTELIN) 137 mcg (0.1 %) nasal spray 1 puff in each nostril      cholecalciferol, vitamin D3, (VITAMIN D3) 5,000 unit Tab Take 5,000 Units by mouth once a week. Takes on Saturday      cyanocobalamin (VITAMIN B-12) 1,000 mcg/mL injection 1 tablet      cyanocobalamin, vitamin B-12, 50 mcg tablet Take 50 mcg by mouth every morning. Pt will verify the strength      DIPH,PERTUSS,ACEL,,TET VAC,PF, ADULT (ADACEL,TDAP ADOLESN/ADULT,,PF,) 2 Lf-(2.5-5-3-5 mcg)-5Lf/0.5 mL Syrg Adacel (Tdap Adolesn/Adult)(PF)2 Lf-(2.5-5-3-5)-5 Lf/0.5 mL IM syringe   INJ 0.5ML INTRAMUSCULAR      flu vacc [id number] 65yr up,PF, 180 mcg/0.5 mL Syrg Fluzone High-Dose [id number] (PF) 180 mcg/0.5 mL intramuscular syringe      fosinopril (MONOPRIL) 20 MG tablet Take 20 mg by mouth every evening.       metoprolol succinate (TOPROL-XL) 100 MG 24 hr tablet Take 100 mg by mouth 2 (two) times daily. TAKES HALF OF TABLET IN AM (50 MG) AND 1 TABLET AT NIGHT (100 MG) FOR TOTAL  MG DAILY      oxyCODONE-acetaminophen (PERCOCET) 5-325 mg per tablet Take 1 tablet by mouth every 8 (eight) hours as needed for Pain. 42 tablet 0    triamcinolone acetonide 0.1% (KENALOG) 0.1 % ointment AAA chest bid 80 g 0    triamterene-hydrochlorothiazide 37.5-25 mg (DYAZIDE) 37.5-25 mg per capsule Take 1 capsule by mouth every morning.   1    warfarin (COUMADIN) 1 MG tablet Take 1 tablet (1 mg total) by mouth Daily. 1 Mg daily except Mondays and Fridays take 0.5 mg 90 tablet 3        famotidine  20 mg Oral BID    heparin (porcine)  5,000 Units Subcutaneous Q8H    polyethylene glycol  17 g Oral Daily    senna-docusate 8.6-50 mg  1 tablet Oral BID       ALLERGIES:  Review of patient's allergies indicates:   Allergen  Reactions    Norco [hydrocodone-acetaminophen] Hives       REVIEW OF SYSTEMS:  Review of Systems   Constitutional: Negative for diaphoresis, fever and weight loss.   Respiratory: Negative for shortness of breath.    Cardiovascular: Negative for chest pain.   Gastrointestinal: Negative for blood in stool.   Genitourinary: Negative for hematuria.   Endo/Heme/Allergies: Does not bruise/bleed easily.   All other systems reviewed and are negative.      OBJECTIVE:    PHYSICAL EXAMINATION:   Vitals:    12/30/19 1202   BP: (!) 158/72   Pulse: 76   Resp:    Temp:        Physical Exam:  Vitals reviewed.    Constitutional: She appears well-developed and well-nourished.     Eyes: Pupils are equal, round, and reactive to light. Conjunctivae and EOM are normal.     Cardiovascular: Normal distal pulses and no edema.     Abdominal: Soft.     Skin: Skin displays no rash on trunk and no rash on extremities. Skin displays no lesions on trunk and no lesions on extremities.     Psych/Behavior: She is alert. She is oriented to person, place, and time. She has a normal mood and affect.     Musculoskeletal:        Neck: Range of motion is full.     Neurological:        DTRs: Tricep reflexes are 2+ on the right side and 2+ on the left side. Bicep reflexes are 2+ on the right side and 2+ on the left side. Brachioradialis reflexes are 2+ on the right side and 2+ on the left side. Patellar reflexes are 2+ on the right side and 2+ on the left side. Achilles reflexes are 2+ on the right side and 2+ on the left side.       Back Exam     Muscle Strength   Right Quadriceps:  5/5   Left Quadriceps:  5/5   Right Hamstrings:  5/5   Left Hamstrings:  5/5             SI joint:   Palpation at the right and left SI joints not painful  MIRANDA test is negative bilaterally  Gaenslen test is negative bilaterally  Thigh thrust test is negative bilaterally    Neurologic Exam     Mental Status   Oriented to person, place, and time.   Speech: speech is normal    Level of consciousness: alert    Cranial Nerves   Cranial nerves II through XII intact.     CN III, IV, VI   Pupils are equal, round, and reactive to light.  Extraocular motions are normal.     Motor Exam   Muscle bulk: normal  Overall muscle tone: normal    Strength   Right deltoid: 5/5  Left deltoid: 5/5  Right biceps: 5/5  Left biceps: 5/5  Right triceps: 5/5  Left triceps: 5/5  Right wrist flexion: 5/5  Left wrist flexion: 5/5  Right wrist extension: 5/5  Left wrist extension: 5/5  Right interossei: 5/5  Left interossei: 5/5  Right iliopsoas: 5/5  Left iliopsoas: 5/5  Right quadriceps: 5/5  Left quadriceps: 5/5  Right hamstrin/5  Left hamstrin/5  Right anterior tibial: 5/5  Left anterior tibial: 5/5  Right posterior tibial: 5/5  Left posterior tibial: 5/5  Right peroneal: 5/5  Left peroneal: 5/5  Right gastroc: 5/5  Left gastroc: 5/5    Sensory Exam   Light touch normal.   Pinprick normal.     Gait, Coordination, and Reflexes     Gait  Gait: normal    Coordination   Finger to nose coordination: normal  Tandem walking coordination: normal    Reflexes   Right brachioradialis: 2+  Left brachioradialis: 2+  Right biceps: 2+  Left biceps: 2+  Right triceps: 2+  Left triceps: 2+  Right patellar: 2+  Left patellar: 2+  Right achilles: 2+  Left achilles: 2+  Right plantar: normal  Left plantar: normal  Right Roblero: absent  Left Roblero: absent  Right ankle clonus: absent  Left ankle clonus: absent      DIAGNOSTIC DATA:    Recent Labs     19  2210   HGB 8.5*   HCT 26.0*   MCV 84   WBC 5.64         K 4.0      *   BUN 17   CREATININE 0.9   CALCIUM 9.0   ALT 46*   AST 28   ALBUMIN 2.9*   BILITOT 0.5   INR 1.4*       Microbiology Results (last 7 days)     ** No results found for the last 168 hours. **          I personally interpreted the following imaging:NA    ASSESMENT:  This is a 69 y.o. female with postop day 3 L3-4/L4-5 laminectomy for severe lumbar stenosis with  radiculopathy.  Impair mobility and ADL.    PLAN:  Remission for physical therapy and occupational therapy reassessment.  Will probably need to be transferred to skilled nurse facility.  All questions answered    Nico Alvarez MD  Cell: [phone]

## 2019-12-30 NOTE — ED NOTES
Dr. Enamorado at the bedside for patient reassessment. Physician and I trying to assist the patient to a standing position was unsuccessful. Patient placed back in bed without any incident.

## 2019-12-30 NOTE — ED NOTES
"Radiology tech at the bedside for ultrasound examination. Patient appears calm and is talking/laughing with family at the bedside. When asked about pain patient reports "There is no pain when I'm still. It just hurts when I move it". Skin is warm and dry. Respirations are even and non labored. Side rails up x 2 with call light in reach. Comfort measures offered. Room information board has been updated. Will continue to monitor.   "

## 2019-12-30 NOTE — PLAN OF CARE
VN cued into room.  Pt resting comfortably in bed with call light and personal belongings within reach.  NADN.  Family at bedside.  Pt alert and awake, able to answer admission questions.  No needs or complaints at this time.  Reminded pt to use call light as needed.  VN will continue to follow and be available as needed.

## 2019-12-30 NOTE — ED PROVIDER NOTES
Encounter Date: 12/29/2019    SCRIBE #1 NOTE: I, Fifi Aggarwal, am scribing for, and in the presence of,  Ronald Enamorado MD. I have scribed the entire note.       History     Chief Complaint   Patient presents with    Post-op Problem     To ER per  EMS with c/o bilateral leg pain increasing all day.  Pt had back surgert on 12/27/19.  states that she took Percocet at 1700 with no relief.  Pt concerned that she has been off of coumadin x 9 days.     Time seen by provider: 9:37 PM    This is a 69 y.o. female with PMHx of cataract, PAD, HTN, and A fib, who presents with complaint of bilateral leg pain and weakness that began today. She reports that she had been doing well this morning and was able to walk to the bathroom. But as the day progressed, both legs began feeling increasingly weak and hurting more with ambulation until she was unable to walk to the bathroom. On 12/27 Dr. Alvarez performed an L-spine laminectomy at L3-5 on the patient, and per Dr. Alvarez's notes, the patient had been experiencing lower back and bilateral hip pain that was aggravated with standing and walking. She denies any fever, chills, or other concerning symptoms. After surgery she took Tylenol for pain and began Percocet today at 10:00 (last dose at 19:00) without relief. The patient reports that she was not instructed to take Ibuprofen due to having taken coumadin. She reports that she had been off coumadin for 1 week and resumed it today per physician's orders. A nurse visited her home to change her bandage, and this was the first post-op dressing change. The patient is allergic to Norco (abdominal rash).     The history is provided by the patient.     Review of patient's allergies indicates:   Allergen Reactions    Norco [hydrocodone-acetaminophen] Hives     Past Medical History:   Diagnosis Date    Atrial fibrillation     Cataract     Hypertension     PAD (peripheral artery disease)     Peripheral artery disease      Past  Surgical History:   Procedure Laterality Date    BREAST SURGERY      CHOLECYSTECTOMY      ECTOPIC PREGNANCY SURGERY      SALPINGECTOMY       Family History   Problem Relation Age of Onset    Cataracts Mother     Cataracts Sister      Social History     Tobacco Use    Smoking status: Former Smoker    Smokeless tobacco: Never Used   Substance Use Topics    Alcohol use: No    Drug use: No     Review of Systems   Musculoskeletal: Positive for myalgias (bilateral LE).   Neurological: Positive for weakness (bilateral LE).   All other systems reviewed and are negative.      Physical Exam     Initial Vitals [12/29/19 2124]   BP Pulse Resp Temp SpO2   (!) 141/72 73 18 99.3 °F (37.4 °C) 95 %      MAP       --         Physical Exam    Nursing note and vitals reviewed.  Constitutional: She appears well-developed and well-nourished. She is not diaphoretic. No distress.   HENT:   Head: Normocephalic and atraumatic.   Right Ear: Tympanic membrane normal.   Left Ear: Tympanic membrane normal.   Mouth/Throat: Oropharynx is clear and moist.   Eyes: Conjunctivae and EOM are normal. Pupils are equal, round, and reactive to light.   Neck: Normal range of motion. Neck supple.   Cardiovascular: Normal rate, regular rhythm and normal heart sounds. Exam reveals no gallop and no friction rub.    No murmur heard.  Pulmonary/Chest: Breath sounds normal. She has no wheezes. She has no rhonchi. She has no rales.   Abdominal: Soft. Bowel sounds are normal. There is no tenderness. There is no rebound and no guarding.   Musculoskeletal: Normal range of motion. She exhibits edema (1+ pitting edema to B/L LE). She exhibits no tenderness.   L-spine: post surgical site appears well healing with no cellulitic changes   Lymphadenopathy:     She has no cervical adenopathy.   Neurological: She is alert and oriented to person, place, and time. She has normal strength.   Normal LE strength   Skin: Skin is warm and dry. Capillary refill takes less  than 2 seconds. No rash noted.         ED Course   Procedures  Labs Reviewed   CBC W/ AUTO DIFFERENTIAL - Abnormal; Notable for the following components:       Result Value    RBC 3.08 (*)     Hemoglobin 8.5 (*)     Hematocrit 26.0 (*)     RDW 16.8 (*)     MPV 8.0 (*)     All other components within normal limits   COMPREHENSIVE METABOLIC PANEL - Abnormal; Notable for the following components:    Glucose 115 (*)     Albumin 2.9 (*)     ALT 46 (*)     All other components within normal limits   B-TYPE NATRIURETIC PEPTIDE - Abnormal; Notable for the following components:     (*)     All other components within normal limits   PROTIME-INR - Abnormal; Notable for the following components:    Prothrombin Time 14.8 (*)     INR 1.4 (*)     All other components within normal limits   APTT - Abnormal; Notable for the following components:    aPTT 49.4 (*)     All other components within normal limits   TROPONIN I     EKG Readings: (Independently Interpreted)   Sinus rhythm 7 beats per minute, normal intervals, normal axis, no acute ST changes         X-Rays:   Independently Interpreted Readings:   Other Readings:  Reviewed by myself, read by radiology.     Imaging Results          US Lower Extremity Veins Bilateral (Final result)  Result time 12/29/19 23:20:21    Final result by Megan Jovel MD (12/29/19 23:20:21)                 Impression:      No evidence of deep venous thrombosis in either lower extremity.    Bilateral Rouleaux or slow flow.      Electronically signed by: Megan Jovel  Date:    12/29/2019  Time:    23:20             Narrative:    EXAMINATION:  ULTRASOUND LOWER EXTREMITY VEINS BILATERAL    CLINICAL HISTORY:  Other specified soft tissue disorders    TECHNIQUE:  Duplex and color flow Doppler and dynamic compression was performed of the bilateral lower extremity veins was performed.    COMPARISON:  None    FINDINGS:  Right thigh veins: The common femoral, femoral, popliteal, upper greater  saphenous, and deep femoral veins are patent and free of thrombus. The veins are normally compressible and have normal phasic flow and augmentation response.    Right calf veins: The visualized calf veins are patent.    Left thigh veins: The common femoral, femoral, popliteal, upper greater saphenous, and deep femoral veins are patent and free of thrombus. The veins are normally compressible and have normal phasic flow and augmentation response.    Left calf veins: The visualized calf veins are patent.    Miscellaneous: Slow flow is seen bilaterally.                              Medical Decision Making:   History:   Old Medical Records: I decided to obtain old medical records.  Initial Assessment:    69 y.o. female with the history of recent L3-4 and L4-5 laminectomy, medial facetectomy, foraminotomy, done on the 27th, presents the ER for evaluation of bilateral leg pain. She has a history of peripheral artery disease, atrial fibrillation and hypertension.  Progressively worsening, patient reports she just feels weak in her legs, she took a Percocet for the pain, and denies any improvement.  She also reports that she has not had her warfarin for the past 9 days.  No fever, chills, chest pain or shortness of breath, her postsurgical wounds are well appearing without any cellulitic changes, per the discharge or wound dehiscence.  She is neurovascularly intact in her lower extremities, with equal strength bilaterally, and no saddle anesthesia.  Differential includes postsurgical pain, lymphadenopathy, DVT, postsurgical infection versus other cause.  Will obtain blood work ultrasound imaging pain control with Percocet will reassess.  Clinical Tests:   Lab Tests: Reviewed and Ordered  Radiological Study: Reviewed and Ordered  Medical Tests: Reviewed and Ordered              Attending Attestation:           Physician Attestation for Scribe:  Physician Attestation Statement for Scribe #1: Ronald CARR MD, reviewed  documentation, as scribed by Fifi Aggarwal in my presence, and it is both accurate and complete.                 ED Course as of Dec 30 0517   Sun Dec 29, 2019   2328 Patient resting comfortably in bed, no acute distress, labs imaging reviewed, no acute process identified.  INR 1.4, still slightly subtherapeutic,, however APTT also elevated.  Believe patient is being appropriately anticoagulated at this time, DVT negative for acute process.  Note 2 point drop in hemoglobin post surgery, this is expected, no obvious signs of hemorrhaging at this time.  Patient reports he feels better which to go home.  I believe this pain is related to a postop process, because patient reports that she has difficulty ambulating because of this pain.  No saddle anesthesia or cauda equina symptoms. Discussed with patient plan to discharge home, return precautions, follow-up with Neurosurgery, improved pain regimen with Tylenol Percocet as needed.  Patient understood and agree with plan, patient will be discharged.    [SE]   Mon Dec 30, 2019   0024 As patient was getting ready to leave, she attempted to get out of bed and reported the pain was too significant and she is uncomfortable going home.  She reported the pain has improved, however she feels that she has significant pain when she walks and she is going to be unable to perform her daily life functions.  Discussed with patient, discussed options of discharge versus admission, patient requested admission.  I discussed with Dr. Alvarez, neurosurgery who performed the surgery, discussed patient's concern of significant back pain status post surgery and difficulty walking.  There are no signs of cauda equina at this time, I believe it is mostly postop back pain, this pain is exacerbated with movement and it is worse when she is attempting to go from seated to standing position.  Will admit patient for observation and will be seen by Neurosurgery in the morning.  Patient understands  agrees with plan.    [SE]      ED Course User Index  [SE] Ronald Enamorado MD                Clinical Impression:       ICD-10-CM ICD-9-CM   1. Post-op pain G89.18 338.18   2. Leg swelling M79.89 729.81         Disposition:   Disposition: Discharged  Condition: Stable                     Ronald Enamorado MD  12/30/19 0518

## 2019-12-30 NOTE — ED NOTES
Patient was dressed and robe placed on for discharge. Wheelchair at the bedside for assistance to get to vehicle. Patient was able to sit up at the edge of the bed and tried standing. Patient reports having increased pain and feeling weakness to the legs. Dr. Enamorado notified of patient status

## 2019-12-30 NOTE — ED TRIAGE NOTES
Pt presents to the ED with complaints of bilateral leg pain that she states she has had since her lumbar surgery on Friday 12/27. Pt states that the pain has gotten worse today and was not relieved with her prescribed percocet. Pt states that her legs hurt to the point that she feels like she cannot stand on them. Pt states that she has been off her coumadin for 9 days is concerned that could be causing pain. Pt states that she has never had a blood clot and is on coumadin for PAD. Pt legs are not tender to touch, bilateral legs are equal in size, no swelling or warm spots noted. Pt can move legs and feet with no problem. Pt denies numbness and tingling to legs. Pt states pain is a 10 with movement and a 0 at rest.     APPEARANCE: Alert, oriented and in no acute distress.  CARDIAC: Normal rate and rhythm, no murmur heard.   PERIPHERAL VASCULAR: peripheral pulses present. Normal cap refill. No edema. Warm to touch.    RESPIRATORY:Normal rate and effort, breath sounds clear bilaterally throughout chest. Respirations are equal and unlabored no obvious signs of distress.  GASTRO: soft, bowel sounds normal, no tenderness, no abdominal distention.  MUSC: Full ROM. No bony tenderness or soft tissue tenderness. No obvious deformity.  SKIN: Skin is warm and dry, normal skin turgor, mucous membranes moist.  NEURO: 5/5 strength major flexors/extensors bilaterally. Sensory intact to light touch bilaterally. Warrens coma scale: eyes open spontaneously-4, oriented & converses-5, obeys commands-6. No neurological abnormalities.   MENTAL STATUS: awake, alert and aware of environment.  EYE: PERRL, both eyes: pupils brisk and reactive to light. Normal size.  ENT: EARS: no obvious drainage. NOSE: no active bleeding.

## 2019-12-31 LAB
ANION GAP SERPL CALC-SCNC: 11 MMOL/L (ref 8–16)
BASOPHILS # BLD AUTO: 0 K/UL (ref 0–0.2)
BASOPHILS NFR BLD: 0 % (ref 0–1.9)
BILIRUB UR QL STRIP: NEGATIVE
BUN SERPL-MCNC: 13 MG/DL (ref 8–23)
CALCIUM SERPL-MCNC: 9.5 MG/DL (ref 8.7–10.5)
CHLORIDE SERPL-SCNC: 103 MMOL/L (ref 95–110)
CLARITY UR: CLEAR
CO2 SERPL-SCNC: 27 MMOL/L (ref 23–29)
COLOR UR: YELLOW
CREAT SERPL-MCNC: 0.8 MG/DL (ref 0.5–1.4)
DIFFERENTIAL METHOD: ABNORMAL
EOSINOPHIL # BLD AUTO: 0.1 K/UL (ref 0–0.5)
EOSINOPHIL NFR BLD: 1.5 % (ref 0–8)
ERYTHROCYTE [DISTWIDTH] IN BLOOD BY AUTOMATED COUNT: 16.6 % (ref 11.5–14.5)
EST. GFR  (AFRICAN AMERICAN): >60 ML/MIN/1.73 M^2
EST. GFR  (NON AFRICAN AMERICAN): >60 ML/MIN/1.73 M^2
GLUCOSE SERPL-MCNC: 113 MG/DL (ref 70–110)
GLUCOSE UR QL STRIP: NEGATIVE
HCT VFR BLD AUTO: 26.1 % (ref 37–48.5)
HGB BLD-MCNC: 8.6 G/DL (ref 12–16)
HGB UR QL STRIP: NEGATIVE
INR PPP: 1.2 (ref 0.8–1.2)
KETONES UR QL STRIP: NEGATIVE
LEUKOCYTE ESTERASE UR QL STRIP: NEGATIVE
LYMPHOCYTES # BLD AUTO: 1.1 K/UL (ref 1–4.8)
LYMPHOCYTES NFR BLD: 26 % (ref 18–48)
MCH RBC QN AUTO: 27.5 PG (ref 27–31)
MCHC RBC AUTO-ENTMCNC: 33 G/DL (ref 32–36)
MCV RBC AUTO: 83 FL (ref 82–98)
MONOCYTES # BLD AUTO: 0.5 K/UL (ref 0.3–1)
MONOCYTES NFR BLD: 12.5 % (ref 4–15)
NEUTROPHILS # BLD AUTO: 2.4 K/UL (ref 1.8–7.7)
NEUTROPHILS NFR BLD: 60 % (ref 38–73)
NITRITE UR QL STRIP: NEGATIVE
PH UR STRIP: 7 [PH] (ref 5–8)
PLATELET # BLD AUTO: 204 K/UL (ref 150–350)
PMV BLD AUTO: 8 FL (ref 9.2–12.9)
POTASSIUM SERPL-SCNC: 4.5 MMOL/L (ref 3.5–5.1)
PROT UR QL STRIP: NEGATIVE
PROTHROMBIN TIME: 13.3 SEC (ref 9–12.5)
RBC # BLD AUTO: 3.13 M/UL (ref 4–5.4)
SODIUM SERPL-SCNC: 141 MMOL/L (ref 136–145)
SP GR UR STRIP: 1.01 (ref 1–1.03)
URN SPEC COLLECT METH UR: NORMAL
UROBILINOGEN UR STRIP-ACNC: NEGATIVE EU/DL
WBC # BLD AUTO: 4.07 K/UL (ref 3.9–12.7)

## 2019-12-31 PROCEDURE — 85610 PROTHROMBIN TIME: CPT

## 2019-12-31 PROCEDURE — 99024 PR POST-OP FOLLOW-UP VISIT: ICD-10-PCS | Mod: ,,, | Performed by: PHYSICIAN ASSISTANT

## 2019-12-31 PROCEDURE — 97116 GAIT TRAINING THERAPY: CPT | Mod: 59

## 2019-12-31 PROCEDURE — 96376 TX/PRO/DX INJ SAME DRUG ADON: CPT

## 2019-12-31 PROCEDURE — 25000003 PHARM REV CODE 250: Performed by: EMERGENCY MEDICINE

## 2019-12-31 PROCEDURE — 85025 COMPLETE CBC W/AUTO DIFF WBC: CPT

## 2019-12-31 PROCEDURE — 99024 POSTOP FOLLOW-UP VISIT: CPT | Mod: ,,, | Performed by: PHYSICIAN ASSISTANT

## 2019-12-31 PROCEDURE — 81003 URINALYSIS AUTO W/O SCOPE: CPT

## 2019-12-31 PROCEDURE — 25000003 PHARM REV CODE 250: Performed by: PHYSICIAN ASSISTANT

## 2019-12-31 PROCEDURE — 63600175 PHARM REV CODE 636 W HCPCS: Performed by: EMERGENCY MEDICINE

## 2019-12-31 PROCEDURE — 36415 COLL VENOUS BLD VENIPUNCTURE: CPT

## 2019-12-31 PROCEDURE — 96361 HYDRATE IV INFUSION ADD-ON: CPT

## 2019-12-31 PROCEDURE — 25000003 PHARM REV CODE 250: Performed by: NEUROLOGICAL SURGERY

## 2019-12-31 PROCEDURE — 97530 THERAPEUTIC ACTIVITIES: CPT

## 2019-12-31 PROCEDURE — 80048 BASIC METABOLIC PNL TOTAL CA: CPT

## 2019-12-31 PROCEDURE — 94761 N-INVAS EAR/PLS OXIMETRY MLT: CPT

## 2019-12-31 PROCEDURE — G0378 HOSPITAL OBSERVATION PER HR: HCPCS

## 2019-12-31 PROCEDURE — 96372 THER/PROPH/DIAG INJ SC/IM: CPT

## 2019-12-31 PROCEDURE — 97162 PT EVAL MOD COMPLEX 30 MIN: CPT

## 2019-12-31 RX ORDER — METHOCARBAMOL 750 MG/1
750 TABLET, FILM COATED ORAL 3 TIMES DAILY
Status: DISCONTINUED | OUTPATIENT
Start: 2019-12-31 | End: 2020-01-03 | Stop reason: HOSPADM

## 2019-12-31 RX ORDER — DEXTROSE MONOHYDRATE, SODIUM CHLORIDE, AND POTASSIUM CHLORIDE 50; 1.49; 9 G/1000ML; G/1000ML; G/1000ML
INJECTION, SOLUTION INTRAVENOUS CONTINUOUS
Status: DISCONTINUED | OUTPATIENT
Start: 2019-12-31 | End: 2020-01-01

## 2019-12-31 RX ORDER — METOPROLOL SUCCINATE 50 MG/1
100 TABLET, EXTENDED RELEASE ORAL 2 TIMES DAILY
Status: DISCONTINUED | OUTPATIENT
Start: 2019-12-31 | End: 2020-01-03 | Stop reason: HOSPADM

## 2019-12-31 RX ORDER — WARFARIN 1 MG/1
1 TABLET ORAL DAILY
Status: DISCONTINUED | OUTPATIENT
Start: 2020-01-02 | End: 2020-01-03 | Stop reason: HOSPADM

## 2019-12-31 RX ORDER — CHOLECALCIFEROL (VITAMIN D3) 25 MCG
5000 TABLET ORAL DAILY
Status: DISCONTINUED | OUTPATIENT
Start: 2019-12-31 | End: 2019-12-31

## 2019-12-31 RX ORDER — TRIAMTERENE AND HYDROCHLOROTHIAZIDE 37.5; 25 MG/1; MG/1
1 CAPSULE ORAL EVERY MORNING
Status: DISCONTINUED | OUTPATIENT
Start: 2019-12-31 | End: 2020-01-03 | Stop reason: HOSPADM

## 2019-12-31 RX ORDER — FOSINOPIRL SODIUM 10 MG/1
20 TABLET ORAL NIGHTLY
Status: DISCONTINUED | OUTPATIENT
Start: 2019-12-31 | End: 2020-01-03 | Stop reason: HOSPADM

## 2019-12-31 RX ORDER — OXYCODONE HYDROCHLORIDE 5 MG/1
10 TABLET ORAL EVERY 4 HOURS PRN
Status: DISCONTINUED | OUTPATIENT
Start: 2019-12-31 | End: 2020-01-03 | Stop reason: HOSPADM

## 2019-12-31 RX ORDER — WARFARIN 2 MG/1
2 TABLET ORAL DAILY
Status: COMPLETED | OUTPATIENT
Start: 2019-12-31 | End: 2020-01-01

## 2019-12-31 RX ADMIN — FAMOTIDINE 20 MG: 20 TABLET ORAL at 09:12

## 2019-12-31 RX ADMIN — POLYETHYLENE GLYCOL 3350 17 G: 17 POWDER, FOR SOLUTION ORAL at 09:12

## 2019-12-31 RX ADMIN — OXYCODONE HYDROCHLORIDE 5 MG: 5 TABLET ORAL at 09:12

## 2019-12-31 RX ADMIN — TRIAMTERENE AND HYDROCHLOROTHIAZIDE 1 CAPSULE: 25; 37.5 CAPSULE ORAL at 10:12

## 2019-12-31 RX ADMIN — METHOCARBAMOL TABLETS 750 MG: 750 TABLET, COATED ORAL at 02:12

## 2019-12-31 RX ADMIN — OXYCODONE HYDROCHLORIDE 5 MG: 5 TABLET ORAL at 05:12

## 2019-12-31 RX ADMIN — MORPHINE SULFATE 4 MG: 4 INJECTION INTRAVENOUS at 10:12

## 2019-12-31 RX ADMIN — HEPARIN SODIUM 5000 UNITS: 5000 INJECTION, SOLUTION INTRAVENOUS; SUBCUTANEOUS at 05:12

## 2019-12-31 RX ADMIN — OXYCODONE HYDROCHLORIDE 10 MG: 5 TABLET ORAL at 05:12

## 2019-12-31 RX ADMIN — VITAMIN D, TAB 1000IU (100/BT) 5000 UNITS: 25 TAB at 10:12

## 2019-12-31 RX ADMIN — MORPHINE SULFATE 4 MG: 4 INJECTION INTRAVENOUS at 09:12

## 2019-12-31 RX ADMIN — OXYCODONE HYDROCHLORIDE 10 MG: 5 TABLET ORAL at 11:12

## 2019-12-31 RX ADMIN — METHOCARBAMOL TABLETS 750 MG: 750 TABLET, COATED ORAL at 09:12

## 2019-12-31 RX ADMIN — POTASSIUM CHLORIDE, DEXTROSE MONOHYDRATE AND SODIUM CHLORIDE: 150; 5; 900 INJECTION, SOLUTION INTRAVENOUS at 06:12

## 2019-12-31 RX ADMIN — WARFARIN SODIUM 2 MG: 2 TABLET ORAL at 04:12

## 2019-12-31 RX ADMIN — OXYCODONE HYDROCHLORIDE 5 MG: 5 TABLET ORAL at 01:12

## 2019-12-31 RX ADMIN — HEPARIN SODIUM 5000 UNITS: 5000 INJECTION, SOLUTION INTRAVENOUS; SUBCUTANEOUS at 09:12

## 2019-12-31 RX ADMIN — SENNOSIDES AND DOCUSATE SODIUM 1 TABLET: 8.6; 5 TABLET ORAL at 09:12

## 2019-12-31 RX ADMIN — HEPARIN SODIUM 5000 UNITS: 5000 INJECTION, SOLUTION INTRAVENOUS; SUBCUTANEOUS at 02:12

## 2019-12-31 RX ADMIN — METOPROLOL SUCCINATE 100 MG: 50 TABLET, EXTENDED RELEASE ORAL at 10:12

## 2019-12-31 RX ADMIN — METOPROLOL SUCCINATE 100 MG: 50 TABLET, EXTENDED RELEASE ORAL at 09:12

## 2019-12-31 RX ADMIN — FOSINOPRIL 20 MG: 10 TABLET ORAL at 09:12

## 2019-12-31 NOTE — PROGRESS NOTES
[facility]  Neurosurgery  Progress Note    Subjective:     History of Present Illness: Jayla Reagan is a 69 y.o. female with PMHx of cataract, PAD, HTN, and A fib (previously on Coumadin), and recent MIS laminectomy at L3-4 and L4-5 with Dr. Alvarez on 12/27/2019 who re-presented with uncontrolled postop pain.  She was admitted for pain control on 12/30/2019.    Post-Op Info:  * No surgery found *         Interval History:  No acute events overnight.  Patient complaining of bilateral posterior thigh myofascial aching pain worsening with movement.  Pain semi control with p.o. oxycodone 5 mg.  Awaiting physical therapy evaluation.  Previous dysuria and urinary retention improved significantly after Ken catheter exchanged.  Awaiting UA today.  Discussed with clinical pharmacist, will restart home Coumadin at 2 mg x2 days and return to maintenance dose (per previous Coumadin clinic recommendation).  Will monitor INR daily.    Medications:  Continuous Infusions:   dextrose 5 % and 0.9 % NaCl with KCl 20 mEq 75 mL/hr at 12/31/19 0635     Scheduled Meds:   famotidine  20 mg Oral BID    heparin (porcine)  5,000 Units Subcutaneous Q8H    polyethylene glycol  17 g Oral Daily    senna-docusate 8.6-50 mg  1 tablet Oral BID    [START ON 1/2/2020] warfarin  1 mg Oral Daily    warfarin  2 mg Oral Daily     PRN Meds:acetaminophen, acetaminophen, morphine, ondansetron, ondansetron, oxyCODONE, sodium chloride 0.9%     Review of Systems  Objective:     Weight: 108.9 kg (240 lb)  Body mass index is 32.55 kg/m².  Vital Signs (Most Recent):  Temp: 98.3 °F (36.8 °C) (12/31/19 0741)  Pulse: 89 (12/31/19 0741)  Resp: 18 (12/31/19 0741)  BP: (!) 148/72 (12/31/19 0741)  SpO2: 96 % (12/31/19 0752) Vital Signs (24h Range):  Temp:  [98.3 °F (36.8 °C)-98.9 °F (37.2 °C)] 98.3 °F (36.8 °C)  Pulse:  [73-92] 89  Resp:  [14-18] 18  SpO2:  [92 %-100 %] 96 %  BP: (138-180)/(65-81) 148/72                     Female External  Urinary Catheter 12/30/19 0230 (Active)   Skin no redness;no breakdown;perineum cleansed w/ soap and water 12/30/2019  7:50 PM   Tolerance no signs/symptoms of discomfort 12/30/2019  7:50 PM   Suction Continuous suction at 70 mmHg 12/30/2019  7:50 PM   Date of last wick change 12/30/19 12/30/2019  7:50 PM   Time of last wick change 1618 12/30/2019  4:00 PM   Output (mL) 100 mL 12/31/2019  5:00 AM       Neurosurgery Physical Exam   General: well developed, well nourished. no acute distress.  Comfortable  Neurologic: Awake, alert and oriented x3. Thought content appropriate.  Head: normocephalic, atraumatic   GCS: Motor: 6/Verbal: 5/Eyes: 4 GCS Total: 15  Cranial nerves:face symmetric and sensation intact bilaterally, tongue midline, pupils equal, round, reactive to light with accomodation, extraocular muscles intact. CN II-XII grossly intact. Shoulder shrug strength equal. Palate rises symmetrically.  Uvula midline. Hearing equal with finger rub. Gag and taste not tested.     Language: no aphasia  Speech: no dysarthria     Sensory: response to light touch throughout  Motor Strength: Moves all extremities spontaneously with good tone. Full strength upper and lower extremities. No abnormal movements seen.       Strength  Deltoids Triceps Biceps Wrist Extension Wrist Flexion Hand    Upper: R 5/5 5/5 5/5 5/5 5/5 5/5    L 5/5 5/5 5/5 5/5 5/5 5/5     Iliopsoas Quadriceps Knee  Flexion Tibialis  anterior Gastro- cnemius EHL   Lower: R 5/5 5/5 5/5 5/5 5/5 5/5    L 5/5 5/5 5/5 5/5 5/5 5/5   Interossi muscle strength- intact    Straight leg raise: negative bilaterally  No focal numbness or weakness  No midline or paraspinal tenderness to palpation    ENT: normal hearing with finger rub  Heart: RRR, no cyanosis, pallor, or edema.   Lungs:  normal respiratory effort  Abdomen: soft, non-tender and symmetric  Extremities: warm with no cyanosis, edema, or clubbing  Pulses: palpable distal pulses  Skin: warm, dry and intact. No  visible rashes or lesions.     Posterior lumbar incision:  wound is c/d/i, no signs of infection, no erythema, warmth, edema, ttp,  no drainage.  wound edges are well approximated.        Significant Labs:  Recent Labs   Lab 12/29/19 2210 12/31/19  0629   * 113*    141   K 4.0 4.5    103   CO2 23 27   BUN 17 13   CREATININE 0.9 0.8   CALCIUM 9.0 9.5     Recent Labs   Lab 12/29/19 2210 12/31/19  0629   WBC 5.64 4.07   HGB 8.5* 8.6*   HCT 26.0* 26.1*    204     Recent Labs   Lab 12/29/19 2210 12/31/19  0749   INR 1.4* 1.2   APTT 49.4*  --      Microbiology Results (last 7 days)     ** No results found for the last 168 hours. **            Assessment/Plan:     * Post-op pain  69 y.o. female with PMHx of cataract, PAD, HTN, and A fib (previously on Coumadin), and recent MIS laminectomy at L3-4 and L4-5 with Dr. Alvarez on 12/27/2019 who re-presented with uncontrolled postop pain.  She was admitted for pain control on 12/30/2019.    -Patient is neurologically stable.  No focal deficits.  Mostly myofascial pain on exam secondary to immobility.  -Pain semi controlled on current regimen.  Will add Robaxin t.i.d. p.r.n. muscle spasms and increased p.o. Oxy IR to 10 mg q.4 hours p.r.n. severe pain.  Patient can continue p.r.n. subcutaneous morphine for breakthrough pain; however, I encouraged patient to use p.o. pain regimen 1st.  -PT/OT/OOB daily.  Therapy to re-evaluate today to determine DC needs.  Patient lives alone.  -Patient must be OOB for atleast 6 hours daily (may be in intervals: 2 hours in chair with each meal)  -IS to bed side. Patient to use atleast 10x every hour.  -Continue bowel regimen daily.  -Continue SQH, Teds and SCDs for DVTP.  -PPI for GI prophylaxis  -Restarted all home meds for hypertension, vitamin-D deficiency, and anemia.  -Pure wick  Will bladder scan in 4 hour if patient does not void spontaneously.  Obtain urinalysis today given brief history of dysuria.  Dysuria  resolved with pure wick exchange.  -clinical pharmacist consulted to restart home Coumadin for AFib.  Recommend restarting 2 mg x2 days and maintain home dose thereafter.  Monitor INR daily.  Continue DVT prophylaxis subcu heparin dose at this time.    Dispo: DC pending therapy evaluation. Patient has 2 wk wound check with NSR and 6 week follow up with Dr. Alvarez.    Discussed with Dr. Antonio Baker, PAKeyonaC  Neurosurgery  [facility]

## 2019-12-31 NOTE — HPI
Jayla Reagan is a 69 y.o. female with PMHx of cataract, PAD, HTN, and A fib (previously on Coumadin), and recent MIS laminectomy at L3-4 and L4-5 with Dr. Alvarez on 12/27/2019 who re-presented with uncontrolled postop pain.  She was admitted for pain control on 12/30/2019.

## 2019-12-31 NOTE — ASSESSMENT & PLAN NOTE
69 y.o. female with PMHx of cataract, PAD, HTN, and A fib (previously on Coumadin), and recent MIS laminectomy at L3-4 and L4-5 with Dr. Alvarez on 12/27/2019 who re-presented with uncontrolled postop pain.  She was admitted for pain control on 12/30/2019.    -Patient is neurologically stable.  No focal deficits.  Mostly myofascial pain on exam secondary to immobility.  -Pain semi controlled on current regimen.  Will add Robaxin t.i.d. p.r.n. muscle spasms and increased p.o. Oxy IR to 10 mg q.4 hours p.r.n. severe pain.  Patient can continue p.r.n. subcutaneous morphine for breakthrough pain; however, I encouraged patient to use p.o. pain regimen 1st.  -PT/OT/OOB daily.  Therapy to re-evaluate today to determine DC needs.  Patient lives alone.  -Patient must be OOB for atleast 6 hours daily (may be in intervals: 2 hours in chair with each meal)  -IS to bed side. Patient to use atleast 10x every hour.  -Continue bowel regimen daily.  -Continue SQH, Teds and SCDs for DVTP.  -PPI for GI prophylaxis  -Restarted all home meds for hypertension, vitamin-D deficiency, and anemia.  -Pure wick  Will bladder scan in 4 hour if patient does not void spontaneously.  Obtain urinalysis today given brief history of dysuria.  Dysuria resolved with pure wick exchange.  -clinical pharmacist consulted to restart home Coumadin for AFib.  Recommend restarting 2 mg x2 days and maintain home dose thereafter.  Monitor INR daily.  Continue DVT prophylaxis subcu heparin dose at this time.    Dispo: DC pending therapy evaluation. Patient has 2 wk wound check with NSR and 6 week follow up with Dr. Alvarez.    Discussed with Dr. Alvarez

## 2019-12-31 NOTE — PLAN OF CARE
TN met with patient at bedside. Patient was recently discharge from the hospital on 12/28/19 for Lacectomy. Patient was re admitted to due uncontrolled pain. Currently, patient lives at home alone. DME at home includes a RW, Shower Chair and BSC. No other DME noted. Patient was assigned to [facility] at last admission but was unable to start  service due to returning to the hospital. Per patient, she states that is agreeable to going IPR or SNF if recommended by therapy. Patient's family would be able to provided transportation and help at home.      TN  updated whiteboard with contact information. Blue discharge folder and discharge brochure given to patient.  TN instructed patient to contact TN if she has any further questions or concerns. TN will continue to follow.         12/31/19 4186   Discharge Assessment   Assessment Type Discharge Planning Assessment   Confirmed/corrected address and phone number on facesheet? Yes   Assessment information obtained from? Patient;Medical Record   Expected Length of Stay (days) 2   Prior to hospitilization cognitive status: Alert/Oriented   Prior to hospitalization functional status: Assistive Equipment;Needs Assistance   Current cognitive status: Alert/Oriented   Current Functional Status: Assistive Equipment;Needs Assistance   Able to Return to Prior Arrangements other (see comments)  (TBD)   Is patient able to care for self after discharge? Unable to determine at this time (comments)  (TBD)   Patient's perception of discharge disposition rehab facility   Readmission Within the Last 30 Days no previous admission in last 30 days   Patient currently being followed by outpatient case management? No   Patient currently receives any other outside agency services? No   Equipment Currently Used at Home bedside commode;shower chair;walker, rolling   Do you have any problems affording any of your prescribed medications? No   Is the patient taking medications as prescribed? yes   Does  the patient have transportation home? Yes   Transportation Anticipated family or friend will provide   Does the patient receive services at the [facility]madin Clinic? No   Discharge Plan A Rehab;Skilled Nursing Facility   Discharge Plan B Home;Home Health   DME Needed Upon Discharge  none   Patient/Family in Agreement with Plan yes

## 2019-12-31 NOTE — H&P
NEUROSURGICAL INPATIENT CONSULTATION NOTE     DATE OF SERVICE:  12/30/2019     ATTENDING PHYSICIAN:  Nico Alvarez MD              SUBJECTIVE:     HISTORY OF PRESENT ILLNESS:  This is a very pleasant 69 y.o. female who had a recent laminectomy at L3-4 and L4-5 3 days ago.  She was discharged on postop day 1.  She was able to mobilize with a walker.  On postop day 2 she had bilateral leg pain while standing up and was not able to walk safely at home.  Her sister brought to the emergency room.  She was kept overnight.  When she is lying down she reports no significant pain.  She stands up she reports bilateral severe leg no new weakness or numbness.  No sphincter dysfunction symptoms.  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    Seasonal allergic rhinitis 04/09/2019    Lumbar stenosis with neurogenic claudication 10/09/2019    Foraminal stenosis of lumbar region 10/09/2019    Spinal stenosis of lumbar region with radiculopathy 12/27/2019           Resolved Ambulatory Problems     Diagnosis Date Noted    Pain of left hip joint 08/14/2017    Weakness of both lower extremities 08/14/2017    Antalgic gait 08/14/2017    Irregular cardiac rhythm 03/25/2018    Atherosclerosis of coronary artery 04/09/2019           Past Medical History:   Diagnosis Date    Atrial fibrillation      Cataract      Hypertension      PAD (peripheral artery disease)      Peripheral artery disease           PAST SURGICAL HISTORY:         Past Surgical History:   Procedure Laterality Date    BREAST SURGERY        CHOLECYSTECTOMY        ECTOPIC PREGNANCY SURGERY        LUMBAR LAMINECTOMY Right 12/27/2019     Procedure: LAMINECTOMY, SPINE, LUMBAR Right L3-4 Laminectomy Medial Facetectomy and Contralateral Foraminotomy right L4-5 Laminectomy medial facetectomy and microdiscectomy;  Surgeon: Nico Alvarez MD;  Location: [facility] OR;  Service: Neurosurgery;  Laterality: Right;  Procedure: Right L3-4 Laminectomy Medial Facetectomy and Contralateral Foraminotomy right L4-5 Laminectomy medial facetectomy and m    SALPINGECTOMY             SOCIAL HISTORY:   Social History               Socioeconomic History    Marital status: Legally        Spouse name: Not on file    Number of children: Not on file    Years of education: Not on file    Highest education level: Not on file   Occupational History    Not on file   Social Needs    Financial resource strain: Not on file    Food insecurity:       Worry: Not on file       Inability: Not on file    Transportation needs:       Medical: Not on file       Non-medical: Not on file   Tobacco Use    Smoking status: Former Smoker    Smokeless tobacco: Never Used   Substance and Sexual Activity    Alcohol use: No    Drug use: No    Sexual activity: Not on file   Lifestyle    Physical activity:       Days per week: Not on file       Minutes per session: Not on file    Stress: Not on file   Relationships    Social connections:       Talks on phone: Not on file       Gets together: Not on file       Attends Advent service: Not on file       Active member of club or organization: Not on file       Attends meetings of clubs or organizations: Not on file       Relationship status: Not on file   Other Topics Concern    Not on file   Social History Narrative     ** Merged History Encounter **                  FAMILY HISTORY:        Family History   Problem Relation Age of Onset    Cataracts  Mother      Cataracts Sister           CURRENTS MEDICATIONS:     No current facility-administered medications on file prior to encounter.              Current Outpatient Medications on File Prior to Encounter   Medication Sig Dispense Refill    acetaminophen (TYLENOL) 650 MG TbSR Take 650 mg by mouth 2 (two) times daily as needed.        azelastine (ASTELIN) 137 mcg (0.1 %) nasal spray 1 puff in each nostril        cholecalciferol, vitamin D3, (VITAMIN D3) 5,000 unit Tab Take 5,000 Units by mouth once a week. Takes on Saturday        cyanocobalamin (VITAMIN B-12) 1,000 mcg/mL injection 1 tablet        cyanocobalamin, vitamin B-12, 50 mcg tablet Take 50 mcg by mouth every morning. Pt will verify the strength        DIPH,PERTUSS,ACEL,,TET VAC,PF, ADULT (ADACEL,TDAP ADOLESN/ADULT,,PF,) 2 Lf-(2.5-5-3-5 mcg)-5Lf/0.5 mL Syrg Adacel (Tdap Adolesn/Adult)(PF)2 Lf-(2.5-5-3-5)-5 Lf/0.5 mL IM syringe   INJ 0.5ML INTRAMUSCULAR        flu vacc [id number] 65yr up,PF, 180 mcg/0.5 mL Syrg Fluzone High-Dose [id number] (PF) 180 mcg/0.5 mL intramuscular syringe        fosinopril (MONOPRIL) 20 MG tablet Take 20 mg by mouth every evening.         metoprolol succinate (TOPROL-XL) 100 MG 24 hr tablet Take 100 mg by mouth 2 (two) times daily. TAKES HALF OF TABLET IN AM (50 MG) AND 1 TABLET AT NIGHT (100 MG) FOR TOTAL  MG DAILY        oxyCODONE-acetaminophen (PERCOCET) 5-325 mg per tablet Take 1 tablet by mouth every 8 (eight) hours as needed for Pain. 42 tablet 0    triamcinolone acetonide 0.1% (KENALOG) 0.1 % ointment AAA chest bid 80 g 0    triamterene-hydrochlorothiazide 37.5-25 mg (DYAZIDE) 37.5-25 mg per capsule Take 1 capsule by mouth every morning.    1    warfarin (COUMADIN) 1 MG tablet Take 1 tablet (1 mg total) by mouth Daily. 1 Mg daily except Mondays and Fridays take 0.5 mg 90 tablet 3          famotidine  20 mg Oral BID    heparin (porcine)  5,000 Units Subcutaneous Q8H    polyethylene glycol  17 g Oral  Daily    senna-docusate 8.6-50 mg  1 tablet Oral BID         ALLERGIES:       Review of patient's allergies indicates:   Allergen Reactions    Norco [hydrocodone-acetaminophen] Hives         REVIEW OF SYSTEMS:  Review of Systems   Constitutional: Negative for diaphoresis, fever and weight loss.   Respiratory: Negative for shortness of breath.    Cardiovascular: Negative for chest pain.   Gastrointestinal: Negative for blood in stool.   Genitourinary: Negative for hematuria.   Endo/Heme/Allergies: Does not bruise/bleed easily.   All other systems reviewed and are negative.        OBJECTIVE:     PHYSICAL EXAMINATION:       Vitals:     12/30/19 1202   BP: (!) 158/72   Pulse: 76   Resp:     Temp:           Physical Exam:  Vitals reviewed.     Constitutional: She appears well-developed and well-nourished.      Eyes: Pupils are equal, round, and reactive to light. Conjunctivae and EOM are normal.      Cardiovascular: Normal distal pulses and no edema.      Abdominal: Soft.      Skin: Skin displays no rash on trunk and no rash on extremities. Skin displays no lesions on trunk and no lesions on extremities.     Psych/Behavior: She is alert. She is oriented to person, place, and time. She has a normal mood and affect.     Musculoskeletal:        Neck: Range of motion is full.     Neurological:        DTRs: Tricep reflexes are 2+ on the right side and 2+ on the left side. Bicep reflexes are 2+ on the right side and 2+ on the left side. Brachioradialis reflexes are 2+ on the right side and 2+ on the left side. Patellar reflexes are 2+ on the right side and 2+ on the left side. Achilles reflexes are 2+ on the right side and 2+ on the left side.         Back Exam      Muscle Strength   Right Quadriceps:  5/5   Left Quadriceps:  5/5   Right Hamstrings:  5/5   Left Hamstrings:  5/5                  SI joint:   Palpation at the right and left SI joints not painful  MIRANDA test is negative bilaterally  Gaenslen test is negative  bilaterally  Thigh thrust test is negative bilaterally     Neurologic Exam      Mental Status   Oriented to person, place, and time.   Speech: speech is normal   Level of consciousness: alert     Cranial Nerves   Cranial nerves II through XII intact.      CN III, IV, VI   Pupils are equal, round, and reactive to light.  Extraocular motions are normal.      Motor Exam   Muscle bulk: normal  Overall muscle tone: normal     Strength   Right deltoid: 5/5  Left deltoid: 5/5  Right biceps: 5/5  Left biceps: 5/5  Right triceps: 5/5  Left triceps: 5/5  Right wrist flexion: 5/5  Left wrist flexion: 5/5  Right wrist extension: 5/5  Left wrist extension: 5/5  Right interossei: 5/5  Left interossei: 5/5  Right iliopsoas: 5/5  Left iliopsoas: 5/5  Right quadriceps: 5/5  Left quadriceps: 5/5  Right hamstrin/5  Left hamstrin/5  Right anterior tibial: 5/5  Left anterior tibial: 5/5  Right posterior tibial: 5/5  Left posterior tibial: 5/5  Right peroneal: 5/5  Left peroneal: 5/5  Right gastroc: 5/5  Left gastroc: 5/5     Sensory Exam   Light touch normal.   Pinprick normal.      Gait, Coordination, and Reflexes      Gait  Gait: normal     Coordination   Finger to nose coordination: normal  Tandem walking coordination: normal     Reflexes   Right brachioradialis: 2+  Left brachioradialis: 2+  Right biceps: 2+  Left biceps: 2+  Right triceps: 2+  Left triceps: 2+  Right patellar: 2+  Left patellar: 2+  Right achilles: 2+  Left achilles: 2+  Right plantar: normal  Left plantar: normal  Right Roblero: absent  Left Roblero: absent  Right ankle clonus: absent  Left ankle clonus: absent        DIAGNOSTIC DATA:         Recent Labs     19  2210   HGB 8.5*   HCT 26.0*   MCV 84   WBC 5.64         K 4.0      *   BUN 17   CREATININE 0.9   CALCIUM 9.0   ALT 46*   AST 28   ALBUMIN 2.9*   BILITOT 0.5   INR 1.4*             Microbiology Results (last 7 days)      ** No results found for the last 168 hours. **              I personally interpreted the following imaging:NA     ASSESMENT:  This is a 69 y.o. female with postop day 3 L3-4/L4-5 laminectomy for severe lumbar stenosis with radiculopathy.  Impair mobility and ADL.     PLAN:  Remission for physical therapy and occupational therapy reassessment.  Will probably need to be transferred to skilled nurse facility.  All questions answered     Nico Alvarez MD  Cell: [phone]

## 2019-12-31 NOTE — PT/OT/SLP PROGRESS
"Physical Therapy Treatment    Patient Name:  Jayla Reagan   MRN:  [id number]    Recommendations:     Discharge Recommendations:  rehabilitation facility   Discharge Equipment Recommendations: shower chair   Barriers to discharge: Decreased caregiver support    Assessment:     Jayla Reagan is a 69 y.o. female admitted with a medical diagnosis of Post-op pain.  She presents with the following impairments/functional limitations:  weakness, impaired endurance, impaired self care skills, impaired functional mobilty, gait instability, impaired balance, decreased coordination, decreased upper extremity function, decreased lower extremity function, pain, decreased safety awareness, decreased ROM, impaired coordination, impaired skin, orthopedic precautions. Pt transferred from EOB to B/S commode and from B/S commode to B/S chair with RW and mod-max A of 1 with 1 other person standing by to increase safety. Pt self limiting with all functional mobility at this time. States, "My back is hurting and I just have no strength mainly in the back of my legs". Would benefit from continued PT services to increase pt's out of bed therapeutic activity and exercise.    Rehab Prognosis: Good; patient would benefit from acute skilled PT services to address these deficits and reach maximum level of function.    Recent Surgery: * No surgery found *      Plan:     During this hospitalization, patient to be seen 6 x/week to address the identified rehab impairments via gait training, therapeutic activities, therapeutic exercises, neuromuscular re-education and progress toward the following goals:    · Plan of Care Expires:  01/30/20    Subjective     Chief Complaint: "My back just hurts and I feel so weak"    Patient/Family Comments/goals: "I don't know if I can get up and walk".  Pain/Comfort:  · Pain Rating 1: 8/10  · Location - Side 1: Bilateral  · Location - Orientation 1: generalized  · Location 1: back  · Pain Addressed 1: Reposition, " "Distraction  · Pain Rating Post-Intervention 1: 9/10      Objective:     Communicated with nurse prior to session.  Patient found supine with peripheral IV upon PT entry to room.     General Precautions: Standard, fall   Orthopedic Precautions:spinal precautions   Braces: N/A     Functional Mobility:  · Bed Mobility:     · Rolling Left:  contact guard assistance and using log roll technique  · Scooting: contact guard assistance and  to EOB  · Supine to Sit: contact guard assistance and  using log roll technique  · Transfers:     · Sit to Stand:  moderate assistance, maximal assistance and  mod A with another person standing by for safety from EOB and max A with another standing by for safety from B/S commode with rolling walker  · Gait:  ~2-3 turning steps to sit on B/S commode and ~4-5 turning steps from B/S commode to B/S chair both with RW max A plus another person to increase safety      AM-PAC 6 CLICK MOBILITY  Turning over in bed (including adjusting bedclothes, sheets and blankets)?: 3  Sitting down on and standing up from a chair with arms (e.g., wheelchair, bedside commode, etc.): 2  Moving from lying on back to sitting on the side of the bed?: 3  Moving to and from a bed to a chair (including a wheelchair)?: 2  Need to walk in hospital room?: 2  Climbing 3-5 steps with a railing?: 1  Basic Mobility Total Score: 13       Therapeutic Activities and Exercises:   Nurse (Magaly) requested assistance with pt to transfer to B/S commode then to B/S chair. Pt very self limiting at this time stating, "My back is very painful and I am just very weak in my legs". Sit to stand transfer from EOB to B/S commode required max A with 1 standing by to increase safety. Pt took 2-3 turning steps which were more shuffling/sliding as she did not clear feet off of floor. This required max A plus max and constant verbal cueing to follow through with transfer. B/S commode to B/S chair required max A with 1 standing by to increase " safety with pt taking 4-5 turning steps which were again more of a shuffling/sliding. And again required max and constant verbal cueing to follow through with transfer.    Patient left up in chair with all lines intact, call button in reach,  nurse notified and  nurse (Magaly) present..    GOALS:   Multidisciplinary Problems     Physical Therapy Goals        Problem: Physical Therapy Goal    Goal Priority Disciplines Outcome Goal Variances Interventions   Physical Therapy Goal     PT, PT/OT Ongoing, Progressing     Description:  Goals to be met by: 20     Patient will increase functional independence with mobility by performin. Supine to sit with Stand-by Assistance  2. Sit to stand transfer with Contact Guard Assistance  3. Bed to chair transfer with Minimal Assistance using Rolling Walker.    PT evaluation completed. Patient limited secondary to bilateral LE pain. See full note for details.                       Time Tracking:     PT Received On: 19  PT Start Time: 1435     PT Stop Time: 1501  PT Total Time (min): 26 min     Billable Minutes: Gait Training  15 and Therapeutic Activity  11    Treatment Type: Treatment  PT/PTA: PTA     PTA Visit Number: 1     Umm Banerjee, PTA  2019

## 2019-12-31 NOTE — HOSPITAL COURSE
12/30/2019: admitted for pain control   12/31:  No acute events overnight.  Patient complaining of bilateral posterior thigh myofascial aching pain worsening with movement.  Pain semi control with p.o. oxycodone 5 mg.  Awaiting physical therapy evaluation.  Previous dysuria and urinary retention improved significantly after Ken catheter exchanged.  Awaiting UA today.  Discussed with clinical pharmacist, will restart home Coumadin at 2 mg x2 days and return to maintenance dose (per previous Coumadin clinic recommendation).  Will monitor INR daily.  1/1/20: status day 5 L3-4 and L4-5 laminectomy. Got up and walked yesterday. Pain is only during getting up and is felt in bilateral hips.   1/2:  No acute events overnight.  Vitals and labs stable.  Patient continues to complain of incisional, low back, and bilateral posterior thigh pain only with mobilization.  She also has some truncal weakness and difficulty with walking secondary to limited mobility.  She has not ambulated since admission but has been out of bed with PT and nursing assistance.  Pending skilled nursing facility placement.  1/3:  No acute events overnight.  Patient feels like she has improvements in lower extremity strength with physical therapy exercises.  She mobilized at edge of bed but not able to walk on her own yet.  Standing without legs giving out.  Pain well controlled with current regimen.  Plan to DC to [facility] today.  Patient follow Neurosurgery in 2-3 weeks for staple removal and 6 weeks with Dr. Alvarez.

## 2019-12-31 NOTE — PLAN OF CARE
Medicated with PRN pain medication as ordered. Patient weight shifted in bed using pillows. Orders for PT/OT daily. Remains free from falls, bed alarm in use.

## 2019-12-31 NOTE — SUBJECTIVE & OBJECTIVE
Interval History:  No acute events overnight.  Patient complaining of bilateral posterior thigh myofascial aching pain worsening with movement.  Pain semi control with p.o. oxycodone 5 mg.  Awaiting physical therapy evaluation.  Previous dysuria and urinary retention improved significantly after Ken catheter exchanged.  Awaiting UA today.  Discussed with clinical pharmacist, will restart home Coumadin at 2 mg x2 days and return to maintenance dose (per previous Coumadin clinic recommendation).  Will monitor INR daily.    Medications:  Continuous Infusions:   dextrose 5 % and 0.9 % NaCl with KCl 20 mEq 75 mL/hr at 12/31/19 0635     Scheduled Meds:   famotidine  20 mg Oral BID    heparin (porcine)  5,000 Units Subcutaneous Q8H    polyethylene glycol  17 g Oral Daily    senna-docusate 8.6-50 mg  1 tablet Oral BID    [START ON 1/2/2020] warfarin  1 mg Oral Daily    warfarin  2 mg Oral Daily     PRN Meds:acetaminophen, acetaminophen, morphine, ondansetron, ondansetron, oxyCODONE, sodium chloride 0.9%     Review of Systems  Objective:     Weight: 108.9 kg (240 lb)  Body mass index is 32.55 kg/m².  Vital Signs (Most Recent):  Temp: 98.3 °F (36.8 °C) (12/31/19 0741)  Pulse: 89 (12/31/19 0741)  Resp: 18 (12/31/19 0741)  BP: (!) 148/72 (12/31/19 0741)  SpO2: 96 % (12/31/19 0752) Vital Signs (24h Range):  Temp:  [98.3 °F (36.8 °C)-98.9 °F (37.2 °C)] 98.3 °F (36.8 °C)  Pulse:  [73-92] 89  Resp:  [14-18] 18  SpO2:  [92 %-100 %] 96 %  BP: (138-180)/(65-81) 148/72                     Female External Urinary Catheter 12/30/19 0230 (Active)   Skin no redness;no breakdown;perineum cleansed w/ soap and water 12/30/2019  7:50 PM   Tolerance no signs/symptoms of discomfort 12/30/2019  7:50 PM   Suction Continuous suction at 70 mmHg 12/30/2019  7:50 PM   Date of last wick change 12/30/19 12/30/2019  7:50 PM   Time of last wick change 1618 12/30/2019  4:00 PM   Output (mL) 100 mL 12/31/2019  5:00 AM       Neurosurgery Physical  Exam   General: well developed, well nourished. no acute distress.  Comfortable  Neurologic: Awake, alert and oriented x3. Thought content appropriate.  Head: normocephalic, atraumatic   GCS: Motor: 6/Verbal: 5/Eyes: 4 GCS Total: 15  Cranial nerves:face symmetric and sensation intact bilaterally, tongue midline, pupils equal, round, reactive to light with accomodation, extraocular muscles intact. CN II-XII grossly intact. Shoulder shrug strength equal. Palate rises symmetrically.  Uvula midline. Hearing equal with finger rub. Gag and taste not tested.     Language: no aphasia  Speech: no dysarthria     Sensory: response to light touch throughout  Motor Strength: Moves all extremities spontaneously with good tone. Full strength upper and lower extremities. No abnormal movements seen.       Strength  Deltoids Triceps Biceps Wrist Extension Wrist Flexion Hand    Upper: R 5/5 5/5 5/5 5/5 5/5 5/5    L 5/5 5/5 5/5 5/5 5/5 5/5     Iliopsoas Quadriceps Knee  Flexion Tibialis  anterior Gastro- cnemius EHL   Lower: R 5/5 5/5 5/5 5/5 5/5 5/5    L 5/5 5/5 5/5 5/5 5/5 5/5   Interossi muscle strength- intact    Straight leg raise: negative bilaterally  No focal numbness or weakness  No midline or paraspinal tenderness to palpation    ENT: normal hearing with finger rub  Heart: RRR, no cyanosis, pallor, or edema.   Lungs:  normal respiratory effort  Abdomen: soft, non-tender and symmetric  Extremities: warm with no cyanosis, edema, or clubbing  Pulses: palpable distal pulses  Skin: warm, dry and intact. No visible rashes or lesions.     Posterior lumbar incision:  wound is c/d/i, no signs of infection, no erythema, warmth, edema, ttp,  no drainage.  wound edges are well approximated.        Significant Labs:  Recent Labs   Lab 12/29/19 2210 12/31/19  0629   * 113*    141   K 4.0 4.5    103   CO2 23 27   BUN 17 13   CREATININE 0.9 0.8   CALCIUM 9.0 9.5     Recent Labs   Lab 12/29/19 2210 12/31/19  0629    WBC 5.64 4.07   HGB 8.5* 8.6*   HCT 26.0* 26.1*    204     Recent Labs   Lab 12/29/19  2210 12/31/19  0749   INR 1.4* 1.2   APTT 49.4*  --      Microbiology Results (last 7 days)     ** No results found for the last 168 hours. **

## 2019-12-31 NOTE — NURSING
MD contacted per request from primary RN Anna, patient with pain on urination.   Orders were placed per Dr. Alvarez   Primary RN notified. Will continue to Monitor.

## 2019-12-31 NOTE — PLAN OF CARE
Problem: Physical Therapy Goal  Goal: Physical Therapy Goal  Description  Goals to be met by: 20     Patient will increase functional independence with mobility by performin. Supine to sit with Stand-by Assistance  2. Sit to stand transfer with Contact Guard Assistance  3. Bed to chair transfer with Minimal Assistance using Rolling Walker.    PT evaluation completed. Patient limited secondary to bilateral LE pain. See full note for details.      Outcome: Ongoing, Progressing   Pt continues to work toward all goals.

## 2019-12-31 NOTE — PLAN OF CARE
Problem: Physical Therapy Goal  Goal: Physical Therapy Goal  Description  Goals to be met by: 20     Patient will increase functional independence with mobility by performin. Supine to sit with Stand-by Assistance  2. Sit to stand transfer with Contact Guard Assistance  3. Bed to chair transfer with Minimal Assistance using Rolling Walker.    PT evaluation completed. Patient limited secondary to bilateral LE pain. See full note for details.      Outcome: Ongoing, Progressing

## 2019-12-31 NOTE — PT/OT/SLP EVAL
Physical Therapy Evaluation    Patient Name:  Jayla Reagan   MRN:  [id number]    Recommendations:     Discharge Recommendations:  rehabilitation facility   Discharge Equipment Recommendations: bedside commode, shower chair   Barriers to discharge: Decreased caregiver support    Assessment:     Jayla Reagan is a 69 y.o. female admitted with a medical diagnosis of Post-op pain.  She presents with the following impairments/functional limitations:  weakness, impaired endurance, impaired self care skills, impaired functional mobilty, gait instability, decreased lower extremity function, impaired cardiopulmonary response to activity, orthopedic precautions, pain Patient limited secondary to bilateral LE pain.    Rehab Prognosis: Good; patient would benefit from acute skilled PT services to address these deficits and reach maximum level of function.    Recent Surgery: * No surgery found *      Plan:     During this hospitalization, patient to be seen 6 x/week to address the identified rehab impairments via gait training, therapeutic activities, therapeutic exercises, neuromuscular re-education and progress toward the following goals:    · Plan of Care Expires:  01/30/20    Subjective     Chief Complaint: bilateral LE pain  Patient/Family Comments/goals: get stronger  Pain/Comfort:  · Pain Rating 1: 7/10  · Location - Side 1: Bilateral  · Location - Orientation 1: generalized  · Location 1: back  · Pain Addressed 1: Distraction, Reposition  · Pain Rating Post-Intervention 1: 8/10    Patients cultural, spiritual, Shinto conflicts given the current situation: no    Living Environment:  Patient lives ALONE in a single story house with a single story threshold step.  Prior to admission, patients level of function was modified independent.  Equipment used at home: walker, rolling.  DME owned (not currently used): bedside commode and shower chair.  Upon discharge, patient will have assistance from NO ONE.    Objective:      Communicated with RN prior to session.  Patient found HOB elevated with peripheral IV, PureWick, bed alarm  upon PT entry to room.    General Precautions: Standard, fall   Orthopedic Precautions:spinal precautions   Braces: N/A     Exams:  · Cognitive Exam:  Patient is oriented to Person, Place, Time and Situation  · Sensation: -       Intact  · RLE Strength: 3/5; resistance not applied due to pain  · LLE Strength: 3/5; resistance not applied due to pain    Functional Mobility:  · Bed Mobility:  Rolling Left:  contact guard assistance  · Rolling Right: contact guard assistance  · Supine to Sit: minimum assistance  · Sit to Supine: minimum assistance  · Transfers:  Sit to Stand:  moderate assistance with rolling walker  · Gait: Patient ambulated 3 steps with mod A and RW; limited due to bilateral LE pain      Therapeutic Activities and Exercises:  Patient limited due to pain. Patient also lives alone and is not a safe discharge home with current living situation. Patient would benefit from inpatient multi-disciplinary rehabilitation to address functional deficits.    AM-PAC 6 CLICK MOBILITY  Total Score:12     Patient left HOB elevated with all lines intact, call button in reach and bed alarm on.    GOALS:   Multidisciplinary Problems     Physical Therapy Goals        Problem: Physical Therapy Goal    Goal Priority Disciplines Outcome Goal Variances Interventions   Physical Therapy Goal     PT, PT/OT Ongoing, Progressing     Description:  Goals to be met by: 20     Patient will increase functional independence with mobility by performin. Supine to sit with Stand-by Assistance  2. Sit to stand transfer with Contact Guard Assistance  3. Bed to chair transfer with Minimal Assistance using Rolling Walker.    PT evaluation completed. Patient limited secondary to bilateral LE pain. See full note for details.                       History:     Past Medical History:   Diagnosis Date    Atrial fibrillation      Cataract     Hypertension     PAD (peripheral artery disease)     Peripheral artery disease        Past Surgical History:   Procedure Laterality Date    BREAST SURGERY      CHOLECYSTECTOMY      ECTOPIC PREGNANCY SURGERY      LUMBAR LAMINECTOMY Right 12/27/2019    Procedure: LAMINECTOMY, SPINE, LUMBAR Right L3-4 Laminectomy Medial Facetectomy and Contralateral Foraminotomy right L4-5 Laminectomy medial facetectomy and microdiscectomy;  Surgeon: Nico Alvarez MD;  Location: [facility];  Service: Neurosurgery;  Laterality: Right;  Procedure: Right L3-4 Laminectomy Medial Facetectomy and Contralateral Foraminotomy right L4-5 Laminectomy medial facetectomy and m    SALPINGECTOMY         Time Tracking:     PT Received On: 12/31/19  PT Start Time: 0926     PT Stop Time: 0950  PT Total Time (min): 24 min     Billable Minutes: Evaluation 24      Sofi Morales, PT  12/31/2019

## 2019-12-31 NOTE — PT/OT/SLP PROGRESS
"Occupational Therapy  Missed Eval    Patient Name:  Jayla Reagan   MRN:  [id number]    Orders received. Chart reviewed.  OT eval attempted at 1014am, however pt refused 2/2 10/10 BLE pain & fatigue/pain from recent PT eval. Pt requested meds for breakthrough pain. Nsg notified.  2nd OT eval attempt at 1135am. Per nsg, pt had recently received IV morphine. Pt initially agreed to getting up to b/s chair, however after min BLE mobility in supine, pt stated that she "just can't do it" & declined eval at this time. Nsg notified.  Will follow up as able.    ABE Clark  12/31/2019  "

## 2019-12-31 NOTE — PLAN OF CARE
VN cued into room.  Pt resting in bed with call light and personal belongings within reach, IVF running.  NADN.  No family at bedside.  Pt reports pain after working with PT, but admits she didn't do very much.  No other needs or complaints at this time.  Reminded pt to use call light as needed.  VN will continue to follow and be available as needed.

## 2019-12-31 NOTE — PROGRESS NOTES
Pharmacy Warfarin Education Note     Jayla Reagan was offered Warfarin Education on 12/31/2019.  The patient and/or family/caregiver was present and accepted education, using teachback method. .    Patient specific concerns or situations: n/a    Warfarin education materials provided and information discussed during the education process included:  1. What warfarin is  2. Indication, current dose,how to take warfarin, what time of day to take warfarin,      Follow-up appointment for monitoring  3. What to do if you miss a dose  4. Drug interactions associated with the use of anticoagulants (I.e. Warfarin), such as        the use of over the counter medications (e.g. Aspirin, acetaminophen, ibuprofen),        prescription drugs, and herbal supplements. Notify prescribing doctor of any        changes in medications.  5.  Side effects of taking warfarin, increased bleeding risk, when to call the prescribing       physician, when to seek emergency help  6.  Monitoring blood levels (PT/INR)  7.  Eating habits and being consistent, Vitamin K rich foods, effects of diet on blood       Levels  8.  Recommendation of purchasing a medical bracelet or carrying a ID card to alert medical staff if you become ill  9.  Notifying physicians of procedures, medical or dental        Anticoagulants       Ordered     Route Frequency Start Stop    12/31/19 0905  Coumadin      Oral Daily 01/02/20 1700 --    12/31/19 0905  Coumadin      Oral Daily 12/31/19 1700 01/02 1659    12/30/19 0035  heparin (porcine)  (VTE Prophylaxis Orders - High Risk)      SubQ Every 8 hours 12/30/19 0600 --          Lab Results   Component Value Date/Time    INR 1.2 12/31/2019 07:49 AM    INR 1.4 (H) 12/29/2019 10:10 PM    INR 1.4 (H) 12/27/2019 06:09 AM    INR 2.3 09/06/2017    INR 2.4 05/04/2017   ;  Lab Results   Component Value Date/Time    HGB 8.6 (L) 12/31/2019 06:29 AM    HGB 8.5 (L) 12/29/2019 10:10 PM    HGB 10.4 (L) 12/27/2019 06:09 AM

## 2020-01-01 LAB
ANION GAP SERPL CALC-SCNC: 9 MMOL/L (ref 8–16)
BASOPHILS # BLD AUTO: 0 K/UL (ref 0–0.2)
BASOPHILS NFR BLD: 0 % (ref 0–1.9)
BUN SERPL-MCNC: 12 MG/DL (ref 8–23)
CALCIUM SERPL-MCNC: 9.4 MG/DL (ref 8.7–10.5)
CHLORIDE SERPL-SCNC: 104 MMOL/L (ref 95–110)
CO2 SERPL-SCNC: 27 MMOL/L (ref 23–29)
CREAT SERPL-MCNC: 0.9 MG/DL (ref 0.5–1.4)
DIFFERENTIAL METHOD: ABNORMAL
EOSINOPHIL # BLD AUTO: 0.1 K/UL (ref 0–0.5)
EOSINOPHIL NFR BLD: 2.7 % (ref 0–8)
ERYTHROCYTE [DISTWIDTH] IN BLOOD BY AUTOMATED COUNT: 16.4 % (ref 11.5–14.5)
EST. GFR  (AFRICAN AMERICAN): >60 ML/MIN/1.73 M^2
EST. GFR  (NON AFRICAN AMERICAN): >60 ML/MIN/1.73 M^2
GLUCOSE SERPL-MCNC: 110 MG/DL (ref 70–110)
HCT VFR BLD AUTO: 26.1 % (ref 37–48.5)
HGB BLD-MCNC: 8.5 G/DL (ref 12–16)
INR PPP: 1.3 (ref 0.8–1.2)
LYMPHOCYTES # BLD AUTO: 1.4 K/UL (ref 1–4.8)
LYMPHOCYTES NFR BLD: 31.6 % (ref 18–48)
MCH RBC QN AUTO: 27.3 PG (ref 27–31)
MCHC RBC AUTO-ENTMCNC: 32.6 G/DL (ref 32–36)
MCV RBC AUTO: 84 FL (ref 82–98)
MONOCYTES # BLD AUTO: 0.4 K/UL (ref 0.3–1)
MONOCYTES NFR BLD: 9.9 % (ref 4–15)
NEUTROPHILS # BLD AUTO: 2.5 K/UL (ref 1.8–7.7)
NEUTROPHILS NFR BLD: 55.8 % (ref 38–73)
PLATELET # BLD AUTO: 203 K/UL (ref 150–350)
PMV BLD AUTO: 7.7 FL (ref 9.2–12.9)
POTASSIUM SERPL-SCNC: 4 MMOL/L (ref 3.5–5.1)
PROTHROMBIN TIME: 14.5 SEC (ref 9–12.5)
RBC # BLD AUTO: 3.11 M/UL (ref 4–5.4)
SODIUM SERPL-SCNC: 140 MMOL/L (ref 136–145)
WBC # BLD AUTO: 4.43 K/UL (ref 3.9–12.7)

## 2020-01-01 PROCEDURE — G0378 HOSPITAL OBSERVATION PER HR: HCPCS

## 2020-01-01 PROCEDURE — 85025 COMPLETE CBC W/AUTO DIFF WBC: CPT

## 2020-01-01 PROCEDURE — 94761 N-INVAS EAR/PLS OXIMETRY MLT: CPT

## 2020-01-01 PROCEDURE — 96361 HYDRATE IV INFUSION ADD-ON: CPT

## 2020-01-01 PROCEDURE — 97165 OT EVAL LOW COMPLEX 30 MIN: CPT

## 2020-01-01 PROCEDURE — 25000003 PHARM REV CODE 250: Performed by: NEUROLOGICAL SURGERY

## 2020-01-01 PROCEDURE — 25000003 PHARM REV CODE 250: Performed by: PHYSICIAN ASSISTANT

## 2020-01-01 PROCEDURE — 96372 THER/PROPH/DIAG INJ SC/IM: CPT

## 2020-01-01 PROCEDURE — 80048 BASIC METABOLIC PNL TOTAL CA: CPT

## 2020-01-01 PROCEDURE — 25000003 PHARM REV CODE 250: Performed by: EMERGENCY MEDICINE

## 2020-01-01 PROCEDURE — 97535 SELF CARE MNGMENT TRAINING: CPT

## 2020-01-01 PROCEDURE — 36415 COLL VENOUS BLD VENIPUNCTURE: CPT

## 2020-01-01 PROCEDURE — 63600175 PHARM REV CODE 636 W HCPCS: Performed by: EMERGENCY MEDICINE

## 2020-01-01 PROCEDURE — 85610 PROTHROMBIN TIME: CPT

## 2020-01-01 PROCEDURE — 96376 TX/PRO/DX INJ SAME DRUG ADON: CPT

## 2020-01-01 RX ORDER — FERROUS SULFATE 300 MG/5ML
300 LIQUID (ML) ORAL DAILY
Status: DISCONTINUED | OUTPATIENT
Start: 2020-01-01 | End: 2020-01-03 | Stop reason: HOSPADM

## 2020-01-01 RX ADMIN — POLYETHYLENE GLYCOL 3350 17 G: 17 POWDER, FOR SOLUTION ORAL at 09:01

## 2020-01-01 RX ADMIN — MINERAL SUPPLEMENT IRON 300 MG / 5 ML STRENGTH LIQUID 100 PER BOX UNFLAVORED 300 MG: at 01:01

## 2020-01-01 RX ADMIN — SENNOSIDES AND DOCUSATE SODIUM 1 TABLET: 8.6; 5 TABLET ORAL at 09:01

## 2020-01-01 RX ADMIN — FOSINOPRIL 20 MG: 10 TABLET ORAL at 09:01

## 2020-01-01 RX ADMIN — METOPROLOL SUCCINATE 100 MG: 50 TABLET, EXTENDED RELEASE ORAL at 09:01

## 2020-01-01 RX ADMIN — HEPARIN SODIUM 5000 UNITS: 5000 INJECTION, SOLUTION INTRAVENOUS; SUBCUTANEOUS at 05:01

## 2020-01-01 RX ADMIN — WARFARIN SODIUM 2 MG: 2 TABLET ORAL at 04:01

## 2020-01-01 RX ADMIN — HEPARIN SODIUM 5000 UNITS: 5000 INJECTION, SOLUTION INTRAVENOUS; SUBCUTANEOUS at 09:01

## 2020-01-01 RX ADMIN — OXYCODONE HYDROCHLORIDE 10 MG: 5 TABLET ORAL at 07:01

## 2020-01-01 RX ADMIN — POTASSIUM CHLORIDE, DEXTROSE MONOHYDRATE AND SODIUM CHLORIDE: 150; 5; 900 INJECTION, SOLUTION INTRAVENOUS at 05:01

## 2020-01-01 RX ADMIN — HEPARIN SODIUM 5000 UNITS: 5000 INJECTION, SOLUTION INTRAVENOUS; SUBCUTANEOUS at 01:01

## 2020-01-01 RX ADMIN — METHOCARBAMOL TABLETS 750 MG: 750 TABLET, COATED ORAL at 09:01

## 2020-01-01 RX ADMIN — OXYCODONE HYDROCHLORIDE 10 MG: 5 TABLET ORAL at 05:01

## 2020-01-01 RX ADMIN — TRIAMTERENE AND HYDROCHLOROTHIAZIDE 1 CAPSULE: 25; 37.5 CAPSULE ORAL at 09:01

## 2020-01-01 RX ADMIN — METHOCARBAMOL TABLETS 750 MG: 750 TABLET, COATED ORAL at 04:01

## 2020-01-01 RX ADMIN — POTASSIUM CHLORIDE, DEXTROSE MONOHYDRATE AND SODIUM CHLORIDE: 150; 5; 900 INJECTION, SOLUTION INTRAVENOUS at 09:01

## 2020-01-01 RX ADMIN — MORPHINE SULFATE 4 MG: 4 INJECTION INTRAVENOUS at 12:01

## 2020-01-01 NOTE — PROGRESS NOTES
NEUROSURGICAL POST-OPERATIVE PROGRESS NOTE    DATE OF SERVICE:  01/01/2020      ATTENDING PHYSICIAN:  Nico Alvarez MD    SUBJECTIVE:    INTERIM HISTORY:    This is a very pleasant 69 y.o. y.o. female, who is status day 5 L3-4 and L4-5 laminectomy. Got up and walked yesterday. Pain is only during getting up and is felt in bilateral hips.               OBJECTIVE:    PHYSICAL EXAMINATION:   Vitals:    01/01/20 1207   BP: (!) 149/84   Pulse: 82   Resp: 18   Temp: 97.3 °F (36.3 °C)       Neurosurgery Physical Exam    Ortho Exam    Neurologic Exam     Motor Exam     Strength   Strength 5/5 throughout.       Wound has healed.    DIAGNOSTIC DATA:    Hct 26.1 Hb8.5    ASSESMENT:    This is a 69 y.o. female who is s/p day 5 L3-4 and L4-5 laminectomy. Impaired mobility    Problem List Items Addressed This Visit        Neuro    * (Principal) Post-op pain - Primary    Current Assessment & Plan     69 y.o. female with PMHx of cataract, PAD, HTN, and A fib (previously on Coumadin), and recent MIS laminectomy at L3-4 and L4-5 with Dr. Alvarez on 12/27/2019 who re-presented with uncontrolled postop pain.  She was admitted for pain control on 12/30/2019.    -Patient is neurologically stable.  No focal deficits.  Mostly myofascial pain on exam secondary to immobility.  -Pain semi controlled on current regimen.  Will add Robaxin t.i.d. p.r.n. muscle spasms and increased p.o. Oxy IR to 10 mg q.4 hours p.r.n. severe pain.  Patient can continue p.r.n. subcutaneous morphine for breakthrough pain; however, I encouraged patient to use p.o. pain regimen 1st.  -PT/OT/OOB daily.  Therapy to re-evaluate today to determine DC needs.  Patient lives alone.  -Patient must be OOB for atleast 6 hours daily (may be in intervals: 2 hours in chair with each meal)  -IS to bed side. Patient to use atleast 10x every hour.  -Continue bowel regimen daily.  -Continue SQH, Teds and SCDs for DVTP.  -PPI for GI prophylaxis  -Restarted all home meds for  hypertension, vitamin-D deficiency, and anemia.  -Pure wick  Will bladder scan in 4 hour if patient does not void spontaneously.  Obtain urinalysis today given brief history of dysuria.  Dysuria resolved with pure wick exchange.  -clinical pharmacist consulted to restart home Coumadin for AFib.  Recommend restarting 2 mg x2 days and maintain home dose thereafter.  Monitor INR daily.  Continue DVT prophylaxis subcu heparin dose at this time.    Dispo: DC pending therapy evaluation. Patient has 2 wk wound check with NSR and 6 week follow up with Dr. Alvarez.    Discussed with Dr. Alvarez                  Relevant Orders    Saline lock IV      Other Visit Diagnoses     Leg swelling        Relevant Orders    US Lower Extremity Veins Bilateral (Completed)    EKG 12-lead (Completed)    Long term current use of anticoagulant              PLAN:    Continue PT-OT  Up in the chair TID before meal  All questions answered        Nico Alvarez MD  Pager: [phone]

## 2020-01-01 NOTE — PLAN OF CARE
VN rounds:  VN cued into pt's room with pt's permission.  Pt resting in bed in low position with call bell at side, bed alarm in place.  Fall risk protocol discussed with pt.  VN instructed to call for assistance.  Pt aware and agreeable.   Pt denies pain, nausea. No acute distress noted.  Pt's chart, labs and vital signs reviewed.  Allowed time for questions.  Will continue to be available and intervene as needed.      01/01/20 1123   Type of Frequent Check   Type Patient Rounds   Safety/Activity   Patient Rounds bed in low position;call light in patient/parent reach;placement of personal items at bedside   Safety Promotion/Fall Prevention assistive device/personal item within reach;bed alarm set;Fall Risk reviewed with patient/family;medications reviewed;side rails raised x 2;instructed to call staff for mobility   Safety Precautions emergency equipment at bedside   Positioning   Body Position supine   Head of Bed (HOB) HOB at 30 degrees   Positioning/Transfer Devices pillows   Pain/Comfort/Sleep   Preferred Pain Scale number (Numeric Rating Pain Scale)   Comfort/Acceptable Pain Level 4   Pain Rating (0-10): Rest 2  (pt reports tolerable, no need for pain med at this time)   POSS ([place] Opioid-Induced Sed Scale) 1 - Awake and alert   Nausea/Vomiting   Nausea/Vomiting No Nausea and Vomiting   Assessments (Pre/Post)   Level of Consciousness (AVPU) alert

## 2020-01-01 NOTE — PLAN OF CARE
Pts safety maintained, meds given per MAR. Pain remains uncontrolled. Incision CDI, neurovascular function intact. No N/V, SOB, distress. Vitals stable through the night.

## 2020-01-01 NOTE — PLAN OF CARE
Plan of Care communicated with patient throughout the day.  Patient c/o pain 8 throughout day, reported to ALEXX English, pain medication adjusted.  Patient needs 2 person assist with transfers.  Patient has no confidence in ability to stand, she states she is afraid of falling, constant reassurance needed and encouragement needed when transferring.  Patient use perwick when in bed.  All questions/concerns addressed.

## 2020-01-01 NOTE — PLAN OF CARE
Problem: Occupational Therapy Goal  Goal: Occupational Therapy Goal  Description  Goals to be met by: 02/01/20     Patient will increase functional independence with ADLs by performing: (Pt lives alone and has limited assistance at discharge - recommendations for discharge are Mod I with ADLs for safety)    UE Dressing with Modified [place].  LE Dressing with Modified [place] and Assistive Devices as needed.  Grooming while standing at sink with Modified [place].  Toileting from bedside commode with Modified [place] for hygiene and clothing management.   Toilet transfer to bedside commode with Modified [place].  Upper extremity exercise program 3x12 reps per handout, with supervision to improve UE strength/endurance in order to facilitate func mobility.     Outcome: Ongoing, Progressing    Pt was agreeable to OT evaluation.  Goals established to assist pt with returning to OF regarding ADLs and func mobility.  Pt will benefit from skilled OT services in order to increase her level of safety and independence with ADLs and mobility.      Brittany Doan, OT  1/1/2020

## 2020-01-01 NOTE — PT/OT/SLP EVAL
Occupational Therapy   Evaluation    Name: Jayla Reagan  MRN: [id number]  Admitting Diagnosis:  Post-op pain      Recommendations:     Discharge Recommendations: rehabilitation facility  Discharge Equipment Recommendations:  hip kit  Barriers to discharge:  Decreased caregiver support    Assessment:     Jayla Reagan is a 69 y.o. female with a medical diagnosis of Post-op pain.  She presents with the following performance deficits affecting function: weakness, impaired endurance, impaired sensation, impaired self care skills, impaired functional mobilty, gait instability, impaired balance, decreased coordination, decreased lower extremity function, decreased safety awareness, pain, impaired skin, orthopedic precautions.  Pt was agreeable to OT evaluation.  Pt reports functioning at mod I for ADLs and func mobility prior to hospitalization.  Currently, pt requires set up to max A with ADLs and is having difficulty standing for transfers.  Goals established to assist pt with returning to [facility] regarding ADLs and func mobility.  Pt will benefit from skilled OT services in order to increase her level of safety and independence with ADLs and mobility.      Rehab Prognosis: Good; patient would benefit from acute skilled OT services to address these deficits and reach maximum level of function.       Plan:     Patient to be seen 5 x/week to address the above listed problems via self-care/home management, therapeutic activities, therapeutic exercises  · Plan of Care Expires: 01/31/20  · Plan of Care Reviewed with: patient    Subjective     Chief Complaint: pain in back; weakness/pain in legs when trying to stand  Patient/Family Comments/goals: Regain independence in order to return home (lives alone - limited assist from daughter)    Occupational Profile:  Living Environment: Pt lives alone in [facility] with threshold entrance - has tub/shower combo  Previous level of function: mod I with ADLs and func mobility; drives  Roles and  Routines: mother  Equipment Used at Home:  walker, rolling, bedside commode, shower chair  Assistance upon Discharge: daughter is able to assist some    Pain/Comfort:  · Pain Rating 1: 7/10  · Location 1: back  · Pain Addressed 1: Pre-medicate for activity, Reposition, Cessation of Activity  · Pain Rating Post-Intervention 1: 10/10    Patients cultural, spiritual, Restorationism conflicts given the current situation:      Objective:     Communicated with: nurse prior to session.  Patient found HOB elevated with peripheral IV, bed alarm upon OT entry to room.    General Precautions: Standard, fall   Orthopedic Precautions:spinal precautions   Braces: N/A     Occupational Performance:    Bed Mobility:    · Patient completed Rolling/Turning to Left with  minimum assistance  · Patient completed Scooting/Bridging with minimum assistance  · Patient completed Supine to Sit with maximal assistance and lower legs and lift trunk  · Patient completed Sit to Supine with maximal assistance to lift legs    Functional Mobility/Transfers:  · Attempted  preparation for transfer to [facility] - pt needed max A to achieve standing with RW.  Once in full stand, pt's B LEs buckled and she sat back on bed. Pt was too fearful to attempt again.     Activities of Daily Living:  · Grooming: supervision set up   · Upper Body Dressing: moderate assistance    · Lower Body Dressing: maximal assistance  - unable to reach feet to karyna socks  · Toileting: maximal assistance incontinent - able to perform hygiene    Cognitive/Visual Perceptual:  Cognitive/Psychosocial Skills:     -       Oriented to: Person, Place, Time and Situation   -       Follows Commands/attention:Follows one-step commands  -       Communication: clear/fluent  -       Memory: No Deficits noted  -       Safety awareness/insight to disability: impaired   -       Mood/Affect/Coping skills/emotional control: Appropriate to situation  Visual/Perceptual:  -Intact      Physical  Exam:  Balance: -       Good sitting (static) - poor standing  Postural examination/scapula alignment:    -       Rounded shoulders  -       Forward head  Skin integrity: bandage at surgical site on back  Edema:  None noted  Sensation: -       Impaired  legs (states mostly burning sensation behind legs)  Dominant hand: -       right  Upper Extremity Range of Motion:     -       Right Upper Extremity: WFL  -       Left Upper Extremity: WFL  Upper Extremity Strength:    -       Right Upper Extremity: WFL  -       Left Upper Extremity: WFL   Strength:    -       Right Upper Extremity: WFL  -       Left Upper Extremity: WFL  Fine Motor Coordination: -       Intact  Gross motor coordination: impaired due to pain    AMPAC 6 Click ADL:  AMPAC Total Score: 14    Treatment & Education:  · Pt completed ADLs and func mobility activities for tx session as noted above  · Pt educated on role of OT and POC    Education:    Patient left HOB elevated with all lines intact and call button in reach    GOALS:   Multidisciplinary Problems     Occupational Therapy Goals        Problem: Occupational Therapy Goal    Goal Priority Disciplines Outcome Interventions   Occupational Therapy Goal     OT, PT/OT Ongoing, Progressing    Description:  Goals to be met by: 02/01/20     Patient will increase functional independence with ADLs by performing:    UE Dressing with Modified [place].  LE Dressing with Modified [place] and Assistive Devices as needed.  Grooming while standing at sink with Modified [place].  Toileting from bedside commode with Modified [place] for hygiene and clothing management.   Toilet transfer to bedside commode with Modified [place].  Upper extremity exercise program 3x12 reps per handout, with supervision to improve UE strength/endurance in order to facilitate func mobility.                      History:     Past Medical History:   Diagnosis Date    Atrial fibrillation     Cataract      Hypertension     PAD (peripheral artery disease)     Peripheral artery disease        Past Surgical History:   Procedure Laterality Date    BREAST SURGERY      CHOLECYSTECTOMY      ECTOPIC PREGNANCY SURGERY      LUMBAR LAMINECTOMY Right 12/27/2019    Procedure: LAMINECTOMY, SPINE, LUMBAR Right L3-4 Laminectomy Medial Facetectomy and Contralateral Foraminotomy right L4-5 Laminectomy medial facetectomy and microdiscectomy;  Surgeon: Nico Alvarez MD;  Location: [facility];  Service: Neurosurgery;  Laterality: Right;  Procedure: Right L3-4 Laminectomy Medial Facetectomy and Contralateral Foraminotomy right L4-5 Laminectomy medial facetectomy and m    SALPINGECTOMY         Time Tracking:     OT Date of Treatment: 01/01/20  OT Start Time: 1303  OT Stop Time: 1337  OT Total Time (min): 34 min    Billable Minutes:Evaluation 15  Self Care/Home Management 19    Brittany Doan, OT  1/1/2020

## 2020-01-02 LAB
ANION GAP SERPL CALC-SCNC: 10 MMOL/L (ref 8–16)
BASOPHILS # BLD AUTO: 0.01 K/UL (ref 0–0.2)
BASOPHILS NFR BLD: 0.3 % (ref 0–1.9)
BUN SERPL-MCNC: 13 MG/DL (ref 8–23)
CALCIUM SERPL-MCNC: 9.3 MG/DL (ref 8.7–10.5)
CHLORIDE SERPL-SCNC: 102 MMOL/L (ref 95–110)
CO2 SERPL-SCNC: 26 MMOL/L (ref 23–29)
CREAT SERPL-MCNC: 0.8 MG/DL (ref 0.5–1.4)
DIFFERENTIAL METHOD: ABNORMAL
EOSINOPHIL # BLD AUTO: 0.1 K/UL (ref 0–0.5)
EOSINOPHIL NFR BLD: 2.8 % (ref 0–8)
ERYTHROCYTE [DISTWIDTH] IN BLOOD BY AUTOMATED COUNT: 16.4 % (ref 11.5–14.5)
EST. GFR  (AFRICAN AMERICAN): >60 ML/MIN/1.73 M^2
EST. GFR  (NON AFRICAN AMERICAN): >60 ML/MIN/1.73 M^2
GLUCOSE SERPL-MCNC: 95 MG/DL (ref 70–110)
HCT VFR BLD AUTO: 25.6 % (ref 37–48.5)
HGB BLD-MCNC: 8.4 G/DL (ref 12–16)
INR PPP: 1.5 (ref 0.8–1.2)
LYMPHOCYTES # BLD AUTO: 1.3 K/UL (ref 1–4.8)
LYMPHOCYTES NFR BLD: 31.4 % (ref 18–48)
MCH RBC QN AUTO: 27.5 PG (ref 27–31)
MCHC RBC AUTO-ENTMCNC: 32.8 G/DL (ref 32–36)
MCV RBC AUTO: 84 FL (ref 82–98)
MONOCYTES # BLD AUTO: 0.5 K/UL (ref 0.3–1)
MONOCYTES NFR BLD: 12.8 % (ref 4–15)
NEUTROPHILS # BLD AUTO: 2.1 K/UL (ref 1.8–7.7)
NEUTROPHILS NFR BLD: 52.7 % (ref 38–73)
PLATELET # BLD AUTO: 235 K/UL (ref 150–350)
PMV BLD AUTO: 7.8 FL (ref 9.2–12.9)
POTASSIUM SERPL-SCNC: 4.2 MMOL/L (ref 3.5–5.1)
PROTHROMBIN TIME: 15.8 SEC (ref 9–12.5)
RBC # BLD AUTO: 3.05 M/UL (ref 4–5.4)
SODIUM SERPL-SCNC: 138 MMOL/L (ref 136–145)
WBC # BLD AUTO: 3.98 K/UL (ref 3.9–12.7)

## 2020-01-02 PROCEDURE — 63600175 PHARM REV CODE 636 W HCPCS: Performed by: EMERGENCY MEDICINE

## 2020-01-02 PROCEDURE — 99024 POSTOP FOLLOW-UP VISIT: CPT | Mod: ,,, | Performed by: PHYSICIAN ASSISTANT

## 2020-01-02 PROCEDURE — 99024 PR POST-OP FOLLOW-UP VISIT: ICD-10-PCS | Mod: ,,, | Performed by: PHYSICIAN ASSISTANT

## 2020-01-02 PROCEDURE — 86580 TB INTRADERMAL TEST: CPT | Performed by: PHYSICIAN ASSISTANT

## 2020-01-02 PROCEDURE — 97530 THERAPEUTIC ACTIVITIES: CPT | Mod: CQ

## 2020-01-02 PROCEDURE — 97530 THERAPEUTIC ACTIVITIES: CPT

## 2020-01-02 PROCEDURE — G0378 HOSPITAL OBSERVATION PER HR: HCPCS

## 2020-01-02 PROCEDURE — 25000003 PHARM REV CODE 250: Performed by: PHYSICIAN ASSISTANT

## 2020-01-02 PROCEDURE — 96372 THER/PROPH/DIAG INJ SC/IM: CPT

## 2020-01-02 PROCEDURE — 85025 COMPLETE CBC W/AUTO DIFF WBC: CPT

## 2020-01-02 PROCEDURE — 30200315 PPD INTRADERMAL TEST REV CODE 302: Performed by: PHYSICIAN ASSISTANT

## 2020-01-02 PROCEDURE — 25000003 PHARM REV CODE 250: Performed by: NEUROLOGICAL SURGERY

## 2020-01-02 PROCEDURE — 94761 N-INVAS EAR/PLS OXIMETRY MLT: CPT

## 2020-01-02 PROCEDURE — 80048 BASIC METABOLIC PNL TOTAL CA: CPT

## 2020-01-02 PROCEDURE — 36415 COLL VENOUS BLD VENIPUNCTURE: CPT

## 2020-01-02 PROCEDURE — 85610 PROTHROMBIN TIME: CPT

## 2020-01-02 PROCEDURE — 25000003 PHARM REV CODE 250: Performed by: EMERGENCY MEDICINE

## 2020-01-02 RX ORDER — METHOCARBAMOL 500 MG/1
1000 TABLET, FILM COATED ORAL 3 TIMES DAILY
Qty: 60 TABLET | Refills: 1 | Status: SHIPPED | OUTPATIENT
Start: 2020-01-02 | End: 2020-01-02

## 2020-01-02 RX ORDER — AMOXICILLIN 250 MG
1 CAPSULE ORAL 2 TIMES DAILY
Start: 2020-01-02 | End: 2020-06-19

## 2020-01-02 RX ORDER — POLYETHYLENE GLYCOL 3350 17 G/17G
17 POWDER, FOR SOLUTION ORAL DAILY
Refills: 0
Start: 2020-01-03 | End: 2020-12-22

## 2020-01-02 RX ORDER — OXYCODONE HYDROCHLORIDE 10 MG/1
10 TABLET ORAL
Qty: 60 TABLET | Refills: 0 | Status: SHIPPED | OUTPATIENT
Start: 2020-01-02 | End: 2020-01-03

## 2020-01-02 RX ORDER — METHOCARBAMOL 500 MG/1
1000 TABLET, FILM COATED ORAL 3 TIMES DAILY PRN
Qty: 60 TABLET | Refills: 1 | Status: SHIPPED | OUTPATIENT
Start: 2020-01-02 | End: 2020-02-16 | Stop reason: SDUPTHER

## 2020-01-02 RX ORDER — HEPARIN SODIUM 5000 [USP'U]/ML
5000 INJECTION, SOLUTION INTRAVENOUS; SUBCUTANEOUS EVERY 8 HOURS
Start: 2020-01-02 | End: 2020-06-19

## 2020-01-02 RX ORDER — FERROUS SULFATE 300 MG/5ML
300 LIQUID (ML) ORAL DAILY
Refills: 0
Start: 2020-01-03 | End: 2020-06-19

## 2020-01-02 RX ADMIN — METHOCARBAMOL TABLETS 750 MG: 750 TABLET, COATED ORAL at 09:01

## 2020-01-02 RX ADMIN — SENNOSIDES AND DOCUSATE SODIUM 1 TABLET: 8.6; 5 TABLET ORAL at 09:01

## 2020-01-02 RX ADMIN — TRIAMTERENE AND HYDROCHLOROTHIAZIDE 1 CAPSULE: 25; 37.5 CAPSULE ORAL at 06:01

## 2020-01-02 RX ADMIN — HEPARIN SODIUM 5000 UNITS: 5000 INJECTION, SOLUTION INTRAVENOUS; SUBCUTANEOUS at 05:01

## 2020-01-02 RX ADMIN — OXYCODONE HYDROCHLORIDE 10 MG: 5 TABLET ORAL at 06:01

## 2020-01-02 RX ADMIN — OXYCODONE HYDROCHLORIDE 10 MG: 5 TABLET ORAL at 09:01

## 2020-01-02 RX ADMIN — METOPROLOL SUCCINATE 100 MG: 50 TABLET, EXTENDED RELEASE ORAL at 09:01

## 2020-01-02 RX ADMIN — POLYETHYLENE GLYCOL 3350 17 G: 17 POWDER, FOR SOLUTION ORAL at 09:01

## 2020-01-02 RX ADMIN — WARFARIN SODIUM 1 MG: 1 TABLET ORAL at 05:01

## 2020-01-02 RX ADMIN — MINERAL SUPPLEMENT IRON 300 MG / 5 ML STRENGTH LIQUID 100 PER BOX UNFLAVORED 300 MG: at 09:01

## 2020-01-02 RX ADMIN — HEPARIN SODIUM 5000 UNITS: 5000 INJECTION, SOLUTION INTRAVENOUS; SUBCUTANEOUS at 01:01

## 2020-01-02 RX ADMIN — OXYCODONE HYDROCHLORIDE 10 MG: 5 TABLET ORAL at 12:01

## 2020-01-02 RX ADMIN — HEPARIN SODIUM 5000 UNITS: 5000 INJECTION, SOLUTION INTRAVENOUS; SUBCUTANEOUS at 09:01

## 2020-01-02 RX ADMIN — OXYCODONE HYDROCHLORIDE 10 MG: 5 TABLET ORAL at 05:01

## 2020-01-02 RX ADMIN — TUBERCULIN PURIFIED PROTEIN DERIVATIVE 5 UNITS: 5 INJECTION INTRADERMAL at 12:01

## 2020-01-02 RX ADMIN — OXYCODONE HYDROCHLORIDE 10 MG: 5 TABLET ORAL at 01:01

## 2020-01-02 RX ADMIN — METHOCARBAMOL TABLETS 750 MG: 750 TABLET, COATED ORAL at 03:01

## 2020-01-02 NOTE — NURSING
Cued into patient's room with permission. Nurse Shante at bedside engaging with patient and giving evening medications. Side rails x2 up with head of elevated at 45 degrees. Bed alarm is activated and is on. No complaints to VN at this time. Will continue to be available as needed to assist with care

## 2020-01-02 NOTE — PLAN OF CARE
[facility]    FACILITY TRANSFER ORDERS      Patient Name: Jayla Reagan  YOB: 1950    PCP: Rambo Palmer MD   PCP Address: [address] / [place] [address]  PCP Phone Number: [phone]  PCP Fax: [phone]    Encounter Date: 01/03/2020    Admit to:  Skilled nursing facility    Vital Signs:  Routine    Diagnoses:   Active Hospital Problems    Diagnosis  POA    *Post-op pain [G89.18]  Yes      Resolved Hospital Problems   No resolved problems to display.       Allergies:  Review of patient's allergies indicates:   Allergen Reactions    Norco [hydrocodone-acetaminophen] Hives       Diet: regular diet    Activities: Activity as tolerated, Ambulate in patel, Bathroom privileges with assistance, Commode at bedside, Up with assistance and Weight bearing as tolerated    Nursing:   -Neuro checks every shift  -Neurovascular checks every shift  -Skin assessment every shift   -Intake and output documented every shift  -Vital signs every shift  -Incentive spirometry 10x every hour if available  -Aspiration precautions  -Hypoglycemia protocol  -Fall precautions  -Seizure precautions  -Turn patient every 2 hours. Use side wedge; patient must not lay flat.   -Patient out of bed/sitting in chair for total 6 hours per day (may be in intervals: 2 hours with breakfast, lunch, and dinner)  -Bedside commode; rolling walker for inpatient use if available  -Bacitracin to incision TWICE DAILY  -Place teds and SCDs.  Boot heel protectors.  If available.  -Potts to gravity; potts care every 12 hours; empty and document output [id number]  -Monitor incision every shift. Patient may shower from the front; do no submerge incision; do not scrub incision. Keep incision open to air.  -Others: per facility protocol    OTHER:  -SQH 5000 units Q8H for DVT prophylaxis while admitted at skilled nursing facility.  Stop when INR is at 2-3        Labs: CBC, BMP and INR Daily for Three  days; or per  facility protocol    CONSULTS:    Physical Therapy to evaluate and treat. , Occupational Therapy to evaluate and treat. and  to evaluate for community resources/long-range planning.    MISCELLANEOUS CARE:  Routine Skin for Bedridden Patients: Apply moisture barrier cream to all skin folds and wet areas in perineal area daily and after baths and all bowel movements.    WOUND CARE ORDERS  None    Medications: Review discharge medications with patient and family and provide education.      Current Discharge Medication List      START taking these medications    Details   ferrous sulfate 300 mg (60 mg iron)/5 mL syrup Take 5 mLs (300 mg total) by mouth once daily.  Refills: 0      heparin sodium,porcine (HEPARIN, PORCINE,) 5,000 unit/mL injection Inject 1 mL (5,000 Units total) into the skin every 8 (eight) hours.    Comments: This is for DVT prophylaxis.  Only use while admitted at skilled nursing facility.  Stop when patient is discharged home OR Stop when INR is at 2-3      methocarbamol (ROBAXIN) 500 MG Tab Take 2 tablets (1,000 mg total) by mouth 3 (three) times daily as needed muscle spasm.  Qty: 60 tablet, Refills: 1      oxyCODONE (ROXICODONE) 10 mg Tab immediate release tablet Take 1 tablet (10 mg total) by mouth every 6 hours as needed for pain.  Qty: 60 tablet, Refills: 0    Comments: Quantity prescribed more than 7 day supply? Yes, quantity medically necessary secondary to uncontrolled postop lumbar spinal surgery pain      polyethylene glycol (GLYCOLAX) 17 gram PwPk Take 17 g by mouth once daily.  Refills: 0      senna-docusate 8.6-50 mg (PERICOLACE) 8.6-50 mg per tablet Take 1 tablet by mouth 2 (two) times daily.         CONTINUE these medications which have NOT CHANGED    Details   azelastine (ASTELIN) 137 mcg (0.1 %) nasal spray 1 puff in each nostril daily      cholecalciferol, vitamin D3, (VITAMIN D3) 125 mcg (5,000 unit) Tab Take 5,000 Units by mouth once daily.       cyanocobalamin,  vitamin B-12, 50 mcg tablet Take 500 mcg by mouth every morning.       fosinopril (MONOPRIL) 20 MG tablet Take 20 mg by mouth every evening.       metoprolol succinate (TOPROL-XL) 100 MG 24 hr tablet Take 100 mg by mouth 2 (two) times daily.       triamterene-hydrochlorothiazide 37.5-25 mg (DYAZIDE) 37.5-25 mg per capsule Take 1 capsule by mouth every morning.   Refills: 1      warfarin (COUMADIN) 1 MG tablet Take 1 tablet (1 mg total) by mouth Daily. Qty: 90 tablet, Refills: 3    Comments: Restart on 12/29/2019  Associated Diagnoses: Paroxysmal atrial fibrillation         STOP taking these medications       acetaminophen (TYLENOL) 650 MG TbSR Comments:   Reason for Stopping:         cyanocobalamin (VITAMIN B-12) 1,000 mcg/mL injection Comments:   Reason for Stopping:         flu vacc [id number] 65yr up,PF, 180 mcg/0.5 mL Syrg Comments:   Reason for Stopping:         oxyCODONE-acetaminophen (PERCOCET) 5-325 mg per tablet Comments:   Reason for Stopping:         triamcinolone acetonide 0.1% (KENALOG) 0.1 % ointment Comments:   Reason for Stopping:                FOLLOW UP APPOINTMENTS:  -Patient has 1-2 wk wound check/staple removal with NSR and 6 week follow up with Dr. Alvarez with Xrays prior.  -She will need to have transportation to this appointment.   -PLEASE CALL [facility] WITH ANY QUESTIONS [phone]. [address], [address] [facility], [place], [place] [address].        _________________________________  Amador Baker PA-C  01/02/2020

## 2020-01-02 NOTE — PLAN OF CARE
called [facility] at [phone], unable to complete LOCET, high call volume at this time, awaiting call back to complete.        01/02/20 1156   Post-Acute Status   Post-Acute Authorization Placement   Post-Acute Placement Status Pending State Direction

## 2020-01-02 NOTE — PLAN OF CARE
sent SNF referral to 1) [facility] and 2) [facility], per patient's preference. SW will continue to follow up pending approval.denial.       01/02/20 1225   Post-Acute Status   Post-Acute Authorization Placement   Post-Acute Placement Status Referrals Sent

## 2020-01-02 NOTE — PROGRESS NOTES
[facility]  Neurosurgery  Progress Note    Subjective:     History of Present Illness: Jayla Reagan is a 69 y.o. female with PMHx of cataract, PAD, HTN, and A fib (previously on Coumadin), and recent MIS laminectomy at L3-4 and L4-5 with Dr. Alvarez on 12/27/2019 who re-presented with uncontrolled postop pain.  She was admitted for pain control on 12/30/2019.    Post-Op Info:  * No surgery found *         Interval History: No acute events overnight.  Vitals and labs stable.  Patient continues to complain of incisional, low back, and bilateral posterior thigh pain only with mobilization.  She also has some truncal weakness and difficulty with walking secondary to limited mobility.  She has not ambulated since admission but has been out of bed with PT and nursing assistance.  Pending skilled nursing facility placement.    Medications:  Continuous Infusions:  Scheduled Meds:   ferrous sulfate  300 mg Oral Daily    fosinopril  20 mg Oral QHS    heparin (porcine)  5,000 Units Subcutaneous Q8H    methocarbamol  750 mg Oral TID    metoprolol succinate  100 mg Oral BID    polyethylene glycol  17 g Oral Daily    senna-docusate 8.6-50 mg  1 tablet Oral BID    triamterene-hydrochlorothiazide 37.5-25 mg  1 capsule Oral QAM    tuberculin  5 Units Intradermal Once    warfarin  1 mg Oral Daily     PRN Meds:acetaminophen, acetaminophen, morphine, ondansetron, ondansetron, oxyCODONE, sodium chloride 0.9%     Review of Systems  Objective:     Weight: 106.9 kg (235 lb 10.8 oz)  Body mass index is 31.96 kg/m².  Vital Signs (Most Recent):  Temp: 99.4 °F (37.4 °C) (01/02/20 0753)  Pulse: 80 (01/02/20 0753)  Resp: 16 (01/02/20 0753)  BP: (!) 144/72 (01/02/20 0753)  SpO2: 97 % (01/02/20 0753) Vital Signs (24h Range):  Temp:  [97.3 °F (36.3 °C)-99.4 °F (37.4 °C)] 99.4 °F (37.4 °C)  Pulse:  [63-82] 80  Resp:  [16-18] 16  SpO2:  [94 %-97 %] 97 %  BP: (133-153)/(61-84) 144/72                     Female External  Urinary Catheter 12/30/19 0230 (Active)   Skin no redness;no breakdown 1/2/2020  9:28 AM   Tolerance no signs/symptoms of discomfort 1/2/2020  9:28 AM   Suction Continuous suction at 70 mmHg 1/2/2020  9:28 AM   Date of last wick change 01/02/20 1/2/2020  9:28 AM   Time of last wick change 1920 1/1/2020  7:14 PM   Output (mL) 900 mL 1/1/2020  9:24 PM       Neurosurgery Physical Exam   General: well developed, well nourished. no acute distress.  Comfortable  Neurologic: Awake, alert and oriented x3. Thought content appropriate.  Head: normocephalic, atraumatic   GCS: Motor: 6/Verbal: 5/Eyes: 4 GCS Total: 15  Cranial nerves:face symmetric and sensation intact bilaterally, tongue midline, pupils equal, round, reactive to light with accomodation, extraocular muscles intact. CN II-XII grossly intact. Shoulder shrug strength equal. Palate rises symmetrically.  Uvula midline. Hearing equal with finger rub. Gag and taste not tested.      Language: no aphasia  Speech: no dysarthria      Sensory: response to light touch throughout  Motor Strength: Moves all extremities spontaneously with good tone. Full strength upper and lower extremities. No abnormal movements seen.         ** pain limited proximal bilateral leg weakness; bilateral hip flexion 4/5  Strength   Deltoids Triceps Biceps Wrist Extension Wrist Flexion Hand    Upper: R 5/5 5/5 5/5 5/5 5/5 5/5     L 5/5 5/5 5/5 5/5 5/5 5/5       Iliopsoas Quadriceps Knee  Flexion Tibialis  anterior Gastro- cnemius EHL   Lower: R 4/5 5/5 5/5 5/5 5/5 5/5     L 4/5 5/5 5/5 5/5 5/5 5/5   Interossi muscle strength- intact     Poor truncal support    Straight leg raise: negative bilaterally  No focal numbness or weakness  No midline or paraspinal tenderness to palpation     ENT: normal hearing with finger rub  Heart: RRR, no cyanosis, pallor, or edema.   Lungs:  normal respiratory effort  Abdomen: soft, non-tender and symmetric  Extremities: warm with no cyanosis, edema, or  clubbing  Pulses: palpable distal pulses  Skin: warm, dry and intact. No visible rashes or lesions.      Posterior lumbar incision:  wound is c/d/i, no signs of infection, no erythema, warmth, edema, ttp,  no drainage.  wound edges are well approximated.         Significant Labs:  Recent Labs   Lab 01/01/20 0417 01/02/20  0510    95    138   K 4.0 4.2    102   CO2 27 26   BUN 12 13   CREATININE 0.9 0.8   CALCIUM 9.4 9.3     Recent Labs   Lab 01/01/20 0417 01/02/20  0510   WBC 4.43 3.98   HGB 8.5* 8.4*   HCT 26.1* 25.6*    235     Recent Labs   Lab 01/01/20 0417 01/02/20  0510   INR 1.3* 1.5*     Microbiology Results (last 7 days)     ** No results found for the last 168 hours. **            Assessment/Plan:     * Post-op pain  69 y.o. female with PMHx of cataract, PAD, HTN, and A fib (previously on Coumadin), and recent MIS laminectomy at L3-4 and L4-5 with Dr. Alvarez on 12/27/2019 who re-presented with uncontrolled postop pain.  She was admitted for pain control on 12/30/2019.    -Patient is neurologically stable.  No focal deficits.  Mostly myofascial pain and difficulty walking on exam secondary to immobility.  -Pain semi controlled on current regimen.  Will add Robaxin t.i.d. p.r.n. muscle spasms and p.o. Oxy IR to 10 mg q.4 hours p.r.n. severe pain.  Patient can continue p.r.n. subcutaneous morphine for breakthrough pain; however, I encouraged patient to use p.o. pain regimen 1st.  -PT/OT/OOB daily.  Therapy recommending rehab versus skilled nursing facility placement.  Patient lives alone.  -Patient must be OOB for atleast 6 hours daily (may be in intervals: 2 hours in chair with each meal).  Require multiple nursing staff assistance.  -IS to bed side. Patient to use atleast 10x every hour.  -Continue bowel regimen daily.  -Continue SQH, Teds and SCDs for DVTP.  -PPI for GI prophylaxis  -Restarted all home meds for hypertension, vitamin-D deficiency, and anemia.  -Pure wick  Will  bladder scan in 4 hour if patient does not void spontaneously.  Urinalysis negative.  Dysuria resolved with pure wick exchange.  -clinical pharmacist consulted to restart home Coumadin for AFib.  Recommend restarting 2 mg x2 days and maintain home dose thereafter.  Monitor INR daily.  Continue DVT prophylaxis subcu heparin dose at this time.    Dispo: DC to skilled nursing facility pending acceptance and insurance approval. Patient has 2 wk wound check with NSR and 6 week follow up with Dr. Alvarez.    Discussed with Dr. Antonio Baker PA-C  Neurosurgery  [facility]

## 2020-01-02 NOTE — PLAN OF CARE
called [facility] at [phone], spoke with Yaritza, completed LOCET. SW faxed PASRR, awaiting form 142 from State.       01/02/20 1400   Post-Acute Status   Post-Acute Authorization Placement   Post-Acute Placement Status Pending State Certification

## 2020-01-02 NOTE — PLAN OF CARE
Patient is alert, responsive, in NAD. Plan of care reviewed with patient, all questions answered. Safety measures maintained. No falls/injury. Will continue to monitor.

## 2020-01-02 NOTE — ASSESSMENT & PLAN NOTE
69 y.o. female with PMHx of cataract, PAD, HTN, and A fib (previously on Coumadin), and recent MIS laminectomy at L3-4 and L4-5 with Dr. Alvarez on 12/27/2019 who re-presented with uncontrolled postop pain.  She was admitted for pain control on 12/30/2019.    -Patient is neurologically stable.  No focal deficits.  Mostly myofascial pain and difficulty walking on exam secondary to immobility.  -Pain semi controlled on current regimen.  Will add Robaxin t.i.d. p.r.n. muscle spasms and p.o. Oxy IR to 10 mg q.4 hours p.r.n. severe pain.  Patient can continue p.r.n. subcutaneous morphine for breakthrough pain; however, I encouraged patient to use p.o. pain regimen 1st.  -PT/OT/OOB daily.  Therapy recommending rehab versus skilled nursing facility placement.  Patient lives alone.  -Patient must be OOB for atleast 6 hours daily (may be in intervals: 2 hours in chair with each meal).  Require multiple nursing staff assistance.  -IS to bed side. Patient to use atleast 10x every hour.  -Continue bowel regimen daily.  -Continue SQH, Teds and SCDs for DVTP.  -PPI for GI prophylaxis  -Restarted all home meds for hypertension, vitamin-D deficiency, and anemia.  -Pure wick  Will bladder scan in 4 hour if patient does not void spontaneously.  Urinalysis negative.  Dysuria resolved with pure wick exchange.  -clinical pharmacist consulted to restart home Coumadin for AFib.  Recommend restarting 2 mg x2 days and maintain home dose thereafter.  Monitor INR daily.  Continue DVT prophylaxis subcu heparin dose at this time.    Dispo: DC to skilled nursing facility pending acceptance and insurance approval. Patient has 2 wk wound check with NSR and 6 week follow up with Dr. Alvarez.    Discussed with Dr. Alvarez

## 2020-01-02 NOTE — PLAN OF CARE
spoke with Yamileth with [facility], [phone], patient has been medically approved, awaiting financials. Yamileth spoke with patient's daughter and has scheduled an appointment for 11:30 AM-- to have paperwork completed. CINDY will continue to follow up pending insurance approval. JACKIE vines sent faxed, [phone].       01/02/20 4814   Post-Acute Status   Post-Acute Authorization Placement   Post-Acute Placement Status Pending Payor Review

## 2020-01-02 NOTE — PLAN OF CARE
[facility] decliend patient-unable to accept insurance-PHN.       01/02/20 1240   Post-Acute Status   Post-Acute Authorization Placement   Post-Acute Placement Status Patient Evaluation by Facility

## 2020-01-02 NOTE — PLAN OF CARE
Problem: Occupational Therapy Goal  Goal: Occupational Therapy Goal  Description  Goals to be met by: 02/01/20     Patient will increase functional independence with ADLs by performing:    UE Dressing with Modified [place].  LE Dressing with Modified [place] and Assistive Devices as needed.  Grooming while standing at sink with Modified [place].  Toileting from bedside commode with Modified [place] for hygiene and clothing management.   Toilet transfer to bedside commode with Modified [place].  Upper extremity exercise program 3x12 reps per handout, with supervision to improve UE strength/endurance in order to facilitate func mobility.     Outcome: Ongoing, Progressing     Pt progressing towards goals. Cont OT POC

## 2020-01-02 NOTE — SUBJECTIVE & OBJECTIVE
Interval History: No acute events overnight.  Vitals and labs stable.  Patient continues to complain of incisional, low back, and bilateral posterior thigh pain only with mobilization.  She also has some truncal weakness and difficulty with walking secondary to limited mobility.  She has not ambulated since admission but has been out of bed with PT and nursing assistance.  Pending skilled nursing facility placement.    Medications:  Continuous Infusions:  Scheduled Meds:   ferrous sulfate  300 mg Oral Daily    fosinopril  20 mg Oral QHS    heparin (porcine)  5,000 Units Subcutaneous Q8H    methocarbamol  750 mg Oral TID    metoprolol succinate  100 mg Oral BID    polyethylene glycol  17 g Oral Daily    senna-docusate 8.6-50 mg  1 tablet Oral BID    triamterene-hydrochlorothiazide 37.5-25 mg  1 capsule Oral QAM    tuberculin  5 Units Intradermal Once    warfarin  1 mg Oral Daily     PRN Meds:acetaminophen, acetaminophen, morphine, ondansetron, ondansetron, oxyCODONE, sodium chloride 0.9%     Review of Systems  Objective:     Weight: 106.9 kg (235 lb 10.8 oz)  Body mass index is 31.96 kg/m².  Vital Signs (Most Recent):  Temp: 99.4 °F (37.4 °C) (01/02/20 0753)  Pulse: 80 (01/02/20 0753)  Resp: 16 (01/02/20 0753)  BP: (!) 144/72 (01/02/20 0753)  SpO2: 97 % (01/02/20 0753) Vital Signs (24h Range):  Temp:  [97.3 °F (36.3 °C)-99.4 °F (37.4 °C)] 99.4 °F (37.4 °C)  Pulse:  [63-82] 80  Resp:  [16-18] 16  SpO2:  [94 %-97 %] 97 %  BP: (133-153)/(61-84) 144/72                     Female External Urinary Catheter 12/30/19 0230 (Active)   Skin no redness;no breakdown 1/2/2020  9:28 AM   Tolerance no signs/symptoms of discomfort 1/2/2020  9:28 AM   Suction Continuous suction at 70 mmHg 1/2/2020  9:28 AM   Date of last wick change 01/02/20 1/2/2020  9:28 AM   Time of last wick change 1920 1/1/2020  7:14 PM   Output (mL) 900 mL 1/1/2020  9:24 PM       Neurosurgery Physical Exam   General: well developed, well nourished. no  acute distress.  Comfortable  Neurologic: Awake, alert and oriented x3. Thought content appropriate.  Head: normocephalic, atraumatic   GCS: Motor: 6/Verbal: 5/Eyes: 4 GCS Total: 15  Cranial nerves:face symmetric and sensation intact bilaterally, tongue midline, pupils equal, round, reactive to light with accomodation, extraocular muscles intact. CN II-XII grossly intact. Shoulder shrug strength equal. Palate rises symmetrically.  Uvula midline. Hearing equal with finger rub. Gag and taste not tested.      Language: no aphasia  Speech: no dysarthria      Sensory: response to light touch throughout  Motor Strength: Moves all extremities spontaneously with good tone. Full strength upper and lower extremities. No abnormal movements seen.         ** pain limited proximal bilateral leg weakness; bilateral hip flexion 4/5  Strength   Deltoids Triceps Biceps Wrist Extension Wrist Flexion Hand    Upper: R 5/5 5/5 5/5 5/5 5/5 5/5     L 5/5 5/5 5/5 5/5 5/5 5/5       Iliopsoas Quadriceps Knee  Flexion Tibialis  anterior Gastro- cnemius EHL   Lower: R 4/5 5/5 5/5 5/5 5/5 5/5     L 4/5 5/5 5/5 5/5 5/5 5/5   Interossi muscle strength- intact     Poor truncal support    Straight leg raise: negative bilaterally  No focal numbness or weakness  No midline or paraspinal tenderness to palpation     ENT: normal hearing with finger rub  Heart: RRR, no cyanosis, pallor, or edema.   Lungs:  normal respiratory effort  Abdomen: soft, non-tender and symmetric  Extremities: warm with no cyanosis, edema, or clubbing  Pulses: palpable distal pulses  Skin: warm, dry and intact. No visible rashes or lesions.      Posterior lumbar incision:  wound is c/d/i, no signs of infection, no erythema, warmth, edema, ttp,  no drainage.  wound edges are well approximated.         Significant Labs:  Recent Labs   Lab 01/01/20  0417 01/02/20  0510    95    138   K 4.0 4.2    102   CO2 27 26   BUN 12 13   CREATININE 0.9 0.8   CALCIUM 9.4  9.3     Recent Labs   Lab 01/01/20 0417 01/02/20  0510   WBC 4.43 3.98   HGB 8.5* 8.4*   HCT 26.1* 25.6*    235     Recent Labs   Lab 01/01/20 0417 01/02/20  0510   INR 1.3* 1.5*     Microbiology Results (last 7 days)     ** No results found for the last 168 hours. **

## 2020-01-02 NOTE — NURSING
Spoke with Dr. Alvarez regarding patient's refusal to get out of bed.  He stated to keep trying.  No new orders received.

## 2020-01-02 NOTE — PT/OT/SLP PROGRESS
Occupational Therapy   Treatment    Name: Jayla Reagan  MRN: [id number]  Admitting Diagnosis:  Post-op pain       Recommendations:     Discharge Recommendations: rehabilitation facility  Discharge Equipment Recommendations:  hip kit  Barriers to discharge:  Decreased caregiver support    Assessment:     Jayla Reagan is a 69 y.o. female with a medical diagnosis of Post-op pain.  She presents with uncontrolled pain s/p spinal surgeries . Performance deficits affecting function are weakness, impaired self care skills, impaired balance, impaired endurance, gait instability, impaired functional mobilty, orthopedic precautions, pain, decreased lower extremity function, impaired skin, decreased ROM, impaired coordination, edema.     Pt progressing towards goals. Cont OT POC     Rehab Prognosis:  Good; patient would benefit from acute skilled OT services to address these deficits and reach maximum level of function.       Plan:     Patient to be seen 5 x/week to address the above listed problems via self-care/home management, therapeutic activities, therapeutic exercises  · Plan of Care Expires: 01/31/20  · Plan of Care Reviewed with: patient    Subjective     Pain/Comfort:  · Pain Rating 1: (did not rate )  · Location - Side 1: Bilateral  · Location - Orientation 1: generalized  · Location 1: (posterior buttocks down to knees)  · Pain Addressed 1: Pre-medicate for activity, Reposition, Distraction, Cessation of Activity, Nurse notified  · Pain Rating Post-Intervention 1: (did not rate )    Objective:     Communicated with: nsg prior to session.  General Precautions: Standard, fall   Orthopedic Precautions:spinal precautions   Braces: N/A     Occupational Performance:     Bed Mobility:    · Patient completed Rolling/Turning to Left with  contact guard assistance  · Patient completed Scooting/Bridging with contact guard assistance/maximal assist   · Patient completed Supine to Sit with contact guard assistance and minimum  assistance  · Patient completed Sit to Supine with moderate assistance     Functional Mobility/Transfers:  · Patient completed Sit <> Stand Transfer with moderate assistance and a 2nd person for safety   with  rolling walker   · Functional Mobility: unable to perform    Activities of Daily Living:  · Lower Body Dressing: total assistance    · Toileting: total assistance        [facility] 6 Click ADL: 15    Treatment & Education:  Pt performing bed mobility x 2 trials this date; verbal cues for log rolling each trial, but able to perform CGA/Min A with use of bed rails; 2 trials performed 2/2 dizziness and pain and inability to tolerate increased time upright EOB and/or standing   BPs WFL (slightly elevated following 2nd stand) throughout session   Mod A for stands x 2 trials with RW (assist of a 2nd person for safety 2/2 pt's knees buckling and reports of dizziness and inability to tolerate standing)   Lateral seated scooting to HOB x 4 trials CGA; supine scooting to HOB x 1 with max A of 2     Patient left supine with all lines intact, call button in reach, bed alarm on, nsg notified and family  presentEducation:      GOALS:   Multidisciplinary Problems     Occupational Therapy Goals        Problem: Occupational Therapy Goal    Goal Priority Disciplines Outcome Interventions   Occupational Therapy Goal     OT, PT/OT Ongoing, Progressing    Description:  Goals to be met by: 02/01/20     Patient will increase functional independence with ADLs by performing:    UE Dressing with Modified [place].  LE Dressing with Modified [place] and Assistive Devices as needed.  Grooming while standing at sink with Modified [place].  Toileting from bedside commode with Modified [place] for hygiene and clothing management.   Toilet transfer to bedside commode with Modified [place].  Upper extremity exercise program 3x12 reps per handout, with supervision to improve UE strength/endurance in order to facilitate func  mobility.                      Time Tracking:     OT Date of Treatment: 01/02/20  OT Start Time: 1351  OT Stop Time: 1424  OT Total Time (min): 33 min    Billable Minutes:Therapeutic Activity 15 co treatment with PT     Nina Cantor, OT  1/2/2020

## 2020-01-03 VITALS
TEMPERATURE: 96 F | WEIGHT: 235.69 LBS | HEIGHT: 72 IN | RESPIRATION RATE: 18 BRPM | BODY MASS INDEX: 31.92 KG/M2 | HEART RATE: 87 BPM | SYSTOLIC BLOOD PRESSURE: 113 MMHG | OXYGEN SATURATION: 96 % | DIASTOLIC BLOOD PRESSURE: 59 MMHG

## 2020-01-03 LAB
ANION GAP SERPL CALC-SCNC: 8 MMOL/L (ref 8–16)
BASOPHILS # BLD AUTO: 0.01 K/UL (ref 0–0.2)
BASOPHILS NFR BLD: 0.2 % (ref 0–1.9)
BUN SERPL-MCNC: 20 MG/DL (ref 8–23)
CALCIUM SERPL-MCNC: 9.5 MG/DL (ref 8.7–10.5)
CHLORIDE SERPL-SCNC: 102 MMOL/L (ref 95–110)
CO2 SERPL-SCNC: 29 MMOL/L (ref 23–29)
CREAT SERPL-MCNC: 0.9 MG/DL (ref 0.5–1.4)
DIFFERENTIAL METHOD: ABNORMAL
EOSINOPHIL # BLD AUTO: 0.1 K/UL (ref 0–0.5)
EOSINOPHIL NFR BLD: 2.1 % (ref 0–8)
ERYTHROCYTE [DISTWIDTH] IN BLOOD BY AUTOMATED COUNT: 16.4 % (ref 11.5–14.5)
EST. GFR  (AFRICAN AMERICAN): >60 ML/MIN/1.73 M^2
EST. GFR  (NON AFRICAN AMERICAN): >60 ML/MIN/1.73 M^2
GLUCOSE SERPL-MCNC: 109 MG/DL (ref 70–110)
HCT VFR BLD AUTO: 26.7 % (ref 37–48.5)
HGB BLD-MCNC: 8.7 G/DL (ref 12–16)
INR PPP: 1.6 (ref 0.8–1.2)
LYMPHOCYTES # BLD AUTO: 1.2 K/UL (ref 1–4.8)
LYMPHOCYTES NFR BLD: 27 % (ref 18–48)
MCH RBC QN AUTO: 27.3 PG (ref 27–31)
MCHC RBC AUTO-ENTMCNC: 32.6 G/DL (ref 32–36)
MCV RBC AUTO: 84 FL (ref 82–98)
MONOCYTES # BLD AUTO: 0.6 K/UL (ref 0.3–1)
MONOCYTES NFR BLD: 14.1 % (ref 4–15)
NEUTROPHILS # BLD AUTO: 2.4 K/UL (ref 1.8–7.7)
NEUTROPHILS NFR BLD: 56.6 % (ref 38–73)
PLATELET # BLD AUTO: 249 K/UL (ref 150–350)
PMV BLD AUTO: 7.9 FL (ref 9.2–12.9)
POTASSIUM SERPL-SCNC: 4.4 MMOL/L (ref 3.5–5.1)
PROTHROMBIN TIME: 16.8 SEC (ref 9–12.5)
RBC # BLD AUTO: 3.19 M/UL (ref 4–5.4)
SODIUM SERPL-SCNC: 139 MMOL/L (ref 136–145)
WBC # BLD AUTO: 4.33 K/UL (ref 3.9–12.7)

## 2020-01-03 PROCEDURE — 85610 PROTHROMBIN TIME: CPT

## 2020-01-03 PROCEDURE — 96372 THER/PROPH/DIAG INJ SC/IM: CPT

## 2020-01-03 PROCEDURE — 94761 N-INVAS EAR/PLS OXIMETRY MLT: CPT

## 2020-01-03 PROCEDURE — 25000003 PHARM REV CODE 250: Performed by: EMERGENCY MEDICINE

## 2020-01-03 PROCEDURE — 99024 POSTOP FOLLOW-UP VISIT: CPT | Mod: ,,, | Performed by: PHYSICIAN ASSISTANT

## 2020-01-03 PROCEDURE — 99024 PR POST-OP FOLLOW-UP VISIT: ICD-10-PCS | Mod: ,,, | Performed by: PHYSICIAN ASSISTANT

## 2020-01-03 PROCEDURE — 36415 COLL VENOUS BLD VENIPUNCTURE: CPT

## 2020-01-03 PROCEDURE — 25000003 PHARM REV CODE 250: Performed by: NEUROLOGICAL SURGERY

## 2020-01-03 PROCEDURE — 85025 COMPLETE CBC W/AUTO DIFF WBC: CPT

## 2020-01-03 PROCEDURE — 80048 BASIC METABOLIC PNL TOTAL CA: CPT

## 2020-01-03 PROCEDURE — G0378 HOSPITAL OBSERVATION PER HR: HCPCS

## 2020-01-03 PROCEDURE — 25000003 PHARM REV CODE 250: Performed by: PHYSICIAN ASSISTANT

## 2020-01-03 PROCEDURE — 63600175 PHARM REV CODE 636 W HCPCS: Performed by: EMERGENCY MEDICINE

## 2020-01-03 RX ORDER — BISACODYL 10 MG
10 SUPPOSITORY, RECTAL RECTAL DAILY PRN
Status: DISCONTINUED | OUTPATIENT
Start: 2020-01-03 | End: 2020-01-03 | Stop reason: HOSPADM

## 2020-01-03 RX ORDER — WARFARIN 1 MG/1
1 TABLET ORAL DAILY
COMMUNITY
End: 2021-03-01

## 2020-01-03 RX ORDER — OXYCODONE HYDROCHLORIDE 10 MG/1
10 TABLET ORAL EVERY 6 HOURS PRN
Qty: 60 TABLET | Refills: 0 | Status: SHIPPED | OUTPATIENT
Start: 2020-01-03 | End: 2020-12-22

## 2020-01-03 RX ADMIN — OXYCODONE HYDROCHLORIDE 10 MG: 5 TABLET ORAL at 03:01

## 2020-01-03 RX ADMIN — HEPARIN SODIUM 5000 UNITS: 5000 INJECTION, SOLUTION INTRAVENOUS; SUBCUTANEOUS at 01:01

## 2020-01-03 RX ADMIN — BISACODYL 10 MG: 10 SUPPOSITORY RECTAL at 01:01

## 2020-01-03 RX ADMIN — SENNOSIDES AND DOCUSATE SODIUM 1 TABLET: 8.6; 5 TABLET ORAL at 09:01

## 2020-01-03 RX ADMIN — METOPROLOL SUCCINATE 100 MG: 50 TABLET, EXTENDED RELEASE ORAL at 09:01

## 2020-01-03 RX ADMIN — METHOCARBAMOL TABLETS 750 MG: 750 TABLET, COATED ORAL at 09:01

## 2020-01-03 RX ADMIN — MINERAL SUPPLEMENT IRON 300 MG / 5 ML STRENGTH LIQUID 100 PER BOX UNFLAVORED 300 MG: at 09:01

## 2020-01-03 RX ADMIN — METHOCARBAMOL TABLETS 750 MG: 750 TABLET, COATED ORAL at 04:01

## 2020-01-03 RX ADMIN — HEPARIN SODIUM 5000 UNITS: 5000 INJECTION, SOLUTION INTRAVENOUS; SUBCUTANEOUS at 06:01

## 2020-01-03 RX ADMIN — TRIAMTERENE AND HYDROCHLOROTHIAZIDE 1 CAPSULE: 25; 37.5 CAPSULE ORAL at 06:01

## 2020-01-03 RX ADMIN — POLYETHYLENE GLYCOL 3350 17 G: 17 POWDER, FOR SOLUTION ORAL at 09:01

## 2020-01-03 RX ADMIN — OXYCODONE HYDROCHLORIDE 10 MG: 5 TABLET ORAL at 11:01

## 2020-01-03 RX ADMIN — OXYCODONE HYDROCHLORIDE 10 MG: 5 TABLET ORAL at 04:01

## 2020-01-03 NOTE — NURSING
Permission attained to enter room via virtual system.  Virtual rounds completed as documented.  Today's lab values, notes, and vital signs up to now have been reviewed. Educated regarding continued safety precautions and probability of discharge to [facility] tomorrow . Patient reports  Improved pain control since admission.  Stated her nurse was coming in shortly with her pain medication

## 2020-01-03 NOTE — PROGRESS NOTES
[facility]  Neurosurgery  Progress Note    Subjective:     History of Present Illness: Jayla Reagan is a 69 y.o. female with PMHx of cataract, PAD, HTN, and A fib (previously on Coumadin), and recent MIS laminectomy at L3-4 and L4-5 with Dr. Alvarez on 12/27/2019 who re-presented with uncontrolled postop pain.  She was admitted for pain control on 12/30/2019.    Post-Op Info:  * No surgery found *         Interval History: No acute events overnight.  Patient feels like she has improvements in lower extremity strength with physical therapy exercises.  She mobilized at edge of bed but not able to walk on her own yet.  Standing without legs giving out.  Pain well controlled with current regimen.  Plan to DC to [facility] today.  Patient follow Neurosurgery in 2-3 weeks for staple removal and 6 weeks with Dr. Alvarez.    Medications:  Continuous Infusions:  Scheduled Meds:   ferrous sulfate  300 mg Oral Daily    fosinopril  20 mg Oral QHS    heparin (porcine)  5,000 Units Subcutaneous Q8H    methocarbamol  750 mg Oral TID    metoprolol succinate  100 mg Oral BID    polyethylene glycol  17 g Oral Daily    senna-docusate 8.6-50 mg  1 tablet Oral BID    triamterene-hydrochlorothiazide 37.5-25 mg  1 capsule Oral QAM    warfarin  1 mg Oral Daily     PRN Meds:acetaminophen, acetaminophen, bisacodyl, morphine, ondansetron, ondansetron, oxyCODONE, sodium chloride 0.9%     Review of Systems  Objective:     Weight: 106.9 kg (235 lb 10.8 oz)  Body mass index is 31.96 kg/m².  Vital Signs (Most Recent):  Temp: 97.5 °F (36.4 °C) (01/03/20 0440)  Pulse: 89 (01/03/20 0944)  Resp: 17 (01/03/20 0806)  BP: 112/63 (01/03/20 0944)  SpO2: 96 % (01/03/20 0806) Vital Signs (24h Range):  Temp:  [97.5 °F (36.4 °C)-98.5 °F (36.9 °C)] 97.5 °F (36.4 °C)  Pulse:  [65-89] 89  Resp:  [16-17] 17  SpO2:  [95 %-97 %] 96 %  BP: (111-120)/(56-63) 112/63     Date 01/03/20 0700 - 01/04/20 0659   Shift 6671-8287 8714-0787 7972-0472  24 Hour Total   INTAKE   P.O. 240   240   Shift Total(mL/kg) 240(2.2)   240(2.2)   OUTPUT   Shift Total(mL/kg)       Weight (kg) 106.9 106.9 106.9 106.9                   Female External Urinary Catheter 12/30/19 0230 (Active)   Skin no redness;no breakdown 1/2/2020  8:30 PM   Tolerance no signs/symptoms of discomfort 1/2/2020  8:30 PM   Suction Continuous suction at 70 mmHg 1/2/2020  8:30 PM   Date of last wick change 01/02/20 1/2/2020  8:30 PM   Time of last wick change 2300 1/2/2020  8:30 PM   Output (mL) 400 mL 1/3/2020 12:00 AM       Neurosurgery Physical Exam   General: well developed, well nourished. no acute distress.  Comfortable  Neurologic: Awake, alert and oriented x3. Thought content appropriate.  Head: normocephalic, atraumatic   GCS: Motor: 6/Verbal: 5/Eyes: 4 GCS Total: 15  Cranial nerves:face symmetric and sensation intact bilaterally, tongue midline, pupils equal, round, reactive to light with accomodation, extraocular muscles intact. CN II-XII grossly intact. Shoulder shrug strength equal. Palate rises symmetrically.  Uvula midline. Hearing equal with finger rub. Gag and taste not tested.      Language: no aphasia  Speech: no dysarthria      Sensory: response to light touch throughout  Motor Strength: Moves all extremities spontaneously with good tone. Full strength upper and lower extremities. No abnormal movements seen.          Strength   Deltoids Triceps Biceps Wrist Extension Wrist Flexion Hand    Upper: R 5/5 5/5 5/5 5/5 5/5 5/5     L 5/5 5/5 5/5 5/5 5/5 5/5       Iliopsoas Quadriceps Knee  Flexion Tibialis  anterior Gastro- cnemius EHL   Lower: R 4+/5 5/5 5/5 5/5 5/5 5/5     L 4+/5 5/5 5/5 5/5 5/5 5/5   Interossi muscle strength- intact     Poor truncal support     Straight leg raise: negative bilaterally  No focal numbness or weakness  No midline or paraspinal tenderness to palpation     ENT: normal hearing with finger rub  Heart: RRR, no cyanosis, pallor, or edema.   Lungs:  normal  respiratory effort  Abdomen: soft, non-tender and symmetric  Extremities: warm with no cyanosis, edema, or clubbing  Pulses: palpable distal pulses  Skin: warm, dry and intact. No visible rashes or lesions.      Posterior lumbar incision:  wound is c/d/i, no signs of infection, no erythema, warmth, edema, ttp,  no drainage.  wound edges are well approximated.      Significant Labs:  Recent Labs   Lab 01/02/20 0510 01/03/20  0533   GLU 95 109    139   K 4.2 4.4    102   CO2 26 29   BUN 13 20   CREATININE 0.8 0.9   CALCIUM 9.3 9.5     Recent Labs   Lab 01/02/20 0510 01/03/20  0533   WBC 3.98 4.33   HGB 8.4* 8.7*   HCT 25.6* 26.7*    249     Recent Labs   Lab 01/02/20 0510 01/03/20  0533   INR 1.5* 1.6*     Microbiology Results (last 7 days)     ** No results found for the last 168 hours. **            Assessment/Plan:     * Post-op pain  69 y.o. female with PMHx of cataract, PAD, HTN, and A fib (previously on Coumadin), and recent MIS laminectomy at L3-4 and L4-5 with Dr. Alvarez on 12/27/2019 who re-presented with uncontrolled postop pain.  She was admitted for pain control on 12/30/2019.    -Patient is neurologically stable.  No focal deficits.  Mostly myofascial pain and difficulty walking on exam secondary to immobility.  -Pain semi controlled on current regimen.  Will add Robaxin t.i.d. p.r.n. muscle spasms and p.o. Oxy IR to 10 mg q.4 hours p.r.n. severe pain.  Patient can continue p.r.n. subcutaneous morphine for breakthrough pain; however, I encouraged patient to use p.o. pain regimen 1st.  -PT/OT/OOB daily.  Therapy recommending rehab versus skilled nursing facility placement.  Patient lives alone.  -Patient must be OOB for atleast 6 hours daily (may be in intervals: 2 hours in chair with each meal).  Require multiple nursing staff assistance.  -IS to bed side. Patient to use atleast 10x every hour.  -Continue bowel regimen daily.  -Continue SQH, Teds and SCDs for DVTP.  -PPI for GI  prophylaxis  -Restarted all home meds for hypertension, vitamin-D deficiency, and anemia.  -Pure wick  Will bladder scan in 4 hour if patient does not void spontaneously.  Urinalysis negative.  Dysuria resolved with pure wick exchange.  -clinical pharmacist consulted to restart home Coumadin for AFib.  Recommend restarting 2 mg x2 days and maintain home dose thereafter.  Monitor INR daily.  Continue DVT prophylaxis subcu heparin dose at this time.    Dispo: DC to [facility] pending insurance approval. Patient has 2-3 wk wound check with NSR and 6 week follow up with Dr. Alvarez.    Discussed with Dr. Antonio Baker PAKeyonaC  Neurosurgery  [facility]

## 2020-01-03 NOTE — PLAN OF CARE
spoke with Camilo with [facility], informed of N auth approval for [facility] transport, auth# [id number]       01/03/20 7705   Post-Acute Status   Post-Acute Authorization Placement   Post-Acute Placement Status Set-up Complete

## 2020-01-03 NOTE — PROGRESS NOTES
Future Appointments   Date Time Provider Department Center   1/7/2020 10:15 AM [facility], [place] [facility] [place]   1/10/2020 10:00 AM Amador Baker PA-C [facility] NEUROSU [place] [facility]   2/4/2020  9:40 AM Kaiden Hodge MD [facility] CARDIO [place] [facility]   2/10/2020  3:30 PM Nico Alvarez MD [facility] NEUROSU [place] [facility]

## 2020-01-03 NOTE — PT/OT/SLP PROGRESS
Physical Therapy Treatment    Patient Name:  Jayla Reagan   MRN:  [id number]    Recommendations:     Discharge Recommendations:  rehabilitation facility   Discharge Equipment Recommendations: shower chair   Barriers to discharge: Decreased caregiver support    Assessment:     Jayla Reagan is a 69 y.o. female admitted with a medical diagnosis of Post-op pain.  She presents with the following impairments/functional limitations:  weakness, impaired endurance, impaired functional mobilty, impaired self care skills, impaired balance, decreased lower extremity function, orthopedic precautions, decreased coordination, decreased ROM, impaired skin, impaired coordination.  Pt c/o weakness with standing 30sec with RW x 2 bouts.  First bout, pt was unable to remaining sitting to take BP and was assisted supine with Mod A.  BP taken in supine and was 128/57mmHg after pt up ~12 mins.  Pt performed supine <> sit again, and after standing for 30 sec c/o weakness and dizziness.  Pt returned sitting EOB with BP taken in sitting 149/60mmHg.  Pt was unable to take steps today secondary to weakness, c/o mild/moderate dizziness, and increased fear of falling.  Pt would benefit from IPR to assist pt's return to PLOF..    Rehab Prognosis: Fair+; patient would benefit from acute skilled PT services to address these deficits and reach maximum level of function.    Recent Surgery: * No surgery found *      Plan:     During this hospitalization, patient to be seen 6 x/week to address the identified rehab impairments via gait training, therapeutic activities, therapeutic exercises, neuromuscular re-education and progress toward the following goals:    · Plan of Care Expires:  01/30/20    Subjective     Chief Complaint: Low back pain that runs down the posterior leg to the level of her knee.  Patient/Family Comments/goals: Pt agreed to tx.  Pain/Comfort:  · Pain Rating 1: (did not rate)  · Location - Side 1: Bilateral  · Location -  Orientation 1: generalized  · Location 1: (buttock down back of leg to knee)  · Pain Addressed 1: Pre-medicate for activity, Reposition, Distraction, Cessation of Activity, Nurse notified  · Pain Rating Post-Intervention 1: (as above)      Objective:     Communicated with RN (Shannan) prior to session.  Patient found supine with peripheral IV, bed alarm upon PT entry to room.     General Precautions: Standard, fall   Orthopedic Precautions:spinal precautions   Braces:       Functional Mobility:  · Bed Mobility:     · Rolling Left:  contact guard assistance and with vc's for log rolling technique and HOB elevated ~30 *.  · Scooting: stand by assistance, contact guard assistance and to EOB  · Supine to Sit: contact guard assistance  · Sit to Supine: moderate assistance  · Transfers:     · Sit to Stand:  Moderate assistance of 1 and [place]/CGA of another for increased safety with RW  x 2 bouts with rolling walker.  · Balance: sitting good-, standing fair/fair-      AM-PAC 6 CLICK MOBILITY  Turning over in bed (including adjusting bedclothes, sheets and blankets)?: 3  Sitting down on and standing up from a chair with arms (e.g., wheelchair, bedside commode, etc.): 2  Moving from lying on back to sitting on the side of the bed?: 3  Moving to and from a bed to a chair (including a wheelchair)?: 2  Need to walk in hospital room?: 2  Climbing 3-5 steps with a railing?: 1  Basic Mobility Total Score: 13       Therapeutic Activities and Exercises:   Pt sat EOB with SBA and was instructed in breathing technique to assist with calming pt secondary to pt stating she felt anxious with fear of falling.  Static standing x 2 bouts for 30 sec each with RW and [place]/CGA. Pt had to return supine x 1 bout and sitting EOB on 2nd bout secondary to c/o feeling weak and dizzy.  BP's as above.  Pt was unable to take steps today.    Patient left supine with all lines intact, call button in reach, bed alarm on and RN notified..    GOALS:    Multidisciplinary Problems     Physical Therapy Goals        Problem: Physical Therapy Goal    Goal Priority Disciplines Outcome Goal Variances Interventions   Physical Therapy Goal     PT, PT/OT Ongoing, Progressing     Description:  Goals to be met by: 20     Patient will increase functional independence with mobility by performin. Supine to sit with Stand-by Assistance  2. Sit to stand transfer with Contact Guard Assistance  3. Bed to chair transfer with Minimal Assistance using Rolling Walker.    PT evaluation completed. Patient limited secondary to bilateral LE pain. See full note for details.                       Time Tracking:     PT Received On: 20  PT Start Time: 1351     PT Stop Time: 1424  PT Total Time (min): 33 min     Billable Minutes: Therapeutic Activity 18 Co tx with OT 15 mins    Treatment Type: Treatment  PT/PTA: PTA     PTA Visit Number: 2     Lilian Du PTA  2020

## 2020-01-03 NOTE — ASSESSMENT & PLAN NOTE
69 y.o. female with PMHx of cataract, PAD, HTN, and A fib (previously on Coumadin), and recent MIS laminectomy at L3-4 and L4-5 with Dr. Alvarez on 12/27/2019 who re-presented with uncontrolled postop pain.  She was admitted for pain control on 12/30/2019.    -Patient is neurologically stable.  No focal deficits.  Mostly myofascial pain and difficulty walking on exam secondary to immobility.  -Pain semi controlled on current regimen.  Will add Robaxin t.i.d. p.r.n. muscle spasms and p.o. Oxy IR to 10 mg q.4 hours p.r.n. severe pain.  Patient can continue p.r.n. subcutaneous morphine for breakthrough pain; however, I encouraged patient to use p.o. pain regimen 1st.  -PT/OT/OOB daily.  Therapy recommending rehab versus skilled nursing facility placement.  Patient lives alone.  -Patient must be OOB for atleast 6 hours daily (may be in intervals: 2 hours in chair with each meal).  Require multiple nursing staff assistance.  -IS to bed side. Patient to use atleast 10x every hour.  -Continue bowel regimen daily.  -Continue SQH, Teds and SCDs for DVTP.  -PPI for GI prophylaxis  -Restarted all home meds for hypertension, vitamin-D deficiency, and anemia.  -Pure wick  Will bladder scan in 4 hour if patient does not void spontaneously.  Urinalysis negative.  Dysuria resolved with pure wick exchange.  -clinical pharmacist consulted to restart home Coumadin for AFib.  Recommend restarting 2 mg x2 days and maintain home dose thereafter.  Monitor INR daily.  Continue DVT prophylaxis subcu heparin dose at this time.    Dispo: DC to [facility] pending insurance approval. Patient has 2-3 wk wound check with NSR and 6 week follow up with Dr. Alvarez.    Discussed with Dr. Alvarez

## 2020-01-03 NOTE — DISCHARGE SUMMARY
[facility]  Neurosurgery  Discharge Summary      Patient Name: Jayla Reagan  MRN: [id number]  Admission Date: 12/29/2019  Hospital Length of Stay: 0 days  Discharge Date and Time: 1/3/2020  4:50 PM  Attending Physician: Nico Alvarez MD   Discharging Provider: Amador Baker PA-C  Primary Care Provider: Rambo Palmer MD    HPI:   Jayla Reagan is a 69 y.o. female with PMHx of cataract, PAD, HTN, and A fib (previously on Coumadin), and recent MIS laminectomy at L3-4 and L4-5 with Dr. Alvarez on 12/27/2019 who re-presented with uncontrolled postop pain.  She was admitted for pain control on 12/30/2019.    * No surgery found *     Hospital Course: 12/30/2019: admitted for pain control   12/31:  No acute events overnight.  Patient complaining of bilateral posterior thigh myofascial aching pain worsening with movement.  Pain semi control with p.o. oxycodone 5 mg.  Awaiting physical therapy evaluation.  Previous dysuria and urinary retention improved significantly after Ken catheter exchanged.  Awaiting UA today.  Discussed with clinical pharmacist, will restart home Coumadin at 2 mg x2 days and return to maintenance dose (per previous Coumadin clinic recommendation).  Will monitor INR daily.  1/1/20: status day 5 L3-4 and L4-5 laminectomy. Got up and walked yesterday. Pain is only during getting up and is felt in bilateral hips.   1/2:  No acute events overnight.  Vitals and labs stable.  Patient continues to complain of incisional, low back, and bilateral posterior thigh pain only with mobilization.  She also has some truncal weakness and difficulty with walking secondary to limited mobility.  She has not ambulated since admission but has been out of bed with PT and nursing assistance.  Pending skilled nursing facility placement.  1/3:  No acute events overnight.  Patient feels like she has improvements in lower extremity strength with physical therapy exercises.  She mobilized at edge  of bed but not able to walk on her own yet.  Standing without legs giving out.  Pain well controlled with current regimen.  Plan to DC to [facility] today.  Patient follow Neurosurgery in 2-3 weeks for staple removal and 6 weeks with Dr. Davis.    Consults:   Consults (From admission, onward)        Status Ordering Provider     Inpatient consult to Social Work  Once     Provider:  (Not yet assigned)    Acknowledged BRIGIDO LANDA     IP consult to case management  Once     Provider:  (Not yet assigned)    Acknowledged RICK DAVIS            Pending Diagnostic Studies:     None        Final Active Diagnoses:    Diagnosis Date Noted POA    PRINCIPAL PROBLEM:  Post-op pain [G89.18] 12/30/2019 Yes      Problems Resolved During this Admission:      Discharged Condition: good    Disposition: Skilled Nursing Facility    Follow Up:  Follow-up Information     Rick Davis MD. Schedule an appointment as soon as possible for a visit in 1 day.    Specialty:  Neurosurgery  Contact information:  [address]  [address]  [facility] [id number] [phone]             Rambo Palmer MD. Schedule an appointment as soon as possible for a visit in 2 days.    Specialty:  Family Medicine  Contact information:  [address]  [address]  [facility] [address]  [phone]                 Patient Instructions:   No discharge procedures on file.  Medications:  Reconciled Home Medications:      Medication List      START taking these medications    ferrous sulfate 300 mg (60 mg iron)/5 mL syrup  Take 5 mLs (300 mg total) by mouth once daily.     heparin (porcine) 5,000 unit/mL injection  Inject 1 mL (5,000 Units total) into the skin every 8 (eight) hours.     methocarbamol 500 MG Tab  Commonly known as:  ROBAXIN  Take 2 tablets (1,000 mg total) by mouth 3 (three) times daily as needed.     oxyCODONE 10 mg Tab immediate release tablet  Commonly known as:  ROXICODONE  Take 1 tablet (10 mg total) by mouth every 6 (six)  hours as needed.     polyethylene glycol 17 gram Pwpk  Commonly known as:  GLYCOLAX  Take 17 g by mouth once daily.     senna-docusate 8.6-50 mg 8.6-50 mg per tablet  Commonly known as:  PERICOLACE  Take 1 tablet by mouth 2 (two) times daily.        CHANGE how you take these medications    cyanocobalamin (vitamin B-12) 50 mcg tablet  Take 50 mcg by mouth every morning. Pt will verify the strength  What changed:  Another medication with the same name was removed. Continue taking this medication, and follow the directions you see here.        CONTINUE taking these medications    azelastine 137 mcg (0.1 %) nasal spray  Commonly known as:  ASTELIN  1 puff in each nostril     fosinopril 20 MG tablet  Commonly known as:  MONOPRIL  Take 20 mg by mouth every evening.     metoprolol succinate 100 MG 24 hr tablet  Commonly known as:  TOPROL-XL  Take 100 mg by mouth 2 (two) times daily.     triamterene-hydrochlorothiazide 37.5-25 mg 37.5-25 mg per capsule  Commonly known as:  DYAZIDE  Take 1 capsule by mouth every morning.     Vitamin D3 125 mcg (5,000 unit) Tab  Generic drug:  cholecalciferol (vitamin D3)  Take 5,000 Units by mouth once daily. Takes on Saturday        STOP taking these medications    acetaminophen 650 MG Tbsr  Commonly known as:  TYLENOL     flu vacc [id number] 65yr up(PF) 180 mcg/0.5 mL Syrg     oxyCODONE-acetaminophen 5-325 mg per tablet  Commonly known as:  PERCOCET     triamcinolone acetonide 0.1% 0.1 % ointment  Commonly known as:  KENALOG        ASK your doctor about these medications    * warfarin 1 MG tablet  Commonly known as:  COUMADIN  Take 1 mg by mouth Daily.  Ask about: Which instructions should I use?     * warfarin 1 MG tablet  Commonly known as:  COUMADIN  Take 1 tablet (1 mg total) by mouth Daily. 1 Mg daily except Mondays and Fridays take 0.5 mg  Ask about: Which instructions should I use?         * This list has 2 medication(s) that are the same as other medications prescribed for you. Read  the directions carefully, and ask your doctor or other care provider to review them with you.                Amador Baker PA-C  Neurosurgery  [facility]

## 2020-01-03 NOTE — PLAN OF CARE
Pt remains A+Ox4. Unable to tolerate significant amt of therapy r/t weakness, anxiety about moving. Voiding well via purewick. Tolerating diet well. Medicated for c/o pain with PRN oxycodone per MAR. Safety precautions maintained.

## 2020-01-03 NOTE — PLAN OF CARE
spoke with bedside nurse, Elva, informed of number for report to [facility], [phone],  B to nurse Chava. SW informed that transportation was being requested for 4 PM. Nurse verbalized understanding.    SW met with patient to discuss and sign Patient Choice Form, patient provided with a copy of document.     01/03/20 1503   Final Note   Assessment Type Final Discharge Note   Anticipated Discharge Disposition SNF   What phone number can be called within the next 1-3 days to see how you are doing after discharge? [phone]   Hospital Follow Up  Appt(s) scheduled? Yes   Discharge plans and expectations educations in teach back method with documentation complete? Yes   Right Care Referral Info   Post Acute Recommendation SNF / Sub-Acute Rehab   Facility Name [facility] [address] [place], State [place], [place]

## 2020-01-03 NOTE — PLAN OF CARE
Problem: Physical Therapy Goal  Goal: Physical Therapy Goal  Description  Goals to be met by: 20     Patient will increase functional independence with mobility by performin. Supine to sit with Stand-by Assistance  2. Sit to stand transfer with Contact Guard Assistance  3. Bed to chair transfer with Minimal Assistance using Rolling Walker.    PT evaluation completed. Patient limited secondary to bilateral LE pain. See full note for details.      Outcome: Ongoing, Progressing   Continue working toward goals.

## 2020-01-03 NOTE — PLAN OF CARE
Slept well during the night. Was medicated for c/o back pain as needed. Repositioned. Kept clean and dry. External catheter changed twice during the shift for comfort. Suha care done with cath change.

## 2020-01-03 NOTE — SUBJECTIVE & OBJECTIVE
Interval History: No acute events overnight.  Patient feels like she has improvements in lower extremity strength with physical therapy exercises.  She mobilized at edge of bed but not able to walk on her own yet.  Standing without legs giving out.  Pain well controlled with current regimen.  Plan to DC to [facility] today.  Patient follow Neurosurgery in 2-3 weeks for staple removal and 6 weeks with Dr. Alvarez.    Medications:  Continuous Infusions:  Scheduled Meds:   ferrous sulfate  300 mg Oral Daily    fosinopril  20 mg Oral QHS    heparin (porcine)  5,000 Units Subcutaneous Q8H    methocarbamol  750 mg Oral TID    metoprolol succinate  100 mg Oral BID    polyethylene glycol  17 g Oral Daily    senna-docusate 8.6-50 mg  1 tablet Oral BID    triamterene-hydrochlorothiazide 37.5-25 mg  1 capsule Oral QAM    warfarin  1 mg Oral Daily     PRN Meds:acetaminophen, acetaminophen, bisacodyl, morphine, ondansetron, ondansetron, oxyCODONE, sodium chloride 0.9%     Review of Systems  Objective:     Weight: 106.9 kg (235 lb 10.8 oz)  Body mass index is 31.96 kg/m².  Vital Signs (Most Recent):  Temp: 97.5 °F (36.4 °C) (01/03/20 0440)  Pulse: 89 (01/03/20 0944)  Resp: 17 (01/03/20 0806)  BP: 112/63 (01/03/20 0944)  SpO2: 96 % (01/03/20 0806) Vital Signs (24h Range):  Temp:  [97.5 °F (36.4 °C)-98.5 °F (36.9 °C)] 97.5 °F (36.4 °C)  Pulse:  [65-89] 89  Resp:  [16-17] 17  SpO2:  [95 %-97 %] 96 %  BP: (111-120)/(56-63) 112/63     Date 01/03/20 0700 - 01/04/20 0659   Shift 9813-9915 5174-5188 5916-1430 24 Hour Total   INTAKE   P.O. 240   240   Shift Total(mL/kg) 240(2.2)   240(2.2)   OUTPUT   Shift Total(mL/kg)       Weight (kg) 106.9 106.9 106.9 106.9                   Female External Urinary Catheter 12/30/19 0230 (Active)   Skin no redness;no breakdown 1/2/2020  8:30 PM   Tolerance no signs/symptoms of discomfort 1/2/2020  8:30 PM   Suction Continuous suction at 70 mmHg 1/2/2020  8:30 PM   Date of last wick change  01/02/20 1/2/2020  8:30 PM   Time of last wick change 2300 1/2/2020  8:30 PM   Output (mL) 400 mL 1/3/2020 12:00 AM       Neurosurgery Physical Exam   General: well developed, well nourished. no acute distress.  Comfortable  Neurologic: Awake, alert and oriented x3. Thought content appropriate.  Head: normocephalic, atraumatic   GCS: Motor: 6/Verbal: 5/Eyes: 4 GCS Total: 15  Cranial nerves:face symmetric and sensation intact bilaterally, tongue midline, pupils equal, round, reactive to light with accomodation, extraocular muscles intact. CN II-XII grossly intact. Shoulder shrug strength equal. Palate rises symmetrically.  Uvula midline. Hearing equal with finger rub. Gag and taste not tested.      Language: no aphasia  Speech: no dysarthria      Sensory: response to light touch throughout  Motor Strength: Moves all extremities spontaneously with good tone. Full strength upper and lower extremities. No abnormal movements seen.          Strength   Deltoids Triceps Biceps Wrist Extension Wrist Flexion Hand    Upper: R 5/5 5/5 5/5 5/5 5/5 5/5     L 5/5 5/5 5/5 5/5 5/5 5/5       Iliopsoas Quadriceps Knee  Flexion Tibialis  anterior Gastro- cnemius EHL   Lower: R 4+/5 5/5 5/5 5/5 5/5 5/5     L 4+/5 5/5 5/5 5/5 5/5 5/5   Interossi muscle strength- intact     Poor truncal support     Straight leg raise: negative bilaterally  No focal numbness or weakness  No midline or paraspinal tenderness to palpation     ENT: normal hearing with finger rub  Heart: RRR, no cyanosis, pallor, or edema.   Lungs:  normal respiratory effort  Abdomen: soft, non-tender and symmetric  Extremities: warm with no cyanosis, edema, or clubbing  Pulses: palpable distal pulses  Skin: warm, dry and intact. No visible rashes or lesions.      Posterior lumbar incision:  wound is c/d/i, no signs of infection, no erythema, warmth, edema, ttp,  no drainage.  wound edges are well approximated.      Significant Labs:  Recent Labs   Lab 01/02/20  0510  01/03/20  0533   GLU 95 109    139   K 4.2 4.4    102   CO2 26 29   BUN 13 20   CREATININE 0.8 0.9   CALCIUM 9.3 9.5     Recent Labs   Lab 01/02/20  0510 01/03/20  0533   WBC 3.98 4.33   HGB 8.4* 8.7*   HCT 25.6* 26.7*    249     Recent Labs   Lab 01/02/20  0510 01/03/20  0533   INR 1.5* 1.6*     Microbiology Results (last 7 days)     ** No results found for the last 168 hours. **

## 2020-01-05 NOTE — PROGRESS NOTES
[facility]                                 Skilled Nursing Facility                   Progress Note     Admit Date:1/3/2020  [facility]   Principal Problem:  Debility/uncontrolled pain related to recent MIS laminectomy at L3-4 and L4-5  HPI obtained from patient interview and chart review     Visit Date: 1/7/2020    Chief Complaint: Establish Care/ Initial Visit s/p hospitalization for uncontrolled postop pain status post MIS laminectomy at L3-4 and L4-5    HPI:   Ms. Jayla Reagan is a 69 y.o. female with PMHx of cataract, PAD, HTN, and A fib (previously on Coumadin), and recent MIS laminectomy at L3-4 and L4-5 with Dr. Alvarez on 12/27/2019.  Patient was discharged home and then readmitted uncontrolled postop pain on 12/30/19.  Patient complained of bilateral posterior thigh myofascial aching pain worsening with movement.  Patient experienced some dysuria and urinary retention during admit, will monitor on skilled.  Coumadin restarted during admission, will monitor INR weekly.  Patient also put on heparin on discharge but changed to Lovenox on admit to skilled facility with a goal INR of 2-3.  Patient follow Neurosurgery in 2-3 weeks for staple removal and 6 weeks with Dr. Alvarez.  Patient reporting good pain control today during initial visit.    Patient will be treated at [facility] with PT and OT to improve functional status and ability to perform ADLs.     Past Medical History: Patient has a past medical history of Atrial fibrillation, Cataract, Hypertension, PAD (peripheral artery disease), and Peripheral artery disease.    Past Surgical History: Patient has a past surgical history that includes Ectopic pregnancy surgery; Breast surgery; Salpingectomy; Cholecystectomy; and Lumbar laminectomy (Right, 12/27/2019).    Social History: Patient reports that she has quit smoking. She has never used smokeless tobacco. She  reports that she does not drink alcohol or use drugs.    Family History: family history includes Cataracts in her mother and sister.    Allergies: Patient is allergic to norco [hydrocodone-acetaminophen].    ROS  Constitutional: Negative for fever or fatigue.   Eyes: Negative for blurred vision, double vision and discharge.   Respiratory: Negative for cough, shortness of breath and wheezing.    Cardiovascular: Negative for chest pain, palpitations, and leg swelling.   Gastrointestinal: Negative for abdominal pain, constipation, diarrhea, nausea and vomiting.   Genitourinary: Negative for dysuria, frequency and urgency.   Musculoskeletal:  + generalized weakness. Negative for back pain and myalgias.   Skin: Negative for itching and rash, + lower back surgical incision present.   Neurological: Negative for dizziness, speech change, and headaches.   Psychiatric/Behavioral: Negative for depression. The patient is not nervous/anxious.      PEx   BP:  91/52, HR 69    Constitutional: Patient appears well-developed and in no distress   Head: Normocephalic and atraumatic.   Eyes: Pupils are equal, round, and reactive to light.   Neck: Normal range of motion. Neck supple.   Cardiovascular: Normal rate, regular rhythm and normal heart sounds.    Pulmonary/Chest: Effort normal and breath sounds are clear  Abdominal: Soft. Bowel sounds are normal.   Musculoskeletal: Normal range of motion.   Neurological: Alert and oriented to person, place, and time.   Psychiatric: Normal mood and affect. Behavior is normal.   Skin: Skin is warm and dry. Full skin assessment completed during visit, + lower back surgical incision present with staples present      Assessment and Plan:    Aftercare of recent Post-op pain, ongoing  S/p MIS laminectomy at L3-4 and L4-5 with Dr. Alvarez on 12/27/2019  Debility r/t recent laminectomy and pain control issues, ongoing  - Continue with PT/OT for gait training and strengthening and restoration of ADL's   -  Encourage mobility, OOB in chair, and early ambulation as appropriate  - Fall precautions   - Monitor for bowel and bladder dysfunction  - Monitor for and prevent skin breakdown and pressure ulcers  - Continue DVT prophylaxis with Coumadin for AF, Lovenox subcu- only on SNF, then dc>> or dc when INR 2-3 goal  - pain regimen:  Robaxin p.o. t.i.d. p.r.n. muscle spasms, Oxy IR 10 mg p.o. q.6 hours p.r.n. Pain, +Tylenol 1 g p.o. t.i.d. scheduled-added per admitting MD  - BM regimen:  MiraLax p.o. q.d., senna-Colace p.o. b.i.d.  - next neuro surgery follow-up appointment scheduled for 1/10    HTN with HLD, chronic, ongoing  PA D, chronic, stable  Remote history of atrial fibrillation requiring long-term anticoagulation, chronic, stable  - continue Monopril, Dyazide (riamterene-hydrochlorothiazide 37.5-25), metoprolol XL BID, Coumadin 1mg QD (for AFib)   - Continue heparin subcu-changed to Lovenox by admitting MD (for DVT PPX per neuro surgery)>>> This is for DVT prophylaxis.  Only use while admitted at skilled nursing facility.  Stop when patient is discharged home OR Stop when INR is at 2-3  - monitor weekly INR    Vitamin-D deficiency, chronic, stable  - continue cholecalciferol p.o. q.d.    Anemia, chronic, stable  -continue ferrous sulfate plus vitamin-C on MWF    Allergic rhinitis, chronic, stable  -continue Astelin nasal spray    Recent history of urinary retention, improved  -continue to monitor on skilled         Future Appointments   Date Time Provider Department Center   1/7/2020 10:15 AM [facility], [place] [facility] [place]   1/10/2020 10:00 AM Amador Baker PA-C [facility] NEUROSU [place] [facility]   2/4/2020  9:40 AM Kaiden Hodge MD [facility] CARDIO [place] [facility]   2/10/2020  3:30 PM Nico Alvarez MD [facility] NEUROSU [place] [facility]         I certify that SNF services are required to be given on an inpatient basis because Jayla Reagan needs for skilled nursing care and/or skilled rehabilitation are required on a daily  basis and such services can only practically be provided in a skilled nursing facility setting and are for an ongoing condition for which she received inpatient care in the hospital.     Total time of the visit 112 minutes  Non physical exam/ non charting time: 81 minutes   Start Time:0800  Stop Time:0952  Description of non physical exam/non charting time: counseling patient on clinical conditions and therapies provided regarding pain control, therapy plan of care, management of chronic conditions.  Extensive chart review completed including all consultation notes.  All pertinent laboratory and radiographical images reviewed.        Afshan Campoverde NP          Patient note was created using MModal Dictation.  Any errors in syntax or even information may not have been identified and edited on initial review prior to signing this note.

## 2020-01-07 ENCOUNTER — DOCUMENTATION ONLY (OUTPATIENT)
Dept: HEPATOLOGY | Facility: HOSPITAL | Age: 70
End: 2020-01-07

## 2020-01-08 NOTE — PROGRESS NOTES
[facility]                                 Skilled Nursing Facility                   Progress Note     Admit Date:1/3/2020  [facility]   Principal Problem:  Debility/uncontrolled pain related to recent MIS laminectomy at L3-4 and L4-5  HPI obtained from patient interview and chart review     Visit Date: 1/9/2020    Chief Complaint:  BP/HR monitoring, lab review, PT/OT progression, pain control    HPI:   Ms. Jayla Reagan is a 69 y.o. female with PMHx of cataract, PAD, HTN, and A fib (previously on Coumadin), and recent MIS laminectomy at L3-4 and L4-5 with Dr. Alvarez on 12/27/2019.  Patient was discharged home and then readmitted uncontrolled postop pain on 12/30/19.  Patient complained of bilateral posterior thigh myofascial aching pain worsening with movement.  Patient experienced some dysuria and urinary retention during admit, will monitor on skilled.  Coumadin restarted during admission, will monitor INR weekly.  Patient also put on heparin on discharge but changed to Lovenox on admit to skilled facility with a goal INR of 2-3.  Patient follow Neurosurgery in 2-3 weeks for staple removal and 6 weeks with Dr. Alvarez.  Patient reporting good pain control today during initial visit.    Patient will be treated at [facility] with PT and OT to improve functional status and ability to perform ADLs.    Interval history:  /69, HR 69, controlled on current regimen.  Most recent INR 1.7, continuing on Coumadin plus Lovenox for anticoagulation and DVT prophylaxis.  Patient has an upcoming appointment with neuro surgery on 01/10, will await recommendations.  Patient's pain is well controlled on current regimen, no changes needed at this time.  Patient medically stable.  Will continue to monitor and treat of chronic conditions.  Progressing well with PT/OT.      Past Medical History: Patient has a past medical history of Atrial  fibrillation, Cataract, Hypertension, PAD (peripheral artery disease), and Peripheral artery disease.    Past Surgical History: Patient has a past surgical history that includes Ectopic pregnancy surgery; Breast surgery; Salpingectomy; Cholecystectomy; and Lumbar laminectomy (Right, 12/27/2019).    Social History: Patient reports that she has quit smoking. She has never used smokeless tobacco. She reports that she does not drink alcohol or use drugs.    Family History: family history includes Cataracts in her mother and sister.    Allergies: Patient is allergic to norco [hydrocodone-acetaminophen].    ROS  Constitutional: Negative for fever or fatigue.   Eyes: Negative for blurred vision, double vision and discharge.   Respiratory: Negative for cough, shortness of breath and wheezing.    Cardiovascular: Negative for chest pain, palpitations, and leg swelling.   Gastrointestinal: Negative for abdominal pain, constipation, diarrhea, nausea and vomiting.   Genitourinary: Negative for dysuria, frequency and urgency.   Musculoskeletal:  + generalized weakness. Negative for back pain and myalgias.   Skin: Negative for itching and rash, + lower back surgical incision present.   Neurological: Negative for dizziness, speech change, and headaches.   Psychiatric/Behavioral: Negative for depression. The patient is not nervous/anxious.      PEx    Constitutional: Patient appears well-developed and in no distress   Head: Normocephalic and atraumatic.   Eyes: Pupils are equal, round, and reactive to light.   Neck: Normal range of motion. Neck supple.   Cardiovascular: Normal rate, regular rhythm and normal heart sounds.    Pulmonary/Chest: Effort normal and breath sounds are clear  Abdominal: Soft. Bowel sounds are normal.   Musculoskeletal: Normal range of motion.   Neurological: Alert and oriented to person, place, and time.   Psychiatric: Normal mood and affect. Behavior is normal.   Skin: Skin is warm and dry, + lower back  surgical incision present with staples present      Assessment and Plan:    Aftercare of recent Post-op pain, ongoing  S/p MIS laminectomy at L3-4 and L4-5 with Dr. Alvarez on 12/27/2019  Debility r/t recent laminectomy and pain control issues, ongoing  - Continue with PT/OT for gait training and strengthening and restoration of ADL's   - Encourage mobility, OOB in chair, and early ambulation as appropriate  - Fall precautions   - Monitor for bowel and bladder dysfunction  - Monitor for and prevent skin breakdown and pressure ulcers  - Continue DVT prophylaxis with Coumadin for AF, Lovenox subcu- only on SNF, then dc>> or dc when INR 2-3 goal  - pain regimen:  Robaxin p.o. t.i.d. p.r.n. muscle spasms, Oxy IR 10 mg p.o. q.6 hours p.r.n. Pain, +Tylenol 1 g p.o. t.i.d. scheduled-added per admitting MD  - BM regimen:  MiraLax p.o. q.d., senna-Colace p.o. b.i.d.  - next neuro surgery follow-up appointment scheduled for 1/10  -1/9 continue current pain regimen, PT/OT, awaiting Neurosurgery recommendations from upcoming appointment    HTN with HLD, chronic, ongoing  PA D, chronic, stable  Remote history of atrial fibrillation requiring long-term anticoagulation, chronic, stable  - continue Monopril, Dyazide (riamterene-hydrochlorothiazide 37.5-25), metoprolol XL BID, Coumadin 1mg QD (for AFib)   - Continue heparin subcu-changed to Lovenox by admitting MD (for DVT PPX per neuro surgery)>>> This is for DVT prophylaxis.  Only use while admitted at skilled nursing facility.  Stop when patient is discharged home OR Stop when INR is at 2-3  - monitor weekly INR  -1/9 continue current regimen    Continued    Vitamin-D deficiency, chronic, stable  - continue cholecalciferol p.o. q.d.    Anemia, chronic, stable  -continue ferrous sulfate plus vitamin-C on MWF    Allergic rhinitis, chronic, stable  -continue Astelin nasal spray    Recent history of urinary retention, improved  -continue to monitor on skilled         Future  Appointments   Date Time Provider Department Center   1/10/2020 10:00 AM Amador Baker PA-C [facility]U [facility]   2/4/2020  9:40 AM Kaiden Hodge MD [facility]   2/10/2020  3:30 PM Nico Alvarez MD [facility]         Afshan Campoverde NP          Patient note was created using MModal Dictation.  Any errors in syntax or even information may not have been identified and edited on initial review prior to signing this note.

## 2020-01-09 ENCOUNTER — DOCUMENTATION ONLY (OUTPATIENT)
Dept: HEPATOLOGY | Facility: HOSPITAL | Age: 70
End: 2020-01-09

## 2020-01-10 ENCOUNTER — OFFICE VISIT (OUTPATIENT)
Dept: NEUROSURGERY | Facility: CLINIC | Age: 70
End: 2020-01-10
Payer: MEDICARE

## 2020-01-10 VITALS
SYSTOLIC BLOOD PRESSURE: 101 MMHG | HEART RATE: 59 BPM | DIASTOLIC BLOOD PRESSURE: 62 MMHG | BODY MASS INDEX: 31.83 KG/M2 | WEIGHT: 235 LBS | HEIGHT: 72 IN

## 2020-01-10 DIAGNOSIS — G89.18 POST-OP PAIN: ICD-10-CM

## 2020-01-10 DIAGNOSIS — M48.062 LUMBAR STENOSIS WITH NEUROGENIC CLAUDICATION: Primary | ICD-10-CM

## 2020-01-10 PROCEDURE — 99999 PR PBB SHADOW E&M-EST. PATIENT-LVL III: ICD-10-PCS | Mod: PBBFAC,,, | Performed by: PHYSICIAN ASSISTANT

## 2020-01-10 PROCEDURE — 99024 PR POST-OP FOLLOW-UP VISIT: ICD-10-PCS | Mod: S$GLB,,, | Performed by: PHYSICIAN ASSISTANT

## 2020-01-10 PROCEDURE — 99024 POSTOP FOLLOW-UP VISIT: CPT | Mod: S$GLB,,, | Performed by: PHYSICIAN ASSISTANT

## 2020-01-10 PROCEDURE — 99999 PR PBB SHADOW E&M-EST. PATIENT-LVL III: CPT | Mod: PBBFAC,,, | Performed by: PHYSICIAN ASSISTANT

## 2020-01-10 RX ORDER — LIDOCAINE AND PRILOCAINE 25; 25 MG/G; MG/G
CREAM TOPICAL
COMMUNITY
Start: 2019-12-17 | End: 2020-06-19

## 2020-01-10 RX ORDER — GEL BASE NO.184
GEL (GRAM) TOPICAL
COMMUNITY
Start: 2019-12-17 | End: 2020-06-19

## 2020-01-10 NOTE — PROGRESS NOTES
Neurosurgery Wound Check Progress Note    HPI  Jayla Reagan is 70 y.o. . female with PMHx of cataract, PAD, HTN, and A fib (previously on Coumadin), and recent MIS laminectomy at L3-4 and L4-5 with Dr. Alvarez on 12/27/2019 who re-presented with uncontrolled postop pain.  She was admitted for pain control on 12/30/2019.  She was ultimately discharged on 01/03/2020 to [facility] for continued therapy treatment.  She is here with her daughter for 2 weeks wound check and staple removal.    Patient states that she is doing a lot better since hospital discharge.  She is now ambulating with rolling walker with therapy assistance through the halls.  Pain has improved significantly since surgery; improved by 50%.  She continues have pain radiating down both legs.  She takes Percocet and Robaxin twice daily before come with therapy.  Denies any new leg weakness, bladder/bowel incontinence, or incisional issues.      Low Back Pain Scale  R Low Back-Pain Score: 0  R Low Back-Pain Intensity: Pain killers give moderate relief from pain  R Low Back-Pain Score: I need help every day in most aspects of self care  Low Back-Lifting: I cannot lift or carry anything at all   Low Back-Walking: I can only walk using a cane or crutches   Low Back-Sitting: Pain prevents me from sitting more than 1 hour   Low Back-Standing: I cannot stand for longer than 30 mins without increasing pain   Low Back-Sleeping: I have pain in bed but it does not prevent me from sleeping well   Low Back-Social Life: I have hardly any social life because of the pain   Low Back-Traveling: Pain restricts all forms of travel   Low Back-Changing Degree of Pain: My pain seems to be getting better but improvement is slow           EXAM  Vitals:    01/10/20 1145   BP: 101/62   Pulse: (!) 59     Body mass index is 31.87 kg/m².      General: well developed, well nourished. no acute distress. Comfortable.   Neurologic: Awake, alert and oriented x3. Thought content  appropriate.  Head: normocephalic, atraumatic    GCS: Motor: 6/Verbal: 5/Eyes: 4 GCS Total: 15  Cranial nerves: face symmetric, tongue midline, pupils equal, round, reactive to light with accomodation, extraocular muscles intact. CN II-XII grossly intact.    Sensory: response to light touch throughout  Motor Strength: Moves all extremities spontaneously with good tone. Full strength upper and lower extremities. No abnormal movements seen.      Strength   Deltoids Triceps Biceps Wrist Extension Wrist Flexion Hand    Upper: R 5/5 5/5 5/5 5/5 5/5 5/5     L 5/5 5/5 5/5 5/5 5/5 5/5       Iliopsoas Quadriceps Knee  Flexion Tibialis  anterior Gastro- cnemius EHL   Lower: R 5/5 5/5 5/5 5/5 5/5 5/5     L 5/5 5/5 5/5 5/5 5/5 5/5       Negative straight leg raise    No focal numbness or weakness   No midline or paraspinal tenderness to palpation  Gait is slow with rolling walker.       Heart: RRR, no cyanosis, pallor, or edema.    Lungs: normal respiratory effort  Abdomen: soft, non-tender and symmetric  Extremities: warm with no cyanosis, edema, or clubbing  Skin: warm, dry and intact. No visible rashes or lesions.        Surgical incision:  C/D/I without any signs of infection.  Incision is healing well.  Slight delayed healing and distal incision and was covered with 2 Steri-Strips.  No erythema, warmth, edema, TTP.  No drainage.  Wound edges are well approximated.    Staples intact and removed without difficulty.          IMAGING/LABS  No imaging/labs available for review during this appt.      ASSESSMENT/PLAN    70 y.o. . female with PMHx of cataract, PAD, HTN, and A fib (previously on Coumadin), and recent MIS laminectomy at L3-4 and L4-5 with Dr. Alvarez on 12/27/2019 who re-presented with uncontrolled postop pain.  She was admitted for pain control on 12/30/2019.  She was ultimately discharged on 01/03/2020 to [facility].  She is here for 2 weeks wound check.      -Patient is doing well postoperatively with  improvement in pain.  Incision is healing  have reiterated wound care, activity restrictions, and follow up appts as below.       -continue oxycodone, Robaxin, and Tylenol as needed for pain.  -continue aggressive therapy at skilled nursing facility.   -May start using lidocaine and diclofenac gel p.r.n. muscle pain once discharged from skilled nursing facility.  Do not place on incision directly.  -Do not submerge incision for another 6 weeks. Pat the incision dry after your shower.  -Patient may stop using bacitracin ointment.  -Keep incision open to air. Turn q2h to promote appropriate wound healing.  -Continue p.r.n. stool softener and miralax to prevent constipation with pain med use.   -Increase ambulation. No heavy lifting >10lbs.   No over bending or twisting at incisional site.  Fall precautions.  -Patient has follow up with Dr. Alvarez in 4-6 weeks.       All questions were answered. Patient was encouraged to call clinic with any future concerns prior to follow up appt.     Please call with any questions.    [facility]      Note dictated with voice recognition software, please excuse any grammatical errors.

## 2020-01-13 ENCOUNTER — TELEPHONE (OUTPATIENT)
Dept: NEUROSURGERY | Facility: CLINIC | Age: 70
End: 2020-01-13

## 2020-01-13 NOTE — TELEPHONE ENCOUNTER
Spoke to the patient regarding Teds. Instructed as long as she is up and moving she does not need to wear them.

## 2020-01-13 NOTE — TELEPHONE ENCOUNTER
----- Message from Lamar Us sent at 1/10/2020  1:29 PM CST -----  Contact: [phone]/self  Type:  Needs Medical Advice    Who Called: self  Would the patient rather a call back or a response via [facility]?callback  Best Call Back Number:[phone]  Additional Information:Pt is requesting to speak with you concerning her compression stocking.

## 2020-01-14 ENCOUNTER — DOCUMENTATION ONLY (OUTPATIENT)
Dept: HEPATOLOGY | Facility: HOSPITAL | Age: 70
End: 2020-01-14

## 2020-01-14 NOTE — PROGRESS NOTES
[facility]                                 Skilled Nursing Facility                   Progress Note     Admit Date:1/3/2020  [facility]   Principal Problem:  Debility/uncontrolled pain related to recent MIS laminectomy at L3-4 and L4-5  HPI obtained from patient interview and chart review     Visit Date: 1/14/2020    Chief Complaint: neurosurgery recommendations, INR review, BP/HR monitoring, PT/OT progression    HPI:   Ms. Jayla Reagan is a 69 y.o. female with PMHx of cataract, PAD, HTN, and A fib (previously on Coumadin), and recent MIS laminectomy at L3-4 and L4-5 with Dr. Alvarez on 12/27/2019.  Patient was discharged home and then readmitted uncontrolled postop pain on 12/30/19.  Patient complained of bilateral posterior thigh myofascial aching pain worsening with movement.  Patient experienced some dysuria and urinary retention during admit, will monitor on skilled.  Coumadin restarted during admission, will monitor INR weekly.  Patient also put on heparin on discharge but changed to Lovenox on admit to skilled facility with a goal INR of 2-3.  Patient follow Neurosurgery in 2-3 weeks for staple removal and 6 weeks with Dr. Alvarez.  Patient reporting good pain control today during initial visit.    Patient will be treated at [facility] with PT and OT to improve functional status and ability to perform ADLs.    Interval history:  /74, HR 71, controlled on current regimen.  Most recent INR 1.9, continuing on Coumadin plus Lovenox for anticoagulation and DVT prophylaxis.  Neurosurgery recommendations from 1/13 appt, ok to shower, staples removed, fu in 4-6 weeks. Patient's pain is well controlled on current regimen, no changes needed at this time.  Patient medically stable.  Will continue to monitor and treat of chronic conditions.  Progressing well with PT/OT.      Past Medical History: Patient has a past medical  history of Atrial fibrillation, Cataract, Hypertension, PAD (peripheral artery disease), and Peripheral artery disease.    Past Surgical History: Patient has a past surgical history that includes Ectopic pregnancy surgery; Breast surgery; Salpingectomy; Cholecystectomy; and Lumbar laminectomy (Right, 12/27/2019).    Social History: Patient reports that she has quit smoking. She has never used smokeless tobacco. She reports that she does not drink alcohol or use drugs.    Family History: family history includes Cataracts in her mother and sister.    Allergies: Patient is allergic to norco [hydrocodone-acetaminophen].    ROS  Constitutional: Negative for fever or fatigue.   Eyes: Negative for blurred vision, double vision and discharge.   Respiratory: Negative for cough, shortness of breath and wheezing.    Cardiovascular: Negative for chest pain, palpitations, and leg swelling.   Gastrointestinal: Negative for abdominal pain, constipation, diarrhea, nausea and vomiting.   Genitourinary: Negative for dysuria, frequency and urgency.   Musculoskeletal:  + generalized weakness. Negative for back pain and myalgias.   Skin: Negative for itching and rash, + lower back surgical incision present.   Neurological: Negative for dizziness, speech change, and headaches.   Psychiatric/Behavioral: Negative for depression. The patient is not nervous/anxious.      PEx    Constitutional: Patient appears well-developed and in no distress   Head: Normocephalic and atraumatic.   Eyes: Pupils are equal, round, and reactive to light.   Neck: Normal range of motion. Neck supple.   Cardiovascular: Normal rate, regular rhythm and normal heart sounds.    Pulmonary/Chest: Effort normal and breath sounds are clear  Abdominal: Soft. Bowel sounds are normal.   Musculoskeletal: Normal range of motion.   Neurological: Alert and oriented to person, place, and time.   Psychiatric: Normal mood and affect. Behavior is normal.   Skin: Skin is warm and  dry, + lower back surgical incision present staples removed    Assessment and Plan:    Aftercare of recent Post-op pain, ongoing  S/p MIS laminectomy at L3-4 and L4-5 with Dr. Alvarez on 12/27/2019  Debility r/t recent laminectomy and pain control issues, ongoing  - Continue with PT/OT for gait training and strengthening and restoration of ADL's   - Encourage mobility, OOB in chair, and early ambulation as appropriate  - Fall precautions   - Monitor for bowel and bladder dysfunction  - Monitor for and prevent skin breakdown and pressure ulcers  - Continue DVT prophylaxis with Coumadin for AF, Lovenox subcu- only on SNF, then dc>> or dc when INR 2-3 goal  - pain regimen:  Robaxin p.o. t.i.d. p.r.n. muscle spasms, Oxy IR 10 mg p.o. q.6 hours p.r.n. Pain, +Tylenol 1 g p.o. t.i.d. scheduled-added per admitting MD  - BM regimen:  MiraLax p.o. q.d., senna-Colace p.o. b.i.d.  - next neuro surgery follow-up appointment scheduled for 1/10  -1/14 continue current pain regimen, PT/OT, Neurosurgery appt recommendations: fu in 4-6 weeks, do not submerge in water, ok to shower, staples removed    HTN with HLD, chronic, ongoing  PA D, chronic, stable  Remote history of atrial fibrillation requiring long-term anticoagulation, chronic, stable  - continue Monopril, Dyazide (riamterene-hydrochlorothiazide 37.5-25), metoprolol XL BID, Coumadin 1mg QD (for AFib)   - Continue heparin subcu-changed to Lovenox by admitting MD (for DVT PPX per neuro surgery)>>> This is for DVT prophylaxis.  Only use while admitted at skilled nursing facility.  Stop when patient is discharged home OR Stop when INR is at 2-3  - monitor weekly INR  -1/14 continue current regimen    Continued    Vitamin-D deficiency, chronic, stable  - continue cholecalciferol p.o. q.d.    Anemia, chronic, stable  -continue ferrous sulfate plus vitamin-C on MWF    Allergic rhinitis, chronic, stable  -continue Astelin nasal spray    Recent history of urinary retention,  improved  -continue to monitor on skilled         Future Appointments   Date Time Provider Department Center   2/4/2020  9:40 AM Kaiden Hodge MD [facility] C[facility]   2/10/2020  3:30 PM Nico Alvarez MD [facility]         Afshan Campoverde NP          Patient note was created using MModal Dictation.  Any errors in syntax or even information may not have been identified and edited on initial review prior to signing this note.

## 2020-01-20 NOTE — PROGRESS NOTES
[facility]                                 Skilled Nursing Facility                   Progress Note     Admit Date:1/3/2020  [facility]   Principal Problem:  Debility/uncontrolled pain related to recent MIS laminectomy at L3-4 and L4-5  HPI obtained from patient interview and chart review     Visit Date: 1/21/2020    Chief Complaint: INR review, d/c planning, BP/HR monitoring, PT/OT progression    HPI:   Ms. Jayla Reagan is a 69 y.o. female with PMHx of cataract, PAD, HTN, and A fib (previously on Coumadin), and recent MIS laminectomy at L3-4 and L4-5 with Dr. Alvarez on 12/27/2019.  Patient was discharged home and then readmitted uncontrolled postop pain on 12/30/19.  Patient complained of bilateral posterior thigh myofascial aching pain worsening with movement.  Patient experienced some dysuria and urinary retention during admit, will monitor on skilled.  Coumadin restarted during admission, will monitor INR weekly.  Patient also put on heparin on discharge but changed to Lovenox on admit to skilled facility with a goal INR of 2-3.  Patient follow Neurosurgery in 2-3 weeks for staple removal and 6 weeks with Dr. Alvarez.  Patient reporting good pain control today during initial visit.    Patient will be treated at [facility] with PT and OT to improve functional status and ability to perform ADLs.    Interval history:  /76, HR 80, most recent weight 234.2 lb, controlled on current regimen.  Most recent INR 1.4, continuing on Coumadin plus Lovenox for anticoagulation and DVT prophylaxis, patient scheduled to discharge to home tomorrow, INR slightly lower this week due to reports of a dose being held on Sunday evening due to medication not coming in from pharmacy at facility.  She will need close monitoring of her INR on Coumadin with home health after discharge, PCP can titrate Coumadin up if needed at home but decrease  in INR likely related to held dose. Discharge planning coordinated with facility staff, medication reconciliation completed, review of plan of care.  Patient's pain is well controlled on current regimen, no changes needed at this time.  Patient medically stable.  Will continue to monitor and treat of chronic conditions.  Progressing well with PT/OT.      Past Medical History: Patient has a past medical history of Atrial fibrillation, Cataract, Hypertension, PAD (peripheral artery disease), and Peripheral artery disease.    Past Surgical History: Patient has a past surgical history that includes Ectopic pregnancy surgery; Breast surgery; Salpingectomy; Cholecystectomy; and Lumbar laminectomy (Right, 12/27/2019).    Social History: Patient reports that she has quit smoking. She has never used smokeless tobacco. She reports that she does not drink alcohol or use drugs.    Family History: family history includes Cataracts in her mother and sister.    Allergies: Patient is allergic to norco [hydrocodone-acetaminophen].    ROS  Constitutional: Negative for fever or fatigue.   Eyes: Negative for blurred vision, double vision and discharge.   Respiratory: Negative for cough, shortness of breath and wheezing.    Cardiovascular: Negative for chest pain, palpitations, and leg swelling.   Gastrointestinal: Negative for abdominal pain, constipation, diarrhea, nausea and vomiting.   Genitourinary: Negative for dysuria, frequency and urgency.   Musculoskeletal:  + generalized weakness. Negative for back pain and myalgias.   Skin: Negative for itching and rash, + lower back surgical incision present.   Neurological: Negative for dizziness, speech change, and headaches.   Psychiatric/Behavioral: Negative for depression. The patient is not nervous/anxious.      PEx    Constitutional: Patient appears well-developed and in no distress   Head: Normocephalic and atraumatic.   Eyes: Pupils are equal, round, and reactive to light.   Neck:  Normal range of motion. Neck supple.   Cardiovascular: Normal rate, regular rhythm and normal heart sounds.    Pulmonary/Chest: Effort normal and breath sounds are clear  Abdominal: Soft. Bowel sounds are normal.   Musculoskeletal: Normal range of motion.   Neurological: Alert and oriented to person, place, and time.   Psychiatric: Normal mood and affect. Behavior is normal.   Skin: Skin is warm and dry, + lower back surgical incision present staples removed    Assessment and Plan:    Discharge planning  -1/21 initiate orders for HH, PT/OT, nursing, nurse's aide for upcoming discharge on 01/22    Aftercare of recent Post-op pain, ongoing  S/p MIS laminectomy at L3-4 and L4-5 with Dr. Alvarez on 12/27/2019  Debility r/t recent laminectomy and pain control issues, ongoing  - Continue with PT/OT for gait training and strengthening and restoration of ADL's   - Encourage mobility, OOB in chair, and early ambulation as appropriate  - Fall precautions   - Monitor for bowel and bladder dysfunction  - Monitor for and prevent skin breakdown and pressure ulcers  - Continue DVT prophylaxis with Coumadin for AF, Lovenox subcu- only on SNF, then dc>> or dc when INR 2-3 goal  - pain regimen:  Robaxin p.o. t.i.d. p.r.n. muscle spasms, Oxy IR 10 mg p.o. q.6 hours p.r.n. Pain, +Tylenol 1 g p.o. t.i.d. scheduled-added per admitting MD Keyona TAFOYA regimen:  MiraLax p.o. q.d., senna-Colace p.o. b.i.d.  - next neuro surgery follow-up appointment scheduled for 1/10  -1/14 continue current pain regimen, PT/OT, Neurosurgery appt recommendations: fu in 4-6 weeks, do not submerge in water, ok to shower, staples removed  -1/21 continue current pain regimen, PT/OT    HTN with HLD, chronic, ongoing  PA D, chronic, stable  Remote history of atrial fibrillation requiring long-term anticoagulation, chronic, stable  - continue Monopril, Dyazide (riamterene-hydrochlorothiazide 37.5-25), metoprolol XL BID, Coumadin 1mg QD (for AFib)   - Continue heparin  subcu-changed to Lovenox by admitting MD (for DVT PPX per neuro surgery)>>> This is for DVT prophylaxis.  Only use while admitted at skilled nursing facility.  Stop when patient is discharged home OR Stop when INR is at 2-3  - monitor weekly INR  -1/21 initiate DC of Lovenox prior to discharge, will continue on Coumadin at home, initiate weekly INRs after DC to monitor bleeding times    Continued    Vitamin-D deficiency, chronic, stable  - continue cholecalciferol p.o. q.d.    Anemia, chronic, stable  -continue ferrous sulfate plus vitamin-C on MWF    Allergic rhinitis, chronic, stable  -continue Astelin nasal spray    Recent history of urinary retention, improved  -continue to monitor on skilled         Future Appointments   Date Time Provider Department Center   2/4/2020  9:40 AM Kaiden Hodge MD [facility] CARDIO [facility]   2/10/2020  3:30 PM Nico Alvarez MD [facility] NEUROSU [facility]     Extended time visit:  Total time:37 minutes  Start Time:0800  End Time:0837  Non face-to-face time:20 min  Description of time:  Coordination of care for upcoming discharge, reviewed her medication reconciliation, lab review, changes to anticoagulation plan of care prior to discharge    Afshan Campoverde NP          Patient note was created using MModal Dictation.  Any errors in syntax or even information may not have been identified and edited on initial review prior to signing this note.

## 2020-01-21 ENCOUNTER — DOCUMENTATION ONLY (OUTPATIENT)
Dept: HEPATOLOGY | Facility: HOSPITAL | Age: 70
End: 2020-01-21

## 2020-01-24 ENCOUNTER — TELEPHONE (OUTPATIENT)
Dept: NEUROSURGERY | Facility: CLINIC | Age: 70
End: 2020-01-24

## 2020-01-24 PROCEDURE — G0180 PR HOME HEALTH MD CERTIFICATION: ICD-10-PCS | Mod: ,,, | Performed by: INTERNAL MEDICINE

## 2020-01-24 PROCEDURE — G0180 MD CERTIFICATION HHA PATIENT: HCPCS | Mod: ,,, | Performed by: INTERNAL MEDICINE

## 2020-01-24 NOTE — TELEPHONE ENCOUNTER
----- Message from Sindhu Foster sent at 1/24/2020  1:47 PM CST -----  Contact: Toshia Calling from [facility]   Home health nurse is call ing to talk to nurse in regards to getting verbal orders for Labs.

## 2020-01-27 ENCOUNTER — TELEPHONE (OUTPATIENT)
Dept: NEUROSURGERY | Facility: CLINIC | Age: 70
End: 2020-01-27

## 2020-01-27 ENCOUNTER — TELEPHONE (OUTPATIENT)
Dept: CARDIOLOGY | Facility: CLINIC | Age: 70
End: 2020-01-27

## 2020-01-27 NOTE — PROGRESS NOTES
Patient reports she had back surgery on 12/27/20, per chart Laminectomy, and was transferred to a rehab facility which she was discharged home on 1/22/20 to Coumadin 1 mg to take 1 tab daily and no changes to her other home medications.  Chart routed to pharmacist to review.

## 2020-01-27 NOTE — TELEPHONE ENCOUNTER
----- Message from Fernando Mccarty sent at 1/27/2020 11:37 AM CST -----  Contact: Toshia camara/[facility]  [phone]  Toshia would like to know if you would like an INR drawn on the patient   Please call back to assist at [phone]

## 2020-01-27 NOTE — PROGRESS NOTES
Pt states she does not know what she is going to do about that. She states she is now recovering from surgery in which she had. I asked her for the date. I told her I don't work in the coumadin clinic and I'm just helping out after she said I didn't know she had surgery.   Dr. Sauceda who is over seeing her coumadin now. She said they told her once she is discharged from the hospital, the coumadin clinic will make the decision on what to do with her coumadin. Until then, Dr. Sauceda will have to make that call.

## 2020-01-28 ENCOUNTER — ANTI-COAG VISIT (OUTPATIENT)
Dept: CARDIOLOGY | Facility: CLINIC | Age: 70
End: 2020-01-28
Payer: MEDICARE

## 2020-01-28 DIAGNOSIS — I48.0 PAROXYSMAL ATRIAL FIBRILLATION: ICD-10-CM

## 2020-01-28 DIAGNOSIS — Z79.01 LONG TERM CURRENT USE OF ANTICOAGULANT THERAPY: Primary | ICD-10-CM

## 2020-01-28 LAB — INR PPP: 1.6 (ref 2–3)

## 2020-01-28 PROCEDURE — 85610 POCT INR: ICD-10-PCS | Mod: QW,S$GLB,, | Performed by: INTERNAL MEDICINE

## 2020-01-28 PROCEDURE — 93793 ANTICOAG MGMT PT WARFARIN: CPT | Mod: S$GLB,,,

## 2020-01-28 PROCEDURE — 93793 PR ANTICOAGULANT MGMT FOR PT TAKING WARFARIN: ICD-10-PCS | Mod: S$GLB,,,

## 2020-01-28 PROCEDURE — 85610 PROTHROMBIN TIME: CPT | Mod: QW,S$GLB,, | Performed by: INTERNAL MEDICINE

## 2020-01-28 NOTE — PROGRESS NOTES
INR low. Patient reports rehab facility was not good about getting her coumadin to her. She was d/c from rehab 1/22 and resumed past maintenance dose. INR likely improving since she has been home but we do not know previous INRs. Dose was very stable prior to surgery. She is walking with a walker but only uses as a precaution. She denies changes in diet. No signs or symptoms of bleeding. Will bolus dose today then resume dose. Repeat INR on Monday. Patient reports nurse has difficulty getting lab from  and asks that next INRs be done at [facility] for convenience. She is not currently driving. She reports she will return to  when she is back driving.

## 2020-01-28 NOTE — TELEPHONE ENCOUNTER
I spoke to Toshia w/[facility]  to let her know patient would need to f/u with the Coumadin clinic. She understood

## 2020-01-28 NOTE — PROGRESS NOTES
Toshia/[facility]) called 01/28/20 to day to inform us that patient was a *Hard stick*. Patient schedule to come to ([facility]) today.

## 2020-02-03 ENCOUNTER — LAB VISIT (OUTPATIENT)
Dept: LAB | Facility: HOSPITAL | Age: 70
End: 2020-02-03
Attending: INTERNAL MEDICINE
Payer: MEDICARE

## 2020-02-03 ENCOUNTER — ANTI-COAG VISIT (OUTPATIENT)
Dept: CARDIOLOGY | Facility: CLINIC | Age: 70
End: 2020-02-03
Payer: MEDICARE

## 2020-02-03 DIAGNOSIS — I48.0 PAROXYSMAL ATRIAL FIBRILLATION: ICD-10-CM

## 2020-02-03 DIAGNOSIS — Z79.01 LONG TERM CURRENT USE OF ANTICOAGULANT THERAPY: ICD-10-CM

## 2020-02-03 LAB
INR PPP: 1.7 (ref 0.8–1.2)
PROTHROMBIN TIME: 18.5 SEC (ref 9–12.5)

## 2020-02-03 PROCEDURE — 93793 PR ANTICOAGULANT MGMT FOR PT TAKING WARFARIN: ICD-10-PCS | Mod: S$GLB,,,

## 2020-02-03 PROCEDURE — 85610 PROTHROMBIN TIME: CPT

## 2020-02-03 PROCEDURE — 93793 ANTICOAG MGMT PT WARFARIN: CPT | Mod: S$GLB,,,

## 2020-02-03 PROCEDURE — 36415 COLL VENOUS BLD VENIPUNCTURE: CPT

## 2020-02-03 NOTE — PROGRESS NOTES
INR not at goal. Medications, chart, and patient findings reviewed. See calendar for adjustments to dose and follow up plan.  Pt denies any changes.  Will instruct pt to take 2mg 2/3 & increase maintenance dose.  Plan to re-assess in 2 weeks.

## 2020-02-06 ENCOUNTER — TELEPHONE (OUTPATIENT)
Dept: NEUROSURGERY | Facility: CLINIC | Age: 70
End: 2020-02-06

## 2020-02-07 NOTE — TELEPHONE ENCOUNTER
"Spoke with Ms. Reagan, she states " I would really like to see Dr. Alvarez and speak with him. I haven't seen since my surgery" Appt rescheduled with Dr. Alvarez on Tuesday- pt v/u  "

## 2020-02-07 NOTE — TELEPHONE ENCOUNTER
----- Message from Padmini Braun sent at 2/7/2020  1:30 PM CST -----  Contact: self  Patient is requesting a call back concerning scheduling an appt to see the Dr. Please call

## 2020-02-11 ENCOUNTER — OFFICE VISIT (OUTPATIENT)
Dept: NEUROSURGERY | Facility: CLINIC | Age: 70
End: 2020-02-11
Payer: MEDICARE

## 2020-02-11 ENCOUNTER — ANTI-COAG VISIT (OUTPATIENT)
Dept: CARDIOLOGY | Facility: CLINIC | Age: 70
End: 2020-02-11
Payer: MEDICARE

## 2020-02-11 ENCOUNTER — LAB VISIT (OUTPATIENT)
Dept: LAB | Facility: HOSPITAL | Age: 70
End: 2020-02-11
Attending: INTERNAL MEDICINE
Payer: MEDICARE

## 2020-02-11 VITALS
BODY MASS INDEX: 31.83 KG/M2 | TEMPERATURE: 98 F | SYSTOLIC BLOOD PRESSURE: 124 MMHG | WEIGHT: 235 LBS | DIASTOLIC BLOOD PRESSURE: 76 MMHG | HEIGHT: 72 IN | HEART RATE: 55 BPM

## 2020-02-11 DIAGNOSIS — I48.0 PAROXYSMAL ATRIAL FIBRILLATION: ICD-10-CM

## 2020-02-11 DIAGNOSIS — Z79.01 LONG TERM CURRENT USE OF ANTICOAGULANT THERAPY: ICD-10-CM

## 2020-02-11 DIAGNOSIS — Z98.890 S/P LUMBAR LAMINECTOMY: Primary | ICD-10-CM

## 2020-02-11 LAB
INR PPP: 2.4 (ref 0.8–1.2)
PROTHROMBIN TIME: 25.6 SEC (ref 9–12.5)

## 2020-02-11 PROCEDURE — 99024 PR POST-OP FOLLOW-UP VISIT: ICD-10-PCS | Mod: S$GLB,,, | Performed by: NEUROLOGICAL SURGERY

## 2020-02-11 PROCEDURE — 36415 COLL VENOUS BLD VENIPUNCTURE: CPT

## 2020-02-11 PROCEDURE — 99999 PR PBB SHADOW E&M-EST. PATIENT-LVL IV: ICD-10-PCS | Mod: PBBFAC,,, | Performed by: NEUROLOGICAL SURGERY

## 2020-02-11 PROCEDURE — 85610 PROTHROMBIN TIME: CPT

## 2020-02-11 PROCEDURE — 93793 PR ANTICOAGULANT MGMT FOR PT TAKING WARFARIN: ICD-10-PCS | Mod: S$GLB,,,

## 2020-02-11 PROCEDURE — 93793 ANTICOAG MGMT PT WARFARIN: CPT | Mod: S$GLB,,,

## 2020-02-11 PROCEDURE — 99024 POSTOP FOLLOW-UP VISIT: CPT | Mod: S$GLB,,, | Performed by: NEUROLOGICAL SURGERY

## 2020-02-11 PROCEDURE — 99999 PR PBB SHADOW E&M-EST. PATIENT-LVL IV: CPT | Mod: PBBFAC,,, | Performed by: NEUROLOGICAL SURGERY

## 2020-02-11 RX ORDER — METOPROLOL TARTRATE 100 MG/1
100 TABLET ORAL 2 TIMES DAILY
COMMUNITY
Start: 2020-01-31 | End: 2020-06-19 | Stop reason: SDUPTHER

## 2020-02-12 ENCOUNTER — PATIENT MESSAGE (OUTPATIENT)
Dept: NEUROSURGERY | Facility: CLINIC | Age: 70
End: 2020-02-12

## 2020-02-14 ENCOUNTER — TELEPHONE (OUTPATIENT)
Dept: NEUROSURGERY | Facility: CLINIC | Age: 70
End: 2020-02-14

## 2020-02-14 NOTE — TELEPHONE ENCOUNTER
----- Message from Sruthi Cardozo sent at 2/14/2020  8:09 AM CST -----  Contact: Patient  Type:  RX Refill Request    Who Called:  Patient  Refill or New Rx: refill  RX Name and Strength: methocarbamol tablet 500 mg     How is the patient currently taking it? (ex. 1XDay): 1 x day   Is this a 30 day or 90 day RX: 90 day  Preferred Pharmacy with phone number: [facility] [address], [place] - [address] [facility]  Local or Mail Order: local  Ordering Provider: Dr. Alvarez  Would the patient rather a call back or a response via [facility]?  Call back  Best Call Back Number: [phone]  Additional Information:  Patient would like a call confirming the prescription has been refilled

## 2020-02-16 RX ORDER — METHOCARBAMOL 750 MG/1
750 TABLET, FILM COATED ORAL 3 TIMES DAILY PRN
Qty: 90 TABLET | Refills: 6 | OUTPATIENT
Start: 2020-02-16 | End: 2020-02-24 | Stop reason: SDUPTHER

## 2020-02-17 NOTE — PROGRESS NOTES
NEUROSURGICAL POST-OPERATIVE PROGRESS NOTE    DATE OF SERVICE:  02/11/2020      ATTENDING PHYSICIAN:  Nico Alvarez MD    SUBJECTIVE:    INTERIM HISTORY:    This is a very pleasant 70 y.o. y.o. female, who is status 6 weeks L3-4, L4-5 laminectomy for spinal stenosis. Improvement in radicular legs symptoms but persistent low back pain. Is mobilizing more since surgery.     Low Back Pain Scale  R Low Back-Pain Score: 3  R Low Back-Pain Intensity: Pain killers give moderate relief from pain  Personal Care : It is painful to look after myself and I am slow and careful.  Lifting: I can only lift very light weights.  Walking: Pain prevents me walking more than 1/4 mile.  Sitting: I can sit only in my favorite chair as long as I like.   Low Back-Standing: I cannot stand for longer than 30 mins without increasing pain   Low Back-Sleeping: I have pain in bed but it does not prevent me from sleeping well  Social Life: Pain has restricted my social life, and I do not go out as often.   Low Back-Traveling: I have extra pain while traveling but it does not compel me to seek alternate forms of travel   Low Back-Changing Degree of Pain: My pain seems to be getting better but improvement is slow         OBJECTIVE:    PHYSICAL EXAMINATION:     Neurosurgery Physical Exam    Ortho Exam    Neurologic Exam    Wound has healed.    DIAGNOSTIC DATA:    NA    ASSESMENT:    This is a 70 y.o. female who is s/p L3-4, L4-5 laminectomy.     Problem List Items Addressed This Visit     None      Visit Diagnoses     S/P lumbar laminectomy    -  Primary          PLAN:    Continue light physical activities such as walking  All questions answered          Nico Alvarez MD  Pager: [phone]

## 2020-02-20 ENCOUNTER — PATIENT MESSAGE (OUTPATIENT)
Dept: NEUROSURGERY | Facility: CLINIC | Age: 70
End: 2020-02-20

## 2020-02-24 ENCOUNTER — LAB VISIT (OUTPATIENT)
Dept: LAB | Facility: HOSPITAL | Age: 70
End: 2020-02-24
Attending: INTERNAL MEDICINE
Payer: MEDICARE

## 2020-02-24 ENCOUNTER — ANTI-COAG VISIT (OUTPATIENT)
Dept: CARDIOLOGY | Facility: CLINIC | Age: 70
End: 2020-02-24
Payer: MEDICARE

## 2020-02-24 DIAGNOSIS — I48.0 PAROXYSMAL ATRIAL FIBRILLATION: ICD-10-CM

## 2020-02-24 DIAGNOSIS — Z79.01 LONG TERM CURRENT USE OF ANTICOAGULANT THERAPY: ICD-10-CM

## 2020-02-24 LAB
INR PPP: 3.1 (ref 0.8–1.2)
PROTHROMBIN TIME: 33.2 SEC (ref 9–12.5)

## 2020-02-24 PROCEDURE — 93793 PR ANTICOAGULANT MGMT FOR PT TAKING WARFARIN: ICD-10-PCS | Mod: S$GLB,,,

## 2020-02-24 PROCEDURE — 36415 COLL VENOUS BLD VENIPUNCTURE: CPT

## 2020-02-24 PROCEDURE — 85610 PROTHROMBIN TIME: CPT

## 2020-02-24 PROCEDURE — 93793 ANTICOAG MGMT PT WARFARIN: CPT | Mod: S$GLB,,,

## 2020-02-24 RX ORDER — METHOCARBAMOL 750 MG/1
750 TABLET, FILM COATED ORAL 3 TIMES DAILY PRN
Qty: 90 TABLET | Refills: 6 | Status: SHIPPED | OUTPATIENT
Start: 2020-02-24 | End: 2020-06-19

## 2020-03-09 ENCOUNTER — ANTI-COAG VISIT (OUTPATIENT)
Dept: CARDIOLOGY | Facility: CLINIC | Age: 70
End: 2020-03-09
Payer: MEDICARE

## 2020-03-09 ENCOUNTER — LAB VISIT (OUTPATIENT)
Dept: LAB | Facility: HOSPITAL | Age: 70
End: 2020-03-09
Attending: INTERNAL MEDICINE
Payer: MEDICARE

## 2020-03-09 DIAGNOSIS — Z79.01 LONG TERM CURRENT USE OF ANTICOAGULANT THERAPY: ICD-10-CM

## 2020-03-09 DIAGNOSIS — I48.0 PAROXYSMAL ATRIAL FIBRILLATION: ICD-10-CM

## 2020-03-09 LAB
INR PPP: 2.6 (ref 0.8–1.2)
PROTHROMBIN TIME: 28 SEC (ref 9–12.5)

## 2020-03-09 PROCEDURE — 85610 PROTHROMBIN TIME: CPT

## 2020-03-09 PROCEDURE — 93793 PR ANTICOAGULANT MGMT FOR PT TAKING WARFARIN: ICD-10-PCS | Mod: S$GLB,,,

## 2020-03-09 PROCEDURE — 36415 COLL VENOUS BLD VENIPUNCTURE: CPT

## 2020-03-09 PROCEDURE — 93793 ANTICOAG MGMT PT WARFARIN: CPT | Mod: S$GLB,,,

## 2020-03-23 ENCOUNTER — ANTI-COAG VISIT (OUTPATIENT)
Dept: CARDIOLOGY | Facility: CLINIC | Age: 70
End: 2020-03-23
Payer: MEDICARE

## 2020-03-23 ENCOUNTER — LAB VISIT (OUTPATIENT)
Dept: LAB | Facility: HOSPITAL | Age: 70
End: 2020-03-23
Attending: INTERNAL MEDICINE
Payer: MEDICARE

## 2020-03-23 DIAGNOSIS — Z79.01 LONG TERM CURRENT USE OF ANTICOAGULANT THERAPY: ICD-10-CM

## 2020-03-23 DIAGNOSIS — I48.0 PAROXYSMAL ATRIAL FIBRILLATION: ICD-10-CM

## 2020-03-23 LAB
INR PPP: 2.5 (ref 0.8–1.2)
PROTHROMBIN TIME: 27.1 SEC (ref 9–12.5)

## 2020-03-23 PROCEDURE — 36415 COLL VENOUS BLD VENIPUNCTURE: CPT

## 2020-03-23 PROCEDURE — 93793 PR ANTICOAGULANT MGMT FOR PT TAKING WARFARIN: ICD-10-PCS | Mod: S$GLB,,,

## 2020-03-23 PROCEDURE — 93793 ANTICOAG MGMT PT WARFARIN: CPT | Mod: S$GLB,,,

## 2020-03-23 PROCEDURE — 85610 PROTHROMBIN TIME: CPT

## 2020-03-24 ENCOUNTER — TELEPHONE (OUTPATIENT)
Dept: NEUROSURGERY | Facility: CLINIC | Age: 70
End: 2020-03-24

## 2020-03-24 NOTE — TELEPHONE ENCOUNTER
Spoke to the patient she is c/o increased pain in the lower back right above the buttocks area. This pain increases with activity. This is new pain since the surgery. She had a Laminectomy on 12/27/2019. She rescheduled her 3 month post op until May she did not want to do a virtual visit. She is using tylenol and it is not helping and would like something stronger.

## 2020-03-25 RX ORDER — TRAMADOL HYDROCHLORIDE 50 MG/1
50 TABLET ORAL EVERY 6 HOURS PRN
Qty: 90 TABLET | Refills: 0 | Status: SHIPPED | OUTPATIENT
Start: 2020-03-25 | End: 2020-06-19

## 2020-03-25 RX ORDER — GABAPENTIN 300 MG/1
300 CAPSULE ORAL 3 TIMES DAILY
Qty: 90 CAPSULE | Refills: 11 | Status: SHIPPED | OUTPATIENT
Start: 2020-03-25 | End: 2020-06-19

## 2020-03-25 NOTE — TELEPHONE ENCOUNTER
Spoke to the patient instructed on Dr Alvarez message and she stated she can not take Tramadol and will use the muscle relaxer she has at home and if they do not work she will call us back.

## 2020-04-04 DIAGNOSIS — I48.0 PAROXYSMAL ATRIAL FIBRILLATION: ICD-10-CM

## 2020-04-06 ENCOUNTER — EXTERNAL HOME HEALTH (OUTPATIENT)
Dept: HOME HEALTH SERVICES | Facility: HOSPITAL | Age: 70
End: 2020-04-06
Payer: MEDICARE

## 2020-04-06 ENCOUNTER — TELEPHONE (OUTPATIENT)
Dept: NEUROSURGERY | Facility: CLINIC | Age: 70
End: 2020-04-06

## 2020-04-06 DIAGNOSIS — I48.0 PAROXYSMAL ATRIAL FIBRILLATION: ICD-10-CM

## 2020-04-06 PROBLEM — G89.18 POST-OP PAIN: Status: RESOLVED | Noted: 2019-12-30 | Resolved: 2020-04-06

## 2020-04-06 RX ORDER — WARFARIN 1 MG/1
TABLET ORAL
Refills: 0 | OUTPATIENT
Start: 2020-04-06

## 2020-04-06 RX ORDER — WARFARIN 1 MG/1
1 TABLET ORAL DAILY
Qty: 90 TABLET | Refills: 3 | Status: SHIPPED | OUTPATIENT
Start: 2020-04-06 | End: 2020-06-19 | Stop reason: SDUPTHER

## 2020-04-06 NOTE — TELEPHONE ENCOUNTER
Spoke with Ms. Reagan, appointment has been rescheduled to 04/07/20 as an virtual visit.    Pt v/u

## 2020-04-07 ENCOUNTER — PATIENT MESSAGE (OUTPATIENT)
Dept: NEUROSURGERY | Facility: CLINIC | Age: 70
End: 2020-04-07

## 2020-04-08 ENCOUNTER — LAB VISIT (OUTPATIENT)
Dept: LAB | Facility: HOSPITAL | Age: 70
End: 2020-04-08
Attending: INTERNAL MEDICINE
Payer: MEDICARE

## 2020-04-08 ENCOUNTER — ANTI-COAG VISIT (OUTPATIENT)
Dept: CARDIOLOGY | Facility: CLINIC | Age: 70
End: 2020-04-08
Payer: MEDICARE

## 2020-04-08 DIAGNOSIS — Z79.01 LONG TERM CURRENT USE OF ANTICOAGULANT THERAPY: ICD-10-CM

## 2020-04-08 DIAGNOSIS — I48.0 PAROXYSMAL ATRIAL FIBRILLATION: ICD-10-CM

## 2020-04-08 LAB
INR PPP: 2.4 (ref 0.8–1.2)
PROTHROMBIN TIME: 25.7 SEC (ref 9–12.5)

## 2020-04-08 PROCEDURE — 93793 PR ANTICOAGULANT MGMT FOR PT TAKING WARFARIN: ICD-10-PCS | Mod: S$GLB,,,

## 2020-04-08 PROCEDURE — 36415 COLL VENOUS BLD VENIPUNCTURE: CPT

## 2020-04-08 PROCEDURE — 85610 PROTHROMBIN TIME: CPT

## 2020-04-08 PROCEDURE — 93793 ANTICOAG MGMT PT WARFARIN: CPT | Mod: S$GLB,,,

## 2020-05-11 ENCOUNTER — PATIENT MESSAGE (OUTPATIENT)
Dept: NEUROSURGERY | Facility: CLINIC | Age: 70
End: 2020-05-11

## 2020-05-13 ENCOUNTER — LAB VISIT (OUTPATIENT)
Dept: LAB | Facility: HOSPITAL | Age: 70
End: 2020-05-13
Attending: INTERNAL MEDICINE
Payer: MEDICARE

## 2020-05-13 ENCOUNTER — ANTI-COAG VISIT (OUTPATIENT)
Dept: CARDIOLOGY | Facility: CLINIC | Age: 70
End: 2020-05-13
Payer: MEDICARE

## 2020-05-13 DIAGNOSIS — Z79.01 LONG TERM CURRENT USE OF ANTICOAGULANT THERAPY: ICD-10-CM

## 2020-05-13 DIAGNOSIS — I48.0 PAROXYSMAL ATRIAL FIBRILLATION: ICD-10-CM

## 2020-05-13 LAB
INR PPP: 2.5 (ref 0.8–1.2)
PROTHROMBIN TIME: 27.3 SEC (ref 9–12.5)

## 2020-05-13 PROCEDURE — 93793 ANTICOAG MGMT PT WARFARIN: CPT | Mod: S$GLB,,, | Performed by: PHARMACIST

## 2020-05-13 PROCEDURE — 36415 COLL VENOUS BLD VENIPUNCTURE: CPT

## 2020-05-13 PROCEDURE — 85610 PROTHROMBIN TIME: CPT

## 2020-05-13 PROCEDURE — 93793 PR ANTICOAGULANT MGMT FOR PT TAKING WARFARIN: ICD-10-PCS | Mod: S$GLB,,, | Performed by: PHARMACIST

## 2020-05-14 ENCOUNTER — TELEPHONE (OUTPATIENT)
Dept: ORTHOPEDICS | Facility: CLINIC | Age: 70
End: 2020-05-14

## 2020-05-14 RX ORDER — LIDOCAINE 50 MG/G
OINTMENT TOPICAL
Qty: 1 TUBE | Refills: 11 | Status: SHIPPED | OUTPATIENT
Start: 2020-05-14 | End: 2020-05-21 | Stop reason: SDUPTHER

## 2020-05-14 NOTE — TELEPHONE ENCOUNTER
----- Message from Anca Baum sent at 5/14/2020  1:17 PM CDT -----  Contact: [facility] [email or web address]@ [phone]  Caller is needing clarification on medication: lidocaine (XYLOCAINE) 5 % Oint ointment, please call.

## 2020-05-21 ENCOUNTER — PATIENT MESSAGE (OUTPATIENT)
Dept: NEUROSURGERY | Facility: CLINIC | Age: 70
End: 2020-05-21

## 2020-05-21 RX ORDER — LIDOCAINE 50 MG/G
OINTMENT TOPICAL
Qty: 35.44 G | Refills: 11 | Status: SHIPPED | OUTPATIENT
Start: 2020-05-21 | End: 2020-06-19

## 2020-06-15 ENCOUNTER — LAB VISIT (OUTPATIENT)
Dept: LAB | Facility: HOSPITAL | Age: 70
End: 2020-06-15
Attending: INTERNAL MEDICINE
Payer: MEDICARE

## 2020-06-15 DIAGNOSIS — I48.0 PAROXYSMAL ATRIAL FIBRILLATION: ICD-10-CM

## 2020-06-15 DIAGNOSIS — Z79.01 LONG TERM CURRENT USE OF ANTICOAGULANT THERAPY: ICD-10-CM

## 2020-06-15 LAB
INR PPP: 2.4 (ref 0.8–1.2)
PROTHROMBIN TIME: 24.5 SEC (ref 9–12.5)

## 2020-06-15 PROCEDURE — 85610 PROTHROMBIN TIME: CPT

## 2020-06-15 PROCEDURE — 36415 COLL VENOUS BLD VENIPUNCTURE: CPT

## 2020-06-17 ENCOUNTER — ANTI-COAG VISIT (OUTPATIENT)
Dept: CARDIOLOGY | Facility: CLINIC | Age: 70
End: 2020-06-17
Payer: MEDICARE

## 2020-06-17 DIAGNOSIS — I48.0 PAROXYSMAL ATRIAL FIBRILLATION: ICD-10-CM

## 2020-06-17 DIAGNOSIS — Z79.01 LONG TERM CURRENT USE OF ANTICOAGULANT THERAPY: ICD-10-CM

## 2020-06-17 PROCEDURE — 93793 PR ANTICOAGULANT MGMT FOR PT TAKING WARFARIN: ICD-10-PCS | Mod: S$GLB,,, | Performed by: PHARMACIST

## 2020-06-17 PROCEDURE — 93793 ANTICOAG MGMT PT WARFARIN: CPT | Mod: S$GLB,,, | Performed by: PHARMACIST

## 2020-06-18 PROBLEM — K21.9 GASTROESOPHAGEAL REFLUX DISEASE: Status: ACTIVE | Noted: 2018-12-13

## 2020-06-18 PROBLEM — C54.1 ENDOMETRIAL CANCER: Status: ACTIVE | Noted: 2018-05-19

## 2020-06-18 PROBLEM — R73.01 IMPAIRED FASTING GLUCOSE: Status: ACTIVE | Noted: 2017-08-02

## 2020-06-18 PROBLEM — E66.9 OBESITY: Status: ACTIVE | Noted: 2017-05-02

## 2020-06-18 PROBLEM — D64.9 ANEMIA: Status: ACTIVE | Noted: 2017-05-17

## 2020-07-20 ENCOUNTER — ANTI-COAG VISIT (OUTPATIENT)
Dept: CARDIOLOGY | Facility: CLINIC | Age: 70
End: 2020-07-20
Payer: MEDICARE

## 2020-07-20 ENCOUNTER — LAB VISIT (OUTPATIENT)
Dept: LAB | Facility: HOSPITAL | Age: 70
End: 2020-07-20
Attending: INTERNAL MEDICINE
Payer: MEDICARE

## 2020-07-20 DIAGNOSIS — I48.0 PAROXYSMAL ATRIAL FIBRILLATION: ICD-10-CM

## 2020-07-20 DIAGNOSIS — Z79.01 LONG TERM CURRENT USE OF ANTICOAGULANT THERAPY: ICD-10-CM

## 2020-07-20 LAB
INR PPP: 3 (ref 0.8–1.2)
PROTHROMBIN TIME: 30.3 SEC (ref 9–12.5)

## 2020-07-20 PROCEDURE — 85610 PROTHROMBIN TIME: CPT

## 2020-07-20 PROCEDURE — 93793 ANTICOAG MGMT PT WARFARIN: CPT | Mod: S$GLB,,, | Performed by: PHARMACIST

## 2020-07-20 PROCEDURE — 36415 COLL VENOUS BLD VENIPUNCTURE: CPT

## 2020-07-20 PROCEDURE — 93793 PR ANTICOAGULANT MGMT FOR PT TAKING WARFARIN: ICD-10-PCS | Mod: S$GLB,,, | Performed by: PHARMACIST

## 2020-08-24 ENCOUNTER — ANTI-COAG VISIT (OUTPATIENT)
Dept: CARDIOLOGY | Facility: CLINIC | Age: 70
End: 2020-08-24
Payer: MEDICARE

## 2020-08-24 ENCOUNTER — LAB VISIT (OUTPATIENT)
Dept: LAB | Facility: HOSPITAL | Age: 70
End: 2020-08-24
Attending: INTERNAL MEDICINE
Payer: MEDICARE

## 2020-08-24 DIAGNOSIS — I48.0 PAROXYSMAL ATRIAL FIBRILLATION: ICD-10-CM

## 2020-08-24 DIAGNOSIS — Z79.01 LONG TERM CURRENT USE OF ANTICOAGULANT THERAPY: ICD-10-CM

## 2020-08-24 LAB
INR PPP: 3 (ref 0.8–1.2)
PROTHROMBIN TIME: 30.5 SEC (ref 9–12.5)

## 2020-08-24 PROCEDURE — 93793 ANTICOAG MGMT PT WARFARIN: CPT | Mod: S$GLB,,, | Performed by: PHARMACIST

## 2020-08-24 PROCEDURE — 93793 PR ANTICOAGULANT MGMT FOR PT TAKING WARFARIN: ICD-10-PCS | Mod: S$GLB,,, | Performed by: PHARMACIST

## 2020-08-24 PROCEDURE — 36415 COLL VENOUS BLD VENIPUNCTURE: CPT

## 2020-08-24 PROCEDURE — 85610 PROTHROMBIN TIME: CPT

## 2020-09-28 ENCOUNTER — LAB VISIT (OUTPATIENT)
Dept: LAB | Facility: HOSPITAL | Age: 70
End: 2020-09-28
Attending: INTERNAL MEDICINE
Payer: MEDICARE

## 2020-09-28 ENCOUNTER — ANTI-COAG VISIT (OUTPATIENT)
Dept: CARDIOLOGY | Facility: CLINIC | Age: 70
End: 2020-09-28
Payer: MEDICARE

## 2020-09-28 DIAGNOSIS — Z79.01 LONG TERM CURRENT USE OF ANTICOAGULANT THERAPY: ICD-10-CM

## 2020-09-28 DIAGNOSIS — I48.0 PAROXYSMAL ATRIAL FIBRILLATION: ICD-10-CM

## 2020-09-28 LAB
INR PPP: 3.3 (ref 0.8–1.2)
PROTHROMBIN TIME: 33.3 SEC (ref 9–12.5)

## 2020-09-28 PROCEDURE — 85610 PROTHROMBIN TIME: CPT

## 2020-09-28 PROCEDURE — 93793 PR ANTICOAGULANT MGMT FOR PT TAKING WARFARIN: ICD-10-PCS | Mod: S$GLB,,, | Performed by: PHARMACIST

## 2020-09-28 PROCEDURE — 93793 ANTICOAG MGMT PT WARFARIN: CPT | Mod: S$GLB,,, | Performed by: PHARMACIST

## 2020-09-28 PROCEDURE — 36415 COLL VENOUS BLD VENIPUNCTURE: CPT

## 2020-10-06 ENCOUNTER — TELEPHONE (OUTPATIENT)
Dept: NEUROSURGERY | Facility: CLINIC | Age: 70
End: 2020-10-06

## 2020-10-06 NOTE — TELEPHONE ENCOUNTER
----- Message from Judy Bernal sent at 10/6/2020  1:14 PM CDT -----  Contact: [phone]  Pt RASHMI VALDEZ calling regarding needing to speak with the Office      Please call

## 2020-10-12 ENCOUNTER — ANTI-COAG VISIT (OUTPATIENT)
Dept: CARDIOLOGY | Facility: CLINIC | Age: 70
End: 2020-10-12
Payer: MEDICARE

## 2020-10-12 ENCOUNTER — LAB VISIT (OUTPATIENT)
Dept: LAB | Facility: HOSPITAL | Age: 70
End: 2020-10-12
Attending: INTERNAL MEDICINE
Payer: MEDICARE

## 2020-10-12 DIAGNOSIS — I48.0 PAROXYSMAL ATRIAL FIBRILLATION: ICD-10-CM

## 2020-10-12 DIAGNOSIS — Z79.01 LONG TERM CURRENT USE OF ANTICOAGULANT THERAPY: ICD-10-CM

## 2020-10-12 LAB
INR PPP: 2.9 (ref 0.8–1.2)
PROTHROMBIN TIME: 28.7 SEC (ref 9–12.5)

## 2020-10-12 PROCEDURE — 36415 COLL VENOUS BLD VENIPUNCTURE: CPT

## 2020-10-12 PROCEDURE — 93793 ANTICOAG MGMT PT WARFARIN: CPT | Mod: S$GLB,,, | Performed by: PHARMACIST

## 2020-10-12 PROCEDURE — 85610 PROTHROMBIN TIME: CPT

## 2020-10-12 PROCEDURE — 93793 PR ANTICOAGULANT MGMT FOR PT TAKING WARFARIN: ICD-10-PCS | Mod: S$GLB,,, | Performed by: PHARMACIST

## 2020-10-26 ENCOUNTER — LAB VISIT (OUTPATIENT)
Dept: LAB | Facility: HOSPITAL | Age: 70
End: 2020-10-26
Attending: INTERNAL MEDICINE
Payer: MEDICARE

## 2020-10-26 ENCOUNTER — ANTI-COAG VISIT (OUTPATIENT)
Dept: CARDIOLOGY | Facility: CLINIC | Age: 70
End: 2020-10-26
Payer: MEDICARE

## 2020-10-26 DIAGNOSIS — Z79.01 LONG TERM CURRENT USE OF ANTICOAGULANT THERAPY: ICD-10-CM

## 2020-10-26 DIAGNOSIS — I48.0 PAROXYSMAL ATRIAL FIBRILLATION: ICD-10-CM

## 2020-10-26 LAB
INR PPP: 2.6 (ref 0.8–1.2)
PROTHROMBIN TIME: 26.6 SEC (ref 9–12.5)

## 2020-10-26 PROCEDURE — 93793 PR ANTICOAGULANT MGMT FOR PT TAKING WARFARIN: ICD-10-PCS | Mod: S$GLB,,, | Performed by: PHARMACIST

## 2020-10-26 PROCEDURE — 85610 PROTHROMBIN TIME: CPT

## 2020-10-26 PROCEDURE — 36415 COLL VENOUS BLD VENIPUNCTURE: CPT

## 2020-10-26 PROCEDURE — 93793 ANTICOAG MGMT PT WARFARIN: CPT | Mod: S$GLB,,, | Performed by: PHARMACIST

## 2020-10-27 ENCOUNTER — IMMUNIZATION (OUTPATIENT)
Dept: PHARMACY | Facility: CLINIC | Age: 70
End: 2020-10-27
Payer: MEDICARE

## 2020-11-09 ENCOUNTER — LAB VISIT (OUTPATIENT)
Dept: LAB | Facility: HOSPITAL | Age: 70
End: 2020-11-09
Attending: INTERNAL MEDICINE
Payer: MEDICARE

## 2020-11-09 ENCOUNTER — ANTI-COAG VISIT (OUTPATIENT)
Dept: CARDIOLOGY | Facility: CLINIC | Age: 70
End: 2020-11-09
Payer: MEDICARE

## 2020-11-09 DIAGNOSIS — Z79.01 LONG TERM CURRENT USE OF ANTICOAGULANT THERAPY: ICD-10-CM

## 2020-11-09 DIAGNOSIS — I48.0 PAROXYSMAL ATRIAL FIBRILLATION: ICD-10-CM

## 2020-11-09 LAB
INR PPP: 3.7 (ref 0.8–1.2)
PROTHROMBIN TIME: 36.6 SEC (ref 9–12.5)

## 2020-11-09 PROCEDURE — 36415 COLL VENOUS BLD VENIPUNCTURE: CPT

## 2020-11-09 PROCEDURE — 93793 PR ANTICOAGULANT MGMT FOR PT TAKING WARFARIN: ICD-10-PCS | Mod: S$GLB,,, | Performed by: PHARMACIST

## 2020-11-09 PROCEDURE — 85610 PROTHROMBIN TIME: CPT

## 2020-11-09 PROCEDURE — 93793 ANTICOAG MGMT PT WARFARIN: CPT | Mod: S$GLB,,, | Performed by: PHARMACIST

## 2020-11-09 NOTE — PROGRESS NOTES
Patient states she had some pecans on Monday and Thursday and confirmed the correct dose of coumadin.

## 2020-11-16 ENCOUNTER — ANTI-COAG VISIT (OUTPATIENT)
Dept: CARDIOLOGY | Facility: CLINIC | Age: 70
End: 2020-11-16
Payer: MEDICARE

## 2020-11-16 ENCOUNTER — LAB VISIT (OUTPATIENT)
Dept: LAB | Facility: HOSPITAL | Age: 70
End: 2020-11-16
Attending: INTERNAL MEDICINE
Payer: MEDICARE

## 2020-11-16 DIAGNOSIS — Z79.01 LONG TERM CURRENT USE OF ANTICOAGULANT THERAPY: ICD-10-CM

## 2020-11-16 DIAGNOSIS — I48.0 PAROXYSMAL ATRIAL FIBRILLATION: ICD-10-CM

## 2020-11-16 LAB
INR PPP: 2.8 (ref 0.8–1.2)
PROTHROMBIN TIME: 28.2 SEC (ref 9–12.5)

## 2020-11-16 PROCEDURE — 93793 PR ANTICOAGULANT MGMT FOR PT TAKING WARFARIN: ICD-10-PCS | Mod: S$GLB,,, | Performed by: PHARMACIST

## 2020-11-16 PROCEDURE — 93793 ANTICOAG MGMT PT WARFARIN: CPT | Mod: S$GLB,,, | Performed by: PHARMACIST

## 2020-11-16 PROCEDURE — 36415 COLL VENOUS BLD VENIPUNCTURE: CPT

## 2020-11-16 PROCEDURE — 85610 PROTHROMBIN TIME: CPT

## 2020-11-30 ENCOUNTER — LAB VISIT (OUTPATIENT)
Dept: LAB | Facility: HOSPITAL | Age: 70
End: 2020-11-30
Attending: INTERNAL MEDICINE
Payer: MEDICARE

## 2020-11-30 ENCOUNTER — ANTI-COAG VISIT (OUTPATIENT)
Dept: CARDIOLOGY | Facility: CLINIC | Age: 70
End: 2020-11-30
Payer: MEDICARE

## 2020-11-30 DIAGNOSIS — Z79.01 LONG TERM CURRENT USE OF ANTICOAGULANT THERAPY: ICD-10-CM

## 2020-11-30 DIAGNOSIS — I48.0 PAROXYSMAL ATRIAL FIBRILLATION: ICD-10-CM

## 2020-11-30 LAB
INR PPP: 1.9 (ref 0.8–1.2)
PROTHROMBIN TIME: 19.5 SEC (ref 9–12.5)

## 2020-11-30 PROCEDURE — 93793 PR ANTICOAGULANT MGMT FOR PT TAKING WARFARIN: ICD-10-PCS | Mod: S$GLB,,, | Performed by: PHARMACIST

## 2020-11-30 PROCEDURE — 85610 PROTHROMBIN TIME: CPT

## 2020-11-30 PROCEDURE — 36415 COLL VENOUS BLD VENIPUNCTURE: CPT

## 2020-11-30 PROCEDURE — 93793 ANTICOAG MGMT PT WARFARIN: CPT | Mod: S$GLB,,, | Performed by: PHARMACIST

## 2020-12-14 ENCOUNTER — LAB VISIT (OUTPATIENT)
Dept: LAB | Facility: HOSPITAL | Age: 70
End: 2020-12-14
Attending: INTERNAL MEDICINE
Payer: MEDICARE

## 2020-12-14 ENCOUNTER — ANTI-COAG VISIT (OUTPATIENT)
Dept: CARDIOLOGY | Facility: CLINIC | Age: 70
End: 2020-12-14
Payer: MEDICARE

## 2020-12-14 DIAGNOSIS — I48.0 PAROXYSMAL ATRIAL FIBRILLATION: ICD-10-CM

## 2020-12-14 DIAGNOSIS — Z79.01 LONG TERM CURRENT USE OF ANTICOAGULANT THERAPY: ICD-10-CM

## 2020-12-14 LAB
INR PPP: 2.1 (ref 0.8–1.2)
PROTHROMBIN TIME: 21.7 SEC (ref 9–12.5)

## 2020-12-14 PROCEDURE — 93793 PR ANTICOAGULANT MGMT FOR PT TAKING WARFARIN: ICD-10-PCS | Mod: S$GLB,,, | Performed by: PHARMACIST

## 2020-12-14 PROCEDURE — 93793 ANTICOAG MGMT PT WARFARIN: CPT | Mod: S$GLB,,, | Performed by: PHARMACIST

## 2020-12-14 PROCEDURE — 85610 PROTHROMBIN TIME: CPT

## 2020-12-14 PROCEDURE — 36415 COLL VENOUS BLD VENIPUNCTURE: CPT

## 2020-12-21 ENCOUNTER — LAB VISIT (OUTPATIENT)
Dept: LAB | Facility: HOSPITAL | Age: 70
End: 2020-12-21
Attending: INTERNAL MEDICINE
Payer: MEDICARE

## 2020-12-21 ENCOUNTER — ANTI-COAG VISIT (OUTPATIENT)
Dept: CARDIOLOGY | Facility: CLINIC | Age: 70
End: 2020-12-21
Payer: MEDICARE

## 2020-12-21 DIAGNOSIS — I48.0 PAROXYSMAL ATRIAL FIBRILLATION: ICD-10-CM

## 2020-12-21 DIAGNOSIS — Z79.01 LONG TERM CURRENT USE OF ANTICOAGULANT THERAPY: ICD-10-CM

## 2020-12-21 LAB
INR PPP: 2.2 (ref 0.8–1.2)
PROTHROMBIN TIME: 22.8 SEC (ref 9–12.5)

## 2020-12-21 PROCEDURE — 93793 PR ANTICOAGULANT MGMT FOR PT TAKING WARFARIN: ICD-10-PCS | Mod: S$GLB,,,

## 2020-12-21 PROCEDURE — 85610 PROTHROMBIN TIME: CPT

## 2020-12-21 PROCEDURE — 36415 COLL VENOUS BLD VENIPUNCTURE: CPT

## 2020-12-21 PROCEDURE — 93793 ANTICOAG MGMT PT WARFARIN: CPT | Mod: S$GLB,,,

## 2020-12-21 NOTE — PROGRESS NOTES
INR at goal. Medications and chart reviewed. No changes noted to necessitate adjustment of warfarin or follow-up plan. See calendar.         UTI/IV abx

## 2020-12-22 PROBLEM — C54.1 ENDOMETRIAL CANCER: Chronic | Status: ACTIVE | Noted: 2018-05-19

## 2020-12-22 PROBLEM — I48.0 PAROXYSMAL ATRIAL FIBRILLATION: Chronic | Status: ACTIVE | Noted: 2017-04-05

## 2020-12-22 PROBLEM — Z79.01 LONG TERM CURRENT USE OF ANTICOAGULANT THERAPY: Chronic | Status: ACTIVE | Noted: 2017-04-05

## 2020-12-22 PROBLEM — I10 HYPERTENSION: Chronic | Status: ACTIVE | Noted: 2017-04-05

## 2021-01-04 ENCOUNTER — ANTI-COAG VISIT (OUTPATIENT)
Dept: CARDIOLOGY | Facility: CLINIC | Age: 71
End: 2021-01-04
Payer: MEDICARE

## 2021-01-04 ENCOUNTER — LAB VISIT (OUTPATIENT)
Dept: LAB | Facility: HOSPITAL | Age: 71
End: 2021-01-04
Attending: INTERNAL MEDICINE
Payer: MEDICARE

## 2021-01-04 DIAGNOSIS — I48.0 PAROXYSMAL ATRIAL FIBRILLATION: ICD-10-CM

## 2021-01-04 DIAGNOSIS — Z79.01 LONG TERM CURRENT USE OF ANTICOAGULANT THERAPY: ICD-10-CM

## 2021-01-04 LAB
INR PPP: 2.3 (ref 0.8–1.2)
PROTHROMBIN TIME: 23.6 SEC (ref 9–12.5)

## 2021-01-04 PROCEDURE — 93793 PR ANTICOAGULANT MGMT FOR PT TAKING WARFARIN: ICD-10-PCS | Mod: S$GLB,,, | Performed by: PHARMACIST

## 2021-01-04 PROCEDURE — 36415 COLL VENOUS BLD VENIPUNCTURE: CPT

## 2021-01-04 PROCEDURE — 85610 PROTHROMBIN TIME: CPT

## 2021-01-04 PROCEDURE — 93793 ANTICOAG MGMT PT WARFARIN: CPT | Mod: S$GLB,,, | Performed by: PHARMACIST

## 2021-01-22 ENCOUNTER — PATIENT MESSAGE (OUTPATIENT)
Dept: ADMINISTRATIVE | Facility: OTHER | Age: 71
End: 2021-01-22

## 2021-01-27 ENCOUNTER — LAB VISIT (OUTPATIENT)
Dept: LAB | Facility: HOSPITAL | Age: 71
End: 2021-01-27
Attending: INTERNAL MEDICINE
Payer: MEDICARE

## 2021-01-27 DIAGNOSIS — I48.0 PAROXYSMAL ATRIAL FIBRILLATION: ICD-10-CM

## 2021-01-27 DIAGNOSIS — Z79.01 LONG TERM CURRENT USE OF ANTICOAGULANT THERAPY: ICD-10-CM

## 2021-01-27 LAB
INR PPP: 2.1 (ref 0.8–1.2)
PROTHROMBIN TIME: 21.9 SEC (ref 9–12.5)

## 2021-01-27 PROCEDURE — 85610 PROTHROMBIN TIME: CPT

## 2021-01-27 PROCEDURE — 36415 COLL VENOUS BLD VENIPUNCTURE: CPT

## 2021-01-28 ENCOUNTER — ANTI-COAG VISIT (OUTPATIENT)
Dept: CARDIOLOGY | Facility: CLINIC | Age: 71
End: 2021-01-28
Payer: MEDICARE

## 2021-01-28 DIAGNOSIS — Z79.01 LONG TERM CURRENT USE OF ANTICOAGULANT THERAPY: ICD-10-CM

## 2021-01-28 DIAGNOSIS — I48.0 PAROXYSMAL ATRIAL FIBRILLATION: Primary | ICD-10-CM

## 2021-01-28 PROCEDURE — 93793 PR ANTICOAGULANT MGMT FOR PT TAKING WARFARIN: ICD-10-PCS | Mod: S$GLB,,, | Performed by: PHARMACIST

## 2021-01-28 PROCEDURE — 93793 ANTICOAG MGMT PT WARFARIN: CPT | Mod: S$GLB,,, | Performed by: PHARMACIST

## 2021-02-03 ENCOUNTER — LAB VISIT (OUTPATIENT)
Dept: LAB | Facility: HOSPITAL | Age: 71
End: 2021-02-03
Attending: INTERNAL MEDICINE
Payer: MEDICARE

## 2021-02-03 ENCOUNTER — ANTI-COAG VISIT (OUTPATIENT)
Dept: CARDIOLOGY | Facility: CLINIC | Age: 71
End: 2021-02-03
Payer: MEDICARE

## 2021-02-03 DIAGNOSIS — I48.0 PAROXYSMAL ATRIAL FIBRILLATION: ICD-10-CM

## 2021-02-03 DIAGNOSIS — I48.0 PAROXYSMAL ATRIAL FIBRILLATION: Primary | ICD-10-CM

## 2021-02-03 DIAGNOSIS — Z79.01 LONG TERM CURRENT USE OF ANTICOAGULANT THERAPY: ICD-10-CM

## 2021-02-03 LAB
INR PPP: 2.4 (ref 0.8–1.2)
PROTHROMBIN TIME: 23.9 SEC (ref 9–12.5)

## 2021-02-03 PROCEDURE — 36415 COLL VENOUS BLD VENIPUNCTURE: CPT

## 2021-02-03 PROCEDURE — 85610 PROTHROMBIN TIME: CPT

## 2021-02-03 PROCEDURE — 93793 PR ANTICOAGULANT MGMT FOR PT TAKING WARFARIN: ICD-10-PCS | Mod: S$GLB,,, | Performed by: PHARMACIST

## 2021-02-03 PROCEDURE — 93793 ANTICOAG MGMT PT WARFARIN: CPT | Mod: S$GLB,,, | Performed by: PHARMACIST

## 2021-02-17 ENCOUNTER — ANTI-COAG VISIT (OUTPATIENT)
Dept: CARDIOLOGY | Facility: CLINIC | Age: 71
End: 2021-02-17
Payer: MEDICARE

## 2021-02-17 ENCOUNTER — LAB VISIT (OUTPATIENT)
Dept: LAB | Facility: HOSPITAL | Age: 71
End: 2021-02-17
Attending: INTERNAL MEDICINE
Payer: MEDICARE

## 2021-02-17 DIAGNOSIS — Z79.01 LONG TERM CURRENT USE OF ANTICOAGULANT THERAPY: ICD-10-CM

## 2021-02-17 DIAGNOSIS — I48.0 PAROXYSMAL ATRIAL FIBRILLATION: Primary | ICD-10-CM

## 2021-02-17 DIAGNOSIS — I48.0 PAROXYSMAL ATRIAL FIBRILLATION: ICD-10-CM

## 2021-02-17 LAB
INR PPP: 2.2 (ref 0.8–1.2)
PROTHROMBIN TIME: 22.2 SEC (ref 9–12.5)

## 2021-02-17 PROCEDURE — 85610 PROTHROMBIN TIME: CPT

## 2021-02-17 PROCEDURE — 93793 PR ANTICOAGULANT MGMT FOR PT TAKING WARFARIN: ICD-10-PCS | Mod: S$GLB,,, | Performed by: PHARMACIST

## 2021-02-17 PROCEDURE — 93793 ANTICOAG MGMT PT WARFARIN: CPT | Mod: S$GLB,,, | Performed by: PHARMACIST

## 2021-02-17 PROCEDURE — 36415 COLL VENOUS BLD VENIPUNCTURE: CPT

## 2021-03-10 ENCOUNTER — ANTI-COAG VISIT (OUTPATIENT)
Dept: CARDIOLOGY | Facility: CLINIC | Age: 71
End: 2021-03-10
Payer: MEDICARE

## 2021-03-10 ENCOUNTER — LAB VISIT (OUTPATIENT)
Dept: LAB | Facility: HOSPITAL | Age: 71
End: 2021-03-10
Attending: INTERNAL MEDICINE
Payer: MEDICARE

## 2021-03-10 DIAGNOSIS — I48.0 PAROXYSMAL ATRIAL FIBRILLATION: Primary | ICD-10-CM

## 2021-03-10 DIAGNOSIS — Z79.01 LONG TERM CURRENT USE OF ANTICOAGULANT THERAPY: ICD-10-CM

## 2021-03-10 DIAGNOSIS — I48.0 PAROXYSMAL ATRIAL FIBRILLATION: ICD-10-CM

## 2021-03-10 LAB
INR PPP: 2.4 (ref 0.8–1.2)
PROTHROMBIN TIME: 24.2 SEC (ref 9–12.5)

## 2021-03-10 PROCEDURE — 85610 PROTHROMBIN TIME: CPT | Performed by: INTERNAL MEDICINE

## 2021-03-10 PROCEDURE — 36415 COLL VENOUS BLD VENIPUNCTURE: CPT | Performed by: INTERNAL MEDICINE

## 2021-03-10 PROCEDURE — 93793 PR ANTICOAGULANT MGMT FOR PT TAKING WARFARIN: ICD-10-PCS | Mod: S$GLB,,, | Performed by: PHARMACIST

## 2021-03-10 PROCEDURE — 93793 ANTICOAG MGMT PT WARFARIN: CPT | Mod: S$GLB,,, | Performed by: PHARMACIST

## 2021-03-15 ENCOUNTER — OFFICE VISIT (OUTPATIENT)
Dept: CARDIOLOGY | Facility: CLINIC | Age: 71
End: 2021-03-15
Payer: MEDICARE

## 2021-03-15 ENCOUNTER — LAB VISIT (OUTPATIENT)
Dept: LAB | Facility: HOSPITAL | Age: 71
End: 2021-03-15
Attending: INTERNAL MEDICINE
Payer: MEDICARE

## 2021-03-15 VITALS
HEART RATE: 63 BPM | BODY MASS INDEX: 33.19 KG/M2 | SYSTOLIC BLOOD PRESSURE: 130 MMHG | DIASTOLIC BLOOD PRESSURE: 78 MMHG | WEIGHT: 244.69 LBS | OXYGEN SATURATION: 93 %

## 2021-03-15 DIAGNOSIS — I48.0 PAROXYSMAL ATRIAL FIBRILLATION: Primary | ICD-10-CM

## 2021-03-15 DIAGNOSIS — I48.0 PAROXYSMAL ATRIAL FIBRILLATION: ICD-10-CM

## 2021-03-15 DIAGNOSIS — I10 ESSENTIAL HYPERTENSION: Chronic | ICD-10-CM

## 2021-03-15 DIAGNOSIS — I73.9 PVD (PERIPHERAL VASCULAR DISEASE): ICD-10-CM

## 2021-03-15 DIAGNOSIS — I25.10 ATHEROSCLEROSIS OF CORONARY ARTERY, ANGINA PRESENCE UNSPECIFIED, UNSPECIFIED VESSEL OR LESION TYPE, UNSPECIFIED WHETHER NATIVE OR TRANSPLANTED HEART: ICD-10-CM

## 2021-03-15 LAB
INR PPP: 2.1 (ref 0.8–1.2)
PROTHROMBIN TIME: 21.8 SEC (ref 9–12.5)

## 2021-03-15 PROCEDURE — 1159F PR MEDICATION LIST DOCUMENTED IN MEDICAL RECORD: ICD-10-PCS | Mod: S$GLB,,, | Performed by: INTERNAL MEDICINE

## 2021-03-15 PROCEDURE — 99214 OFFICE O/P EST MOD 30 MIN: CPT | Mod: S$GLB,,, | Performed by: INTERNAL MEDICINE

## 2021-03-15 PROCEDURE — 99999 PR PBB SHADOW E&M-EST. PATIENT-LVL III: ICD-10-PCS | Mod: PBBFAC,,, | Performed by: INTERNAL MEDICINE

## 2021-03-15 PROCEDURE — 3288F FALL RISK ASSESSMENT DOCD: CPT | Mod: CPTII,S$GLB,, | Performed by: INTERNAL MEDICINE

## 2021-03-15 PROCEDURE — 3288F PR FALLS RISK ASSESSMENT DOCUMENTED: ICD-10-PCS | Mod: CPTII,S$GLB,, | Performed by: INTERNAL MEDICINE

## 2021-03-15 PROCEDURE — 36415 COLL VENOUS BLD VENIPUNCTURE: CPT | Performed by: INTERNAL MEDICINE

## 2021-03-15 PROCEDURE — 99999 PR PBB SHADOW E&M-EST. PATIENT-LVL III: CPT | Mod: PBBFAC,,, | Performed by: INTERNAL MEDICINE

## 2021-03-15 PROCEDURE — 99214 PR OFFICE/OUTPT VISIT, EST, LEVL IV, 30-39 MIN: ICD-10-PCS | Mod: S$GLB,,, | Performed by: INTERNAL MEDICINE

## 2021-03-15 PROCEDURE — 1101F PT FALLS ASSESS-DOCD LE1/YR: CPT | Mod: CPTII,S$GLB,, | Performed by: INTERNAL MEDICINE

## 2021-03-15 PROCEDURE — 3075F PR MOST RECENT SYSTOLIC BLOOD PRESS GE 130-139MM HG: ICD-10-PCS | Mod: CPTII,S$GLB,, | Performed by: INTERNAL MEDICINE

## 2021-03-15 PROCEDURE — 3008F PR BODY MASS INDEX (BMI) DOCUMENTED: ICD-10-PCS | Mod: CPTII,S$GLB,, | Performed by: INTERNAL MEDICINE

## 2021-03-15 PROCEDURE — 3008F BODY MASS INDEX DOCD: CPT | Mod: CPTII,S$GLB,, | Performed by: INTERNAL MEDICINE

## 2021-03-15 PROCEDURE — 3075F SYST BP GE 130 - 139MM HG: CPT | Mod: CPTII,S$GLB,, | Performed by: INTERNAL MEDICINE

## 2021-03-15 PROCEDURE — 85610 PROTHROMBIN TIME: CPT | Performed by: INTERNAL MEDICINE

## 2021-03-15 PROCEDURE — 1159F MED LIST DOCD IN RCRD: CPT | Mod: S$GLB,,, | Performed by: INTERNAL MEDICINE

## 2021-03-15 PROCEDURE — 1126F PR PAIN SEVERITY QUANTIFIED, NO PAIN PRESENT: ICD-10-PCS | Mod: S$GLB,,, | Performed by: INTERNAL MEDICINE

## 2021-03-15 PROCEDURE — 1101F PR PT FALLS ASSESS DOC 0-1 FALLS W/OUT INJ PAST YR: ICD-10-PCS | Mod: CPTII,S$GLB,, | Performed by: INTERNAL MEDICINE

## 2021-03-15 PROCEDURE — 3078F DIAST BP <80 MM HG: CPT | Mod: CPTII,S$GLB,, | Performed by: INTERNAL MEDICINE

## 2021-03-15 PROCEDURE — 3078F PR MOST RECENT DIASTOLIC BLOOD PRESSURE < 80 MM HG: ICD-10-PCS | Mod: CPTII,S$GLB,, | Performed by: INTERNAL MEDICINE

## 2021-03-15 PROCEDURE — 1126F AMNT PAIN NOTED NONE PRSNT: CPT | Mod: S$GLB,,, | Performed by: INTERNAL MEDICINE

## 2021-03-17 ENCOUNTER — LAB VISIT (OUTPATIENT)
Dept: LAB | Facility: HOSPITAL | Age: 71
End: 2021-03-17
Attending: INTERNAL MEDICINE
Payer: MEDICARE

## 2021-03-17 ENCOUNTER — TELEPHONE (OUTPATIENT)
Dept: CARDIOLOGY | Facility: CLINIC | Age: 71
End: 2021-03-17

## 2021-03-17 DIAGNOSIS — I48.0 PAROXYSMAL ATRIAL FIBRILLATION: ICD-10-CM

## 2021-03-17 LAB
INR PPP: 1.9 (ref 0.8–1.2)
PROTHROMBIN TIME: 19.9 SEC (ref 9–12.5)

## 2021-03-17 PROCEDURE — 85610 PROTHROMBIN TIME: CPT | Performed by: INTERNAL MEDICINE

## 2021-03-17 PROCEDURE — 36415 COLL VENOUS BLD VENIPUNCTURE: CPT | Performed by: INTERNAL MEDICINE

## 2021-03-18 ENCOUNTER — ANTI-COAG VISIT (OUTPATIENT)
Dept: CARDIOLOGY | Facility: CLINIC | Age: 71
End: 2021-03-18

## 2021-03-18 DIAGNOSIS — Z79.01 LONG TERM CURRENT USE OF ANTICOAGULANT THERAPY: ICD-10-CM

## 2021-03-18 DIAGNOSIS — I48.0 PAROXYSMAL ATRIAL FIBRILLATION: Primary | ICD-10-CM

## 2021-04-06 ENCOUNTER — PATIENT MESSAGE (OUTPATIENT)
Dept: CARDIOLOGY | Facility: CLINIC | Age: 71
End: 2021-04-06

## 2021-04-06 DIAGNOSIS — I48.0 PAROXYSMAL ATRIAL FIBRILLATION: ICD-10-CM

## 2021-04-20 ENCOUNTER — PATIENT MESSAGE (OUTPATIENT)
Dept: CARDIOLOGY | Facility: CLINIC | Age: 71
End: 2021-04-20

## 2021-10-14 ENCOUNTER — OFFICE VISIT (OUTPATIENT)
Dept: CARDIOLOGY | Facility: CLINIC | Age: 71
End: 2021-10-14
Payer: MEDICARE

## 2021-10-14 VITALS
DIASTOLIC BLOOD PRESSURE: 70 MMHG | WEIGHT: 244.25 LBS | HEIGHT: 72 IN | SYSTOLIC BLOOD PRESSURE: 120 MMHG | OXYGEN SATURATION: 99 % | BODY MASS INDEX: 33.08 KG/M2 | HEART RATE: 59 BPM

## 2021-10-14 DIAGNOSIS — Z79.01 LONG TERM CURRENT USE OF ANTICOAGULANT THERAPY: Chronic | ICD-10-CM

## 2021-10-14 DIAGNOSIS — I10 PRIMARY HYPERTENSION: Chronic | ICD-10-CM

## 2021-10-14 DIAGNOSIS — I73.9 PVD (PERIPHERAL VASCULAR DISEASE): ICD-10-CM

## 2021-10-14 DIAGNOSIS — I48.0 PAROXYSMAL ATRIAL FIBRILLATION: Primary | Chronic | ICD-10-CM

## 2021-10-14 DIAGNOSIS — E78.1 ENDOGENOUS HYPERLIPEMIA: ICD-10-CM

## 2021-10-14 DIAGNOSIS — I73.9 CLAUDICATION: ICD-10-CM

## 2021-10-14 PROCEDURE — 99999 PR PBB SHADOW E&M-EST. PATIENT-LVL III: ICD-10-PCS | Mod: PBBFAC,,, | Performed by: INTERNAL MEDICINE

## 2021-10-14 PROCEDURE — 4010F ACE/ARB THERAPY RXD/TAKEN: CPT | Mod: CPTII,S$GLB,, | Performed by: INTERNAL MEDICINE

## 2021-10-14 PROCEDURE — 3078F PR MOST RECENT DIASTOLIC BLOOD PRESSURE < 80 MM HG: ICD-10-PCS | Mod: CPTII,S$GLB,, | Performed by: INTERNAL MEDICINE

## 2021-10-14 PROCEDURE — 3074F SYST BP LT 130 MM HG: CPT | Mod: CPTII,S$GLB,, | Performed by: INTERNAL MEDICINE

## 2021-10-14 PROCEDURE — 3078F DIAST BP <80 MM HG: CPT | Mod: CPTII,S$GLB,, | Performed by: INTERNAL MEDICINE

## 2021-10-14 PROCEDURE — 4010F PR ACE/ARB THEARPY RXD/TAKEN: ICD-10-PCS | Mod: CPTII,S$GLB,, | Performed by: INTERNAL MEDICINE

## 2021-10-14 PROCEDURE — 3008F PR BODY MASS INDEX (BMI) DOCUMENTED: ICD-10-PCS | Mod: CPTII,S$GLB,, | Performed by: INTERNAL MEDICINE

## 2021-10-14 PROCEDURE — 99214 PR OFFICE/OUTPT VISIT, EST, LEVL IV, 30-39 MIN: ICD-10-PCS | Mod: S$GLB,,, | Performed by: INTERNAL MEDICINE

## 2021-10-14 PROCEDURE — 99999 PR PBB SHADOW E&M-EST. PATIENT-LVL III: CPT | Mod: PBBFAC,,, | Performed by: INTERNAL MEDICINE

## 2021-10-14 PROCEDURE — 3074F PR MOST RECENT SYSTOLIC BLOOD PRESSURE < 130 MM HG: ICD-10-PCS | Mod: CPTII,S$GLB,, | Performed by: INTERNAL MEDICINE

## 2021-10-14 PROCEDURE — 99214 OFFICE O/P EST MOD 30 MIN: CPT | Mod: S$GLB,,, | Performed by: INTERNAL MEDICINE

## 2021-10-14 PROCEDURE — 3008F BODY MASS INDEX DOCD: CPT | Mod: CPTII,S$GLB,, | Performed by: INTERNAL MEDICINE

## 2021-10-27 ENCOUNTER — HOSPITAL ENCOUNTER (OUTPATIENT)
Dept: RADIOLOGY | Facility: HOSPITAL | Age: 71
Discharge: HOME | End: 2021-10-27
Attending: INTERNAL MEDICINE
Payer: MEDICARE

## 2021-10-27 DIAGNOSIS — I73.9 PVD (PERIPHERAL VASCULAR DISEASE): ICD-10-CM

## 2021-10-27 DIAGNOSIS — I73.9 CLAUDICATION: ICD-10-CM

## 2021-10-27 PROCEDURE — 93926 LOWER EXTREMITY STUDY: CPT | Mod: 26,RT,, | Performed by: RADIOLOGY

## 2021-10-27 PROCEDURE — 93926 US LOWER EXTREMITY ARTERIES RIGHT: ICD-10-PCS | Mod: 26,RT,, | Performed by: RADIOLOGY

## 2021-10-27 PROCEDURE — 93926 LOWER EXTREMITY STUDY: CPT | Mod: TC,RT

## 2021-11-04 ENCOUNTER — LAB VISIT (OUTPATIENT)
Dept: LAB | Facility: HOSPITAL | Age: 71
End: 2021-11-04
Attending: FAMILY MEDICINE
Payer: MEDICARE

## 2021-11-04 DIAGNOSIS — I10 PRIMARY HYPERTENSION: Chronic | ICD-10-CM

## 2021-11-04 PROBLEM — M48.062 LUMBAR STENOSIS WITH NEUROGENIC CLAUDICATION: Status: RESOLVED | Noted: 2019-10-09 | Resolved: 2021-11-04

## 2021-11-04 LAB
ALBUMIN SERPL BCP-MCNC: 3.7 G/DL (ref 3.5–5.2)
ALP SERPL-CCNC: 76 U/L (ref 55–135)
ALT SERPL W/O P-5'-P-CCNC: 12 U/L (ref 10–44)
ANION GAP SERPL CALC-SCNC: 9 MMOL/L (ref 8–16)
AST SERPL-CCNC: 15 U/L (ref 10–40)
BASOPHILS # BLD AUTO: 0.01 K/UL (ref 0–0.2)
BASOPHILS NFR BLD: 0.3 % (ref 0–1.9)
BILIRUB SERPL-MCNC: 0.4 MG/DL (ref 0.1–1)
BUN SERPL-MCNC: 18 MG/DL (ref 8–23)
CALCIUM SERPL-MCNC: 9.7 MG/DL (ref 8.7–10.5)
CHLORIDE SERPL-SCNC: 107 MMOL/L (ref 95–110)
CHOLEST SERPL-MCNC: 199 MG/DL (ref 120–199)
CHOLEST/HDLC SERPL: 3 {RATIO} (ref 2–5)
CO2 SERPL-SCNC: 22 MMOL/L (ref 23–29)
CREAT SERPL-MCNC: 1.1 MG/DL (ref 0.5–1.4)
DIFFERENTIAL METHOD: ABNORMAL
EOSINOPHIL # BLD AUTO: 0.1 K/UL (ref 0–0.5)
EOSINOPHIL NFR BLD: 1.8 % (ref 0–8)
ERYTHROCYTE [DISTWIDTH] IN BLOOD BY AUTOMATED COUNT: 16.1 % (ref 11.5–14.5)
EST. GFR  (AFRICAN AMERICAN): 58 ML/MIN/1.73 M^2
EST. GFR  (NON AFRICAN AMERICAN): 50 ML/MIN/1.73 M^2
GLUCOSE SERPL-MCNC: 93 MG/DL (ref 70–110)
HCT VFR BLD AUTO: 30.2 % (ref 37–48.5)
HDLC SERPL-MCNC: 66 MG/DL (ref 40–75)
HDLC SERPL: 33.2 % (ref 20–50)
HGB BLD-MCNC: 10.2 G/DL (ref 12–16)
IMM GRANULOCYTES # BLD AUTO: 0 K/UL (ref 0–0.04)
IMM GRANULOCYTES NFR BLD AUTO: 0 % (ref 0–0.5)
LDLC SERPL CALC-MCNC: 118.8 MG/DL (ref 63–159)
LYMPHOCYTES # BLD AUTO: 1.2 K/UL (ref 1–4.8)
LYMPHOCYTES NFR BLD: 31 % (ref 18–48)
MCH RBC QN AUTO: 28.7 PG (ref 27–31)
MCHC RBC AUTO-ENTMCNC: 33.8 G/DL (ref 32–36)
MCV RBC AUTO: 85 FL (ref 82–98)
MONOCYTES # BLD AUTO: 0.4 K/UL (ref 0.3–1)
MONOCYTES NFR BLD: 8.9 % (ref 4–15)
NEUTROPHILS # BLD AUTO: 2.3 K/UL (ref 1.8–7.7)
NEUTROPHILS NFR BLD: 58 % (ref 38–73)
NONHDLC SERPL-MCNC: 133 MG/DL
NRBC BLD-RTO: 0 /100 WBC
PLATELET # BLD AUTO: 258 K/UL (ref 150–450)
PMV BLD AUTO: 8.4 FL (ref 9.2–12.9)
POTASSIUM SERPL-SCNC: 4.4 MMOL/L (ref 3.5–5.1)
PROT SERPL-MCNC: 8.3 G/DL (ref 6–8.4)
RBC # BLD AUTO: 3.56 M/UL (ref 4–5.4)
SODIUM SERPL-SCNC: 138 MMOL/L (ref 136–145)
TRIGL SERPL-MCNC: 71 MG/DL (ref 30–150)
TSH SERPL DL<=0.005 MIU/L-ACNC: 1.79 UIU/ML (ref 0.4–4)
WBC # BLD AUTO: 3.93 K/UL (ref 3.9–12.7)

## 2021-11-04 PROCEDURE — 80061 LIPID PANEL: CPT | Performed by: FAMILY MEDICINE

## 2021-11-04 PROCEDURE — 80053 COMPREHEN METABOLIC PANEL: CPT | Performed by: FAMILY MEDICINE

## 2021-11-04 PROCEDURE — 85025 COMPLETE CBC W/AUTO DIFF WBC: CPT | Performed by: FAMILY MEDICINE

## 2021-11-04 PROCEDURE — 84443 ASSAY THYROID STIM HORMONE: CPT | Performed by: FAMILY MEDICINE

## 2021-11-04 PROCEDURE — 36415 COLL VENOUS BLD VENIPUNCTURE: CPT | Performed by: FAMILY MEDICINE

## 2021-11-05 PROBLEM — N18.31 CHRONIC KIDNEY DISEASE, STAGE 3A: Status: ACTIVE | Noted: 2021-11-05

## 2022-01-08 NOTE — TELEPHONE ENCOUNTER
----- Message from Jovan Garcia sent at 12/13/2019 11:12 AM CST -----  Contact: [phone]/ Sd with [email or web address]   Sd with [email or web address] would like to speak with you in regards to getting a diagnosis on the patient. Please call.   
Left message to contact clinic.   
no

## 2022-02-08 PROBLEM — D64.9 ANEMIA: Chronic | Status: ACTIVE | Noted: 2017-05-17

## 2022-02-11 ENCOUNTER — LAB VISIT (OUTPATIENT)
Dept: LAB | Facility: HOSPITAL | Age: 72
End: 2022-02-11
Attending: FAMILY MEDICINE
Payer: MEDICARE

## 2022-02-11 DIAGNOSIS — D64.9 ANEMIA, UNSPECIFIED TYPE: ICD-10-CM

## 2022-02-11 LAB
BASOPHILS # BLD AUTO: 0.01 K/UL (ref 0–0.2)
BASOPHILS NFR BLD: 0.3 % (ref 0–1.9)
DIFFERENTIAL METHOD: ABNORMAL
EOSINOPHIL # BLD AUTO: 0.1 K/UL (ref 0–0.5)
EOSINOPHIL NFR BLD: 1.9 % (ref 0–8)
ERYTHROCYTE [DISTWIDTH] IN BLOOD BY AUTOMATED COUNT: 16.2 % (ref 11.5–14.5)
FERRITIN SERPL-MCNC: 218 NG/ML (ref 20–300)
HCT VFR BLD AUTO: 29.2 % (ref 37–48.5)
HGB BLD-MCNC: 9.9 G/DL (ref 12–16)
IMM GRANULOCYTES # BLD AUTO: 0.01 K/UL (ref 0–0.04)
IMM GRANULOCYTES NFR BLD AUTO: 0.3 % (ref 0–0.5)
LYMPHOCYTES # BLD AUTO: 1.2 K/UL (ref 1–4.8)
LYMPHOCYTES NFR BLD: 33.6 % (ref 18–48)
MCH RBC QN AUTO: 28.5 PG (ref 27–31)
MCHC RBC AUTO-ENTMCNC: 33.9 G/DL (ref 32–36)
MCV RBC AUTO: 84 FL (ref 82–98)
MONOCYTES # BLD AUTO: 0.4 K/UL (ref 0.3–1)
MONOCYTES NFR BLD: 11.9 % (ref 4–15)
NEUTROPHILS # BLD AUTO: 1.9 K/UL (ref 1.8–7.7)
NEUTROPHILS NFR BLD: 52 % (ref 38–73)
NRBC BLD-RTO: 0 /100 WBC
PLATELET # BLD AUTO: 241 K/UL (ref 150–450)
PMV BLD AUTO: 8.5 FL (ref 9.2–12.9)
RBC # BLD AUTO: 3.47 M/UL (ref 4–5.4)
WBC # BLD AUTO: 3.6 K/UL (ref 3.9–12.7)

## 2022-02-11 PROCEDURE — 84466 ASSAY OF TRANSFERRIN: CPT | Performed by: FAMILY MEDICINE

## 2022-02-11 PROCEDURE — 82728 ASSAY OF FERRITIN: CPT | Performed by: FAMILY MEDICINE

## 2022-02-11 PROCEDURE — 82746 ASSAY OF FOLIC ACID SERUM: CPT | Performed by: FAMILY MEDICINE

## 2022-02-11 PROCEDURE — 36415 COLL VENOUS BLD VENIPUNCTURE: CPT | Performed by: FAMILY MEDICINE

## 2022-02-11 PROCEDURE — 82607 VITAMIN B-12: CPT | Performed by: FAMILY MEDICINE

## 2022-02-11 PROCEDURE — 85025 COMPLETE CBC W/AUTO DIFF WBC: CPT | Performed by: FAMILY MEDICINE

## 2022-02-12 LAB
FOLATE SERPL-MCNC: 9.1 NG/ML (ref 4–24)
IRON SERPL-MCNC: 75 UG/DL (ref 30–160)
SATURATED IRON: 27 % (ref 20–50)
TOTAL IRON BINDING CAPACITY: 280 UG/DL (ref 250–450)
TRANSFERRIN SERPL-MCNC: 189 MG/DL (ref 200–375)
VIT B12 SERPL-MCNC: >2000 PG/ML (ref 210–950)

## 2022-04-04 ENCOUNTER — PATIENT MESSAGE (OUTPATIENT)
Dept: CARDIOLOGY | Facility: CLINIC | Age: 72
End: 2022-04-04
Payer: MEDICARE

## 2022-04-04 ENCOUNTER — TELEPHONE (OUTPATIENT)
Dept: CARDIOLOGY | Facility: CLINIC | Age: 72
End: 2022-04-04
Payer: MEDICARE

## 2022-04-04 DIAGNOSIS — I48.0 PAROXYSMAL ATRIAL FIBRILLATION: ICD-10-CM

## 2022-04-04 NOTE — TELEPHONE ENCOUNTER
----- Message from Cyndi Michael sent at 4/4/2022 12:55 PM CDT -----  Regarding: self .[phone]  .Type:  Patient Returning Call    Who Called:  self     Who Left Message for Patient:  Jayleen Bustamante    Does the patient know what this is regarding? yes    Would the patient rather a call back or a response via [facility]? Yes     Best Call Back Number: .[phone]

## 2022-04-04 NOTE — TELEPHONE ENCOUNTER
----- Message from Aurelia Castillo sent at 4/4/2022 11:58 AM CDT -----  Type:  Needs Medical Advice    Who Called: pt  Symptoms (please be specific): pt is needing a return call back      Would the patient rather a call back or a response via [facility]? call  Best Call Back Number: [phone]  Additional Information:

## 2022-04-05 ENCOUNTER — LAB VISIT (OUTPATIENT)
Dept: LAB | Facility: HOSPITAL | Age: 72
End: 2022-04-05
Attending: FAMILY MEDICINE
Payer: MEDICARE

## 2022-04-05 DIAGNOSIS — R10.9 RIGHT FLANK PAIN: ICD-10-CM

## 2022-04-05 LAB
ALBUMIN SERPL BCP-MCNC: 3.7 G/DL (ref 3.5–5.2)
ALP SERPL-CCNC: 71 U/L (ref 55–135)
ALT SERPL W/O P-5'-P-CCNC: 14 U/L (ref 10–44)
ANION GAP SERPL CALC-SCNC: 8 MMOL/L (ref 8–16)
AST SERPL-CCNC: 19 U/L (ref 10–40)
BASOPHILS # BLD AUTO: 0.01 K/UL (ref 0–0.2)
BASOPHILS NFR BLD: 0.3 % (ref 0–1.9)
BILIRUB SERPL-MCNC: 0.5 MG/DL (ref 0.1–1)
BUN SERPL-MCNC: 20 MG/DL (ref 8–23)
CALCIUM SERPL-MCNC: 9.5 MG/DL (ref 8.7–10.5)
CHLORIDE SERPL-SCNC: 106 MMOL/L (ref 95–110)
CO2 SERPL-SCNC: 25 MMOL/L (ref 23–29)
CREAT SERPL-MCNC: 1 MG/DL (ref 0.5–1.4)
DIFFERENTIAL METHOD: ABNORMAL
EOSINOPHIL # BLD AUTO: 0.1 K/UL (ref 0–0.5)
EOSINOPHIL NFR BLD: 2.4 % (ref 0–8)
ERYTHROCYTE [DISTWIDTH] IN BLOOD BY AUTOMATED COUNT: 16.6 % (ref 11.5–14.5)
EST. GFR  (AFRICAN AMERICAN): >60 ML/MIN/1.73 M^2
EST. GFR  (NON AFRICAN AMERICAN): 56 ML/MIN/1.73 M^2
GLUCOSE SERPL-MCNC: 104 MG/DL (ref 70–110)
HCT VFR BLD AUTO: 30 % (ref 37–48.5)
HGB BLD-MCNC: 10 G/DL (ref 12–16)
IMM GRANULOCYTES # BLD AUTO: 0 K/UL (ref 0–0.04)
IMM GRANULOCYTES NFR BLD AUTO: 0 % (ref 0–0.5)
LYMPHOCYTES # BLD AUTO: 1.1 K/UL (ref 1–4.8)
LYMPHOCYTES NFR BLD: 33 % (ref 18–48)
MCH RBC QN AUTO: 28.7 PG (ref 27–31)
MCHC RBC AUTO-ENTMCNC: 33.3 G/DL (ref 32–36)
MCV RBC AUTO: 86 FL (ref 82–98)
MONOCYTES # BLD AUTO: 0.4 K/UL (ref 0.3–1)
MONOCYTES NFR BLD: 11.3 % (ref 4–15)
NEUTROPHILS # BLD AUTO: 1.8 K/UL (ref 1.8–7.7)
NEUTROPHILS NFR BLD: 53 % (ref 38–73)
NRBC BLD-RTO: 0 /100 WBC
PLATELET # BLD AUTO: 257 K/UL (ref 150–450)
PMV BLD AUTO: 8.4 FL (ref 9.2–12.9)
POTASSIUM SERPL-SCNC: 4.4 MMOL/L (ref 3.5–5.1)
PROT SERPL-MCNC: 8.1 G/DL (ref 6–8.4)
RBC # BLD AUTO: 3.49 M/UL (ref 4–5.4)
SODIUM SERPL-SCNC: 139 MMOL/L (ref 136–145)
WBC # BLD AUTO: 3.36 K/UL (ref 3.9–12.7)

## 2022-04-05 PROCEDURE — 85025 COMPLETE CBC W/AUTO DIFF WBC: CPT | Performed by: FAMILY MEDICINE

## 2022-04-05 PROCEDURE — 36415 COLL VENOUS BLD VENIPUNCTURE: CPT | Performed by: FAMILY MEDICINE

## 2022-04-05 PROCEDURE — 80053 COMPREHEN METABOLIC PANEL: CPT | Performed by: FAMILY MEDICINE

## 2022-06-01 ENCOUNTER — PATIENT MESSAGE (OUTPATIENT)
Dept: CARDIOLOGY | Facility: CLINIC | Age: 72
End: 2022-06-01
Payer: MEDICARE

## 2022-06-08 ENCOUNTER — OFFICE VISIT (OUTPATIENT)
Dept: CARDIOLOGY | Facility: CLINIC | Age: 72
End: 2022-06-08
Payer: MEDICARE

## 2022-06-08 VITALS
HEART RATE: 64 BPM | SYSTOLIC BLOOD PRESSURE: 119 MMHG | DIASTOLIC BLOOD PRESSURE: 79 MMHG | BODY MASS INDEX: 33.14 KG/M2 | WEIGHT: 244.69 LBS | HEIGHT: 72 IN

## 2022-06-08 DIAGNOSIS — I10 PRIMARY HYPERTENSION: Chronic | ICD-10-CM

## 2022-06-08 DIAGNOSIS — I48.0 PAROXYSMAL ATRIAL FIBRILLATION: Primary | Chronic | ICD-10-CM

## 2022-06-08 PROCEDURE — 1126F AMNT PAIN NOTED NONE PRSNT: CPT | Mod: CPTII,S$GLB,, | Performed by: INTERNAL MEDICINE

## 2022-06-08 PROCEDURE — 99999 PR PBB SHADOW E&M-EST. PATIENT-LVL III: ICD-10-PCS | Mod: PBBFAC,,, | Performed by: INTERNAL MEDICINE

## 2022-06-08 PROCEDURE — 3078F DIAST BP <80 MM HG: CPT | Mod: CPTII,S$GLB,, | Performed by: INTERNAL MEDICINE

## 2022-06-08 PROCEDURE — 3008F PR BODY MASS INDEX (BMI) DOCUMENTED: ICD-10-PCS | Mod: CPTII,S$GLB,, | Performed by: INTERNAL MEDICINE

## 2022-06-08 PROCEDURE — 4010F ACE/ARB THERAPY RXD/TAKEN: CPT | Mod: CPTII,S$GLB,, | Performed by: INTERNAL MEDICINE

## 2022-06-08 PROCEDURE — 99214 OFFICE O/P EST MOD 30 MIN: CPT | Mod: S$GLB,,, | Performed by: INTERNAL MEDICINE

## 2022-06-08 PROCEDURE — 1126F PR PAIN SEVERITY QUANTIFIED, NO PAIN PRESENT: ICD-10-PCS | Mod: CPTII,S$GLB,, | Performed by: INTERNAL MEDICINE

## 2022-06-08 PROCEDURE — 3074F SYST BP LT 130 MM HG: CPT | Mod: CPTII,S$GLB,, | Performed by: INTERNAL MEDICINE

## 2022-06-08 PROCEDURE — 3078F PR MOST RECENT DIASTOLIC BLOOD PRESSURE < 80 MM HG: ICD-10-PCS | Mod: CPTII,S$GLB,, | Performed by: INTERNAL MEDICINE

## 2022-06-08 PROCEDURE — 99214 PR OFFICE/OUTPT VISIT, EST, LEVL IV, 30-39 MIN: ICD-10-PCS | Mod: S$GLB,,, | Performed by: INTERNAL MEDICINE

## 2022-06-08 PROCEDURE — 1159F PR MEDICATION LIST DOCUMENTED IN MEDICAL RECORD: ICD-10-PCS | Mod: CPTII,S$GLB,, | Performed by: INTERNAL MEDICINE

## 2022-06-08 PROCEDURE — 3074F PR MOST RECENT SYSTOLIC BLOOD PRESSURE < 130 MM HG: ICD-10-PCS | Mod: CPTII,S$GLB,, | Performed by: INTERNAL MEDICINE

## 2022-06-08 PROCEDURE — 99999 PR PBB SHADOW E&M-EST. PATIENT-LVL III: CPT | Mod: PBBFAC,,, | Performed by: INTERNAL MEDICINE

## 2022-06-08 PROCEDURE — 4010F PR ACE/ARB THEARPY RXD/TAKEN: ICD-10-PCS | Mod: CPTII,S$GLB,, | Performed by: INTERNAL MEDICINE

## 2022-06-08 PROCEDURE — 1159F MED LIST DOCD IN RCRD: CPT | Mod: CPTII,S$GLB,, | Performed by: INTERNAL MEDICINE

## 2022-06-08 PROCEDURE — 3008F BODY MASS INDEX DOCD: CPT | Mod: CPTII,S$GLB,, | Performed by: INTERNAL MEDICINE

## 2022-06-08 NOTE — PROGRESS NOTES
Subjective:   @Patient ID:  Jayla Treviño is a 72 y.o. female who presents for follow-up of  PAF, PVD, and HTN.       HPI:   Here for f/u   She has been doing well  No recurrent a.fib l  Compliant with eliquis with no bleeding issues  Main c/o is here chronic back pain.   No chest pain      She has Claudication the LE which believed to be neurogenic from her back.       Pertinent hx: PAF with Pill in the pocket rhythm strategy,    Prior cardiovascular  Hx  --------------------------------    - Arterial U/S 10/14/2021  No abnormal velocity doubling to suggest hemodynamically significant stenosis on the right.     - Exercise CATHY in 2017 was WNL    - ECHO 2017  EF 55-60%    - EKG 2019  Sinus bradycardia, no acute ST-T wave changes          Patient Active Problem List    Diagnosis Date Noted    Chronic kidney disease, stage 3a 2021    Spinal stenosis of lumbar region with radiculopathy 2019    Foraminal stenosis of lumbar region 10/09/2019    Atherosclerosis of coronary artery 2019    Endogenous hyperlipemia 2019    Seasonal allergic rhinitis 2019    Gastroesophageal reflux disease 2018    Endometrial cancer 2018    Post-menopausal bleeding 05/15/2018    s/p RA-TLH/BSO 5/15/2018 05/15/2018    Impaired fasting glucose 2017    Anemia 2017    Obesity 2017    Paroxysmal atrial fibrillation 2017    Hypertension 2017    PVD (peripheral vascular disease) 2017    Long term current use of anticoagulant therapy 2017    Nuclear sclerosis - Both Eyes 2013         Right Arm BP - Sittin/80  Left Arm BP - Sittin/79  LAST HbA1c  No results found for: HGBA1C    Lipid panel  Lab Results   Component Value Date    CHOL 199 2021    CHOL 208 (H) 11/15/2019     Lab Results   Component Value Date    HDL 66 2021    HDL 74 11/15/2019     Lab Results   Component Value Date    LDLCALC 118.8 2021     LDLCALC 124.4 11/15/2019     Lab Results   Component Value Date    TRIG 71 11/04/2021    TRIG 48 11/15/2019     Lab Results   Component Value Date    CHOLHDL 33.2 11/04/2021    CHOLHDL 35.6 11/15/2019            Review of Systems   Constitutional: Negative for chills and fever.   HENT: Negative for hearing loss and nosebleeds.    Eyes: Negative for blurred vision.   Cardiovascular: Negative for chest pain and palpitations.   Respiratory: Negative for hemoptysis and shortness of breath.    Hematologic/Lymphatic: Negative for bleeding problem.   Skin: Negative for itching.   Musculoskeletal:        As in HPI   Gastrointestinal: Negative for abdominal pain and hematochezia.   Genitourinary: Negative for hematuria.   Neurological: Negative for dizziness and loss of balance.   Psychiatric/Behavioral: Negative for altered mental status and depression.       Objective:   Physical Exam  Constitutional:       Appearance: She is well-developed.   HENT:      Head: Normocephalic and atraumatic.   Eyes:      Conjunctiva/sclera: Conjunctivae normal.   Neck:      Vascular: No carotid bruit.   Cardiovascular:      Rate and Rhythm: Regular rhythm. Bradycardia present.      Heart sounds: Normal heart sounds. No murmur heard.    No friction rub. No gallop.   Pulmonary:      Effort: Pulmonary effort is normal. No respiratory distress.      Breath sounds: Normal breath sounds. No stridor. No wheezing.   Musculoskeletal:      Cervical back: Neck supple.   Skin:     General: Skin is warm and dry.   Neurological:      Mental Status: She is alert and oriented to person, place, and time.   Psychiatric:         Behavior: Behavior normal.         Assessment:     1. Paroxysmal atrial fibrillation    2. Primary hypertension        Plan:     - Continue current strategy  - Continue Eliquis.   - Check U/S art Rt LE. Likely some element of arthritis      I spent 5-10 minutes asking, assessing, assisting, arranging and advising heart healthy diet  improvements. This included low-salt meals, portion control and health food alternatives. I also encourage 30 minutes of moderate exercise 3-4x a week.     1 yr f/u     - EKG reviewed, sinus bradycardia, no acute ST-T wave changes     Continue with current medical plan and lifestyle changes.  Return sooner for concerns or questions. If symptoms persist go to the ED  I have reviewed all pertinent data including patient's medical history in detail and updated the computerized patient record.     No orders of the defined types were placed in this encounter.      Follow up as scheduled.     She expressed verbal understanding and agreed with the plan    Patient's Medications   New Prescriptions    No medications on file   Previous Medications    APIXABAN (ELIQUIS) 5 MG TAB    Take 1 tablet (5 mg total) by mouth 2 (two) times daily.    CHOLECALCIFEROL, VITAMIN D3, (VITAMIN D3) 125 MCG (5,000 UNIT) TAB    Take 5,000 Units by mouth once daily. Takes on Saturday    CYANOCOBALAMIN, VITAMIN B-12, 50 MCG TABLET    Take 50 mcg by mouth every morning. Pt will verify the strength    DICLOFENAC SODIUM (VOLTAREN) 1 % GEL    APPLY 2 GRAMS TOPICALLY THREE TIMES DAILY    FLUTICASONE PROPIONATE (FLONASE) 50 MCG/ACTUATION NASAL SPRAY    Use 2 spray(s) in each nostril once daily    FOSINOPRIL (MONOPRIL) 20 MG TABLET    Take 1 tablet by mouth once daily    GABAPENTIN (NEURONTIN) 100 MG CAPSULE    Take 1 capsule (100 mg total) by mouth every evening.    METOPROLOL TARTRATE (LOPRESSOR) 100 MG TABLET    Take 1 tablet by mouth twice daily    TRIAMTERENE-HYDROCHLOROTHIAZIDE 37.5-25 MG (DYAZIDE) 37.5-25 MG PER CAPSULE    Take 1 capsule by mouth every morning.     TRIAMTERENE-HYDROCHLOROTHIAZIDE 37.5-25 MG (MAXZIDE-25) 37.5-25 MG PER TABLET    TAKE 1 TABLET BY MOUTH ONCE DAILY IN THE MORNING   Modified Medications    No medications on file   Discontinued Medications    No medications on file

## 2022-07-13 DIAGNOSIS — M25.569 ACUTE KNEE PAIN, UNSPECIFIED LATERALITY: Primary | ICD-10-CM

## 2022-07-21 ENCOUNTER — HOSPITAL ENCOUNTER (OUTPATIENT)
Dept: RADIOLOGY | Facility: HOSPITAL | Age: 72
Discharge: HOME | End: 2022-07-21
Attending: ORTHOPAEDIC SURGERY
Payer: MEDICARE

## 2022-07-21 ENCOUNTER — OFFICE VISIT (OUTPATIENT)
Dept: ORTHOPEDICS | Facility: CLINIC | Age: 72
End: 2022-07-21
Payer: MEDICARE

## 2022-07-21 VITALS — BODY MASS INDEX: 32.78 KG/M2 | WEIGHT: 242 LBS | HEIGHT: 72 IN

## 2022-07-21 DIAGNOSIS — M25.569 ACUTE KNEE PAIN, UNSPECIFIED LATERALITY: ICD-10-CM

## 2022-07-21 DIAGNOSIS — M17.11 PRIMARY OSTEOARTHRITIS OF RIGHT KNEE: Primary | ICD-10-CM

## 2022-07-21 PROCEDURE — 73564 PR  X-RAY KNEE 4+ VIEW: ICD-10-PCS | Mod: 26,LT,, | Performed by: RADIOLOGY

## 2022-07-21 PROCEDURE — 73564 X-RAY EXAM KNEE 4 OR MORE: CPT | Mod: 26,RT,, | Performed by: RADIOLOGY

## 2022-07-21 PROCEDURE — 1125F AMNT PAIN NOTED PAIN PRSNT: CPT | Mod: CPTII,S$GLB,, | Performed by: ORTHOPAEDIC SURGERY

## 2022-07-21 PROCEDURE — 3288F PR FALLS RISK ASSESSMENT DOCUMENTED: ICD-10-PCS | Mod: CPTII,S$GLB,, | Performed by: ORTHOPAEDIC SURGERY

## 2022-07-21 PROCEDURE — 1101F PT FALLS ASSESS-DOCD LE1/YR: CPT | Mod: CPTII,S$GLB,, | Performed by: ORTHOPAEDIC SURGERY

## 2022-07-21 PROCEDURE — 73564 X-RAY EXAM KNEE 4 OR MORE: CPT | Mod: 26,LT,, | Performed by: RADIOLOGY

## 2022-07-21 PROCEDURE — 3008F BODY MASS INDEX DOCD: CPT | Mod: CPTII,S$GLB,, | Performed by: ORTHOPAEDIC SURGERY

## 2022-07-21 PROCEDURE — 1159F PR MEDICATION LIST DOCUMENTED IN MEDICAL RECORD: ICD-10-PCS | Mod: CPTII,S$GLB,, | Performed by: ORTHOPAEDIC SURGERY

## 2022-07-21 PROCEDURE — 99999 PR PBB SHADOW E&M-EST. PATIENT-LVL III: ICD-10-PCS | Mod: PBBFAC,,, | Performed by: ORTHOPAEDIC SURGERY

## 2022-07-21 PROCEDURE — 1160F PR REVIEW ALL MEDS BY PRESCRIBER/CLIN PHARMACIST DOCUMENTED: ICD-10-PCS | Mod: CPTII,S$GLB,, | Performed by: ORTHOPAEDIC SURGERY

## 2022-07-21 PROCEDURE — 3288F FALL RISK ASSESSMENT DOCD: CPT | Mod: CPTII,S$GLB,, | Performed by: ORTHOPAEDIC SURGERY

## 2022-07-21 PROCEDURE — 1159F MED LIST DOCD IN RCRD: CPT | Mod: CPTII,S$GLB,, | Performed by: ORTHOPAEDIC SURGERY

## 2022-07-21 PROCEDURE — 4010F ACE/ARB THERAPY RXD/TAKEN: CPT | Mod: CPTII,S$GLB,, | Performed by: ORTHOPAEDIC SURGERY

## 2022-07-21 PROCEDURE — 73564 X-RAY EXAM KNEE 4 OR MORE: CPT | Mod: TC,50,PN

## 2022-07-21 PROCEDURE — 1101F PR PT FALLS ASSESS DOC 0-1 FALLS W/OUT INJ PAST YR: ICD-10-PCS | Mod: CPTII,S$GLB,, | Performed by: ORTHOPAEDIC SURGERY

## 2022-07-21 PROCEDURE — 99202 PR OFFICE/OUTPT VISIT, NEW, LEVL II, 15-29 MIN: ICD-10-PCS | Mod: S$GLB,,, | Performed by: ORTHOPAEDIC SURGERY

## 2022-07-21 PROCEDURE — 99202 OFFICE O/P NEW SF 15 MIN: CPT | Mod: S$GLB,,, | Performed by: ORTHOPAEDIC SURGERY

## 2022-07-21 PROCEDURE — 4010F PR ACE/ARB THEARPY RXD/TAKEN: ICD-10-PCS | Mod: CPTII,S$GLB,, | Performed by: ORTHOPAEDIC SURGERY

## 2022-07-21 PROCEDURE — 99999 PR PBB SHADOW E&M-EST. PATIENT-LVL III: CPT | Mod: PBBFAC,,, | Performed by: ORTHOPAEDIC SURGERY

## 2022-07-21 PROCEDURE — 3008F PR BODY MASS INDEX (BMI) DOCUMENTED: ICD-10-PCS | Mod: CPTII,S$GLB,, | Performed by: ORTHOPAEDIC SURGERY

## 2022-07-21 PROCEDURE — 1160F RVW MEDS BY RX/DR IN RCRD: CPT | Mod: CPTII,S$GLB,, | Performed by: ORTHOPAEDIC SURGERY

## 2022-07-21 PROCEDURE — 1125F PR PAIN SEVERITY QUANTIFIED, PAIN PRESENT: ICD-10-PCS | Mod: CPTII,S$GLB,, | Performed by: ORTHOPAEDIC SURGERY

## 2022-07-21 NOTE — PROGRESS NOTES
Subjective:      Patient ID: Jayla Treviño is a 72 y.o. female.    Chief Complaint: Consult (R Knee )    HPI     They have experienced problems with their right knee over the past 3 weeks. The patient denies relevant history of injury/aggravation. Pain is located laterally Associated symptoms include NA.  Symptoms are aggravated by no particular activity. They have been treated with over-the-counter medication.   Symptoms have recently stayed the same. Ambulation reportedly has not been impaired. Self care ADLs are not painful.     Review of Systems   Constitutional: Negative for fever and weight loss.   HENT: Negative for congestion.    Eyes: Negative for visual disturbance.   Cardiovascular: Negative for chest pain.   Respiratory: Negative for shortness of breath.    Hematologic/Lymphatic: Negative for bleeding problem. Does not bruise/bleed easily.   Skin: Negative for poor wound healing.   Musculoskeletal: Positive for joint pain.   Gastrointestinal: Negative for abdominal pain.   Genitourinary: Negative for dysuria.   Neurological: Negative for seizures.   Psychiatric/Behavioral: Negative for altered mental status.   Allergic/Immunologic: Negative for persistent infections.         Objective:      Ortho/SPM Exam      Right knee    The patient is not in acute distress.   Body habitus is normal.   Sclera appear normal  No respiratory distress  The patient walks with a slight limp  Resisted SLR negative.   The skin over the knee is intact.  Knee effusion 0  Tendernes is located absent.  Range of motion- Flexion full, Extension full.   Ligament exam:   MCL intact   Lachman intact              Post sag intact    LCL 1+ laxity  Patellar apprehension negative.  Popliteal cyst negative  Patellar crepitation absent.  Flexion/pinch negative.  Pulses DP present, PT present.  Motor normal 5/5 strength in all tested muscle groups.   Sensory normal.    I reviewed the relevant imaging for the patient's condition:   Right knee films show 50% lateral narrowing.      Assessment:       Encounter Diagnosis   Name Primary?    Primary osteoarthritis of right knee Yes   The condition is structurally mild to moderate.  The patient does not have disabling symptoms and has not really been treated to this point    Her use of Eliquis limits medication options.    The condition may progress over time.        Plan:       Jayla was seen today for consult.    Diagnoses and all orders for this visit:    Primary osteoarthritis of right knee    I explained my diagnostic impression and the reasoning behind it in detail, using layman's terms.  Models and/or pictures were used to help in the explanation.    Hinged knee brace applied with usage instructions    Activity modification explained    Injection was discussed.  The patient I agree that if her symptoms fail to resolve by next visit we will revisit this.

## 2022-07-25 NOTE — TELEPHONE ENCOUNTER
----- Message from Cyndi Michael sent at 4/4/2022 12:55 PM CDT -----  Regarding: self .[phone]  .Type:  Patient Returning Call    Who Called:  self     Who Left Message for Patient:  Jayleen Bustamante    Does the patient know what this is regarding? yes    Would the patient rather a call back or a response via [facility]? Yes     Best Call Back Number: [phone]         ambulatory

## 2022-08-12 ENCOUNTER — PATIENT MESSAGE (OUTPATIENT)
Dept: ORTHOPEDICS | Facility: CLINIC | Age: 72
End: 2022-08-12
Payer: MEDICARE

## 2022-08-16 PROBLEM — M17.11 PRIMARY OSTEOARTHRITIS OF RIGHT KNEE: Status: ACTIVE | Noted: 2022-08-16

## 2022-11-17 ENCOUNTER — LAB VISIT (OUTPATIENT)
Dept: LAB | Facility: HOSPITAL | Age: 72
End: 2022-11-17
Attending: FAMILY MEDICINE
Payer: MEDICARE

## 2022-11-17 DIAGNOSIS — R73.01 IMPAIRED FASTING GLUCOSE: ICD-10-CM

## 2022-11-17 DIAGNOSIS — D64.9 ANEMIA, UNSPECIFIED TYPE: Chronic | ICD-10-CM

## 2022-11-17 LAB
ANION GAP SERPL CALC-SCNC: 6 MMOL/L (ref 8–16)
BASOPHILS # BLD AUTO: 0.01 K/UL (ref 0–0.2)
BASOPHILS NFR BLD: 0.3 % (ref 0–1.9)
BUN SERPL-MCNC: 25 MG/DL (ref 8–23)
CALCIUM SERPL-MCNC: 10.2 MG/DL (ref 8.7–10.5)
CHLORIDE SERPL-SCNC: 107 MMOL/L (ref 95–110)
CO2 SERPL-SCNC: 27 MMOL/L (ref 23–29)
CREAT SERPL-MCNC: 1.1 MG/DL (ref 0.5–1.4)
DIFFERENTIAL METHOD: ABNORMAL
EOSINOPHIL # BLD AUTO: 0.1 K/UL (ref 0–0.5)
EOSINOPHIL NFR BLD: 1.3 % (ref 0–8)
ERYTHROCYTE [DISTWIDTH] IN BLOOD BY AUTOMATED COUNT: 16.3 % (ref 11.5–14.5)
EST. GFR  (NO RACE VARIABLE): 53 ML/MIN/1.73 M^2
ESTIMATED AVG GLUCOSE: 114 MG/DL (ref 68–131)
GLUCOSE SERPL-MCNC: 101 MG/DL (ref 70–110)
HBA1C MFR BLD: 5.6 % (ref 4–5.6)
HCT VFR BLD AUTO: 30.1 % (ref 37–48.5)
HGB BLD-MCNC: 10 G/DL (ref 12–16)
IMM GRANULOCYTES # BLD AUTO: 0.02 K/UL (ref 0–0.04)
IMM GRANULOCYTES NFR BLD AUTO: 0.5 % (ref 0–0.5)
INSULIN COLLECTION INTERVAL: 1
INSULIN SERPL-ACNC: 11.6 UU/ML
LYMPHOCYTES # BLD AUTO: 1.3 K/UL (ref 1–4.8)
LYMPHOCYTES NFR BLD: 32.7 % (ref 18–48)
MCH RBC QN AUTO: 28.4 PG (ref 27–31)
MCHC RBC AUTO-ENTMCNC: 33.2 G/DL (ref 32–36)
MCV RBC AUTO: 86 FL (ref 82–98)
MONOCYTES # BLD AUTO: 0.5 K/UL (ref 0.3–1)
MONOCYTES NFR BLD: 11.7 % (ref 4–15)
NEUTROPHILS # BLD AUTO: 2.1 K/UL (ref 1.8–7.7)
NEUTROPHILS NFR BLD: 53.5 % (ref 38–73)
NRBC BLD-RTO: 0 /100 WBC
PATH REV BLD -IMP: NORMAL
PATH REV BLD -IMP: NORMAL
PLATELET # BLD AUTO: 249 K/UL (ref 150–450)
PMV BLD AUTO: 8.3 FL (ref 9.2–12.9)
POTASSIUM SERPL-SCNC: 4.7 MMOL/L (ref 3.5–5.1)
RBC # BLD AUTO: 3.52 M/UL (ref 4–5.4)
SODIUM SERPL-SCNC: 140 MMOL/L (ref 136–145)
WBC # BLD AUTO: 3.92 K/UL (ref 3.9–12.7)

## 2022-11-17 PROCEDURE — 85025 COMPLETE CBC W/AUTO DIFF WBC: CPT | Performed by: FAMILY MEDICINE

## 2022-11-17 PROCEDURE — 83525 ASSAY OF INSULIN: CPT | Performed by: FAMILY MEDICINE

## 2022-11-17 PROCEDURE — 80048 BASIC METABOLIC PNL TOTAL CA: CPT | Performed by: FAMILY MEDICINE

## 2022-11-17 PROCEDURE — 83036 HEMOGLOBIN GLYCOSYLATED A1C: CPT | Performed by: FAMILY MEDICINE

## 2022-11-17 PROCEDURE — 85060 BLOOD SMEAR INTERPRETATION: CPT | Mod: ,,, | Performed by: PATHOLOGY

## 2022-11-17 PROCEDURE — 36415 COLL VENOUS BLD VENIPUNCTURE: CPT | Performed by: FAMILY MEDICINE

## 2022-11-17 PROCEDURE — 85060 PATHOLOGIST REVIEW: ICD-10-PCS | Mod: ,,, | Performed by: PATHOLOGY

## 2022-11-30 ENCOUNTER — LAB VISIT (OUTPATIENT)
Dept: LAB | Facility: HOSPITAL | Age: 72
End: 2022-11-30
Attending: FAMILY MEDICINE
Payer: MEDICARE

## 2022-11-30 DIAGNOSIS — Z79.899 LONG TERM USE OF DRUG: ICD-10-CM

## 2022-11-30 DIAGNOSIS — I10 PRIMARY HYPERTENSION: ICD-10-CM

## 2022-11-30 DIAGNOSIS — D64.9 ANEMIA, UNSPECIFIED TYPE: ICD-10-CM

## 2022-11-30 PROBLEM — E78.1 ENDOGENOUS HYPERLIPEMIA: Status: RESOLVED | Noted: 2019-04-09 | Resolved: 2022-11-30

## 2022-11-30 PROBLEM — I73.9 PVD (PERIPHERAL VASCULAR DISEASE): Status: RESOLVED | Noted: 2017-04-05 | Resolved: 2022-11-30

## 2022-11-30 PROBLEM — N95.0 POST-MENOPAUSAL BLEEDING: Status: RESOLVED | Noted: 2018-05-15 | Resolved: 2022-11-30

## 2022-11-30 LAB
ALBUMIN SERPL BCP-MCNC: 3.6 G/DL (ref 3.5–5.2)
ALP SERPL-CCNC: 73 U/L (ref 55–135)
ALT SERPL W/O P-5'-P-CCNC: 11 U/L (ref 10–44)
ANION GAP SERPL CALC-SCNC: 10 MMOL/L (ref 8–16)
AST SERPL-CCNC: 14 U/L (ref 10–40)
BILIRUB SERPL-MCNC: 0.6 MG/DL (ref 0.1–1)
BUN SERPL-MCNC: 25 MG/DL (ref 8–23)
CALCIUM SERPL-MCNC: 9.7 MG/DL (ref 8.7–10.5)
CHLORIDE SERPL-SCNC: 105 MMOL/L (ref 95–110)
CO2 SERPL-SCNC: 24 MMOL/L (ref 23–29)
CREAT SERPL-MCNC: 1.1 MG/DL (ref 0.5–1.4)
EST. GFR  (NO RACE VARIABLE): 53 ML/MIN/1.73 M^2
FERRITIN SERPL-MCNC: 249 NG/ML (ref 20–300)
FOLATE SERPL-MCNC: 7.4 NG/ML (ref 4–24)
GLUCOSE SERPL-MCNC: 104 MG/DL (ref 70–110)
IRON SERPL-MCNC: 92 UG/DL (ref 30–160)
POTASSIUM SERPL-SCNC: 4.6 MMOL/L (ref 3.5–5.1)
PROT SERPL-MCNC: 7.9 G/DL (ref 6–8.4)
SATURATED IRON: 31 % (ref 20–50)
SODIUM SERPL-SCNC: 139 MMOL/L (ref 136–145)
TOTAL IRON BINDING CAPACITY: 297 UG/DL (ref 250–450)
TRANSFERRIN SERPL-MCNC: 201 MG/DL (ref 200–375)
TSH SERPL DL<=0.005 MIU/L-ACNC: 2.19 UIU/ML (ref 0.4–4)
VIT B12 SERPL-MCNC: >2000 PG/ML (ref 210–950)

## 2022-11-30 PROCEDURE — 84443 ASSAY THYROID STIM HORMONE: CPT | Performed by: FAMILY MEDICINE

## 2022-11-30 PROCEDURE — 80053 COMPREHEN METABOLIC PANEL: CPT | Performed by: FAMILY MEDICINE

## 2022-11-30 PROCEDURE — 82607 VITAMIN B-12: CPT | Performed by: FAMILY MEDICINE

## 2022-11-30 PROCEDURE — 82728 ASSAY OF FERRITIN: CPT | Performed by: FAMILY MEDICINE

## 2022-11-30 PROCEDURE — 82746 ASSAY OF FOLIC ACID SERUM: CPT | Performed by: FAMILY MEDICINE

## 2022-11-30 PROCEDURE — 84466 ASSAY OF TRANSFERRIN: CPT | Performed by: FAMILY MEDICINE

## 2022-11-30 PROCEDURE — 36415 COLL VENOUS BLD VENIPUNCTURE: CPT | Performed by: FAMILY MEDICINE

## 2022-12-03 ENCOUNTER — PATIENT MESSAGE (OUTPATIENT)
Dept: CARDIOLOGY | Facility: CLINIC | Age: 72
End: 2022-12-03
Payer: MEDICARE

## 2022-12-08 RX ORDER — FLECAINIDE ACETATE 150 MG/1
150 TABLET ORAL EVERY 12 HOURS PRN
COMMUNITY
End: 2023-02-07

## 2022-12-12 ENCOUNTER — OFFICE VISIT (OUTPATIENT)
Dept: URGENT CARE | Facility: CLINIC | Age: 72
End: 2022-12-12
Payer: MEDICARE

## 2022-12-12 ENCOUNTER — PATIENT MESSAGE (OUTPATIENT)
Dept: CARDIOLOGY | Facility: CLINIC | Age: 72
End: 2022-12-12
Payer: MEDICARE

## 2022-12-12 VITALS
TEMPERATURE: 99 F | RESPIRATION RATE: 16 BRPM | DIASTOLIC BLOOD PRESSURE: 65 MMHG | HEART RATE: 62 BPM | HEIGHT: 72 IN | OXYGEN SATURATION: 97 % | BODY MASS INDEX: 32.1 KG/M2 | WEIGHT: 237 LBS | SYSTOLIC BLOOD PRESSURE: 91 MMHG

## 2022-12-12 DIAGNOSIS — U07.1 COVID-19 VIRUS INFECTION: Primary | ICD-10-CM

## 2022-12-12 DIAGNOSIS — U07.1 COVID-19 VIRUS DETECTED: ICD-10-CM

## 2022-12-12 LAB
CTP QC/QA: YES
SARS-COV-2 AG RESP QL IA.RAPID: POSITIVE

## 2022-12-12 PROCEDURE — 4010F ACE/ARB THERAPY RXD/TAKEN: CPT | Mod: CPTII,S$GLB,, | Performed by: NURSE PRACTITIONER

## 2022-12-12 PROCEDURE — 1159F PR MEDICATION LIST DOCUMENTED IN MEDICAL RECORD: ICD-10-PCS | Mod: CPTII,S$GLB,, | Performed by: NURSE PRACTITIONER

## 2022-12-12 PROCEDURE — 1159F MED LIST DOCD IN RCRD: CPT | Mod: CPTII,S$GLB,, | Performed by: NURSE PRACTITIONER

## 2022-12-12 PROCEDURE — 1160F RVW MEDS BY RX/DR IN RCRD: CPT | Mod: CPTII,S$GLB,, | Performed by: NURSE PRACTITIONER

## 2022-12-12 PROCEDURE — 3078F DIAST BP <80 MM HG: CPT | Mod: CPTII,S$GLB,, | Performed by: NURSE PRACTITIONER

## 2022-12-12 PROCEDURE — 87811 SARS CORONAVIRUS 2 ANTIGEN POCT, MANUAL READ: ICD-10-PCS | Mod: QW,S$GLB,, | Performed by: NURSE PRACTITIONER

## 2022-12-12 PROCEDURE — 3008F BODY MASS INDEX DOCD: CPT | Mod: CPTII,S$GLB,, | Performed by: NURSE PRACTITIONER

## 2022-12-12 PROCEDURE — 3074F PR MOST RECENT SYSTOLIC BLOOD PRESSURE < 130 MM HG: ICD-10-PCS | Mod: CPTII,S$GLB,, | Performed by: NURSE PRACTITIONER

## 2022-12-12 PROCEDURE — 3008F PR BODY MASS INDEX (BMI) DOCUMENTED: ICD-10-PCS | Mod: CPTII,S$GLB,, | Performed by: NURSE PRACTITIONER

## 2022-12-12 PROCEDURE — 87811 SARS-COV-2 COVID19 W/OPTIC: CPT | Mod: QW,S$GLB,, | Performed by: NURSE PRACTITIONER

## 2022-12-12 PROCEDURE — 4010F PR ACE/ARB THEARPY RXD/TAKEN: ICD-10-PCS | Mod: CPTII,S$GLB,, | Performed by: NURSE PRACTITIONER

## 2022-12-12 PROCEDURE — 99214 PR OFFICE/OUTPT VISIT, EST, LEVL IV, 30-39 MIN: ICD-10-PCS | Mod: S$GLB,,, | Performed by: NURSE PRACTITIONER

## 2022-12-12 PROCEDURE — 3074F SYST BP LT 130 MM HG: CPT | Mod: CPTII,S$GLB,, | Performed by: NURSE PRACTITIONER

## 2022-12-12 PROCEDURE — 99214 OFFICE O/P EST MOD 30 MIN: CPT | Mod: S$GLB,,, | Performed by: NURSE PRACTITIONER

## 2022-12-12 PROCEDURE — 3044F PR MOST RECENT HEMOGLOBIN A1C LEVEL <7.0%: ICD-10-PCS | Mod: CPTII,S$GLB,, | Performed by: NURSE PRACTITIONER

## 2022-12-12 PROCEDURE — 3078F PR MOST RECENT DIASTOLIC BLOOD PRESSURE < 80 MM HG: ICD-10-PCS | Mod: CPTII,S$GLB,, | Performed by: NURSE PRACTITIONER

## 2022-12-12 PROCEDURE — 1160F PR REVIEW ALL MEDS BY PRESCRIBER/CLIN PHARMACIST DOCUMENTED: ICD-10-PCS | Mod: CPTII,S$GLB,, | Performed by: NURSE PRACTITIONER

## 2022-12-12 PROCEDURE — 3044F HG A1C LEVEL LT 7.0%: CPT | Mod: CPTII,S$GLB,, | Performed by: NURSE PRACTITIONER

## 2022-12-12 NOTE — PROGRESS NOTES
Subjective:       Patient ID: Jayla Treviño is a 72 y.o. female.    Vitals:  height is 6' (1.829 m) and weight is 107.5 kg (237 lb). Her temperature is 98.6 °F (37 °C). Her blood pressure is 91/65 and her pulse is 62. Her respiration is 16 and oxygen saturation is 97%.     Chief Complaint: Facial Pain    This is a 72 y.o. female who presents today with a chief complaint of sinus congestion, sinu pain, cough x two days,  denies fever, body aches or chills, denies  wheezing or shortness of breath, denies nausea, vomiting, diarrhea or abdominal pain, denies chest pain or dizziness positional lightheadedness, denies sore throat or trouble swallowing, denies loss of taste or smell, or any other symptoms         Facial Pain  This is a new problem. The current episode started yesterday. The problem occurs constantly. The problem has been gradually worsening. Associated symptoms include congestion, coughing, headaches and a sore throat. Pertinent negatives include no chills, diaphoresis, fatigue or fever. Treatments tried: benadryl. The treatment provided no relief.     Constitution: Negative for chills, sweating, fatigue and fever.   HENT:  Positive for congestion and sore throat.    Respiratory:  Positive for cough.    Neurological:  Positive for headaches.     Objective:      Physical Exam   Constitutional: She is oriented to person, place, and time. She appears well-developed. She is cooperative.  Non-toxic appearance. She does not appear ill. No distress.      Comments:Patient sitting comfortably on the exam table, non toxic appearance  and answering questions appropriately, no acute distress         HENT:   Head: Normocephalic and atraumatic.   Ears:   Right Ear: Hearing, tympanic membrane, external ear and ear canal normal.   Left Ear: Hearing, tympanic membrane, external ear and ear canal normal.   Nose: Nose normal. No mucosal edema, rhinorrhea or nasal deformity. No epistaxis. Right sinus exhibits no  maxillary sinus tenderness and no frontal sinus tenderness. Left sinus exhibits no maxillary sinus tenderness and no frontal sinus tenderness.   Mouth/Throat: Uvula is midline, oropharynx is clear and moist and mucous membranes are normal. No trismus in the jaw. Normal dentition. No uvula swelling. No oropharyngeal exudate, posterior oropharyngeal edema or posterior oropharyngeal erythema.   Eyes: Conjunctivae and lids are normal. No scleral icterus.   Neck: Trachea normal and phonation normal. Neck supple. No edema present. No erythema present. No neck rigidity present.   Cardiovascular: Normal rate, regular rhythm, normal heart sounds and normal pulses.   Pulmonary/Chest: Effort normal and breath sounds normal. No stridor. No respiratory distress. She has no decreased breath sounds. She has no wheezes. She has no rhonchi. She has no rales.   Abdominal: Normal appearance.   Musculoskeletal: Normal range of motion.         General: No deformity. Normal range of motion.   Neurological: She is alert and oriented to person, place, and time. She exhibits normal muscle tone. Coordination normal.   Skin: Skin is warm, dry, intact, not diaphoretic and not pale.   Psychiatric: Her speech is normal and behavior is normal. Judgment and thought content normal.   Nursing note and vitals reviewed.      Results for orders placed or performed in visit on 12/12/22   SARS Coronavirus 2 Antigen, POCT Manual Read   Result Value Ref Range    SARS Coronavirus 2 Antigen Positive (A) Negative     Acceptable Yes          Patient in no acute distress.  Vitals reassuring.  Discussed results/diagnosis/plan in depth with patient in clinic. Strict precautions given to patient to monitor for worsening signs and symptoms. Advised to follow up with primary.All questions answered. Strict ER precautions given. If your symptoms worsens or fail to improve you should go to the Emergency Room. Discharge and follow-up instructions given  verbally/printed. Discharge and follow-up instructions discussed with the patient who expressed understanding and willingness to comply with my recommendations.Patient voiced understanding and in agreement with current treatment plan.     Please be advised this text was dictated with [employer] software and may contain errors due to translation.    Assessment:       1. COVID-19 virus infection          Plan:         COVID-19 virus infection  -     SARS Coronavirus 2 Antigen, POCT Manual Read  -     molnupiravir 200 mg capsule (EUA); Take 4 capsules (800 mg total) by mouth every 12 (twelve) hours. for 5 days  Dispense: 40 capsule; Refill: 0           Medical Decision Making:   Clinical Tests:   Lab Tests: Reviewed  Urgent Care Management:  Patient in no acute distress.  Vitals reassuring.  On exam, patient is nontoxic appearing and afebrile.  Lungs CTA.  Positive COVID 19 results discussed with patient detail.  Detailed education provided about COVID 19 precautions and recommendations as per CDC guidelines.  Patient is on Eliquis.  Not a candidate for Paxlovid.  All patient's medication reviewed with her in detail and drug to drug interactions checked on [employer] COVID-19 website.  And discussed with patient in depth.  Patient agreed on with treatment with molunupiravir.  Proper hydration advised.  Advised patient to notify her cardiologist and primary about today's visit I reiterated the importance of further evaluation with strict ER precautions if no improvement in symptoms.Patient voiced understanding and in agreement with current treatment plan.           Patient Instructions     You have tested positive for COVID-19 today.        ISOLATION    If you tested positive and do not have symptoms, you must isolate for 5 days starting on the day of the positive test. I    If you tested positive and have symptoms, you must isolate for 5 days starting on the day of the first symptoms,  not the day of the positive  test.    This is the most important part, both the CDC and the [facility] emphasize that you do not test out of isolation.    After 5 days, if your symptoms have improved and you have not had fever on day 5, you can return to the community on day 6- NO TESTING REQUIRED!     In fact, we do not retest if you were positive in the last 90 days.    After your 5 days of isolation are completed, the CDC recommends strict mask use for the first 5 days that you come out of isolation    CDC Testing and Quarantine Guidelines for patients with exposure to a known-positive COVID-19 person:    ·     A 'close exposure' is defined as anyone who has had an exposure (masked or unmasked) to a known COVID -19 positive person            within 6 feet of someone            for a cumulative total of 15 minutes or more over a 24-hour period.    ·     vaccinated Have been boosted or completed the primary series of Pfizer or Moderna vaccine within the last 6 months or completed the primary series of J&J vaccine within the last 2 months and/or had a positive test within 90 days            do NOT need to quarantine after contact with someone who had COVID-19 unless they have symptoms.            fully vaccinated people who have not had a positive test within 90 days, should get tested 3-5 days after their exposure, even if they don't have symptoms and wear a mask indoors in public for 10 days following exposure or until their test result is negative on day 5.            If you develop symptoms test and quarantine.      ·     Unvaccinated, or are more than six months out from their second mRNA dose (or more than 2 months after the J&J vaccine) and not yet boosted,  and/or NOT had a positive test within 90 days and meet 'close exposure'    you are required by CDC guidelines to quarantine for at least 5 days from time of exposure followed by 5 days of strict masking. It is recommended, but not required to test after 5 days, unless you develop symptoms,  in which case you should test at that time.    If you do decide to test at 5 days and are asymptomatic, the risk is that if you test without symptoms on Day 5 for example) and you are positive, your 5 day isolation begins on that day, and you turned your 5 day quarantine into 10 days.            If your exposure does not meet the above definition, you can return to your normal daily activities to include social distancing, wearing a mask and frequent handwashing.    Alternatively, if a 5-day quarantine is not feasible, it is imperative that an exposed person wear a well-fitting mask at all times when around others for 10 days after exposure.            If your condition worsens or fails to improve we recommend that you receive another evaluation at the ER immediately or contact your PCP to discuss your concerns or return here. You must understand that you've received an urgent care treatment only and that you may be released before all your medical problems are known or treated. You the patient will arrange for followup care as instructed.    If you were prescribed antibiotics, please take them to completion.  If you were prescribed a narcotic medication, do not drive or operate heavy equipment or machinery while taking these medications.  Please follow up with your primary care doctor or specialist as needed.  If you  smoke, please stop smoking.

## 2022-12-12 NOTE — PATIENT INSTRUCTIONS
You have tested positive for COVID-19 today.        ISOLATION    If you tested positive and do not have symptoms, you must isolate for 5 days starting on the day of the positive test. I    If you tested positive and have symptoms, you must isolate for 5 days starting on the day of the first symptoms,  not the day of the positive test.    This is the most important part, both the CDC and the [facility] emphasize that you do not test out of isolation.    After 5 days, if your symptoms have improved and you have not had fever on day 5, you can return to the community on day 6- NO TESTING REQUIRED!     In fact, we do not retest if you were positive in the last 90 days.    After your 5 days of isolation are completed, the CDC recommends strict mask use for the first 5 days that you come out of isolation    CDC Testing and Quarantine Guidelines for patients with exposure to a known-positive COVID-19 person:    ·     A 'close exposure' is defined as anyone who has had an exposure (masked or unmasked) to a known COVID -19 positive person            within 6 feet of someone            for a cumulative total of 15 minutes or more over a 24-hour period.    ·     vaccinated Have been boosted or completed the primary series of Pfizer or Moderna vaccine within the last 6 months or completed the primary series of J&J vaccine within the last 2 months and/or had a positive test within 90 days            do NOT need to quarantine after contact with someone who had COVID-19 unless they have symptoms.            fully vaccinated people who have not had a positive test within 90 days, should get tested 3-5 days after their exposure, even if they don't have symptoms and wear a mask indoors in public for 10 days following exposure or until their test result is negative on day 5.            If you develop symptoms test and quarantine.      ·     Unvaccinated, or are more than six months out from their second mRNA dose (or more than 2 months  after the J&J vaccine) and not yet boosted,  and/or NOT had a positive test within 90 days and meet 'close exposure'    you are required by CDC guidelines to quarantine for at least 5 days from time of exposure followed by 5 days of strict masking. It is recommended, but not required to test after 5 days, unless you develop symptoms, in which case you should test at that time.    If you do decide to test at 5 days and are asymptomatic, the risk is that if you test without symptoms on Day 5 for example) and you are positive, your 5 day isolation begins on that day, and you turned your 5 day quarantine into 10 days.            If your exposure does not meet the above definition, you can return to your normal daily activities to include social distancing, wearing a mask and frequent handwashing.    Alternatively, if a 5-day quarantine is not feasible, it is imperative that an exposed person wear a well-fitting mask at all times when around others for 10 days after exposure.            If your condition worsens or fails to improve we recommend that you receive another evaluation at the ER immediately or contact your PCP to discuss your concerns or return here. You must understand that you've received an urgent care treatment only and that you may be released before all your medical problems are known or treated. You the patient will arrange for followup care as instructed.    If you were prescribed antibiotics, please take them to completion.  If you were prescribed a narcotic medication, do not drive or operate heavy equipment or machinery while taking these medications.  Please follow up with your primary care doctor or specialist as needed.  If you  smoke, please stop smoking.

## 2023-01-12 ENCOUNTER — TELEPHONE (OUTPATIENT)
Dept: PODIATRY | Facility: CLINIC | Age: 73
End: 2023-01-12
Payer: MEDICARE

## 2023-01-12 NOTE — TELEPHONE ENCOUNTER
----- Message from Viola Banks sent at 1/12/2023  9:55 AM CST -----  Needs advice from nurse:      Who Called:pt  Regarding:patient would like to be seen sooner than appt on 2/2  Would the patient rather a call back or V[facility]?  Best Call Back number:[phone]  Additional Info:

## 2023-01-19 ENCOUNTER — HOSPITAL ENCOUNTER (EMERGENCY)
Facility: HOSPITAL | Age: 73
Discharge: HOME | End: 2023-01-19
Attending: EMERGENCY MEDICINE
Payer: MEDICARE

## 2023-01-19 VITALS
RESPIRATION RATE: 16 BRPM | TEMPERATURE: 98 F | WEIGHT: 237 LBS | BODY MASS INDEX: 32.14 KG/M2 | SYSTOLIC BLOOD PRESSURE: 132 MMHG | DIASTOLIC BLOOD PRESSURE: 76 MMHG | HEART RATE: 58 BPM | OXYGEN SATURATION: 100 %

## 2023-01-19 DIAGNOSIS — R00.2 PALPITATIONS: Primary | ICD-10-CM

## 2023-01-19 DIAGNOSIS — R07.9 CHEST PAIN: ICD-10-CM

## 2023-01-19 LAB
ALBUMIN SERPL BCP-MCNC: 3.1 G/DL (ref 3.5–5.2)
ALP SERPL-CCNC: 70 U/L (ref 55–135)
ALT SERPL W/O P-5'-P-CCNC: 10 U/L (ref 10–44)
ANION GAP SERPL CALC-SCNC: 8 MMOL/L (ref 8–16)
AST SERPL-CCNC: 13 U/L (ref 10–40)
BASOPHILS # BLD AUTO: 0.01 K/UL (ref 0–0.2)
BASOPHILS NFR BLD: 0.2 % (ref 0–1.9)
BILIRUB SERPL-MCNC: 0.3 MG/DL (ref 0.1–1)
BNP SERPL-MCNC: 74 PG/ML (ref 0–99)
BUN SERPL-MCNC: 20 MG/DL (ref 8–23)
CALCIUM SERPL-MCNC: 9.2 MG/DL (ref 8.7–10.5)
CHLORIDE SERPL-SCNC: 106 MMOL/L (ref 95–110)
CO2 SERPL-SCNC: 24 MMOL/L (ref 23–29)
CREAT SERPL-MCNC: 0.9 MG/DL (ref 0.5–1.4)
DIFFERENTIAL METHOD: ABNORMAL
EOSINOPHIL # BLD AUTO: 0 K/UL (ref 0–0.5)
EOSINOPHIL NFR BLD: 0.9 % (ref 0–8)
ERYTHROCYTE [DISTWIDTH] IN BLOOD BY AUTOMATED COUNT: 16.6 % (ref 11.5–14.5)
EST. GFR  (NO RACE VARIABLE): >60 ML/MIN/1.73 M^2
GLUCOSE SERPL-MCNC: 124 MG/DL (ref 70–110)
HCT VFR BLD AUTO: 27.2 % (ref 37–48.5)
HGB BLD-MCNC: 9.4 G/DL (ref 12–16)
IMM GRANULOCYTES # BLD AUTO: 0.01 K/UL (ref 0–0.04)
IMM GRANULOCYTES NFR BLD AUTO: 0.2 % (ref 0–0.5)
LYMPHOCYTES # BLD AUTO: 1.1 K/UL (ref 1–4.8)
LYMPHOCYTES NFR BLD: 25 % (ref 18–48)
MAGNESIUM SERPL-MCNC: 1.8 MG/DL (ref 1.6–2.6)
MCH RBC QN AUTO: 29.1 PG (ref 27–31)
MCHC RBC AUTO-ENTMCNC: 34.6 G/DL (ref 32–36)
MCV RBC AUTO: 84 FL (ref 82–98)
MONOCYTES # BLD AUTO: 0.4 K/UL (ref 0.3–1)
MONOCYTES NFR BLD: 9.7 % (ref 4–15)
NEUTROPHILS # BLD AUTO: 2.8 K/UL (ref 1.8–7.7)
NEUTROPHILS NFR BLD: 64 % (ref 38–73)
NRBC BLD-RTO: 0 /100 WBC
PLATELET # BLD AUTO: 256 K/UL (ref 150–450)
PMV BLD AUTO: 8.5 FL (ref 9.2–12.9)
POTASSIUM SERPL-SCNC: 4 MMOL/L (ref 3.5–5.1)
PROT SERPL-MCNC: 7 G/DL (ref 6–8.4)
RBC # BLD AUTO: 3.23 M/UL (ref 4–5.4)
SODIUM SERPL-SCNC: 138 MMOL/L (ref 136–145)
TROPONIN I SERPL DL<=0.01 NG/ML-MCNC: <0.006 NG/ML (ref 0–0.03)
TROPONIN I SERPL DL<=0.01 NG/ML-MCNC: <0.006 NG/ML (ref 0–0.03)
WBC # BLD AUTO: 4.44 K/UL (ref 3.9–12.7)

## 2023-01-19 PROCEDURE — 93010 ELECTROCARDIOGRAM REPORT: CPT | Mod: ,,, | Performed by: INTERNAL MEDICINE

## 2023-01-19 PROCEDURE — 85025 COMPLETE CBC W/AUTO DIFF WBC: CPT | Performed by: EMERGENCY MEDICINE

## 2023-01-19 PROCEDURE — 99285 EMERGENCY DEPT VISIT HI MDM: CPT | Mod: 25

## 2023-01-19 PROCEDURE — 93010 EKG 12-LEAD: ICD-10-PCS | Mod: ,,, | Performed by: INTERNAL MEDICINE

## 2023-01-19 PROCEDURE — 84484 ASSAY OF TROPONIN QUANT: CPT | Mod: 91 | Performed by: EMERGENCY MEDICINE

## 2023-01-19 PROCEDURE — 93005 ELECTROCARDIOGRAM TRACING: CPT

## 2023-01-19 PROCEDURE — 83880 ASSAY OF NATRIURETIC PEPTIDE: CPT | Performed by: EMERGENCY MEDICINE

## 2023-01-19 PROCEDURE — 80053 COMPREHEN METABOLIC PANEL: CPT | Performed by: EMERGENCY MEDICINE

## 2023-01-19 PROCEDURE — 25000003 PHARM REV CODE 250: Performed by: EMERGENCY MEDICINE

## 2023-01-19 PROCEDURE — 83735 ASSAY OF MAGNESIUM: CPT | Performed by: EMERGENCY MEDICINE

## 2023-01-19 RX ORDER — ASPIRIN 325 MG
325 TABLET ORAL
Status: COMPLETED | OUTPATIENT
Start: 2023-01-19 | End: 2023-01-19

## 2023-01-19 RX ADMIN — ASPIRIN 325 MG ORAL TABLET 325 MG: 325 PILL ORAL at 06:01

## 2023-01-19 NOTE — ED NOTES
Care hand off from KAILA Humphrey RN. Cxr completed at bedside. Pt. Is awake and resting quietly without distress. Pt. Reports feeling much better presently and palpitation are resolved. Pt. Is talking on phone to family member. No complaints presently.

## 2023-01-19 NOTE — ED NOTES
Patient identifiers for Jayla Treviño checked and correct.  LOC: The patient is awake, alert and aware of environment with an appropriate affect, the patient is oriented x 3 and speaking appropriately.  APPEARANCE: Patient resting comfortably and in no acute distress, patient is clean and well groomed, patient's clothing are properly fastened.  SKIN: The skin is warm and dry.  MUSKULOSKELETAL: Patient moving all extremities well, no obvious swelling or deformities noted.  RESPIRATORY: Airway is open and patent, respirations are spontaneous, patient has a normal effort and rate.  CARDIAC: Patient has a normal rate and rhythm, no periphreal edema noted.

## 2023-01-19 NOTE — DISCHARGE INSTRUCTIONS
Your workup today in the ER was reassuring. Your labs and xray look normal. Please follow up with Dr Hodge in the next 3 days. Come back if you have chest pain, pass out, or other concerning symptoms.

## 2023-01-19 NOTE — ED PROVIDER NOTES
Encounter Date: 2023       History     Chief Complaint   Patient presents with    Palpitations     Patient brought in by EMS from home for complaints of palpitations and SOB that started last night around 6-7 pm. Patient took BP and noticed it had been elevated. Highest reading at home 166/108. Patient has history of Afib. Currently reports minor SOB. Denies chest pain.      HPI  Review of patient's allergies indicates:   Allergen Reactions    Norco [hydrocodone-acetaminophen] Hives     Past Medical History:   Diagnosis Date    Atrial fibrillation     Endometrial cancer 2018    Hypertension     Lumbar stenosis with neurogenic claudication 10/09/2019     Past Surgical History:   Procedure Laterality Date    BREAST SURGERY      CHOLECYSTECTOMY      LUMBAR LAMINECTOMY Right 2019    Right L3-4 laminectomy, medial facetectomy, and contralateral foraminotomy; right L4-5 laminectomy, medial facetectomy, and microdiskectomy;  Surgeon: Nico Alvarez MD    SALPINGECTOMY      ectopic pregnancy     Family History   Problem Relation Age of Onset    Cataracts Mother     Cataracts Sister      Social History     Tobacco Use    Smoking status: Former     Packs/day: 0.50     Types: Cigarettes     Start date:      Quit date:      Years since quittin.0    Smokeless tobacco: Never   Substance Use Topics    Alcohol use: No    Drug use: No     Review of Systems   Constitutional:  Negative for fever.   HENT:  Negative for sore throat.    Respiratory:  Positive for shortness of breath.    Cardiovascular:  Positive for palpitations. Negative for chest pain.   Gastrointestinal:  Negative for nausea.   Genitourinary:  Negative for dysuria.   Musculoskeletal:  Negative for back pain.   Skin:  Negative for rash.   Neurological:  Negative for weakness.   Hematological:  Does not bruise/bleed easily.     Physical Exam     Initial Vitals [23 0525]   BP Pulse Resp Temp SpO2   (!) 143/64 69 18  98.2 °F (36.8 °C) 100 %      MAP       --         Physical Exam    Nursing note and vitals reviewed.        ED Course   Procedures  Labs Reviewed   CBC W/ AUTO DIFFERENTIAL - Abnormal; Notable for the following components:       Result Value    RBC 3.23 (*)     Hemoglobin 9.4 (*)     Hematocrit 27.2 (*)     RDW 16.6 (*)     MPV 8.5 (*)     All other components within normal limits   TROPONIN I   MAGNESIUM   COMPREHENSIVE METABOLIC PANEL   B-TYPE NATRIURETIC PEPTIDE   MAGNESIUM          Imaging Results              X-Ray Chest AP Portable (In process)                      Medications   aspirin tablet 325 mg (325 mg Oral Given 1/19/23 0646)     Medical Decision Making:   ED Management:  History:  73f followe jorge ay Dr Hodge of cardiology pw palpitations that started yesterday, not a/w exertion or anything in particular. Took flecainide and it did not improve her sxs. Hard to sleep, so came to ER. Denies chest pain, endorses some SOB with it. Denies f/c/n/v/CP/abd pain/urinary sxs/diarrhea.      ------------------------------------------------------------------------    Physical:    Triage vital signs reviewed.     Constitutional: Well-nourished, well-developed. Not in acute distress.  HEENT: Normocephalic, atraumatic. Moist mucous membranes.  Eyes: No conjunctival injection.  Resp: Normal respiratory effort, breathing unlabored.  Cardio: Regular rate and rhythm.  GI: Abdomen non-distended.   MSK: Extremities without any deformities noted. No lower extremity edema.  Skin: Warm and dry. No rashes or lesions noted.  Neuro: Awake and alert. Moves all extremities.    ---------------------------------------------------------------------------    MDM:    Chronic illness or exacerbation of chronic illness: CAD  Differential: ACS, elyte abnormality    Review of prior external/non ED notes: cardiology clinic notes  Lab tests ordered and independently reviewed by me: cbc at baseline, cmp unremarkable, trop x 2 neg, neg BNP  EKG  independently reviewed by me: sinus  rhythm, no ischemia  Imaging ordered and independently reviewed by me: xr w/o pna or ptx    Medications given in the ED and/or prescribed: none indicated  Explanation of disposition: 73-year-old female presenting with palpitations.  Feels better after staying in the emergency department on observation.  Negative troponins.  X-ray clear.  Feels comfortable following up with her PCP and cardiologist.    -----------------     I discussed with the patient and/or the family/caregiver that today's evaluation in the ED does not suggest any emergent or life-threatening medical conditions that would require hospitalization or immediate intervention beyond what was provided in the ED. I believe the patient is safe for discharge.  However, a reassuring evaluation in the ED does not preclude the presence or development of a serious or life-threatening condition. As such, strict return precautions were discussed with the patient expressing understanding of instructions, and all questions answered.                         Clinical Impression:   Final diagnoses:  [R00.2] Palpitations  [R07.9] Chest pain               Alana Seth MD  01/19/23 2525

## 2023-01-25 ENCOUNTER — HOSPITAL ENCOUNTER (OUTPATIENT)
Dept: RADIOLOGY | Facility: HOSPITAL | Age: 73
Discharge: HOME | End: 2023-01-25
Attending: STUDENT IN AN ORGANIZED HEALTH CARE EDUCATION/TRAINING PROGRAM
Payer: MEDICARE

## 2023-01-25 ENCOUNTER — OFFICE VISIT (OUTPATIENT)
Dept: PODIATRY | Facility: CLINIC | Age: 73
End: 2023-01-25
Payer: MEDICARE

## 2023-01-25 VITALS
HEIGHT: 72 IN | WEIGHT: 237 LBS | BODY MASS INDEX: 32.1 KG/M2 | HEART RATE: 59 BPM | DIASTOLIC BLOOD PRESSURE: 71 MMHG | SYSTOLIC BLOOD PRESSURE: 123 MMHG

## 2023-01-25 DIAGNOSIS — L60.0 INGROWN NAIL: ICD-10-CM

## 2023-01-25 DIAGNOSIS — L60.9 DISEASE OF NAIL: ICD-10-CM

## 2023-01-25 DIAGNOSIS — M79.674 PAIN OF TOE OF RIGHT FOOT: ICD-10-CM

## 2023-01-25 DIAGNOSIS — M79.674 PAIN OF TOE OF RIGHT FOOT: Primary | ICD-10-CM

## 2023-01-25 DIAGNOSIS — L84 CORN OR CALLUS: ICD-10-CM

## 2023-01-25 PROCEDURE — 3074F SYST BP LT 130 MM HG: CPT | Mod: CPTII,S$GLB,, | Performed by: STUDENT IN AN ORGANIZED HEALTH CARE EDUCATION/TRAINING PROGRAM

## 2023-01-25 PROCEDURE — 3078F PR MOST RECENT DIASTOLIC BLOOD PRESSURE < 80 MM HG: ICD-10-PCS | Mod: CPTII,S$GLB,, | Performed by: STUDENT IN AN ORGANIZED HEALTH CARE EDUCATION/TRAINING PROGRAM

## 2023-01-25 PROCEDURE — 1126F AMNT PAIN NOTED NONE PRSNT: CPT | Mod: CPTII,S$GLB,, | Performed by: STUDENT IN AN ORGANIZED HEALTH CARE EDUCATION/TRAINING PROGRAM

## 2023-01-25 PROCEDURE — 1159F MED LIST DOCD IN RCRD: CPT | Mod: CPTII,S$GLB,, | Performed by: STUDENT IN AN ORGANIZED HEALTH CARE EDUCATION/TRAINING PROGRAM

## 2023-01-25 PROCEDURE — 99203 PR OFFICE/OUTPT VISIT, NEW, LEVL III, 30-44 MIN: ICD-10-PCS | Mod: S$GLB,,, | Performed by: STUDENT IN AN ORGANIZED HEALTH CARE EDUCATION/TRAINING PROGRAM

## 2023-01-25 PROCEDURE — 73630 XR FOOT COMPLETE 3 VIEW RIGHT: ICD-10-PCS | Mod: 26,RT,, | Performed by: RADIOLOGY

## 2023-01-25 PROCEDURE — 3008F BODY MASS INDEX DOCD: CPT | Mod: CPTII,S$GLB,, | Performed by: STUDENT IN AN ORGANIZED HEALTH CARE EDUCATION/TRAINING PROGRAM

## 2023-01-25 PROCEDURE — 3074F PR MOST RECENT SYSTOLIC BLOOD PRESSURE < 130 MM HG: ICD-10-PCS | Mod: CPTII,S$GLB,, | Performed by: STUDENT IN AN ORGANIZED HEALTH CARE EDUCATION/TRAINING PROGRAM

## 2023-01-25 PROCEDURE — 1101F PR PT FALLS ASSESS DOC 0-1 FALLS W/OUT INJ PAST YR: ICD-10-PCS | Mod: CPTII,S$GLB,, | Performed by: STUDENT IN AN ORGANIZED HEALTH CARE EDUCATION/TRAINING PROGRAM

## 2023-01-25 PROCEDURE — 3288F PR FALLS RISK ASSESSMENT DOCUMENTED: ICD-10-PCS | Mod: CPTII,S$GLB,, | Performed by: STUDENT IN AN ORGANIZED HEALTH CARE EDUCATION/TRAINING PROGRAM

## 2023-01-25 PROCEDURE — 73630 X-RAY EXAM OF FOOT: CPT | Mod: 26,RT,, | Performed by: RADIOLOGY

## 2023-01-25 PROCEDURE — 99999 PR PBB SHADOW E&M-EST. PATIENT-LVL IV: ICD-10-PCS | Mod: PBBFAC,,, | Performed by: STUDENT IN AN ORGANIZED HEALTH CARE EDUCATION/TRAINING PROGRAM

## 2023-01-25 PROCEDURE — 73630 X-RAY EXAM OF FOOT: CPT | Mod: TC,FY,RT

## 2023-01-25 PROCEDURE — 3288F FALL RISK ASSESSMENT DOCD: CPT | Mod: CPTII,S$GLB,, | Performed by: STUDENT IN AN ORGANIZED HEALTH CARE EDUCATION/TRAINING PROGRAM

## 2023-01-25 PROCEDURE — 3008F PR BODY MASS INDEX (BMI) DOCUMENTED: ICD-10-PCS | Mod: CPTII,S$GLB,, | Performed by: STUDENT IN AN ORGANIZED HEALTH CARE EDUCATION/TRAINING PROGRAM

## 2023-01-25 PROCEDURE — 1101F PT FALLS ASSESS-DOCD LE1/YR: CPT | Mod: CPTII,S$GLB,, | Performed by: STUDENT IN AN ORGANIZED HEALTH CARE EDUCATION/TRAINING PROGRAM

## 2023-01-25 PROCEDURE — 1159F PR MEDICATION LIST DOCUMENTED IN MEDICAL RECORD: ICD-10-PCS | Mod: CPTII,S$GLB,, | Performed by: STUDENT IN AN ORGANIZED HEALTH CARE EDUCATION/TRAINING PROGRAM

## 2023-01-25 PROCEDURE — 99203 OFFICE O/P NEW LOW 30 MIN: CPT | Mod: S$GLB,,, | Performed by: STUDENT IN AN ORGANIZED HEALTH CARE EDUCATION/TRAINING PROGRAM

## 2023-01-25 PROCEDURE — 99999 PR PBB SHADOW E&M-EST. PATIENT-LVL IV: CPT | Mod: PBBFAC,,, | Performed by: STUDENT IN AN ORGANIZED HEALTH CARE EDUCATION/TRAINING PROGRAM

## 2023-01-25 PROCEDURE — 1126F PR PAIN SEVERITY QUANTIFIED, NO PAIN PRESENT: ICD-10-PCS | Mod: CPTII,S$GLB,, | Performed by: STUDENT IN AN ORGANIZED HEALTH CARE EDUCATION/TRAINING PROGRAM

## 2023-01-25 PROCEDURE — 3078F DIAST BP <80 MM HG: CPT | Mod: CPTII,S$GLB,, | Performed by: STUDENT IN AN ORGANIZED HEALTH CARE EDUCATION/TRAINING PROGRAM

## 2023-01-25 NOTE — PROGRESS NOTES
Subjective:      Patient ID: Jayla Treviño is a 73 y.o. female.    Chief Complaint: Toe Injury (Dropped a can of spray starch on her right great toe (6 weeks ago/Dec 2022) with damage to the nail)    Jayla is a 73 y.o. female who presents to the podiatry clinic  with complaint of  right foot, great toe pain. Onset of the symptoms was about a month ago. Precipitating event: injured right great toe, dropped a can on it . Current symptoms include:  slowly healing toenail, states hurts with pressure . Aggravating factors:  pressure . Symptoms have gradually improved. Patient has had no prior foot problems. Evaluation to date: none. Treatment to date: none. Patients rates pain 0/10 on pain scale. She also complains of diffuse callus to forefoot and heel to both feet. No further pedal complaints.     Review of Systems   Constitutional: Negative for chills, decreased appetite, diaphoresis and fever.   HENT:  Negative for congestion and hearing loss.    Cardiovascular:  Negative for chest pain, claudication, leg swelling and syncope.   Respiratory:  Negative for cough and shortness of breath.    Skin:  Positive for dry skin and nail changes. Negative for color change, flushing, itching, poor wound healing and rash.   Musculoskeletal:  Negative for arthritis, back pain, joint pain and joint swelling.   Gastrointestinal:  Negative for nausea and vomiting.   Neurological:  Negative for focal weakness, numbness, paresthesias and weakness.   Psychiatric/Behavioral:  Negative for altered mental status. The patient is not nervous/anxious.          Objective:      Physical Exam  Constitutional:       General: She is not in acute distress.     Appearance: She is well-developed. She is not diaphoretic.   Cardiovascular:      Comments: Dorsalis pedis and posterior tibial pulses are within normal limits. Skin temperature is within normal limits. Toes are cool to touch and feet are warm proximally. Hair growth is within  normal limits. Skin is normotrophic and without hyperpigmentation. No edema noted. No spider veins or varicosities noted, bilaterally.     Musculoskeletal:      Comments: Adequate joint range of motion without pain, limitation, nor crepitation to bilateral feet and ankle joints. Muscle strength is 5/5 in all groups bilaterally.       Lymphadenopathy:      Comments: Negative lymphangitic streaking    Skin:     General: Skin is warm and dry.      Findings: No lesion.      Comments: Skin is warm and dry, no acute signs of infection noted. No open wounds, macerations or hyperkeratotic lesions, bilaterally.     Right hallux toenail is distally and proximally partially avulsed with new nail visualized, growing to proximal nail bed. Mild tenderness on palpation to medial, lateral and distal nail borders. No open wounds or signs of infection     Otherwise, toenails are well trimmed and of normal morphology, bilaterally.     Skin is dry with diffuse callus to plantar forefoot and heel, bilaterally    Neurological:      Mental Status: She is alert and oriented to person, place, and time.      Sensory: No sensory deficit.      Motor: No abnormal muscle tone.      Comments: Light touch within normal limits.    Psychiatric:         Behavior: Behavior normal.         Thought Content: Thought content normal.         Judgment: Judgment normal.             Assessment:       Encounter Diagnoses   Name Primary?    Pain of toe of right foot Yes    Disease of nail     Ingrown nail     Corn or callus          Plan:       Jayla was seen today for toe injury.    Diagnoses and all orders for this visit:    Pain of toe of right foot  -     X-Ray Foot Complete Right; Future    Disease of nail    Ingrown nail    Corn or callus      I counseled the patient on her conditions, their implications and medical management.  Utilizing sterile toenail clippers I aggressively trimmed  the offending above mentioned  nail border from its edge and  carried the nail plate incision down at an angle in order to wedge out the offending cryptotic portion of the nail plate. The offending border was then removed.  No blood was drawn. Patient tolerated the procedure well and related significant relief. Discussed will consider total nail avulsion if needed in the future. Recommend dressing site with neosporin and bandage 2x per day until symptoms resolve. Recommend open toed shoes or shoes with a wide toe box to reduce pressure.  Recommend urea 40% cream 2-3x per day for callus. May use pumice stone as well to soften calluses. Lotion BID  Return to clinic PRN

## 2023-01-26 ENCOUNTER — OFFICE VISIT (OUTPATIENT)
Dept: CARDIOLOGY | Facility: CLINIC | Age: 73
End: 2023-01-26
Payer: MEDICARE

## 2023-01-26 VITALS
HEIGHT: 72 IN | BODY MASS INDEX: 31.95 KG/M2 | SYSTOLIC BLOOD PRESSURE: 120 MMHG | DIASTOLIC BLOOD PRESSURE: 75 MMHG | OXYGEN SATURATION: 98 % | WEIGHT: 235.88 LBS | HEART RATE: 66 BPM

## 2023-01-26 DIAGNOSIS — I48.0 PAROXYSMAL ATRIAL FIBRILLATION: Primary | Chronic | ICD-10-CM

## 2023-01-26 DIAGNOSIS — I10 PRIMARY HYPERTENSION: Chronic | ICD-10-CM

## 2023-01-26 DIAGNOSIS — I25.10 ATHEROSCLEROSIS OF CORONARY ARTERY, UNSPECIFIED VESSEL OR LESION TYPE, UNSPECIFIED WHETHER ANGINA PRESENT, UNSPECIFIED WHETHER NATIVE OR TRANSPLANTED HEART: ICD-10-CM

## 2023-01-26 PROCEDURE — 99999 PR PBB SHADOW E&M-EST. PATIENT-LVL III: CPT | Mod: PBBFAC,,, | Performed by: INTERNAL MEDICINE

## 2023-01-26 PROCEDURE — 3008F BODY MASS INDEX DOCD: CPT | Mod: CPTII,S$GLB,, | Performed by: INTERNAL MEDICINE

## 2023-01-26 PROCEDURE — 3008F PR BODY MASS INDEX (BMI) DOCUMENTED: ICD-10-PCS | Mod: CPTII,S$GLB,, | Performed by: INTERNAL MEDICINE

## 2023-01-26 PROCEDURE — 3074F PR MOST RECENT SYSTOLIC BLOOD PRESSURE < 130 MM HG: ICD-10-PCS | Mod: CPTII,S$GLB,, | Performed by: INTERNAL MEDICINE

## 2023-01-26 PROCEDURE — 3078F DIAST BP <80 MM HG: CPT | Mod: CPTII,S$GLB,, | Performed by: INTERNAL MEDICINE

## 2023-01-26 PROCEDURE — 1126F PR PAIN SEVERITY QUANTIFIED, NO PAIN PRESENT: ICD-10-PCS | Mod: CPTII,S$GLB,, | Performed by: INTERNAL MEDICINE

## 2023-01-26 PROCEDURE — 3074F SYST BP LT 130 MM HG: CPT | Mod: CPTII,S$GLB,, | Performed by: INTERNAL MEDICINE

## 2023-01-26 PROCEDURE — 3078F PR MOST RECENT DIASTOLIC BLOOD PRESSURE < 80 MM HG: ICD-10-PCS | Mod: CPTII,S$GLB,, | Performed by: INTERNAL MEDICINE

## 2023-01-26 PROCEDURE — 99214 OFFICE O/P EST MOD 30 MIN: CPT | Mod: S$GLB,,, | Performed by: INTERNAL MEDICINE

## 2023-01-26 PROCEDURE — 1126F AMNT PAIN NOTED NONE PRSNT: CPT | Mod: CPTII,S$GLB,, | Performed by: INTERNAL MEDICINE

## 2023-01-26 PROCEDURE — 99214 PR OFFICE/OUTPT VISIT, EST, LEVL IV, 30-39 MIN: ICD-10-PCS | Mod: S$GLB,,, | Performed by: INTERNAL MEDICINE

## 2023-01-26 PROCEDURE — 99999 PR PBB SHADOW E&M-EST. PATIENT-LVL III: ICD-10-PCS | Mod: PBBFAC,,, | Performed by: INTERNAL MEDICINE

## 2023-01-26 NOTE — PROGRESS NOTES
Subjective:   @Patient ID:  Jayla Treviño is a 73 y.o. female who presents for follow-up of  PAF, PVD, and HTN.       HPI:   Here for f/u post ER visit for palpitations.  She was concerned about AFib treated flecainide with no significant improvement.  In the ER workup was okay.  EKG was sinus rhythm with no ischemic changes.  Of note that night she missed the morning metoprolol dose show she doubled the dose at night and the after she doubled the dose she had that feeling overnight.  Felt like her heart was skipping beats.  No recurrent symptoms since then.  Compliant with Eliquis with no bleeding issues        She has Claudication the LE which believed to be neurogenic from her back.       Pertinent hx: PAF with Pill in the pocket rhythm strategy,    Prior cardiovascular  Hx  --------------------------------    - Arterial U/S 10/14/2021  No abnormal velocity doubling to suggest hemodynamically significant stenosis on the right.     - Exercise CATHY in 5/2017 was WNL    - ECHO 4/2017  EF 55-60%    - EKG 11/2019  Sinus bradycardia, no acute ST-T wave changes          Patient Active Problem List    Diagnosis Date Noted    Primary osteoarthritis of right knee 08/16/2022    Chronic kidney disease, stage 3a 11/05/2021    Spinal stenosis of lumbar region with radiculopathy 12/27/2019    Foraminal stenosis of lumbar region 10/09/2019    Atherosclerosis of coronary artery 04/09/2019    Seasonal allergic rhinitis 04/09/2019    Gastroesophageal reflux disease 12/13/2018    Endometrial cancer 05/19/2018    s/p RA-TLH/BSO 5/15/2018 05/15/2018    Impaired fasting glucose 08/02/2017    Anemia 05/17/2017    Obesity 05/02/2017    Paroxysmal atrial fibrillation 04/05/2017    Hypertension 04/05/2017    Long term current use of anticoagulant therapy 04/05/2017    Nuclear sclerosis - Both Eyes 02/04/2013            LAST HbA1c  Lab Results   Component Value Date    HGBA1C 5.6 11/17/2022       Lipid panel  Lab Results    Component Value Date    CHOL 199 11/04/2021    CHOL 208 (H) 11/15/2019     Lab Results   Component Value Date    HDL 66 11/04/2021    HDL 74 11/15/2019     Lab Results   Component Value Date    LDLCALC 118.8 11/04/2021    LDLCALC 124.4 11/15/2019     Lab Results   Component Value Date    TRIG 71 11/04/2021    TRIG 48 11/15/2019     Lab Results   Component Value Date    CHOLHDL 33.2 11/04/2021    CHOLHDL 35.6 11/15/2019            Review of Systems   Constitutional: Negative for chills and fever.   HENT:  Negative for hearing loss and nosebleeds.    Eyes:  Negative for blurred vision.   Cardiovascular:  Negative for chest pain and palpitations.   Respiratory:  Negative for hemoptysis and shortness of breath.    Hematologic/Lymphatic: Negative for bleeding problem.   Skin:  Negative for itching.   Musculoskeletal:         As in HPI   Gastrointestinal:  Negative for abdominal pain and hematochezia.   Genitourinary:  Negative for hematuria.   Neurological:  Negative for dizziness and loss of balance.   Psychiatric/Behavioral:  Negative for altered mental status and depression.      Objective:   Physical Exam  Constitutional:       Appearance: She is well-developed.   HENT:      Head: Normocephalic and atraumatic.   Eyes:      Conjunctiva/sclera: Conjunctivae normal.   Neck:      Vascular: No carotid bruit.   Cardiovascular:      Rate and Rhythm: Regular rhythm. Bradycardia present.      Heart sounds: Normal heart sounds. No murmur heard.    No friction rub. No gallop.   Pulmonary:      Effort: Pulmonary effort is normal. No respiratory distress.      Breath sounds: Normal breath sounds. No stridor. No wheezing.   Musculoskeletal:      Cervical back: Neck supple.   Skin:     General: Skin is warm and dry.   Neurological:      Mental Status: She is alert and oriented to person, place, and time.   Psychiatric:         Behavior: Behavior normal.       Assessment:     1. Paroxysmal atrial fibrillation    2. Primary  hypertension    3. Atherosclerosis of coronary artery, unspecified vessel or lesion type, unspecified whether angina present, unspecified whether native or transplanted heart          Plan:   - repeat 24 hours Holter monitor  - suspect could be PVCs related to bradycardia after doubling night metoprolol dose.  Instructed if she missed metoprolol dose not to double the dose.  Just take as regularly scheduled    - Continue current strategy  - Continue Eliquis.        I spent 5-10 minutes asking, assessing, assisting, arranging and advising heart healthy diet improvements. This included low-salt meals, portion control and health food alternatives. I also encourage 30 minutes of moderate exercise 3-4x a week.     Six-month follow-up    - EKG reviewed, sinus bradycardia, no acute ST-T wave changes     Continue with current medical plan and lifestyle changes.  Return sooner for concerns or questions. If symptoms persist go to the ED  I have reviewed all pertinent data including patient's medical history in detail and updated the computerized patient record.     Orders Placed This Encounter   Procedures    Holter monitor - 24 hour     Standing Status:   Future     Standing Expiration Date:   1/26/2024     Order Specific Question:   Release to patient     Answer:   Immediate       Follow up as scheduled.     She expressed verbal understanding and agreed with the plan    Patient's Medications   New Prescriptions    No medications on file   Previous Medications    APIXABAN (ELIQUIS) 5 MG TAB    Take 1 tablet (5 mg total) by mouth 2 (two) times daily.    CHOLECALCIFEROL, VITAMIN D3, (VITAMIN D3) 125 MCG (5,000 UNIT) TAB    Take 5,000 Units by mouth once daily. Takes on Saturday    CYANOCOBALAMIN, VITAMIN B-12, 50 MCG TABLET    Take 50 mcg by mouth every morning. Pt will verify the strength    DICLOFENAC SODIUM (VOLTAREN) 1 % GEL    Apply 2 grams topically TID    FLECAINIDE (TAMBOCOR) 150 MG TAB    Take 150 mg by mouth every 12  (twelve) hours.    FLUTICASONE PROPIONATE (FLONASE) 50 MCG/ACTUATION NASAL SPRAY    2 sprays (100 mcg total) by Each Nostril route once daily.    FOSINOPRIL (MONOPRIL) 20 MG TABLET    Take 1 tablet (20 mg total) by mouth once daily.    GABAPENTIN (NEURONTIN) 100 MG CAPSULE    Take 1 capsule (100 mg total) by mouth every evening.    METOPROLOL TARTRATE (LOPRESSOR) 100 MG TABLET    Take 1 tablet by mouth twice daily    TRIAMTERENE-HYDROCHLOROTHIAZIDE 37.5-25 MG (DYAZIDE) 37.5-25 MG PER CAPSULE    Take 1 capsule by mouth every morning.   Modified Medications    No medications on file   Discontinued Medications    No medications on file

## 2023-01-27 ENCOUNTER — PATIENT MESSAGE (OUTPATIENT)
Dept: CARDIOLOGY | Facility: CLINIC | Age: 73
End: 2023-01-27
Payer: MEDICARE

## 2023-02-03 ENCOUNTER — HOSPITAL ENCOUNTER (EMERGENCY)
Facility: HOSPITAL | Age: 73
Discharge: HOME | End: 2023-02-03
Attending: EMERGENCY MEDICINE
Payer: MEDICARE

## 2023-02-03 ENCOUNTER — HOSPITAL ENCOUNTER (OUTPATIENT)
Dept: CARDIOLOGY | Facility: HOSPITAL | Age: 73
Discharge: HOME | End: 2023-02-03
Attending: INTERNAL MEDICINE
Payer: MEDICARE

## 2023-02-03 VITALS
OXYGEN SATURATION: 95 % | RESPIRATION RATE: 15 BRPM | TEMPERATURE: 98 F | SYSTOLIC BLOOD PRESSURE: 129 MMHG | HEART RATE: 63 BPM | DIASTOLIC BLOOD PRESSURE: 83 MMHG

## 2023-02-03 DIAGNOSIS — I48.0 PAROXYSMAL ATRIAL FIBRILLATION: Primary | ICD-10-CM

## 2023-02-03 DIAGNOSIS — R00.0 TACHYCARDIA: ICD-10-CM

## 2023-02-03 DIAGNOSIS — I48.0 PAROXYSMAL ATRIAL FIBRILLATION: ICD-10-CM

## 2023-02-03 LAB
ALBUMIN SERPL BCP-MCNC: 3.6 G/DL (ref 3.5–5.2)
ALP SERPL-CCNC: 71 U/L (ref 55–135)
ALT SERPL W/O P-5'-P-CCNC: 13 U/L (ref 10–44)
ANION GAP SERPL CALC-SCNC: 10 MMOL/L (ref 8–16)
AST SERPL-CCNC: 15 U/L (ref 10–40)
BASOPHILS # BLD AUTO: 0.01 K/UL (ref 0–0.2)
BASOPHILS NFR BLD: 0.2 % (ref 0–1.9)
BILIRUB SERPL-MCNC: 0.4 MG/DL (ref 0.1–1)
BILIRUB UR QL STRIP: NEGATIVE
BNP SERPL-MCNC: 336 PG/ML (ref 0–99)
BUN SERPL-MCNC: 19 MG/DL (ref 8–23)
CALCIUM SERPL-MCNC: 9.9 MG/DL (ref 8.7–10.5)
CHLORIDE SERPL-SCNC: 108 MMOL/L (ref 95–110)
CLARITY UR: CLEAR
CO2 SERPL-SCNC: 22 MMOL/L (ref 23–29)
COLOR UR: COLORLESS
CREAT SERPL-MCNC: 1 MG/DL (ref 0.5–1.4)
D DIMER PPP IA.FEU-MCNC: 0.21 MG/L FEU
DIFFERENTIAL METHOD: ABNORMAL
EOSINOPHIL # BLD AUTO: 0 K/UL (ref 0–0.5)
EOSINOPHIL NFR BLD: 0.7 % (ref 0–8)
ERYTHROCYTE [DISTWIDTH] IN BLOOD BY AUTOMATED COUNT: 16.6 % (ref 11.5–14.5)
EST. GFR  (NO RACE VARIABLE): 59 ML/MIN/1.73 M^2
GLUCOSE SERPL-MCNC: 107 MG/DL (ref 70–110)
GLUCOSE UR QL STRIP: NEGATIVE
HCT VFR BLD AUTO: 32.8 % (ref 37–48.5)
HGB BLD-MCNC: 11 G/DL (ref 12–16)
HGB UR QL STRIP: NEGATIVE
IMM GRANULOCYTES # BLD AUTO: 0.01 K/UL (ref 0–0.04)
IMM GRANULOCYTES NFR BLD AUTO: 0.2 % (ref 0–0.5)
INR PPP: 1.1 (ref 0.8–1.2)
KETONES UR QL STRIP: NEGATIVE
LEUKOCYTE ESTERASE UR QL STRIP: NEGATIVE
LYMPHOCYTES # BLD AUTO: 1.3 K/UL (ref 1–4.8)
LYMPHOCYTES NFR BLD: 29.3 % (ref 18–48)
MCH RBC QN AUTO: 28.9 PG (ref 27–31)
MCHC RBC AUTO-ENTMCNC: 33.5 G/DL (ref 32–36)
MCV RBC AUTO: 86 FL (ref 82–98)
MONOCYTES # BLD AUTO: 0.5 K/UL (ref 0.3–1)
MONOCYTES NFR BLD: 10.2 % (ref 4–15)
NEUTROPHILS # BLD AUTO: 2.6 K/UL (ref 1.8–7.7)
NEUTROPHILS NFR BLD: 59.4 % (ref 38–73)
NITRITE UR QL STRIP: NEGATIVE
NRBC BLD-RTO: 0 /100 WBC
PH UR STRIP: 7 [PH] (ref 5–8)
PLATELET # BLD AUTO: 260 K/UL (ref 150–450)
PMV BLD AUTO: 8.3 FL (ref 9.2–12.9)
POTASSIUM SERPL-SCNC: 4.4 MMOL/L (ref 3.5–5.1)
PROT SERPL-MCNC: 7.9 G/DL (ref 6–8.4)
PROT UR QL STRIP: NEGATIVE
PROTHROMBIN TIME: 11.3 SEC (ref 9–12.5)
RBC # BLD AUTO: 3.81 M/UL (ref 4–5.4)
SODIUM SERPL-SCNC: 140 MMOL/L (ref 136–145)
SP GR UR STRIP: 1.01 (ref 1–1.03)
TROPONIN I SERPL DL<=0.01 NG/ML-MCNC: 0.01 NG/ML (ref 0–0.03)
TSH SERPL DL<=0.005 MIU/L-ACNC: 0.48 UIU/ML (ref 0.4–4)
URN SPEC COLLECT METH UR: ABNORMAL
UROBILINOGEN UR STRIP-ACNC: NEGATIVE EU/DL
WBC # BLD AUTO: 4.41 K/UL (ref 3.9–12.7)

## 2023-02-03 PROCEDURE — 93010 EKG 12-LEAD: ICD-10-PCS | Mod: ,,, | Performed by: INTERNAL MEDICINE

## 2023-02-03 PROCEDURE — 84443 ASSAY THYROID STIM HORMONE: CPT | Performed by: EMERGENCY MEDICINE

## 2023-02-03 PROCEDURE — 83880 ASSAY OF NATRIURETIC PEPTIDE: CPT | Performed by: EMERGENCY MEDICINE

## 2023-02-03 PROCEDURE — 93225 XTRNL ECG REC<48 HRS REC: CPT

## 2023-02-03 PROCEDURE — 93227 XTRNL ECG REC<48 HR R&I: CPT | Mod: ,,, | Performed by: INTERNAL MEDICINE

## 2023-02-03 PROCEDURE — 85610 PROTHROMBIN TIME: CPT | Performed by: EMERGENCY MEDICINE

## 2023-02-03 PROCEDURE — 93010 ELECTROCARDIOGRAM REPORT: CPT | Mod: ,,, | Performed by: INTERNAL MEDICINE

## 2023-02-03 PROCEDURE — 93005 ELECTROCARDIOGRAM TRACING: CPT

## 2023-02-03 PROCEDURE — 93227 HOLTER MONITOR - 24 HOUR (CUPID ONLY): ICD-10-PCS | Mod: ,,, | Performed by: INTERNAL MEDICINE

## 2023-02-03 PROCEDURE — 85379 FIBRIN DEGRADATION QUANT: CPT | Performed by: EMERGENCY MEDICINE

## 2023-02-03 PROCEDURE — 99285 EMERGENCY DEPT VISIT HI MDM: CPT | Mod: 25

## 2023-02-03 PROCEDURE — 96360 HYDRATION IV INFUSION INIT: CPT

## 2023-02-03 PROCEDURE — 80053 COMPREHEN METABOLIC PANEL: CPT | Performed by: EMERGENCY MEDICINE

## 2023-02-03 PROCEDURE — 85025 COMPLETE CBC W/AUTO DIFF WBC: CPT | Performed by: EMERGENCY MEDICINE

## 2023-02-03 PROCEDURE — 84484 ASSAY OF TROPONIN QUANT: CPT | Performed by: EMERGENCY MEDICINE

## 2023-02-03 PROCEDURE — 25000003 PHARM REV CODE 250: Performed by: EMERGENCY MEDICINE

## 2023-02-03 PROCEDURE — 81003 URINALYSIS AUTO W/O SCOPE: CPT | Performed by: EMERGENCY MEDICINE

## 2023-02-03 RX ORDER — DIPHENHYDRAMINE HCL 25 MG
25 CAPSULE ORAL NIGHTLY PRN
COMMUNITY

## 2023-02-03 RX ORDER — ASCORBIC ACID 500 MG
500 TABLET ORAL DAILY
COMMUNITY

## 2023-02-03 RX ORDER — DEXTROMETHORPHAN HYDROBROMIDE, GUAIFENESIN 5; 100 MG/5ML; MG/5ML
650 LIQUID ORAL EVERY 8 HOURS PRN
COMMUNITY

## 2023-02-03 RX ADMIN — SODIUM CHLORIDE 1000 ML: 0.9 INJECTION, SOLUTION INTRAVENOUS at 12:02

## 2023-02-03 NOTE — ED NOTES
Patient ambulatory to restroom, provided instructions for clean catch urine sample, pt verbalized understanding.

## 2023-02-03 NOTE — ED TRIAGE NOTES
Pt admitted to the ED for tachycardia. Pt reports she was at her PCP being fit for a Holter monitor when her PCP informed her to come to the ED due to her elevated heart rate. Pt denies CP at this time. Pt reporting SOB and generalized weakness.

## 2023-02-03 NOTE — ED PROVIDER NOTES
Encounter Date: 2/3/2023     History     Chief Complaint   Patient presents with    Tachycardia     Pt presents to ED today reports hx of afib, had heart monitor placed this am was informed her heart rate was rapid and referred to ED      Pt who presents to the ED for palpitations and tachycardia.  Patient has a history of AFib and went to the cardiologist's office this morning to have a Holter monitor placed.  She was told to come emergency department as her heart rate was elevated.  She reports taking a flecainide last night when she was having palpitations.  Seventy chest pain or shortness of breath.        Review of patient's allergies indicates:   Allergen Reactions    Norco [hydrocodone-acetaminophen] Hives     Past Medical History:   Diagnosis Date    Atrial fibrillation     Endometrial cancer 2018    Hypertension     Lumbar stenosis with neurogenic claudication 10/09/2019     Past Surgical History:   Procedure Laterality Date    BREAST SURGERY      CHOLECYSTECTOMY      LUMBAR LAMINECTOMY Right 2019    Right L3-4 laminectomy, medial facetectomy, and contralateral foraminotomy; right L4-5 laminectomy, medial facetectomy, and microdiskectomy;  Surgeon: Nico Alvarez MD    SALPINGECTOMY      ectopic pregnancy     Family History   Problem Relation Age of Onset    Cataracts Mother     Cataracts Sister      Social History     Tobacco Use    Smoking status: Former     Packs/day: 0.50     Types: Cigarettes     Start date:      Quit date:      Years since quittin.0    Smokeless tobacco: Never   Substance Use Topics    Alcohol use: No    Drug use: No     Review of Systems   Cardiovascular:  Positive for palpitations.   All other systems reviewed and are negative.    Physical Exam     Initial Vitals   BP Pulse Resp Temp SpO2   23 1043 23 1038 23 1039 23 1122 23 1043   129/83 (!) 117 (!) 28 98.3 °F (36.8 °C) 95 %      MAP       --                Vitals:     02/03/23 1038 02/03/23 1039 02/03/23 1042 02/03/23 1043   BP:    129/83   Pulse: (!) 117 (!) 117 (!) 119    Resp:  (!) 28     Temp:       TempSrc:       SpO2:    95%    02/03/23 1122 02/03/23 1234   BP:     Pulse:  63   Resp:  15   Temp: 98.3 °F (36.8 °C)    TempSrc: Oral    SpO2:  95%     Physical Exam    Constitutional: She appears well-developed and well-nourished.   HENT:   Head: Normocephalic and atraumatic.   Eyes: EOM are normal. Pupils are equal, round, and reactive to light.   Neck:   Normal range of motion.  Cardiovascular:  Normal rate, regular rhythm and normal heart sounds.           Pulmonary/Chest: Breath sounds normal. No respiratory distress. She has no wheezes.   Abdominal: Abdomen is soft. She exhibits no distension. There is no abdominal tenderness.   Musculoskeletal:         General: No edema. Normal range of motion.      Cervical back: Normal range of motion.     Neurological: She is alert and oriented to person, place, and time. No cranial nerve deficit.   Skin: Skin is warm and dry.   Psychiatric: She has a normal mood and affect. Thought content normal.       ED Course   Procedures                 MDM  History Acquisition   Additional history was not acquired from other historians.  The patient's list of active medical problems, social history, medications, and allergies as documented per RN staff has been reviewed.     Differential Diagnoses   Based on available information and the initial assessment, the working differential diagnoses considered during this evaluation include but are not limited to a fib, anxiety, acs.      LABS     Labs Reviewed   CBC W/ AUTO DIFFERENTIAL - Abnormal; Notable for the following components:       Result Value    RBC 3.81 (*)     Hemoglobin 11.0 (*)     Hematocrit 32.8 (*)     RDW 16.6 (*)     MPV 8.3 (*)     All other components within normal limits   COMPREHENSIVE METABOLIC PANEL - Abnormal; Notable for the following components:    CO2 22 (*)     eGFR 59  (*)     All other components within normal limits   URINALYSIS, REFLEX TO URINE CULTURE - Abnormal; Notable for the following components:    Color, UA Colorless (*)     All other components within normal limits    Narrative:     Specimen Source->Urine   B-TYPE NATRIURETIC PEPTIDE - Abnormal; Notable for the following components:     (*)     All other components within normal limits   TROPONIN I   D DIMER, QUANTITATIVE   TSH   PROTIME-INR   TYPE & SCREEN     Notable findings from our independent review of the labs ordered include slight elevation in BNP.  Labs are otherwise within normal limits.    Imaging     Imaging Results              X-Ray Chest AP Portable (Final result)  Result time 02/03/23 11:41:25      Final result by Igor Breaux MD (02/03/23 11:41:25)                   Impression:      No acute abnormality.      Electronically signed by: Igor Breaux MD  Date:    02/03/2023  Time:    11:41               Narrative:    EXAMINATION:  XR CHEST AP PORTABLE    CLINICAL HISTORY:  weakness;    TECHNIQUE:  Single view of the chest were performed.    COMPARISON:  Chest radiograph dated March 19, 2023    FINDINGS:  The trachea and cardiomediastinal silhouette are within normal limits.  There is no evidence of pleural effusions, pneumothoraces or consolidations.  Lungs are clear.  Osseous structures demonstrate no evidence for acute fractures or dislocations.                                       A radiology report was available for my review at the time of the encounter.    EKG        Additional Consideration   Additional testing  was not considered during the course of this workup.  Comorbidities taken into consideration during the patient's evaluation and treatment include paroxysmal a fib.    Social determinants of health taken into consideration during development of our treatment plan include n/a.    Medications   sodium chloride 0.9% bolus 1,000 mL 1,000 mL (1,000 mLs Intravenous New Bag 2/3/23 1257)                  ED Management/Discussion       Patient's heart rate normalized during her ED stay.  The rhythm on the patient's initial EKG appeared like sinus tachycardia but could have possibly been rapid a flutter.  Regardless, the patient's labs are all within normal limits.  Her heart rate is now normalized in his clearly sinus rhythm.  She has follow-up with her cardiologist Dr. Hodge.  Is appropriate for discharge at this time    CLINICAL IMPRESSION  1. Paroxysmal atrial fibrillation    2. Tachycardia         ED Disposition Condition    Discharge Stable              Manjeet Galvin MD  02/03/23 7000

## 2023-02-03 NOTE — ED NOTES
Pt provided with gown and instructed to change into it and remove all metal. Pt verbalized understanding.

## 2023-02-03 NOTE — PHARMACY MED REC
"Admission Medication History     The home medication history was taken by Maddy Ochoa CPhT.    Medication history obtained from, Patient Verified    You may go to "Admission" then "Reconcile Home Medications" tabs to review and/or act upon these items.     The home medication list has been updated by the Pharmacy department.   Please read ALL comments highlighted in yellow.   Please address this information as you see fit.    Feel free to contact us if you have any questions or require assistance.      The medications listed below were removed from the home medication list.  Please reorder if appropriate:  Patient reports no longer taking the following medication(s):  Gabapentin 100 mg      Maddy Ochoa CPhT.  Ext [phone]               .        "

## 2023-02-04 ENCOUNTER — PATIENT MESSAGE (OUTPATIENT)
Dept: CARDIOLOGY | Facility: CLINIC | Age: 73
End: 2023-02-04
Payer: MEDICARE

## 2023-02-07 DIAGNOSIS — I48.0 PAROXYSMAL A-FIB: Primary | ICD-10-CM

## 2023-02-07 LAB
OHS CV EVENT MONITOR DAY: 0
OHS CV HOLTER LENGTH DECIMAL HOURS: 23.98
OHS CV HOLTER LENGTH HOURS: 23
OHS CV HOLTER LENGTH MINUTES: 59
OHS CV HOLTER SINUS AVERAGE HR: 68
OHS CV HOLTER SINUS MAX HR: 154
OHS CV HOLTER SINUS MIN HR: 47

## 2023-02-07 RX ORDER — FLECAINIDE ACETATE 100 MG/1
100 TABLET ORAL EVERY 12 HOURS
Qty: 60 TABLET | Refills: 11 | Status: SHIPPED | OUTPATIENT
Start: 2023-02-07 | End: 2023-02-28 | Stop reason: SDUPTHER

## 2023-02-08 ENCOUNTER — TELEPHONE (OUTPATIENT)
Dept: CARDIOLOGY | Facility: CLINIC | Age: 73
End: 2023-02-08
Payer: MEDICARE

## 2023-02-08 NOTE — TELEPHONE ENCOUNTER
----- Message from Elidia Le sent at 2/8/2023  1:29 PM CST -----  Type:  Needs Medical Advice    Who Called: self  Reason:questions for the nurse  Would the patient rather a callback or a response via [facility]?call  Best Call Back Number: [phone]  Additional Information:

## 2023-02-08 NOTE — TELEPHONE ENCOUNTER
2/8/23 spoke with patient and she stated that Dr. Kerrsef left her a message about getting an EKG she just wanted to ensure that we would call her to schedule, writer informed her yes, patient verbalized understanding.  SRM

## 2023-02-08 NOTE — TELEPHONE ENCOUNTER
Please let Ms. Treviño knows that I have reviewed the monitor results which showed evidence of atrial fibrillation multiple times.  The anxiety could be also related to the atrial fibrillation.  I would like to start her on flecainide to take every day at 100 mg twice a day.  That to prevent recurrence of atrial fibrillation.  I would like to get a repeat EKG after 3 weeks.  And repeat Holter monitor after 3 months.  Thank you    Sincerely,  Kaiden Hodge MD.   Interventional Cardiologist  [facility], [place]

## 2023-02-28 ENCOUNTER — CLINICAL SUPPORT (OUTPATIENT)
Dept: LAB | Facility: HOSPITAL | Age: 73
End: 2023-02-28
Attending: INTERNAL MEDICINE
Payer: MEDICARE

## 2023-02-28 DIAGNOSIS — I48.0 PAROXYSMAL A-FIB: ICD-10-CM

## 2023-02-28 DIAGNOSIS — I48.0 PAF (PAROXYSMAL ATRIAL FIBRILLATION): ICD-10-CM

## 2023-02-28 DIAGNOSIS — I48.0 PAF (PAROXYSMAL ATRIAL FIBRILLATION): Primary | ICD-10-CM

## 2023-02-28 PROCEDURE — 93005 ELECTROCARDIOGRAM TRACING: CPT

## 2023-02-28 PROCEDURE — 93010 ELECTROCARDIOGRAM REPORT: CPT | Mod: ,,, | Performed by: INTERNAL MEDICINE

## 2023-02-28 PROCEDURE — 93010 EKG 12-LEAD: ICD-10-PCS | Mod: ,,, | Performed by: INTERNAL MEDICINE

## 2023-02-28 RX ORDER — FLECAINIDE ACETATE 100 MG/1
100 TABLET ORAL EVERY 12 HOURS
Qty: 180 TABLET | Refills: 3 | Status: SHIPPED | OUTPATIENT
Start: 2023-02-28 | End: 2024-02-19

## 2023-03-06 ENCOUNTER — PATIENT MESSAGE (OUTPATIENT)
Dept: CARDIOLOGY | Facility: CLINIC | Age: 73
End: 2023-03-06
Payer: MEDICARE

## 2023-03-07 DIAGNOSIS — I48.0 PAROXYSMAL A-FIB: ICD-10-CM

## 2023-03-07 NOTE — TELEPHONE ENCOUNTER
Please let MS. Treviño knows that the EKG results is ok. No major issues. She is still in sinus rhythm. I would like to get repeat EKG after 3 months. Thanks    Sincerely,  Kaiden Hodge MD.   Interventional Cardiologist  [facility], [place]

## 2023-03-15 ENCOUNTER — PATIENT MESSAGE (OUTPATIENT)
Dept: ORTHOPEDICS | Facility: CLINIC | Age: 73
End: 2023-03-15
Payer: MEDICARE

## 2023-03-22 ENCOUNTER — HOSPITAL ENCOUNTER (OUTPATIENT)
Dept: RADIOLOGY | Facility: HOSPITAL | Age: 73
Discharge: HOME | End: 2023-03-22
Attending: ORTHOPAEDIC SURGERY
Payer: MEDICARE

## 2023-03-22 DIAGNOSIS — M25.552 LEFT HIP PAIN: Primary | ICD-10-CM

## 2023-03-22 DIAGNOSIS — M25.552 LEFT HIP PAIN: ICD-10-CM

## 2023-05-18 ENCOUNTER — HOSPITAL ENCOUNTER (OUTPATIENT)
Dept: RADIOLOGY | Facility: HOSPITAL | Age: 73
Discharge: HOME | End: 2023-05-18
Attending: ORTHOPAEDIC SURGERY
Payer: MEDICARE

## 2023-05-18 ENCOUNTER — OFFICE VISIT (OUTPATIENT)
Dept: ORTHOPEDICS | Facility: CLINIC | Age: 73
End: 2023-05-18
Payer: MEDICARE

## 2023-05-18 VITALS — BODY MASS INDEX: 32.63 KG/M2 | WEIGHT: 240.94 LBS | HEIGHT: 72 IN

## 2023-05-18 DIAGNOSIS — M79.604 LEG PAIN, RIGHT: ICD-10-CM

## 2023-05-18 DIAGNOSIS — M79.604 LEG PAIN, RIGHT: Primary | ICD-10-CM

## 2023-05-18 DIAGNOSIS — M48.062 SPINAL STENOSIS OF LUMBAR REGION WITH NEUROGENIC CLAUDICATION: Primary | ICD-10-CM

## 2023-05-18 PROCEDURE — 1160F RVW MEDS BY RX/DR IN RCRD: CPT | Mod: CPTII,,, | Performed by: ORTHOPAEDIC SURGERY

## 2023-05-18 PROCEDURE — 1125F AMNT PAIN NOTED PAIN PRSNT: CPT | Mod: CPTII,,, | Performed by: ORTHOPAEDIC SURGERY

## 2023-05-18 PROCEDURE — 1101F PT FALLS ASSESS-DOCD LE1/YR: CPT | Mod: CPTII,,, | Performed by: ORTHOPAEDIC SURGERY

## 2023-05-18 PROCEDURE — 3044F HG A1C LEVEL LT 7.0%: CPT | Mod: CPTII,,, | Performed by: ORTHOPAEDIC SURGERY

## 2023-05-18 PROCEDURE — 1160F PR REVIEW ALL MEDS BY PRESCRIBER/CLIN PHARMACIST DOCUMENTED: ICD-10-PCS | Mod: CPTII,,, | Performed by: ORTHOPAEDIC SURGERY

## 2023-05-18 PROCEDURE — 3044F PR MOST RECENT HEMOGLOBIN A1C LEVEL <7.0%: ICD-10-PCS | Mod: CPTII,,, | Performed by: ORTHOPAEDIC SURGERY

## 2023-05-18 PROCEDURE — 73564 X-RAY EXAM KNEE 4 OR MORE: CPT | Mod: TC,50,PN

## 2023-05-18 PROCEDURE — 1159F PR MEDICATION LIST DOCUMENTED IN MEDICAL RECORD: ICD-10-PCS | Mod: CPTII,,, | Performed by: ORTHOPAEDIC SURGERY

## 2023-05-18 PROCEDURE — 73564 XR KNEE ORTHO BILAT WITH FLEXION: ICD-10-PCS | Mod: 26,50,, | Performed by: STUDENT IN AN ORGANIZED HEALTH CARE EDUCATION/TRAINING PROGRAM

## 2023-05-18 PROCEDURE — 3008F BODY MASS INDEX DOCD: CPT | Mod: CPTII,,, | Performed by: ORTHOPAEDIC SURGERY

## 2023-05-18 PROCEDURE — 99999 PR PBB SHADOW E&M-EST. PATIENT-LVL III: CPT | Mod: PBBFAC,,, | Performed by: ORTHOPAEDIC SURGERY

## 2023-05-18 PROCEDURE — 4010F PR ACE/ARB THEARPY RXD/TAKEN: ICD-10-PCS | Mod: CPTII,,, | Performed by: ORTHOPAEDIC SURGERY

## 2023-05-18 PROCEDURE — 3288F FALL RISK ASSESSMENT DOCD: CPT | Mod: CPTII,,, | Performed by: ORTHOPAEDIC SURGERY

## 2023-05-18 PROCEDURE — 4010F ACE/ARB THERAPY RXD/TAKEN: CPT | Mod: CPTII,,, | Performed by: ORTHOPAEDIC SURGERY

## 2023-05-18 PROCEDURE — 73564 X-RAY EXAM KNEE 4 OR MORE: CPT | Mod: 26,50,, | Performed by: STUDENT IN AN ORGANIZED HEALTH CARE EDUCATION/TRAINING PROGRAM

## 2023-05-18 PROCEDURE — 1101F PR PT FALLS ASSESS DOC 0-1 FALLS W/OUT INJ PAST YR: ICD-10-PCS | Mod: CPTII,,, | Performed by: ORTHOPAEDIC SURGERY

## 2023-05-18 PROCEDURE — 1125F PR PAIN SEVERITY QUANTIFIED, PAIN PRESENT: ICD-10-PCS | Mod: CPTII,,, | Performed by: ORTHOPAEDIC SURGERY

## 2023-05-18 PROCEDURE — 99213 PR OFFICE/OUTPT VISIT, EST, LEVL III, 20-29 MIN: ICD-10-PCS | Mod: ,,, | Performed by: ORTHOPAEDIC SURGERY

## 2023-05-18 PROCEDURE — 3288F PR FALLS RISK ASSESSMENT DOCUMENTED: ICD-10-PCS | Mod: CPTII,,, | Performed by: ORTHOPAEDIC SURGERY

## 2023-05-18 PROCEDURE — 3008F PR BODY MASS INDEX (BMI) DOCUMENTED: ICD-10-PCS | Mod: CPTII,,, | Performed by: ORTHOPAEDIC SURGERY

## 2023-05-18 PROCEDURE — 99999 PR PBB SHADOW E&M-EST. PATIENT-LVL III: ICD-10-PCS | Mod: PBBFAC,,, | Performed by: ORTHOPAEDIC SURGERY

## 2023-05-18 PROCEDURE — 99213 OFFICE O/P EST LOW 20 MIN: CPT | Mod: ,,, | Performed by: ORTHOPAEDIC SURGERY

## 2023-05-18 PROCEDURE — 1159F MED LIST DOCD IN RCRD: CPT | Mod: CPTII,,, | Performed by: ORTHOPAEDIC SURGERY

## 2023-05-18 RX ORDER — GABAPENTIN 400 MG/1
400 CAPSULE ORAL NIGHTLY
Qty: 30 CAPSULE | Refills: 2 | Status: SHIPPED | OUTPATIENT
Start: 2023-05-18 | End: 2023-09-14

## 2023-05-18 NOTE — PROGRESS NOTES
Subjective:      Patient ID: Jayla Treviño is a 73 y.o. female.    Chief Complaint: Leg Pain (right )    HPI    Patient complains of lower back pain radiating down the lateral aspect of her left lower extremity, especially in the lateral calf.  She denies any focal neurologic deficits.  She has a history of previous lumbar surgery.  She reports claudication symptoms after walking for a few minutes.  No current active treatment.          Review of Systems   Constitutional: Negative for fever and weight loss.   HENT:  Negative for congestion.    Eyes:  Negative for visual disturbance.   Cardiovascular:  Negative for chest pain.   Respiratory:  Negative for shortness of breath.    Hematologic/Lymphatic: Negative for bleeding problem. Does not bruise/bleed easily.   Skin:  Negative for poor wound healing.   Musculoskeletal:  Positive for back pain.   Gastrointestinal:  Negative for abdominal pain.   Genitourinary:  Negative for dysuria.   Neurological:  Negative for seizures.   Psychiatric/Behavioral:  Negative for altered mental status.    Allergic/Immunologic: Negative for persistent infections.       Objective:      Ortho/SPM Exam      Right lower extremity    The patient is not in acute distress.   Body habitus is:normal.   Sclerae normal  The patient walks without a limp.   Respiratory distress:  none  The skin over the hip is:intact.   The lumbar spine is nontender with mild restriction of range of motion in all directions  There is no local tenderness.   Range of motion- Flexion full, External rotation full, internal rotation full.  Resisted SLR negative.  Pain with rotation negative  Sciatic tension findings positive at 70°  Shortening/lengthening compared to the contralateral side absent.  Pulses DP present, PT present.  Motor normal 5/5 strength in all tested muscle groups.   Sensory normal.    I reviewed the relevant imaging for the patient's condition:  Recent right hip films show minimal  DJD              Assessment:       Encounter Diagnosis   Name Primary?    Spinal stenosis of lumbar region with neurogenic claudication Yes      The patient does not appear to be experiencing severe pain or functional impairment.  There is no objective evidence of active pathology in the hip area.          Plan:       Jayla was seen today for leg pain.    Diagnoses and all orders for this visit:    Spinal stenosis of lumbar region with neurogenic claudication          I explained my diagnostic impression and the reasoning behind it in detail, using layman's terms.  Models and/or pictures were used to help in the explanation.    Gabapentin at bedtime    Physical therapy    Appropriate activity modification discussed in detail

## 2023-05-20 ENCOUNTER — PATIENT MESSAGE (OUTPATIENT)
Dept: ORTHOPEDICS | Facility: CLINIC | Age: 73
End: 2023-05-20
Payer: MEDICARE

## 2023-05-22 NOTE — TELEPHONE ENCOUNTER
Certainly stop the Gabapentin if you are experiencing side effects. If the patient is able to take anti-inflammatories, I can certainly send in a prescription dose of Ibuprofen. Let me know. Thanks!

## 2023-05-30 ENCOUNTER — PATIENT MESSAGE (OUTPATIENT)
Dept: CARDIOLOGY | Facility: CLINIC | Age: 73
End: 2023-05-30
Payer: MEDICARE

## 2023-05-31 ENCOUNTER — PATIENT MESSAGE (OUTPATIENT)
Dept: CARDIOLOGY | Facility: CLINIC | Age: 73
End: 2023-05-31
Payer: MEDICARE

## 2023-06-01 DIAGNOSIS — I48.0 PAF (PAROXYSMAL ATRIAL FIBRILLATION): Primary | ICD-10-CM

## 2023-06-09 ENCOUNTER — CLINICAL SUPPORT (OUTPATIENT)
Dept: LAB | Facility: HOSPITAL | Age: 73
End: 2023-06-09
Attending: INTERNAL MEDICINE
Payer: MEDICARE

## 2023-06-09 DIAGNOSIS — I48.0 PAF (PAROXYSMAL ATRIAL FIBRILLATION): ICD-10-CM

## 2023-06-09 PROCEDURE — 93010 EKG 12-LEAD: ICD-10-PCS | Mod: ,,, | Performed by: INTERNAL MEDICINE

## 2023-06-09 PROCEDURE — 93010 ELECTROCARDIOGRAM REPORT: CPT | Mod: ,,, | Performed by: INTERNAL MEDICINE

## 2023-06-09 PROCEDURE — 93005 ELECTROCARDIOGRAM TRACING: CPT

## 2023-06-14 ENCOUNTER — TELEPHONE (OUTPATIENT)
Dept: CARDIOLOGY | Facility: CLINIC | Age: 73
End: 2023-06-14
Payer: MEDICARE

## 2023-06-14 ENCOUNTER — PATIENT MESSAGE (OUTPATIENT)
Dept: CARDIOLOGY | Facility: CLINIC | Age: 73
End: 2023-06-14
Payer: MEDICARE

## 2023-06-14 NOTE — TELEPHONE ENCOUNTER
----- Message from Kaiden Hodge MD sent at 6/14/2023  1:52 PM CDT -----  Contact: pt  Please let her know that I have reviewed the EKG.  Looks good.  The heart stays in normal rhythm.  No major issues . Continue the same medications.  Thanks  ----- Message -----  From: Jayleen Bustamante MA  Sent: 6/12/2023   3:51 PM CDT  To: MD Dr Naveen Barrett can you please see her EKG and let me know the results   ----- Message -----  From: Marcus Welch  Sent: 6/12/2023   3:46 PM CDT  To: Naveen Richey Staff    Type:  Needs Medical Advice    Who Called: pt  Would the patient rather a call back or a response via [facility]? call  Best Call Back Number: [phone]  Additional Information:    pt requesting a call regarding care

## 2023-07-22 ENCOUNTER — PATIENT MESSAGE (OUTPATIENT)
Dept: CARDIOLOGY | Facility: CLINIC | Age: 73
End: 2023-07-22
Payer: MEDICARE

## 2023-07-24 NOTE — TELEPHONE ENCOUNTER
Good Morning,    Since Dr Kerrsef is out I was wondering if you would be able to help out with this patient. She needs to have tooth extraction on Tuesday 7/25 and needs to know the instructions in regards to the Eliquis. Please advise if you are able to. Thank you for your help!    Jocelynn BROWN CMA

## 2023-07-26 ENCOUNTER — PATIENT MESSAGE (OUTPATIENT)
Dept: CARDIOLOGY | Facility: CLINIC | Age: 73
End: 2023-07-26
Payer: MEDICARE

## 2023-07-28 ENCOUNTER — OFFICE VISIT (OUTPATIENT)
Dept: URGENT CARE | Facility: CLINIC | Age: 73
End: 2023-07-28
Payer: MEDICARE

## 2023-07-28 ENCOUNTER — TELEPHONE (OUTPATIENT)
Dept: CARDIOLOGY | Facility: CLINIC | Age: 73
End: 2023-07-28
Payer: MEDICARE

## 2023-07-28 VITALS
HEIGHT: 72 IN | TEMPERATURE: 98 F | OXYGEN SATURATION: 97 % | RESPIRATION RATE: 20 BRPM | WEIGHT: 240 LBS | SYSTOLIC BLOOD PRESSURE: 149 MMHG | DIASTOLIC BLOOD PRESSURE: 88 MMHG | HEART RATE: 57 BPM | BODY MASS INDEX: 32.51 KG/M2

## 2023-07-28 DIAGNOSIS — R05.9 COUGH, UNSPECIFIED TYPE: Primary | ICD-10-CM

## 2023-07-28 DIAGNOSIS — J40 BRONCHITIS: ICD-10-CM

## 2023-07-28 LAB
CTP QC/QA: YES
SARS-COV-2 AG RESP QL IA.RAPID: NEGATIVE

## 2023-07-28 PROCEDURE — 87811 SARS-COV-2 COVID19 W/OPTIC: CPT | Mod: QW,S$GLB,,

## 2023-07-28 PROCEDURE — 99213 PR OFFICE/OUTPT VISIT, EST, LEVL III, 20-29 MIN: ICD-10-PCS | Mod: S$GLB,,,

## 2023-07-28 PROCEDURE — 87811 SARS CORONAVIRUS 2 ANTIGEN POCT, MANUAL READ: ICD-10-PCS | Mod: QW,S$GLB,,

## 2023-07-28 PROCEDURE — 99213 OFFICE O/P EST LOW 20 MIN: CPT | Mod: S$GLB,,,

## 2023-07-28 RX ORDER — ALBUTEROL SULFATE 90 UG/1
2 AEROSOL, METERED RESPIRATORY (INHALATION) EVERY 6 HOURS PRN
Qty: 6.7 G | Refills: 0 | Status: SHIPPED | OUTPATIENT
Start: 2023-07-28

## 2023-07-28 RX ORDER — BENZONATATE 100 MG/1
100 CAPSULE ORAL 3 TIMES DAILY PRN
Qty: 30 CAPSULE | Refills: 0 | Status: SHIPPED | OUTPATIENT
Start: 2023-07-28 | End: 2023-08-07

## 2023-07-28 NOTE — PATIENT INSTRUCTIONS
PLEASE READ YOUR DISCHARGE INSTRUCTIONS ENTIRELY AS IT CONTAINS IMPORTANT INFORMATION.  Use the albuterol inhaler for wheezing.     Take Tessalon Perles for the cough.      Please return or see your primary care doctor if you develop new or worsening symptoms.     Please arrange follow up with your primary medical clinic as soon as possible. You must understand that you've received an Urgent Care treatment only and that you may be released before all of your medical problems are known or treated. You, the patient, will arrange for follow up as instructed. If your symptoms worsen or fail to improve you should go to the Emergency Room.

## 2023-07-28 NOTE — TELEPHONE ENCOUNTER
Message forward to  to review.    Billy Richey Staff  Caller: Unspecified (Today,  4:35 PM)  Type:  Needs Medical Advice       Would the patient rather a call back or a response via [facility]?   Best Call Back Number:  [phone]   Additional Information:  Pt would like to know if she can take albuterol  while taking her usual medications.  Pt would like a call back.

## 2023-07-28 NOTE — PROGRESS NOTES
Subjective:      Patient ID: Jayla Treviño is a 73 y.o. female.    Vitals:  height is 6' (1.829 m) and weight is 108.9 kg (240 lb). Her temperature is 98.3 °F (36.8 °C). Her blood pressure is 149/88 (abnormal) and her pulse is 57 (abnormal). Her respiration is 20 and oxygen saturation is 97%.     Chief Complaint: Cough    73 year old female presents today with a cough, upper back pain, wheezing. Symptoms started 07/26/2023. Treatments at home includes OTC cough medications with mild relief. No known exposure to anything. COVID vaccinated.         Cough  This is a new problem. The current episode started in the past 7 days. The problem has been gradually worsening. The cough is Non-productive. Associated symptoms include wheezing. Nothing aggravates the symptoms. She has tried OTC cough suppressant for the symptoms. The treatment provided mild relief. There is no history of asthma, bronchiectasis, bronchitis, COPD, emphysema, environmental allergies or pneumonia.     Respiratory:  Positive for cough and wheezing.    Allergic/Immunologic: Negative for environmental allergies.    Objective:     Physical Exam   Constitutional: She is oriented to person, place, and time. She appears well-developed. She is cooperative.  Non-toxic appearance. She does not appear ill. No distress.   HENT:   Head: Normocephalic and atraumatic.   Ears:   Right Ear: Hearing, tympanic membrane, external ear and ear canal normal.   Left Ear: Hearing, tympanic membrane, external ear and ear canal normal.   Nose: No mucosal edema, rhinorrhea or congestion. Right sinus exhibits no maxillary sinus tenderness and no frontal sinus tenderness. Left sinus exhibits no maxillary sinus tenderness and no frontal sinus tenderness.   Mouth/Throat: Uvula is midline and mucous membranes are normal. No trismus in the jaw. No uvula swelling. No oropharyngeal exudate, posterior oropharyngeal edema or posterior oropharyngeal erythema.   Eyes: Conjunctivae  and lids are normal.   Neck: Trachea normal and phonation normal. Neck supple. No edema present. No erythema present.   Cardiovascular: Normal rate, regular rhythm, normal heart sounds and normal pulses.   Pulmonary/Chest: Effort normal and breath sounds normal. No respiratory distress. She has no decreased breath sounds. She has no wheezes. She has no rhonchi.   Abdominal: Normal appearance.   Musculoskeletal: Normal range of motion.         General: Normal range of motion.   Lymphadenopathy:     She has no cervical adenopathy.   Neurological: She is alert and oriented to person, place, and time. She exhibits normal muscle tone.   Skin: Skin is warm, dry, intact and not diaphoretic.   Psychiatric: Her speech is normal and behavior is normal.   Nursing note and vitals reviewed.  Results for orders placed or performed in visit on 07/28/23   SARS Coronavirus 2 Antigen, POCT Manual Read   Result Value Ref Range    SARS Coronavirus 2 Antigen Negative Negative     Acceptable Yes        Assessment:     1. Cough, unspecified type    2. Bronchitis        Plan:       Cough, unspecified type  -     SARS Coronavirus 2 Antigen, POCT Manual Read  -     albuterol (VENTOLIN HFA) 90 mcg/actuation inhaler; Inhale 2 puffs into the lungs every 6 (six) hours as needed for Wheezing. Rescue  Dispense: 6.7 g; Refill: 0  -     benzonatate (TESSALON) 100 MG capsule; Take 1 capsule (100 mg total) by mouth 3 (three) times daily as needed for Cough.  Dispense: 30 capsule; Refill: 0    Bronchitis  -     albuterol (VENTOLIN HFA) 90 mcg/actuation inhaler; Inhale 2 puffs into the lungs every 6 (six) hours as needed for Wheezing. Rescue  Dispense: 6.7 g; Refill: 0  -     benzonatate (TESSALON) 100 MG capsule; Take 1 capsule (100 mg total) by mouth 3 (three) times daily as needed for Cough.  Dispense: 30 capsule; Refill: 0    Pt in no acute distress. Vitals reassuring. Reviewed negative COVID test results in detail. Discussed OTC  medications for symptom relief and prescription sent for Tessalon Perles/Albuterol. Discussed the importance of further evaluation if symptoms worsen. Patient stated verbal understanding.    Patient Instructions   PLEASE READ YOUR DISCHARGE INSTRUCTIONS ENTIRELY AS IT CONTAINS IMPORTANT INFORMATION.  Use the albuterol inhaler for wheezing.     Take Tessalon Perles for the cough.      Please return or see your primary care doctor if you develop new or worsening symptoms.     Please arrange follow up with your primary medical clinic as soon as possible. You must understand that you've received an Urgent Care treatment only and that you may be released before all of your medical problems are known or treated. You, the patient, will arrange for follow up as instructed. If your symptoms worsen or fail to improve you should go to the Emergency Room.

## 2023-08-01 ENCOUNTER — TELEPHONE (OUTPATIENT)
Dept: CARDIOLOGY | Facility: CLINIC | Age: 73
End: 2023-08-01
Payer: MEDICARE

## 2023-08-01 ENCOUNTER — PATIENT MESSAGE (OUTPATIENT)
Dept: CARDIOLOGY | Facility: CLINIC | Age: 73
End: 2023-08-01
Payer: MEDICARE

## 2023-08-01 NOTE — TELEPHONE ENCOUNTER
----- Message from Afia Muir MD sent at 7/31/2023  9:03 PM CDT -----  That should be okay.                  ----- Message -----  From: Saritha Kang MA  Sent: 7/28/2023   4:46 PM CDT  To: Afia Muir MD    Hey ,    Please see pt message below.  Can you help me out with Pt question? Regarding  albuterol interacting  with her other Meds.    MEDS LIST        acetaminophen (TYLENOL) 650 MG TbSR    albuterol (VENTOLIN HFA) 90 mcg/actuation inhaler  ascorbic acid, vitamin C, (VITAMIN C) 500 MG tablet  benzonatate (TESSALON) 100 MG capsule  cholecalciferol, vitamin D3, (VITAMIN D3) 125 mcg (5,000 unit) Tab  cyanocobalamin, vitamin B-12, 50 mcg tablet    diclofenac sodium (VOLTAREN) 1 % Gel  diphenhydrAMINE (BENADRYL) 25 mg capsule  ELIQUIS 5 mg Tab    flecainide (TAMBOCOR) 100 MG Tab  fluticasone propionate (FLONASE) 50 mcg/actuation nasal spray    fosinopriL (MONOPRIL) 20 MG tablet    gabapentin (NEURONTIN) 400 MG capsule    metoprolol tartrate (LOPRESSOR) 100 MG tablet    triamterene-hydrochlorothiazide 37.5-25 mg (DYAZIDE) 37.5-25 mg per capsule          Thank You!              ----- Message -----  From: Jyoti Zepeda  Sent: 7/28/2023   4:38 PM CDT  To: Naveen Richey Staff    Type:  Needs Medical Advice      Would the patient rather a call back or a response via [facility]?   Best Call Back Number:  [phone]  Additional Information:  Pt would like to know if she can take albuterol  while taking her usual medications.  Pt would like a call back.

## 2023-09-14 ENCOUNTER — TELEPHONE (OUTPATIENT)
Dept: PODIATRY | Facility: CLINIC | Age: 73
End: 2023-09-14
Payer: MEDICARE

## 2023-09-14 ENCOUNTER — OFFICE VISIT (OUTPATIENT)
Dept: ORTHOPEDICS | Facility: CLINIC | Age: 73
End: 2023-09-14
Payer: MEDICARE

## 2023-09-14 ENCOUNTER — HOSPITAL ENCOUNTER (OUTPATIENT)
Dept: RADIOLOGY | Facility: HOSPITAL | Age: 73
Discharge: HOME | End: 2023-09-14
Attending: ORTHOPAEDIC SURGERY
Payer: MEDICARE

## 2023-09-14 ENCOUNTER — TELEPHONE (OUTPATIENT)
Dept: ORTHOPEDICS | Facility: CLINIC | Age: 73
End: 2023-09-14

## 2023-09-14 VITALS — BODY MASS INDEX: 32.55 KG/M2 | HEIGHT: 72 IN

## 2023-09-14 DIAGNOSIS — M79.604 LEG PAIN, RIGHT: ICD-10-CM

## 2023-09-14 DIAGNOSIS — M51.36 LUMBAR DEGENERATIVE DISC DISEASE: Primary | ICD-10-CM

## 2023-09-14 PROCEDURE — 3044F PR MOST RECENT HEMOGLOBIN A1C LEVEL <7.0%: ICD-10-PCS | Mod: CPTII,S$GLB,, | Performed by: ORTHOPAEDIC SURGERY

## 2023-09-14 PROCEDURE — 99213 OFFICE O/P EST LOW 20 MIN: CPT | Mod: S$GLB,,, | Performed by: ORTHOPAEDIC SURGERY

## 2023-09-14 PROCEDURE — 73630 XR FOOT COMPLETE 3 VIEW RIGHT: ICD-10-PCS | Mod: 26,RT,, | Performed by: STUDENT IN AN ORGANIZED HEALTH CARE EDUCATION/TRAINING PROGRAM

## 2023-09-14 PROCEDURE — 3008F PR BODY MASS INDEX (BMI) DOCUMENTED: ICD-10-PCS | Mod: CPTII,S$GLB,, | Performed by: ORTHOPAEDIC SURGERY

## 2023-09-14 PROCEDURE — 1160F PR REVIEW ALL MEDS BY PRESCRIBER/CLIN PHARMACIST DOCUMENTED: ICD-10-PCS | Mod: CPTII,S$GLB,, | Performed by: ORTHOPAEDIC SURGERY

## 2023-09-14 PROCEDURE — 3288F FALL RISK ASSESSMENT DOCD: CPT | Mod: CPTII,S$GLB,, | Performed by: ORTHOPAEDIC SURGERY

## 2023-09-14 PROCEDURE — 99999 PR PBB SHADOW E&M-EST. PATIENT-LVL III: CPT | Mod: PBBFAC,,, | Performed by: ORTHOPAEDIC SURGERY

## 2023-09-14 PROCEDURE — 1125F PR PAIN SEVERITY QUANTIFIED, PAIN PRESENT: ICD-10-PCS | Mod: CPTII,S$GLB,, | Performed by: ORTHOPAEDIC SURGERY

## 2023-09-14 PROCEDURE — 3008F BODY MASS INDEX DOCD: CPT | Mod: CPTII,S$GLB,, | Performed by: ORTHOPAEDIC SURGERY

## 2023-09-14 PROCEDURE — 99999 PR PBB SHADOW E&M-EST. PATIENT-LVL III: ICD-10-PCS | Mod: PBBFAC,,, | Performed by: ORTHOPAEDIC SURGERY

## 2023-09-14 PROCEDURE — 1159F PR MEDICATION LIST DOCUMENTED IN MEDICAL RECORD: ICD-10-PCS | Mod: CPTII,S$GLB,, | Performed by: ORTHOPAEDIC SURGERY

## 2023-09-14 PROCEDURE — 4010F PR ACE/ARB THEARPY RXD/TAKEN: ICD-10-PCS | Mod: CPTII,S$GLB,, | Performed by: ORTHOPAEDIC SURGERY

## 2023-09-14 PROCEDURE — 1101F PT FALLS ASSESS-DOCD LE1/YR: CPT | Mod: CPTII,S$GLB,, | Performed by: ORTHOPAEDIC SURGERY

## 2023-09-14 PROCEDURE — 1159F MED LIST DOCD IN RCRD: CPT | Mod: CPTII,S$GLB,, | Performed by: ORTHOPAEDIC SURGERY

## 2023-09-14 PROCEDURE — 1125F AMNT PAIN NOTED PAIN PRSNT: CPT | Mod: CPTII,S$GLB,, | Performed by: ORTHOPAEDIC SURGERY

## 2023-09-14 PROCEDURE — 1160F RVW MEDS BY RX/DR IN RCRD: CPT | Mod: CPTII,S$GLB,, | Performed by: ORTHOPAEDIC SURGERY

## 2023-09-14 PROCEDURE — 99213 PR OFFICE/OUTPT VISIT, EST, LEVL III, 20-29 MIN: ICD-10-PCS | Mod: S$GLB,,, | Performed by: ORTHOPAEDIC SURGERY

## 2023-09-14 PROCEDURE — 4010F ACE/ARB THERAPY RXD/TAKEN: CPT | Mod: CPTII,S$GLB,, | Performed by: ORTHOPAEDIC SURGERY

## 2023-09-14 PROCEDURE — 73630 X-RAY EXAM OF FOOT: CPT | Mod: TC,PN,RT

## 2023-09-14 PROCEDURE — 3288F PR FALLS RISK ASSESSMENT DOCUMENTED: ICD-10-PCS | Mod: CPTII,S$GLB,, | Performed by: ORTHOPAEDIC SURGERY

## 2023-09-14 PROCEDURE — 3044F HG A1C LEVEL LT 7.0%: CPT | Mod: CPTII,S$GLB,, | Performed by: ORTHOPAEDIC SURGERY

## 2023-09-14 PROCEDURE — 1101F PR PT FALLS ASSESS DOC 0-1 FALLS W/OUT INJ PAST YR: ICD-10-PCS | Mod: CPTII,S$GLB,, | Performed by: ORTHOPAEDIC SURGERY

## 2023-09-14 PROCEDURE — 73630 X-RAY EXAM OF FOOT: CPT | Mod: 26,RT,, | Performed by: STUDENT IN AN ORGANIZED HEALTH CARE EDUCATION/TRAINING PROGRAM

## 2023-09-14 RX ORDER — METHYLPREDNISOLONE 4 MG/1
TABLET ORAL
Qty: 1 EACH | Refills: 1 | Status: SHIPPED | OUTPATIENT
Start: 2023-09-14

## 2023-09-14 NOTE — PROGRESS NOTES
Subjective:      Patient ID: Jayal Treviño is a 73 y.o. female.    Chief Complaint: Pain of the Right Foot, Knee Pain, and Hip Pain    HPI    The patient complains of diffuse right lower extremity pain, especially in the right foot.  She denies any injury.  Denies focal neurologic complaints.  She has a long history of back pain that she does not directly connect to her current symptoms.          Review of Systems   Constitutional: Negative for fever and weight loss.   HENT:  Negative for congestion.    Eyes:  Negative for visual disturbance.   Cardiovascular:  Negative for chest pain.   Respiratory:  Negative for shortness of breath.    Hematologic/Lymphatic: Negative for bleeding problem. Does not bruise/bleed easily.   Skin:  Negative for poor wound healing.   Musculoskeletal:  Positive for back pain and joint pain.   Gastrointestinal:  Negative for abdominal pain.   Genitourinary:  Negative for dysuria.   Neurological:  Negative for seizures.   Psychiatric/Behavioral:  Negative for altered mental status.    Allergic/Immunologic: Negative for persistent infections.         Objective:      Ortho/SPM Exam      Right lower extremity/hip    The patient is not in acute distress.   Body habitus is:normal.   Sclerae normal  The patient walks with a mild limp  Respiratory distress:  none  The skin over the hip is:intact.   There is no local tenderness.   Range of motion- Flexion full, External rotation full, internal rotation full.  Resisted SLR negative.  Pain with rotation negative  Sciatic tension findings negative.  Shortening/lengthening compared to the contralateral side absent.    There is tenderness on the dorsum of the right foot over the cuboid bone.    Pulses DP present, PT present.  Motor normal 5/5 strength in all tested muscle groups.   Sensory normal.                Assessment:       Encounter Diagnoses   Name Primary?    Lumbar degenerative disc disease Yes    Leg pain, right         The most  likely explanation of current symptoms is disc disease and radiculopathy.  The patient may also have an injury to her right foot that she does not recall.  Gout is also possible.          Plan:       Jayla was seen today for knee pain, hip pain and pain.    Diagnoses and all orders for this visit:    Lumbar degenerative disc disease    Leg pain, right  -     X-Ray Foot Complete 3 view Right; Future          I explained my diagnostic impression and the reasoning behind it in detail, using layman's terms.     Right foot x-ray ordered    Medrol Dosepak    Might consider repeat lumbar MRI and pain clinic referral, depending on clinical response

## 2023-09-14 NOTE — TELEPHONE ENCOUNTER
Called pt back and offered her an appointment what was just cancelled for Tuesday next week. She denied it and she said she need to be seen today. Offered her to check at another facility like [facility], she denied as well. Told me she has an appointment with Ortho today, maybe he can help her with that.

## 2023-09-14 NOTE — PROGRESS NOTES
Please let the patient know that their recent x-ray did not show any fractures or other problems    Owen

## 2023-09-14 NOTE — TELEPHONE ENCOUNTER
----- Message from Sindhu Foster sent at 9/14/2023  7:25 AM CDT -----  Regarding: Call back  Contact: [phone]  Type:  Same Day Appointment Request    Caller is requesting a same day appointment.  Caller declined first available appointment listed below.    Name of Caller: PT   When is the first available appointment? October   Symptoms: Pain on her right foot   Best Call Back Number: [phone]  Additional Information:

## 2023-09-14 NOTE — TELEPHONE ENCOUNTER
----- Message from Owen Anderson MD sent at 9/14/2023  1:26 PM CDT -----  Please let the patient know that their recent x-ray did not show any fractures or other problems    Owen

## 2023-09-16 ENCOUNTER — PATIENT MESSAGE (OUTPATIENT)
Dept: CARDIOLOGY | Facility: CLINIC | Age: 73
End: 2023-09-16
Payer: MEDICARE

## 2023-09-18 NOTE — TELEPHONE ENCOUNTER
Okay to take it for 6 days.  If you develop any significant symptoms please update us    Sincerely,  Kaiden Hodge MD.   Interventional Cardiologist  [facility] [place]

## 2023-12-07 ENCOUNTER — LAB VISIT (OUTPATIENT)
Dept: LAB | Facility: HOSPITAL | Age: 73
End: 2023-12-07
Attending: INTERNAL MEDICINE
Payer: MEDICARE

## 2023-12-07 ENCOUNTER — OFFICE VISIT (OUTPATIENT)
Dept: CARDIOLOGY | Facility: CLINIC | Age: 73
End: 2023-12-07
Payer: MEDICARE

## 2023-12-07 VITALS
SYSTOLIC BLOOD PRESSURE: 123 MMHG | HEIGHT: 72 IN | BODY MASS INDEX: 33.14 KG/M2 | HEART RATE: 60 BPM | WEIGHT: 244.69 LBS | DIASTOLIC BLOOD PRESSURE: 75 MMHG

## 2023-12-07 DIAGNOSIS — Z79.01 LONG TERM CURRENT USE OF ANTICOAGULANT THERAPY: Chronic | ICD-10-CM

## 2023-12-07 DIAGNOSIS — E66.09 CLASS 1 OBESITY DUE TO EXCESS CALORIES WITH SERIOUS COMORBIDITY AND BODY MASS INDEX (BMI) OF 32.0 TO 32.9 IN ADULT: ICD-10-CM

## 2023-12-07 DIAGNOSIS — R94.31 ABNORMAL ELECTROCARDIOGRAM (ECG) (EKG): ICD-10-CM

## 2023-12-07 DIAGNOSIS — I25.10 ATHEROSCLEROSIS OF CORONARY ARTERY OF NATIVE HEART, UNSPECIFIED VESSEL OR LESION TYPE, UNSPECIFIED WHETHER ANGINA PRESENT: ICD-10-CM

## 2023-12-07 DIAGNOSIS — G47.9 SLEEP DISTURBANCE: ICD-10-CM

## 2023-12-07 DIAGNOSIS — D64.9 ANEMIA, UNSPECIFIED TYPE: Chronic | ICD-10-CM

## 2023-12-07 DIAGNOSIS — I48.0 PAROXYSMAL ATRIAL FIBRILLATION: Chronic | ICD-10-CM

## 2023-12-07 DIAGNOSIS — N18.31 CHRONIC KIDNEY DISEASE, STAGE 3A: ICD-10-CM

## 2023-12-07 DIAGNOSIS — I48.0 PAROXYSMAL ATRIAL FIBRILLATION: Primary | Chronic | ICD-10-CM

## 2023-12-07 DIAGNOSIS — I10 PRIMARY HYPERTENSION: Chronic | ICD-10-CM

## 2023-12-07 LAB
ALBUMIN SERPL BCP-MCNC: 3.5 G/DL (ref 3.5–5.2)
ALP SERPL-CCNC: 81 U/L (ref 55–135)
ALT SERPL W/O P-5'-P-CCNC: 10 U/L (ref 10–44)
ANION GAP SERPL CALC-SCNC: 11 MMOL/L (ref 8–16)
AST SERPL-CCNC: 15 U/L (ref 10–40)
BASOPHILS # BLD AUTO: 0.02 K/UL (ref 0–0.2)
BASOPHILS NFR BLD: 0.5 % (ref 0–1.9)
BILIRUB SERPL-MCNC: 0.4 MG/DL (ref 0.1–1)
BUN SERPL-MCNC: 25 MG/DL (ref 8–23)
CALCIUM SERPL-MCNC: 9.8 MG/DL (ref 8.7–10.5)
CHLORIDE SERPL-SCNC: 103 MMOL/L (ref 95–110)
CO2 SERPL-SCNC: 25 MMOL/L (ref 23–29)
CREAT SERPL-MCNC: 1.2 MG/DL (ref 0.5–1.4)
DIFFERENTIAL METHOD: ABNORMAL
EOSINOPHIL # BLD AUTO: 0 K/UL (ref 0–0.5)
EOSINOPHIL NFR BLD: 1.1 % (ref 0–8)
ERYTHROCYTE [DISTWIDTH] IN BLOOD BY AUTOMATED COUNT: 15.9 % (ref 11.5–14.5)
EST. GFR  (NO RACE VARIABLE): 48 ML/MIN/1.73 M^2
GLUCOSE SERPL-MCNC: 105 MG/DL (ref 70–110)
HCT VFR BLD AUTO: 31.5 % (ref 37–48.5)
HGB BLD-MCNC: 10.7 G/DL (ref 12–16)
IMM GRANULOCYTES # BLD AUTO: 0.01 K/UL (ref 0–0.04)
IMM GRANULOCYTES NFR BLD AUTO: 0.3 % (ref 0–0.5)
LYMPHOCYTES # BLD AUTO: 1.1 K/UL (ref 1–4.8)
LYMPHOCYTES NFR BLD: 30 % (ref 18–48)
MCH RBC QN AUTO: 29.4 PG (ref 27–31)
MCHC RBC AUTO-ENTMCNC: 34 G/DL (ref 32–36)
MCV RBC AUTO: 87 FL (ref 82–98)
MONOCYTES # BLD AUTO: 0.4 K/UL (ref 0.3–1)
MONOCYTES NFR BLD: 12 % (ref 4–15)
NEUTROPHILS # BLD AUTO: 2.1 K/UL (ref 1.8–7.7)
NEUTROPHILS NFR BLD: 56.1 % (ref 38–73)
NRBC BLD-RTO: 0 /100 WBC
PLATELET # BLD AUTO: 279 K/UL (ref 150–450)
PMV BLD AUTO: 8.1 FL (ref 9.2–12.9)
POTASSIUM SERPL-SCNC: 3.9 MMOL/L (ref 3.5–5.1)
PROT SERPL-MCNC: 8.2 G/DL (ref 6–8.4)
RBC # BLD AUTO: 3.64 M/UL (ref 4–5.4)
SODIUM SERPL-SCNC: 139 MMOL/L (ref 136–145)
WBC # BLD AUTO: 3.67 K/UL (ref 3.9–12.7)

## 2023-12-07 PROCEDURE — 3044F PR MOST RECENT HEMOGLOBIN A1C LEVEL <7.0%: ICD-10-PCS | Mod: CPTII,S$GLB,, | Performed by: INTERNAL MEDICINE

## 2023-12-07 PROCEDURE — 1159F MED LIST DOCD IN RCRD: CPT | Mod: CPTII,S$GLB,, | Performed by: INTERNAL MEDICINE

## 2023-12-07 PROCEDURE — 99999 PR PBB SHADOW E&M-EST. PATIENT-LVL III: CPT | Mod: PBBFAC,,, | Performed by: INTERNAL MEDICINE

## 2023-12-07 PROCEDURE — 1159F PR MEDICATION LIST DOCUMENTED IN MEDICAL RECORD: ICD-10-PCS | Mod: CPTII,S$GLB,, | Performed by: INTERNAL MEDICINE

## 2023-12-07 PROCEDURE — 1126F AMNT PAIN NOTED NONE PRSNT: CPT | Mod: CPTII,S$GLB,, | Performed by: INTERNAL MEDICINE

## 2023-12-07 PROCEDURE — 99214 PR OFFICE/OUTPT VISIT, EST, LEVL IV, 30-39 MIN: ICD-10-PCS | Mod: S$GLB,,, | Performed by: INTERNAL MEDICINE

## 2023-12-07 PROCEDURE — 1160F RVW MEDS BY RX/DR IN RCRD: CPT | Mod: CPTII,S$GLB,, | Performed by: INTERNAL MEDICINE

## 2023-12-07 PROCEDURE — 85025 COMPLETE CBC W/AUTO DIFF WBC: CPT | Performed by: INTERNAL MEDICINE

## 2023-12-07 PROCEDURE — 36415 COLL VENOUS BLD VENIPUNCTURE: CPT | Performed by: INTERNAL MEDICINE

## 2023-12-07 PROCEDURE — 99999 PR PBB SHADOW E&M-EST. PATIENT-LVL III: ICD-10-PCS | Mod: PBBFAC,,, | Performed by: INTERNAL MEDICINE

## 2023-12-07 PROCEDURE — 3078F DIAST BP <80 MM HG: CPT | Mod: CPTII,S$GLB,, | Performed by: INTERNAL MEDICINE

## 2023-12-07 PROCEDURE — 3074F SYST BP LT 130 MM HG: CPT | Mod: CPTII,S$GLB,, | Performed by: INTERNAL MEDICINE

## 2023-12-07 PROCEDURE — 3078F PR MOST RECENT DIASTOLIC BLOOD PRESSURE < 80 MM HG: ICD-10-PCS | Mod: CPTII,S$GLB,, | Performed by: INTERNAL MEDICINE

## 2023-12-07 PROCEDURE — 99214 OFFICE O/P EST MOD 30 MIN: CPT | Mod: S$GLB,,, | Performed by: INTERNAL MEDICINE

## 2023-12-07 PROCEDURE — 1126F PR PAIN SEVERITY QUANTIFIED, NO PAIN PRESENT: ICD-10-PCS | Mod: CPTII,S$GLB,, | Performed by: INTERNAL MEDICINE

## 2023-12-07 PROCEDURE — 3074F PR MOST RECENT SYSTOLIC BLOOD PRESSURE < 130 MM HG: ICD-10-PCS | Mod: CPTII,S$GLB,, | Performed by: INTERNAL MEDICINE

## 2023-12-07 PROCEDURE — 4010F ACE/ARB THERAPY RXD/TAKEN: CPT | Mod: CPTII,S$GLB,, | Performed by: INTERNAL MEDICINE

## 2023-12-07 PROCEDURE — 1160F PR REVIEW ALL MEDS BY PRESCRIBER/CLIN PHARMACIST DOCUMENTED: ICD-10-PCS | Mod: CPTII,S$GLB,, | Performed by: INTERNAL MEDICINE

## 2023-12-07 PROCEDURE — 3044F HG A1C LEVEL LT 7.0%: CPT | Mod: CPTII,S$GLB,, | Performed by: INTERNAL MEDICINE

## 2023-12-07 PROCEDURE — 4010F PR ACE/ARB THEARPY RXD/TAKEN: ICD-10-PCS | Mod: CPTII,S$GLB,, | Performed by: INTERNAL MEDICINE

## 2023-12-07 PROCEDURE — 3008F PR BODY MASS INDEX (BMI) DOCUMENTED: ICD-10-PCS | Mod: CPTII,S$GLB,, | Performed by: INTERNAL MEDICINE

## 2023-12-07 PROCEDURE — 80053 COMPREHEN METABOLIC PANEL: CPT | Performed by: INTERNAL MEDICINE

## 2023-12-07 PROCEDURE — 3008F BODY MASS INDEX DOCD: CPT | Mod: CPTII,S$GLB,, | Performed by: INTERNAL MEDICINE

## 2023-12-07 RX ORDER — METOPROLOL SUCCINATE 100 MG/1
100 TABLET, EXTENDED RELEASE ORAL DAILY
Qty: 30 TABLET | Refills: 11 | Status: SHIPPED | OUTPATIENT
Start: 2023-12-07 | End: 2024-12-06

## 2023-12-07 NOTE — PROGRESS NOTES
Subjective:   @Patient ID:  Jayla Treviño is a 73 y.o. female who presents for follow-up of  PAF, PVD, and HTN.       HPI:   12/2023:  Here for follow-up.  Holter monitor done last visit in February 2023 showed significant paroxysmal atrial fibrillation.  Flecainide was switch it from pill in the pocket strategy to a daily dose of 100 mg twice a day.  She has been doing well without any recurrent palpitations.  For the last 2 weeks she has been feeling tired.  She reports snoring.  He has been compliant with her medications.  Blood pressure is a little elevated this morning but she did not take any of her medications.  EKG today sinus bradycardia rate 55 beats per minute, QRS 96 ms and she did not take her metoprolol this morning        1/2023: Here for f/u post ER visit for palpitations. She was concerned about AFib treated flecainide with no significant improvement. In the ER workup was okay.  EKG was sinus rhythm with no ischemic changes. Of note that night she missed the morning metoprolol dose show she doubled the dose at night and the after she doubled the dose she had that feeling overnight.  Felt like her heart was skipping beats.  No recurrent symptoms since then.  Compliant with Eliquis with no bleeding issues        She has Claudication the LE which believed to be neurogenic from her back.       Pertinent hx: PAF with Pill in the pocket rhythm strategy,    Prior cardiovascular  Hx  --------------------------------    - Arterial U/S 10/14/2021  No abnormal velocity doubling to suggest hemodynamically significant stenosis on the right.     - Exercise CATHY in 5/2017 was WNL    - ECHO 4/2017  EF 55-60%    - EKG 11/2019  Sinus bradycardia, no acute ST-T wave changes          Patient Active Problem List    Diagnosis Date Noted    Primary osteoarthritis of right knee 08/16/2022    Chronic kidney disease, stage 3a 11/05/2021    Spinal stenosis of lumbar region with radiculopathy 12/27/2019    Foraminal  stenosis of lumbar region 10/09/2019    Atherosclerosis of coronary artery 2019    Seasonal allergic rhinitis 2019    Gastroesophageal reflux disease 2018    Endometrial cancer 2018    s/p RA-TLH/BSO 5/15/2018 05/15/2018    Impaired fasting glucose 2017    Anemia 2017    Obesity 2017    Paroxysmal atrial fibrillation 2017    Hypertension 2017    Long term current use of anticoagulant therapy 2017    Nuclear sclerosis - Both Eyes 2013         Right Arm BP - Sittin/82  Left Arm BP - Sittin/75  LAST HbA1c  Lab Results   Component Value Date    HGBA1C 5.6 2023       Lipid panel  Lab Results   Component Value Date    CHOL 201 (H) 2023    CHOL 199 2021    CHOL 208 (H) 11/15/2019     Lab Results   Component Value Date    HDL 68 2023    HDL 66 2021    HDL 74 11/15/2019     Lab Results   Component Value Date    LDLCALC 125.0 2023    LDLCALC 118.8 2021    LDLCALC 124.4 11/15/2019     Lab Results   Component Value Date    TRIG 40 2023    TRIG 71 2021    TRIG 48 11/15/2019     Lab Results   Component Value Date    CHOLHDL 33.8 2023    CHOLHDL 33.2 2021    CHOLHDL 35.6 11/15/2019            Review of Systems   Constitutional: Negative for chills and fever.   HENT:  Negative for hearing loss and nosebleeds.    Eyes:  Negative for blurred vision.   Cardiovascular:  Negative for chest pain and palpitations.   Respiratory:  Negative for hemoptysis and shortness of breath.    Hematologic/Lymphatic: Negative for bleeding problem.   Skin:  Negative for itching.   Musculoskeletal:         As in HPI   Gastrointestinal:  Negative for abdominal pain and hematochezia.   Genitourinary:  Negative for hematuria.   Neurological:  Negative for dizziness and loss of balance.   Psychiatric/Behavioral:  Negative for altered mental status and depression.        Objective:   Physical Exam  Constitutional:        Appearance: She is well-developed.   HENT:      Head: Normocephalic and atraumatic.   Eyes:      Conjunctiva/sclera: Conjunctivae normal.   Neck:      Vascular: No carotid bruit or JVD.   Cardiovascular:      Rate and Rhythm: Regular rhythm. Bradycardia present.      Pulses:           Carotid pulses are 2+ on the right side and 2+ on the left side.       Radial pulses are 2+ on the right side and 2+ on the left side.        Dorsalis pedis pulses are 2+ on the right side and 2+ on the left side.      Heart sounds: Normal heart sounds. No murmur heard.     No friction rub. No gallop.   Pulmonary:      Effort: Pulmonary effort is normal. No respiratory distress.      Breath sounds: Normal breath sounds. No stridor. No wheezing.   Musculoskeletal:      Cervical back: Neck supple.   Skin:     General: Skin is warm and dry.   Neurological:      Mental Status: She is alert and oriented to person, place, and time.   Psychiatric:         Behavior: Behavior normal.         Assessment:     1. Paroxysmal atrial fibrillation    2. Atherosclerosis of coronary artery of native heart, unspecified vessel or lesion type, unspecified whether angina present    3. Primary hypertension    4. Abnormal electrocardiogram (ECG) (EKG)    5. Chronic kidney disease, stage 3a    6. Long term current use of anticoagulant therapy    7. Class 1 obesity due to excess calories with serious comorbidity and body mass index (BMI) of 32.0 to 32.9 in adult          Plan:   - Remains in sinus rhythm with flecainide.   - We will update echocardiogram  - Check stress MPI given concerns about angina equivalent  - Update CBC, CMP  - We will continue flecainide 100 mg twice a day  - We will cut Lopressor dose and switch it to Toprol-XL.  Low energy level could be related to bradycardia.   -  Instructed to monitor blood pressure and keep log.  Goal BP less than 130/80  - We discussed sleep apnea evaluation  - Continue Eliquis.        I spent 5-10 minutes  asking, assessing, assisting, arranging and advising heart healthy diet improvements. This included low-salt meals, portion control and health food alternatives. I also encourage 30 minutes of moderate exercise 3-4x a week.     Six-month follow-up    - EKG reviewed, sinus bradycardia, no acute ST-T wave changes     Continue with current medical plan and lifestyle changes.  Return sooner for concerns or questions. If symptoms persist go to the ED  I have reviewed all pertinent data including patient's medical history in detail and updated the computerized patient record.     Orders Placed This Encounter   Procedures    NM Myocardial Perfusion Spect Multi Pharmacologic     Standing Status:   Future     Standing Expiration Date:   12/7/2024     Order Specific Question:   May the Radiologist modify the order per protocol to meet the clinical needs of the patient?     Answer:   Yes     Order Specific Question:   Stress Medication to use:     Answer:   Regadenoson     Order Specific Question:   Diabetes?     Answer:   No    Nuclear Stress Test     Standing Status:   Future     Standing Expiration Date:   12/7/2024     Order Specific Question:   Which stress agent will be used?     Answer:   Pharm     Order Specific Question:   Which medicaton for the stress procedure?     Answer:   Regadenoson     Order Specific Question:   Release to patient     Answer:   Immediate    Echo     Standing Status:   Future     Standing Expiration Date:   12/7/2024     Order Specific Question:   Release to patient     Answer:   Immediate       Follow up as scheduled.     She expressed verbal understanding and agreed with the plan    Patient's Medications   New Prescriptions    No medications on file   Previous Medications    ACETAMINOPHEN (TYLENOL) 650 MG TBSR    Take 650 mg by mouth every 8 (eight) hours as needed.    ALBUTEROL (VENTOLIN HFA) 90 MCG/ACTUATION INHALER    Inhale 2 puffs into the lungs every 6 (six) hours as needed for  Wheezing. Rescue    APIXABAN (ELIQUIS) 5 MG TAB    Take 1 tablet by mouth twice daily    ASCORBIC ACID, VITAMIN C, (VITAMIN C) 500 MG TABLET    Take 500 mg by mouth once daily.    CHOLECALCIFEROL, VITAMIN D3, (VITAMIN D3) 125 MCG (5,000 UNIT) TAB    Take 5,000 Units by mouth once daily. Takes on Saturday    CYANOCOBALAMIN, VITAMIN B-12, 50 MCG TABLET    Take 50 mcg by mouth every morning. Pt will verify the strength    DICLOFENAC SODIUM (VOLTAREN) 1 % GEL    Apply 2 g topically 3 (three) times daily as needed (pain).    DIPHENHYDRAMINE (BENADRYL) 25 MG CAPSULE    Take 25 mg by mouth nightly as needed for Itching.    FLECAINIDE (TAMBOCOR) 100 MG TAB    Take 1 tablet (100 mg total) by mouth every 12 (twelve) hours.    FLUTICASONE PROPIONATE (FLONASE) 50 MCG/ACTUATION NASAL SPRAY    2 sprays (100 mcg total) by Each Nostril route once daily.    FOSINOPRIL (MONOPRIL) 20 MG TABLET    Take 1 tablet by mouth once daily    METHYLPREDNISOLONE (MEDROL DOSEPACK) 4 MG TABLET    use as directed    METOPROLOL TARTRATE (LOPRESSOR) 100 MG TABLET    Take 1 tablet by mouth twice daily    TRIAMTERENE-HYDROCHLOROTHIAZIDE 37.5-25 MG (DYAZIDE) 37.5-25 MG PER CAPSULE    Take 1 capsule by mouth once daily in the morning   Modified Medications    No medications on file   Discontinued Medications    No medications on file

## 2024-01-03 ENCOUNTER — PATIENT MESSAGE (OUTPATIENT)
Dept: CARDIOLOGY | Facility: CLINIC | Age: 74
End: 2024-01-03
Payer: MEDICARE

## 2024-01-04 ENCOUNTER — TELEPHONE (OUTPATIENT)
Dept: CARDIOLOGY | Facility: CLINIC | Age: 74
End: 2024-01-04
Payer: MEDICARE

## 2024-01-04 ENCOUNTER — PATIENT MESSAGE (OUTPATIENT)
Dept: CARDIOLOGY | Facility: CLINIC | Age: 74
End: 2024-01-04
Payer: MEDICARE

## 2024-01-04 DIAGNOSIS — I25.10 ATHEROSCLEROSIS OF CORONARY ARTERY OF NATIVE HEART, UNSPECIFIED VESSEL OR LESION TYPE, UNSPECIFIED WHETHER ANGINA PRESENT: ICD-10-CM

## 2024-01-04 DIAGNOSIS — E78.00 HYPERCHOLESTEREMIA: Primary | ICD-10-CM

## 2024-01-05 ENCOUNTER — PATIENT MESSAGE (OUTPATIENT)
Dept: CARDIOLOGY | Facility: CLINIC | Age: 74
End: 2024-01-05
Payer: MEDICARE

## 2024-01-22 ENCOUNTER — TELEPHONE (OUTPATIENT)
Dept: CARDIOLOGY | Facility: CLINIC | Age: 74
End: 2024-01-22
Payer: MEDICARE

## 2024-01-22 NOTE — TELEPHONE ENCOUNTER
Medical Cardiac Clearance Form was received and faxed   back to [facility].    Fax:[phone]              /  Ph:[phone]    Medical Clearance Form was also scanned in Epic / Media        Nw

## 2024-01-30 ENCOUNTER — HOSPITAL ENCOUNTER (OUTPATIENT)
Dept: CARDIOLOGY | Facility: HOSPITAL | Age: 74
Discharge: HOME | End: 2024-01-30
Attending: INTERNAL MEDICINE
Payer: MEDICARE

## 2024-01-30 ENCOUNTER — HOSPITAL ENCOUNTER (OUTPATIENT)
Dept: RADIOLOGY | Facility: HOSPITAL | Age: 74
Discharge: HOME | End: 2024-01-30
Attending: INTERNAL MEDICINE
Payer: MEDICARE

## 2024-01-30 VITALS — BODY MASS INDEX: 33.05 KG/M2 | WEIGHT: 244 LBS | HEIGHT: 72 IN

## 2024-01-30 DIAGNOSIS — R94.31 ABNORMAL ELECTROCARDIOGRAM (ECG) (EKG): ICD-10-CM

## 2024-01-30 DIAGNOSIS — I48.0 PAROXYSMAL ATRIAL FIBRILLATION: Chronic | ICD-10-CM

## 2024-01-30 LAB
CV STRESS BASE HR: 53 BPM
DIASTOLIC BLOOD PRESSURE: 67 MMHG
OHS CV CPX 85 PERCENT MAX PREDICTED HEART RATE MALE: 123
OHS CV CPX MAX PREDICTED HEART RATE: 145
OHS CV CPX PATIENT IS FEMALE: 1
OHS CV CPX PATIENT IS MALE: 0
OHS CV CPX PEAK DIASTOLIC BLOOD PRESSURE: 67 MMHG
OHS CV CPX PEAK HEAR RATE: 74 BPM
OHS CV CPX PEAK RATE PRESSURE PRODUCT: 9398
OHS CV CPX PEAK SYSTOLIC BLOOD PRESSURE: 127 MMHG
OHS CV CPX PERCENT MAX PREDICTED HEART RATE ACHIEVED: 53
OHS CV CPX RATE PRESSURE PRODUCT PRESENTING: 6731
SYSTOLIC BLOOD PRESSURE: 127 MMHG

## 2024-01-30 PROCEDURE — 93018 CV STRESS TEST I&R ONLY: CPT | Mod: ,,, | Performed by: INTERNAL MEDICINE

## 2024-01-30 PROCEDURE — A9502 TC99M TETROFOSMIN: HCPCS

## 2024-01-30 PROCEDURE — 93306 TTE W/DOPPLER COMPLETE: CPT

## 2024-01-30 PROCEDURE — 78452 HT MUSCLE IMAGE SPECT MULT: CPT | Mod: 26,,, | Performed by: STUDENT IN AN ORGANIZED HEALTH CARE EDUCATION/TRAINING PROGRAM

## 2024-01-30 PROCEDURE — 93306 TTE W/DOPPLER COMPLETE: CPT | Mod: 26,,, | Performed by: INTERNAL MEDICINE

## 2024-01-30 PROCEDURE — 93016 CV STRESS TEST SUPVJ ONLY: CPT | Mod: ,,, | Performed by: INTERNAL MEDICINE

## 2024-01-30 PROCEDURE — 93017 CV STRESS TEST TRACING ONLY: CPT

## 2024-01-30 RX ORDER — REGADENOSON 0.08 MG/ML
0.4 INJECTION, SOLUTION INTRAVENOUS ONCE
Status: DISCONTINUED | OUTPATIENT
Start: 2024-01-30 | End: 2024-02-07 | Stop reason: HOSPADM

## 2024-01-31 ENCOUNTER — TELEPHONE (OUTPATIENT)
Dept: CARDIOLOGY | Facility: CLINIC | Age: 74
End: 2024-01-31
Payer: MEDICARE

## 2024-01-31 LAB
ASCENDING AORTA: 2.89 CM
AV INDEX (PROSTH): 0.62
AV MEAN GRADIENT: 5 MMHG
AV PEAK GRADIENT: 11 MMHG
AV REGURGITATION PRESSURE HALF TIME: 874.91 MS
AV VALVE AREA BY VELOCITY RATIO: 1.68 CM²
AV VALVE AREA: 2.01 CM²
AV VELOCITY RATIO: 0.52
BSA FOR ECHO PROCEDURE: 2.39 M2
CV ECHO LV RWT: 0.36 CM
DOP CALC AO PEAK VEL: 1.66 M/S
DOP CALC AO VTI: 37.5 CM
DOP CALC LVOT AREA: 3.2 CM2
DOP CALC LVOT DIAMETER: 2.03 CM
DOP CALC LVOT PEAK VEL: 0.86 M/S
DOP CALC LVOT STROKE VOLUME: 75.37 CM3
DOP CALC MV VTI: 29.4 CM
DOP CALCLVOT PEAK VEL VTI: 23.3 CM
E WAVE DECELERATION TIME: 207.8 MSEC
E/A RATIO: 0.83
E/E' RATIO: 10.53 M/S
ECHO LV POSTERIOR WALL: 0.96 CM (ref 0.6–1.1)
FRACTIONAL SHORTENING: 44 % (ref 28–44)
INTERVENTRICULAR SEPTUM: 0.71 CM (ref 0.6–1.1)
IVC DIAMETER: 1.78 CM
LA MAJOR: 6.47 CM
LA MINOR: 5.5 CM
LA WIDTH: 3.9 CM
LEFT ATRIUM SIZE: 3.53 CM
LEFT ATRIUM VOLUME INDEX MOD: 29.4 ML/M2
LEFT ATRIUM VOLUME INDEX: 29.7 ML/M2
LEFT ATRIUM VOLUME MOD: 68.7 CM3
LEFT ATRIUM VOLUME: 69.58 CM3
LEFT INTERNAL DIMENSION IN SYSTOLE: 2.98 CM (ref 2.1–4)
LEFT VENTRICLE DIASTOLIC VOLUME INDEX: 57.42 ML/M2
LEFT VENTRICLE DIASTOLIC VOLUME: 134.36 ML
LEFT VENTRICLE MASS INDEX: 67 G/M2
LEFT VENTRICLE SYSTOLIC VOLUME INDEX: 14.7 ML/M2
LEFT VENTRICLE SYSTOLIC VOLUME: 34.35 ML
LEFT VENTRICULAR INTERNAL DIMENSION IN DIASTOLE: 5.28 CM (ref 3.5–6)
LEFT VENTRICULAR MASS: 157.44 G
LV LATERAL E/E' RATIO: 8.78 M/S
LV SEPTAL E/E' RATIO: 13.17 M/S
LVOT MG: 1.9 MMHG
LVOT MV: 0.66 CM/S
MV A" WAVE DURATION": 137.01 MSEC
MV MEAN GRADIENT: 1 MMHG
MV PEAK A VEL: 0.95 M/S
MV PEAK E VEL: 0.79 M/S
MV PEAK GRADIENT: 4 MMHG
MV STENOSIS PRESSURE HALF TIME: 60.26 MS
MV VALVE AREA BY CONTINUITY EQUATION: 2.56 CM2
MV VALVE AREA P 1/2 METHOD: 3.65 CM2
OHS LV EJECTION FRACTION SIMPSONS BIPLANE MOD: 69 %
PISA AR MAX VEL: 4.23 M/S
PISA TR MAX VEL: 2.4 M/S
PULM VEIN S/D RATIO: 1.58
PV MV: 0.9 M/S
PV PEAK D VEL: 0.52 M/S
PV PEAK GRADIENT: 7 MMHG
PV PEAK S VEL: 0.82 M/S
PV PEAK VELOCITY: 1.34 M/S
RA MAJOR: 4.64 CM
RA PRESSURE ESTIMATED: 3 MMHG
RA WIDTH: 3.41 CM
RIGHT VENTRICULAR END-DIASTOLIC DIMENSION: 3.34 CM
RV TB RVSP: 5 MMHG
RV TISSUE DOPPLER FREE WALL SYSTOLIC VELOCITY 1 (APICAL 4 CHAMBER VIEW): 14.61 CM/S
SINUS: 2.93 CM
STJ: 2.72 CM
TDI LATERAL: 0.09 M/S
TDI SEPTAL: 0.06 M/S
TDI: 0.08 M/S
TR MAX PG: 23 MMHG
TRICUSPID ANNULAR PLANE SYSTOLIC EXCURSION: 2.33 CM
TV REST PULMONARY ARTERY PRESSURE: 26 MMHG
Z-SCORE OF LEFT VENTRICULAR DIMENSION IN END DIASTOLE: -6.01
Z-SCORE OF LEFT VENTRICULAR DIMENSION IN END SYSTOLE: -5.26

## 2024-01-31 NOTE — PROGRESS NOTES
Stress test result is good. No major blockage to interfere with the blood flow to the heart muscle     Sincerely,  Kaiden Hodge MD.   Interventional Cardiologist  [facility], [place]

## 2024-01-31 NOTE — TELEPHONE ENCOUNTER
----- Message from Kaiden Hodge MD sent at 1/31/2024  3:46 PM CST -----  Stress test result is good. No major blockage to interfere with the blood flow to the heart muscle     Sincerely,  Kaiden Hodge MD.   Interventional Cardiologist  [facility] [place]

## 2024-02-01 ENCOUNTER — PATIENT MESSAGE (OUTPATIENT)
Dept: CARDIOLOGY | Facility: CLINIC | Age: 74
End: 2024-02-01
Payer: MEDICARE

## 2024-02-01 NOTE — PROGRESS NOTES
Echocardiogram results is good.  Normal heart pumping function.  No major abnormalities.  Mild leaky valve is nothing to worry about and no intervention needed    Sincerely,  Kaiden Hodge MD.   Interventional Cardiologist  [facility], [place]   show

## 2024-02-02 ENCOUNTER — PATIENT MESSAGE (OUTPATIENT)
Dept: CARDIOLOGY | Facility: CLINIC | Age: 74
End: 2024-02-02
Payer: MEDICARE

## 2024-02-16 DIAGNOSIS — I48.0 PAROXYSMAL A-FIB: ICD-10-CM

## 2024-02-19 RX ORDER — FLECAINIDE ACETATE 100 MG/1
100 TABLET ORAL EVERY 12 HOURS
Qty: 180 TABLET | Refills: 0 | Status: SHIPPED | OUTPATIENT
Start: 2024-02-19 | End: 2024-05-14

## 2024-03-28 ENCOUNTER — OFFICE VISIT (OUTPATIENT)
Dept: CARDIOLOGY | Facility: CLINIC | Age: 74
End: 2024-03-28
Payer: MEDICARE

## 2024-03-28 VITALS
HEIGHT: 72 IN | DIASTOLIC BLOOD PRESSURE: 75 MMHG | HEART RATE: 61 BPM | BODY MASS INDEX: 33.52 KG/M2 | OXYGEN SATURATION: 100 % | SYSTOLIC BLOOD PRESSURE: 117 MMHG | WEIGHT: 247.44 LBS

## 2024-03-28 DIAGNOSIS — I10 PRIMARY HYPERTENSION: Chronic | ICD-10-CM

## 2024-03-28 DIAGNOSIS — I48.0 PAROXYSMAL ATRIAL FIBRILLATION: Primary | Chronic | ICD-10-CM

## 2024-03-28 DIAGNOSIS — Z79.01 LONG TERM CURRENT USE OF ANTICOAGULANT THERAPY: Chronic | ICD-10-CM

## 2024-03-28 DIAGNOSIS — N18.31 CHRONIC KIDNEY DISEASE, STAGE 3A: ICD-10-CM

## 2024-03-28 PROBLEM — E78.5 HYPERLIPIDEMIA: Status: ACTIVE | Noted: 2019-04-09

## 2024-03-28 PROCEDURE — 99214 OFFICE O/P EST MOD 30 MIN: CPT | Mod: S$GLB,,, | Performed by: INTERNAL MEDICINE

## 2024-03-28 PROCEDURE — 99999 PR PBB SHADOW E&M-EST. PATIENT-LVL IV: CPT | Mod: PBBFAC,,, | Performed by: INTERNAL MEDICINE

## 2024-03-28 RX ORDER — ROSUVASTATIN CALCIUM 10 MG/1
10 TABLET, COATED ORAL
COMMUNITY
Start: 2024-03-12

## 2024-03-28 RX ORDER — CETIRIZINE HYDROCHLORIDE 10 MG/1
10 TABLET ORAL
COMMUNITY
Start: 2024-02-17

## 2024-03-28 RX ORDER — AZELASTINE 1 MG/ML
1 SPRAY, METERED NASAL 2 TIMES DAILY
COMMUNITY
Start: 2024-02-17

## 2024-03-28 NOTE — PROGRESS NOTES
Cardiology Clinic note    Subjective:   Patient ID:  Jayla Treviño is a 74 y.o. female who presents for follow-up of PAF, PVD, HTN    3/28/2024: 74 yo F with PAF, PVD, HTN present to clinic for F/U. Previously seen by Dr. Hodge per HPI below, initial visit for me. Patient reports overall doing well, tolerating Eliquis and Flecainide. Reports her palpitations have resolved, now happening very rarely and mild in intensity. Patient denies CP, SOB/CHANDLER, orthopnea, PND, syncope, LE edema. Reports fatigue has improved since switch to Toprol-XL. No c/o bleeding with Eliquis. Reports compliance and tolerance with all medications.    Echo, stress test, MPI reviewed with patient as below.     ECG 12/7/2023: SB 1st degree AV block, rate 55, QTc 399     HPI:   12/2023:  Here for follow-up.  Holter monitor done last visit in February 2023 showed significant paroxysmal atrial fibrillation.  Flecainide was switch it from pill in the pocket strategy to a daily dose of 100 mg twice a day.  She has been doing well without any recurrent palpitations.  For the last 2 weeks she has been feeling tired.  She reports snoring.  He has been compliant with her medications.  Blood pressure is a little elevated this morning but she did not take any of her medications.  EKG today sinus bradycardia rate 55 beats per minute, QRS 96 ms and she did not take her metoprolol this morning     1/2023: Here for f/u post ER visit for palpitations. She was concerned about AFib treated flecainide with no significant improvement. In the ER workup was okay.  EKG was sinus rhythm with no ischemic changes. Of note that night she missed the morning metoprolol dose show she doubled the dose at night and the after she doubled the dose she had that feeling overnight.  Felt like her heart was skipping beats.  No recurrent symptoms since then.  Compliant with Eliquis with no bleeding issues     She has Claudication the LE which believed to be neurogenic  from her back.         Prior cardiovascular  Hx  --------------------------------    Echo 12/7/2023    Left Ventricle: The left ventricle is normal in size. Normal wall thickness. Normal wall motion. There is normal systolic function. Biplane (2D) method of discs ejection fraction is 69%. There is normal diastolic function.    Right Ventricle: Normal right ventricular cavity size. Wall thickness is normal. Right ventricle wall motion  is normal. Systolic function is normal.    Aortic Valve: There is mild aortic regurgitation.    Mitral Valve: There is mild regurgitation.    Pulmonary Artery: The estimated pulmonary artery systolic pressure is 26 mmHg.    IVC/SVC: Normal venous pressure at 3 mmHg    Stress/ MPI 12/7/2023    The ECG portion of the study is negative for ischemia.    The patient reported no chest pain during the stress test.    There were no arrhythmias during stress.    The nuclear portion of this study will be reported separately.  1. Small fixed region of decreased tracer uptake in anteroseptal aspect of left ventricle, possibly due to breast attenuation given normal left ventricular systolic function, wall thickening, and motion.  Recommend clinical correlation.  No reversible defect to suggest ischemia.  2. The global left ventricular systolic function is normal with an LV ejection fraction of 63 % and no evidence of LV dilatation. Wall motion is normal.            - Arterial U/S 10/14/2021  No abnormal velocity doubling to suggest hemodynamically significant stenosis on the right.     - Exercise CATHY in 5/2017 was WNL     - ECHO 4/2017  EF 55-60%     - EKG 11/2019  Sinus bradycardia, no acute ST-T wave changes     Past Medical History:   Diagnosis Date    Atrial fibrillation     Endometrial cancer 05/19/2018    Hypertension     Lumbar stenosis with neurogenic claudication 10/09/2019          Patient Active Problem List    Diagnosis Date Noted    Primary osteoarthritis of right knee 08/16/2022     Chronic kidney disease, stage 3a 11/05/2021    Spinal stenosis of lumbar region with radiculopathy 12/27/2019    Foraminal stenosis of lumbar region 10/09/2019    Atherosclerosis of coronary artery 04/09/2019    Seasonal allergic rhinitis 04/09/2019    Gastroesophageal reflux disease 12/13/2018    Endometrial cancer 05/19/2018    s/p RA-TLH/BSO 5/15/2018 05/15/2018    Impaired fasting glucose 08/02/2017    Anemia 05/17/2017    Obesity 05/02/2017    Paroxysmal atrial fibrillation 04/05/2017    Hypertension 04/05/2017    Long term current use of anticoagulant therapy 04/05/2017    Nuclear sclerosis - Both Eyes 02/04/2013       Patient's Medications   New Prescriptions    No medications on file   Previous Medications    ACETAMINOPHEN (TYLENOL) 650 MG TBSR    Take 650 mg by mouth every 8 (eight) hours as needed.    ALBUTEROL (VENTOLIN HFA) 90 MCG/ACTUATION INHALER    Inhale 2 puffs into the lungs every 6 (six) hours as needed for Wheezing. Rescue    APIXABAN (ELIQUIS) 5 MG TAB    Take 1 tablet by mouth twice daily    ASCORBIC ACID, VITAMIN C, (VITAMIN C) 500 MG TABLET    Take 500 mg by mouth once daily.    AZELASTINE (ASTELIN) 137 MCG (0.1 %) NASAL SPRAY    1 spray 2 (two) times daily.    CETIRIZINE (ZYRTEC) 10 MG TABLET    Take 10 mg by mouth.    CHOLECALCIFEROL, VITAMIN D3, (VITAMIN D3) 125 MCG (5,000 UNIT) TAB    Take 5,000 Units by mouth once daily. Takes on Saturday    CYANOCOBALAMIN, VITAMIN B-12, 50 MCG TABLET    Take 50 mcg by mouth every morning. Pt will verify the strength    DICLOFENAC SODIUM (VOLTAREN) 1 % GEL    Apply 2 g topically 3 (three) times daily as needed (pain).    DIPHENHYDRAMINE (BENADRYL) 25 MG CAPSULE    Take 25 mg by mouth nightly as needed for Itching.    FLECAINIDE (TAMBOCOR) 100 MG TAB    TAKE 1 TABLET BY MOUTH EVERY 12 HOURS    FLUTICASONE PROPIONATE (FLONASE) 50 MCG/ACTUATION NASAL SPRAY    2 sprays (100 mcg total) by Each Nostril route once daily.    FOSINOPRIL (MONOPRIL) 20 MG TABLET     "Take 1 tablet by mouth once daily    METHYLPREDNISOLONE (MEDROL DOSEPACK) 4 MG TABLET    use as directed    METOPROLOL SUCCINATE (TOPROL-XL) 100 MG 24 HR TABLET    Take 1 tablet (100 mg total) by mouth once daily.    ROSUVASTATIN (CRESTOR) 10 MG TABLET    Take 10 mg by mouth.    TRIAMTERENE-HYDROCHLOROTHIAZIDE 37.5-25 MG (DYAZIDE) 37.5-25 MG PER CAPSULE    Take 1 capsule by mouth once daily in the morning   Modified Medications    No medications on file   Discontinued Medications    No medications on file        Review of Systems   Constitutional: Negative for chills, decreased appetite, diaphoresis, malaise/fatigue, weight gain and weight loss.   Cardiovascular:  Negative for chest pain, claudication, dyspnea on exertion, irregular heartbeat, leg swelling, near-syncope, orthopnea, palpitations, paroxysmal nocturnal dyspnea and syncope.   Respiratory:  Negative for cough, hemoptysis, shortness of breath and snoring.    Gastrointestinal:  Negative for bloating, abdominal pain, nausea and vomiting.   Neurological:  Negative for light-headedness and weakness.       Family History   Problem Relation Age of Onset    Cataracts Mother     Cataracts Sister        Social History     Socioeconomic History    Marital status: Legally    Tobacco Use    Smoking status: Former     Current packs/day: 0.00     Average packs/day: 0.5 packs/day for 40.0 years (20.0 ttl pk-yrs)     Types: Cigarettes     Start date:      Quit date:      Years since quittin.2    Smokeless tobacco: Never   Substance and Sexual Activity    Alcohol use: No    Drug use: No   Social History Narrative    ** Merged History Encounter **                 Objective:   Vitals  Vitals:    24 1424 24 1426   BP: 126/77 117/75   Pulse: 61    SpO2: 100%    Weight: 112.2 kg (247 lb 7.5 oz)    Height: 6' 1" (1.854 m)           Physical Exam  Vitals and nursing note reviewed.   Constitutional:       Appearance: Normal appearance. "   Cardiovascular:      Rate and Rhythm: Normal rate and regular rhythm.      Heart sounds: No murmur heard.     No gallop.   Pulmonary:      Effort: Pulmonary effort is normal.      Breath sounds: Normal breath sounds.   Abdominal:      General: Bowel sounds are normal. There is no distension.      Palpations: Abdomen is soft.      Tenderness: There is no abdominal tenderness.   Skin:     General: Skin is warm and dry.   Neurological:      Mental Status: She is alert and oriented to person, place, and time.         Lab Results    Lab Results   Component Value Date    WBC 3.67 (L) 12/07/2023    HGB 10.7 (L) 12/07/2023    HCT 31.5 (L) 12/07/2023    MCV 87 12/07/2023       Lab Results   Component Value Date     12/07/2023    INR 1.1 02/03/2023    INR 2.3 09/06/2017       Lab Results   Component Value Date    K 3.9 12/07/2023    MG 1.8 01/19/2023    BUN 25 (H) 12/07/2023    CREATININE 1.2 12/07/2023       Lab Results   Component Value Date     12/07/2023    HGBA1C 5.6 03/22/2023       Lab Results   Component Value Date    AST 15 12/07/2023    ALT 10 12/07/2023    ALBUMIN 3.5 12/07/2023    PROT 8.2 12/07/2023       Lab Results   Component Value Date    CHOL 201 (H) 03/22/2023    HDL 68 03/22/2023    LDLCALC 125.0 03/22/2023    TRIG 40 03/22/2023       Lab Results   Component Value Date     (H) 02/03/2023         Assessment:     1. Paroxysmal atrial fibrillation    2. Primary hypertension    3. Chronic kidney disease, stage 3a    4. Long term current use of anticoagulant therapy        Plan:     pAF  -sinus in clinic today  -continue eliquis, Flecainide as above  -palpitations much improved    2. HTN  -well controlled, continue medication regimen as above  -continue home logging    3. HLD  -goal LDLc < 100  -ACSCVD 16.3% - continue statin          No orders of the defined types were placed in this encounter.      She expressed verbal understanding and agreed with the plan    Pertinent cardiac images  and EKG reviewed independently.    Continue with current medical plan and lifestyle changes.  Return sooner for concerns or questions. If symptoms persist go to the ED.  I have reviewed all pertinent data including patient's medical history in detail and updated the computerized patient record.     Total duration of face to face visit time 30 minutes.  Total time spent counseling greater than fifty percent of total visit time.  Counseling included discussion regarding imaging findings, diagnosis, possibilities, treatment options, risks and benefits.    Thank you for the opportunity to care for this patient. Will be available for questions if needed.     Discussed with Dr. Hodge . RTC in 1 year.      Signed:  Jonathan Bernstein DNP  03/28/2024

## 2024-05-06 DIAGNOSIS — M17.11 PRIMARY OSTEOARTHRITIS OF RIGHT KNEE: Primary | ICD-10-CM

## 2024-05-07 ENCOUNTER — HOSPITAL ENCOUNTER (OUTPATIENT)
Dept: RADIOLOGY | Facility: HOSPITAL | Age: 74
Discharge: HOME | End: 2024-05-07
Attending: ORTHOPAEDIC SURGERY
Payer: MEDICARE

## 2024-05-07 ENCOUNTER — OFFICE VISIT (OUTPATIENT)
Dept: ORTHOPEDICS | Facility: CLINIC | Age: 74
End: 2024-05-07
Payer: MEDICARE

## 2024-05-07 DIAGNOSIS — M17.11 PRIMARY OSTEOARTHRITIS OF RIGHT KNEE: Primary | ICD-10-CM

## 2024-05-07 DIAGNOSIS — M17.11 PRIMARY OSTEOARTHRITIS OF RIGHT KNEE: ICD-10-CM

## 2024-05-07 PROCEDURE — 73560 X-RAY EXAM OF KNEE 1 OR 2: CPT | Mod: 26,59,LT, | Performed by: RADIOLOGY

## 2024-05-07 PROCEDURE — 1160F RVW MEDS BY RX/DR IN RCRD: CPT | Mod: CPTII,S$GLB,,

## 2024-05-07 PROCEDURE — 99213 OFFICE O/P EST LOW 20 MIN: CPT | Mod: 25,S$GLB,,

## 2024-05-07 PROCEDURE — 1159F MED LIST DOCD IN RCRD: CPT | Mod: CPTII,S$GLB,,

## 2024-05-07 PROCEDURE — 1101F PT FALLS ASSESS-DOCD LE1/YR: CPT | Mod: CPTII,S$GLB,,

## 2024-05-07 PROCEDURE — 73562 X-RAY EXAM OF KNEE 3: CPT | Mod: TC,RT

## 2024-05-07 PROCEDURE — 1125F AMNT PAIN NOTED PAIN PRSNT: CPT | Mod: CPTII,S$GLB,,

## 2024-05-07 PROCEDURE — 4010F ACE/ARB THERAPY RXD/TAKEN: CPT | Mod: CPTII,S$GLB,,

## 2024-05-07 PROCEDURE — 73560 X-RAY EXAM OF KNEE 1 OR 2: CPT | Mod: TC,59,LT

## 2024-05-07 PROCEDURE — 3288F FALL RISK ASSESSMENT DOCD: CPT | Mod: CPTII,S$GLB,,

## 2024-05-07 PROCEDURE — 99999 PR PBB SHADOW E&M-EST. PATIENT-LVL III: CPT | Mod: PBBFAC,,,

## 2024-05-07 PROCEDURE — 73562 X-RAY EXAM OF KNEE 3: CPT | Mod: 26,RT,, | Performed by: RADIOLOGY

## 2024-05-07 PROCEDURE — 20610 DRAIN/INJ JOINT/BURSA W/O US: CPT | Mod: RT,S$GLB,,

## 2024-05-07 RX ORDER — TRIAMCINOLONE ACETONIDE 40 MG/ML
40 INJECTION, SUSPENSION INTRA-ARTICULAR; INTRAMUSCULAR ONCE
Status: COMPLETED | OUTPATIENT
Start: 2024-05-07 | End: 2024-05-07

## 2024-05-07 RX ADMIN — TRIAMCINOLONE ACETONIDE 40 MG: 40 INJECTION, SUSPENSION INTRA-ARTICULAR; INTRAMUSCULAR at 01:05

## 2024-05-07 NOTE — PROGRESS NOTES
SUBJECTIVE:     Chief Complaint & History of Present Illness:  Jayla Treviño is a 74 y.o. female who is seen here today with a complaint of right knee pain. The pain has been present for about 5 months. The patient describes the pain as a moderate dull ache located in the lateral knee. The pain is worse with any weight bearing and improved by nothing. The patient notes effusion, crepitus sensation but no associated injury, knee giving out, knee locking.  She notes receiving a gel injection by an outside provider on 03/07/2024 with mild-to-moderate relief.      Past Medical History:   Diagnosis Date    Atrial fibrillation     Endometrial cancer 05/19/2018    Hypertension     Lumbar stenosis with neurogenic claudication 10/09/2019       Past Surgical History:   Procedure Laterality Date    BREAST SURGERY      CHOLECYSTECTOMY      LUMBAR LAMINECTOMY Right 12/27/2019    Right L3-4 laminectomy, medial facetectomy, and contralateral foraminotomy; right L4-5 laminectomy, medial facetectomy, and microdiskectomy;  Surgeon: Nico Alvarez MD    SALPINGECTOMY      ectopic pregnancy       Family History   Problem Relation Name Age of Onset    Cataracts Mother      Cataracts Sister         Review of patient's allergies indicates:   Allergen Reactions    Norco [hydrocodone-acetaminophen] Hives           Current Outpatient Medications:     acetaminophen (TYLENOL) 650 MG TbSR, Take 650 mg by mouth every 8 (eight) hours as needed., Disp: , Rfl:     albuterol (VENTOLIN HFA) 90 mcg/actuation inhaler, Inhale 2 puffs into the lungs every 6 (six) hours as needed for Wheezing. Rescue (Patient not taking: Reported on 3/28/2024), Disp: 6.7 g, Rfl: 0    apixaban (ELIQUIS) 5 mg Tab, Take 1 tablet by mouth twice daily, Disp: 180 tablet, Rfl: 3    ascorbic acid, vitamin C, (VITAMIN C) 500 MG tablet, Take 500 mg by mouth once daily., Disp: , Rfl:     azelastine (ASTELIN) 137 mcg (0.1 %) nasal spray, 1 spray 2 (two) times daily.,  Disp: , Rfl:     cetirizine (ZYRTEC) 10 MG tablet, Take 10 mg by mouth., Disp: , Rfl:     cholecalciferol, vitamin D3, (VITAMIN D3) 125 mcg (5,000 unit) Tab, Take 5,000 Units by mouth once daily. Takes on Saturday, Disp: , Rfl:     cyanocobalamin, vitamin B-12, 50 mcg tablet, Take 50 mcg by mouth every morning. Pt will verify the strength, Disp: , Rfl:     diclofenac sodium (VOLTAREN) 1 % Gel, Apply 2 g topically 3 (three) times daily as needed (pain)., Disp: 50 g, Rfl: 6    diphenhydrAMINE (BENADRYL) 25 mg capsule, Take 25 mg by mouth nightly as needed for Itching., Disp: , Rfl:     flecainide (TAMBOCOR) 100 MG Tab, TAKE 1 TABLET BY MOUTH EVERY 12 HOURS, Disp: 180 tablet, Rfl: 0    fluticasone propionate (FLONASE) 50 mcg/actuation nasal spray, 2 sprays (100 mcg total) by Each Nostril route once daily., Disp: 16 g, Rfl: 11    fosinopriL (MONOPRIL) 20 MG tablet, Take 1 tablet by mouth once daily, Disp: 90 tablet, Rfl: 3    methylPREDNISolone (MEDROL DOSEPACK) 4 mg tablet, use as directed, Disp: 1 each, Rfl: 1    metoprolol succinate (TOPROL-XL) 100 MG 24 hr tablet, Take 1 tablet (100 mg total) by mouth once daily., Disp: 30 tablet, Rfl: 11    rosuvastatin (CRESTOR) 10 MG tablet, Take 10 mg by mouth., Disp: , Rfl:     triamterene-hydrochlorothiazide 37.5-25 mg (DYAZIDE) 37.5-25 mg per capsule, Take 1 capsule by mouth once daily in the morning, Disp: 90 capsule, Rfl: 3      Review of Systems:  ROS:  The updated medical history is in the chart and has been reviewed. A review of systems is updated and there is no reported vision changes, ear/nose/mouth/throat complaints, chest pain, shortness of breath, abdominal pain, urological complaints, fevers or chills, psychiatric complaints. Musculoskeletal and neurologcial symptoms are as documented. All other systems are negative.      OBJECTIVE:     PHYSICAL EXAM:  There were no vitals taken for this visit.  General: Pleasant, cooperative, NAD.  HEENT: NCAT, sclera  nonicteric.  Lungs: Respirations are equal and unlabored.   Abdomen: Soft and non-tender.  CV: 2+ bilateral upper and lower extremity pulses.  Neuro: Sensation intact to light touch.  Skin: Intact throughout LE with no rashes, erythema, or lesions.  Extremities: No LE edema, NVI lower extremities. antalgic gait.    right Knee Exam:  Knee Range of Motion:  0-95   Effusion:  Mild-to-moderate  Condition of skin: intact  Location of tenderness: Lateral joint line   Strength: normal  Stability: stable to testing  Varus /Valgus stress: normal    Hip Examination:  full painless range of motion, without tenderness    RADIOGRAPHS:  X-rays of the right knee taken today personally reviewed. Imaging reveals moderate tricompartmental joint space narrowing that is most significant at the lateral tibiofemoral joint space.      ASSESSMENT:       ICD-10-CM ICD-9-CM   1. Primary osteoarthritis of right knee  M17.11 715.16       PLAN:     We discussed with the patient at length all the different treatment options available including anti-inflammatories, acetaminophen, rest, ice, knee strengthening exercise, occasional cortisone injections for temporary relief, Viscosupplimentation injections, arthroscopic debridement, osteotomy, and finally knee arthroplasty.     Knee Injection Procedure Note  Diagnosis: right knee degenerative arthritis  Indications: right knee pain  Procedure Details: Verbal consent was obtained for the procedure. The injection site was identified and the skin was prepared with alcohol. The right knee was injected from an anterolateral approach with 1 ml of Kenalog and 4 ml Lidocaine under sterile technique using a 22 gauge needle. The needle was removed and the area cleansed and dressed.  Complications:  Patient tolerated the procedure well.    she was advised to rest the knee today, using ice and elevation as needed for comfort and swelling.Immediate relief of the knee pain may be short lived and secondary to the  lidocaine. she may have an increase in discomfort tonight followed by steady improvement over the next several days. It may take 1-2 weeks following the injection to get the full benefit of the medication.     Continue taking Tylenol as needed for pain.    Follow up in clinic as needed.

## 2024-05-14 DIAGNOSIS — I48.0 PAROXYSMAL A-FIB: ICD-10-CM

## 2024-05-14 RX ORDER — FLECAINIDE ACETATE 100 MG/1
100 TABLET ORAL EVERY 12 HOURS
Qty: 180 TABLET | Refills: 0 | Status: SHIPPED | OUTPATIENT
Start: 2024-05-14

## 2024-06-12 ENCOUNTER — TELEPHONE (OUTPATIENT)
Dept: CARDIOLOGY | Facility: CLINIC | Age: 74
End: 2024-06-12
Payer: MEDICARE

## 2024-06-12 NOTE — TELEPHONE ENCOUNTER
Patient was scheduled for 07/03, Patient wanted to cancel because she was feeling better, patient will follow up with her yearly appointment

## 2024-08-10 DIAGNOSIS — I48.0 PAROXYSMAL A-FIB: ICD-10-CM

## 2024-08-12 RX ORDER — FLECAINIDE ACETATE 100 MG/1
100 TABLET ORAL EVERY 12 HOURS
Qty: 180 TABLET | Refills: 0 | Status: SHIPPED | OUTPATIENT
Start: 2024-08-12

## 2024-10-01 DIAGNOSIS — I48.0 PAROXYSMAL ATRIAL FIBRILLATION: Chronic | ICD-10-CM

## 2024-10-02 RX ORDER — METOPROLOL SUCCINATE 100 MG/1
100 TABLET, EXTENDED RELEASE ORAL
Qty: 90 TABLET | Refills: 1 | Status: SHIPPED | OUTPATIENT
Start: 2024-10-02

## 2024-10-04 ENCOUNTER — TELEPHONE (OUTPATIENT)
Dept: OBSTETRICS AND GYNECOLOGY | Facility: CLINIC | Age: 74
End: 2024-10-04
Payer: MEDICARE

## 2024-10-04 NOTE — TELEPHONE ENCOUNTER
----- Message from Marcus sent at 10/4/2024 12:57 PM CDT -----  Contact: pt  Type:  Appointment Request    Caller is requesting a sooner appointment.  Caller declined first available appointment listed below.  Caller will not accept being placed on the waitlist and is requesting a message be sent to doctor.  Name of Caller:pt   When is the first available appointment?books are closed   Symptoms:Odor   Would the patient rather a call back or a response via [facility]? call  Best Call Back Number:[phone]  Additional Information:

## 2024-10-09 ENCOUNTER — OFFICE VISIT (OUTPATIENT)
Dept: PODIATRY | Facility: CLINIC | Age: 74
End: 2024-10-09
Payer: MEDICARE

## 2024-10-09 VITALS
HEIGHT: 72 IN | BODY MASS INDEX: 33.51 KG/M2 | WEIGHT: 247.38 LBS | SYSTOLIC BLOOD PRESSURE: 120 MMHG | TEMPERATURE: 99 F | DIASTOLIC BLOOD PRESSURE: 76 MMHG | HEART RATE: 66 BPM

## 2024-10-09 DIAGNOSIS — M79.674 PAIN OF RIGHT GREAT TOE: ICD-10-CM

## 2024-10-09 DIAGNOSIS — L03.031 PARONYCHIA OF GREAT TOE OF RIGHT FOOT: Primary | ICD-10-CM

## 2024-10-09 DIAGNOSIS — L60.0 INGROWN NAIL: ICD-10-CM

## 2024-10-09 PROCEDURE — 99999 PR PBB SHADOW E&M-EST. PATIENT-LVL II: CPT | Mod: PBBFAC,,, | Performed by: STUDENT IN AN ORGANIZED HEALTH CARE EDUCATION/TRAINING PROGRAM

## 2024-10-09 PROCEDURE — 3078F DIAST BP <80 MM HG: CPT | Mod: CPTII,S$GLB,, | Performed by: STUDENT IN AN ORGANIZED HEALTH CARE EDUCATION/TRAINING PROGRAM

## 2024-10-09 PROCEDURE — 99214 OFFICE O/P EST MOD 30 MIN: CPT | Mod: 25,S$GLB,, | Performed by: STUDENT IN AN ORGANIZED HEALTH CARE EDUCATION/TRAINING PROGRAM

## 2024-10-09 PROCEDURE — 3074F SYST BP LT 130 MM HG: CPT | Mod: CPTII,S$GLB,, | Performed by: STUDENT IN AN ORGANIZED HEALTH CARE EDUCATION/TRAINING PROGRAM

## 2024-10-09 PROCEDURE — 1101F PT FALLS ASSESS-DOCD LE1/YR: CPT | Mod: CPTII,S$GLB,, | Performed by: STUDENT IN AN ORGANIZED HEALTH CARE EDUCATION/TRAINING PROGRAM

## 2024-10-09 PROCEDURE — 4010F ACE/ARB THERAPY RXD/TAKEN: CPT | Mod: CPTII,S$GLB,, | Performed by: STUDENT IN AN ORGANIZED HEALTH CARE EDUCATION/TRAINING PROGRAM

## 2024-10-09 PROCEDURE — 3288F FALL RISK ASSESSMENT DOCD: CPT | Mod: CPTII,S$GLB,, | Performed by: STUDENT IN AN ORGANIZED HEALTH CARE EDUCATION/TRAINING PROGRAM

## 2024-10-09 PROCEDURE — 1125F AMNT PAIN NOTED PAIN PRSNT: CPT | Mod: CPTII,S$GLB,, | Performed by: STUDENT IN AN ORGANIZED HEALTH CARE EDUCATION/TRAINING PROGRAM

## 2024-10-09 PROCEDURE — 11730 AVULSION NAIL PLATE SIMPLE 1: CPT | Mod: T5,S$GLB,, | Performed by: STUDENT IN AN ORGANIZED HEALTH CARE EDUCATION/TRAINING PROGRAM

## 2024-10-09 RX ORDER — DOXYCYCLINE 100 MG/1
100 CAPSULE ORAL 2 TIMES DAILY
Qty: 14 CAPSULE | Refills: 0 | Status: SHIPPED | OUTPATIENT
Start: 2024-10-09

## 2024-10-09 NOTE — PROCEDURES
Nail Removal    Date/Time: 10/9/2024 1:15 PM    Performed by: Yevgeniy Sadler DPM  Authorized by: Yevgeniy Sadler DPM    Consent Done?:  Yes (Written)  Time out: Immediately prior to the procedure a time out was called    Location:     Location:  Right foot    Location detail:  Right big toe  Anesthesia:     Anesthesia:  Digital block    Local anesthetic:  Lidocaine 1% without epinephrine    Anesthetic total (ml):  5  Procedure Details:     Preparation:  Skin prepped with alcohol and skin prepped with Betadine    Amount removed:  Partial    Side:  Lateral    Wedge excision of skin of nail fold: No      Nail bed sutured?: No      Nail matrix removed:  None    Dressing applied:  Antibiotic ointment, dressing applied and Xeroform gauze    Patient tolerance:  Patient tolerated the procedure well with no immediate complications

## 2024-10-09 NOTE — PROGRESS NOTES
Subjective:     Patient    Jayla Treviño is a 74 y.o. female.    Problem    Seen by Dr Miller 1 year ago for right 1st toe injury after dropping a can of starch onto the toe. Now presents for toe pain right 1st toenail lateral border which is ingrown and infected. Has an ongoing issue with ingrown nails. No other concerns.     History    History obtained from patient and review of medical records.     Past Medical History:   Diagnosis Date    Atrial fibrillation     Endometrial cancer 05/19/2018    Hypertension     Lumbar stenosis with neurogenic claudication 10/09/2019       Past Surgical History:   Procedure Laterality Date    BREAST SURGERY      CHOLECYSTECTOMY      LUMBAR LAMINECTOMY Right 12/27/2019    Right L3-4 laminectomy, medial facetectomy, and contralateral foraminotomy; right L4-5 laminectomy, medial facetectomy, and microdiskectomy;  Surgeon: Nico Alvarez MD    SALPINGECTOMY      ectopic pregnancy        Objective:     Vitals  Wt Readings from Last 1 Encounters:   10/09/24 112.2 kg (247 lb 5.7 oz)     Temp Readings from Last 1 Encounters:   10/09/24 99.4 °F (37.4 °C) (Oral)     BP Readings from Last 1 Encounters:   10/09/24 120/76     Pulse Readings from Last 1 Encounters:   10/09/24 66       Dermatological Exam    Skin:  Pedal hair growth, skin color, and skin texture normal on right  Pedal hair growth, skin color, and skin texture normal on left    Nails:  Right 1st nail(s) ingrown along lateral border, adjacent paronychia, very sensitive    Vascular Exam    Arteries:  Posterior tibial artery palpable on right  Dorsalis pedis artery palpable on right  Posterior tibial artery palpable on left  Dorsalis pedis artery palpable on left    Veins:  Superficial veins unremarkable on right  Superficial veins unremarkable on left    Swelling:  None on right  None on left    Neurological Exam    Dugger touch test:  6/6 sites sensed, light touch intact     Musculoskeletal  Exam    Footwear:  Sandal on right  Sandal on left    Gait Exam:   Ambulatory Status: Ambulatory  Gait: Antalgic  Assistive Devices: None    Foot Progression Angle:  Normal on right  Normal on left    Right Lower Extremity Additional Findings:  Right foot and ankle function, strength, and range of motion unremarkable except as noted above.     Left Lower Extremity Additional Findings:  Left foot and ankle function, strength, and range of motion unremarkable except as noted above.    Imaging and Other Tests    Imaging:  Independently reviewed and interpreted imaging, findings are as follows: N/A     Assessment:     Encounter Diagnoses   Name Primary?    Paronychia of great toe of right foot Yes    Pain of right great toe     Ingrown nail         Plan:     I counseled the patient on her conditions, their implications and medical management.    Right 1st toenail lateral border ingrown, infected, painful: chronic exacerbated  -Recommended right 1st toenail lateral border temporary avulsion. Reviewed potential risks, benefits, alternatives. Patient amenable. Performed, see procedure note. Post-procedure instructions provided. Physical activity as tolerated.  -Doxycycline.     Return to clinic in 2 weeks, call sooner PRN.

## 2024-10-10 ENCOUNTER — PATIENT MESSAGE (OUTPATIENT)
Dept: PODIATRY | Facility: CLINIC | Age: 74
End: 2024-10-10
Payer: MEDICARE

## 2024-10-21 ENCOUNTER — PATIENT MESSAGE (OUTPATIENT)
Dept: CARDIOLOGY | Facility: CLINIC | Age: 74
End: 2024-10-21
Payer: MEDICARE

## 2024-10-22 NOTE — TELEPHONE ENCOUNTER
Spoke with Joann from [facility]. Joann will fax clearance to have Dr. Hodge sign.       Ju MIGUEL

## 2024-10-25 ENCOUNTER — TELEPHONE (OUTPATIENT)
Dept: CARDIOLOGY | Facility: CLINIC | Age: 74
End: 2024-10-25
Payer: MEDICARE

## 2024-11-07 DIAGNOSIS — I48.0 PAROXYSMAL A-FIB: ICD-10-CM

## 2024-11-07 RX ORDER — FLECAINIDE ACETATE 100 MG/1
100 TABLET ORAL EVERY 12 HOURS
Qty: 180 TABLET | Refills: 0 | Status: SHIPPED | OUTPATIENT
Start: 2024-11-07

## 2024-12-18 ENCOUNTER — HOSPITAL ENCOUNTER (OUTPATIENT)
Dept: RADIOLOGY | Facility: HOSPITAL | Age: 74
Discharge: HOME | End: 2024-12-18
Attending: ORTHOPAEDIC SURGERY
Payer: MEDICARE

## 2024-12-18 ENCOUNTER — OFFICE VISIT (OUTPATIENT)
Dept: ORTHOPEDICS | Facility: CLINIC | Age: 74
End: 2024-12-18
Payer: MEDICARE

## 2024-12-18 VITALS — BODY MASS INDEX: 31.33 KG/M2 | WEIGHT: 237.44 LBS

## 2024-12-18 DIAGNOSIS — M25.552 LEFT HIP PAIN: ICD-10-CM

## 2024-12-18 DIAGNOSIS — M25.552 LEFT HIP PAIN: Primary | ICD-10-CM

## 2024-12-18 PROCEDURE — 99999 PR PBB SHADOW E&M-EST. PATIENT-LVL III: CPT | Mod: PBBFAC,,, | Performed by: ORTHOPAEDIC SURGERY

## 2024-12-18 PROCEDURE — 73502 X-RAY EXAM HIP UNI 2-3 VIEWS: CPT | Mod: TC,PN,LT

## 2024-12-18 PROCEDURE — 73502 X-RAY EXAM HIP UNI 2-3 VIEWS: CPT | Mod: 26,LT,, | Performed by: RADIOLOGY

## 2024-12-18 RX ORDER — METHYLPREDNISOLONE 4 MG/1
TABLET ORAL
Qty: 21 EACH | Refills: 0 | Status: SHIPPED | OUTPATIENT
Start: 2024-12-18 | End: 2025-01-08

## 2024-12-18 NOTE — PROGRESS NOTES
Patient ID:   Jayla Treviño is a 74 y.o. female.    Chief Complaint:   Left hip pain    HPI:   The patient is a 74-year-old female who is presenting today with left hip pain.  Pain is along the lateral aspect of the hip.  The pain has been present for about 2 weeks.  Pain intensity is 7/10.  She does have some left lower back pain.  She reports radiation of the pain from the left lower back into the left lateral hip region.  She does report some pain when laying on the left side.    Medications:    Current Outpatient Medications:     acetaminophen (TYLENOL) 650 MG TbSR, Take 650 mg by mouth every 8 (eight) hours as needed., Disp: , Rfl:     apixaban (ELIQUIS) 5 mg Tab, Take 1 tablet by mouth twice daily, Disp: 180 tablet, Rfl: 3    ascorbic acid, vitamin C, (VITAMIN C) 500 MG tablet, Take 500 mg by mouth once daily., Disp: , Rfl:     azelastine (ASTELIN) 137 mcg (0.1 %) nasal spray, 1 spray 2 (two) times daily., Disp: , Rfl:     cetirizine (ZYRTEC) 10 MG tablet, Take 10 mg by mouth., Disp: , Rfl:     cholecalciferol, vitamin D3, (VITAMIN D3) 125 mcg (5,000 unit) Tab, Take 5,000 Units by mouth once daily. Takes on Saturday, Disp: , Rfl:     cyanocobalamin, vitamin B-12, 50 mcg tablet, Take 50 mcg by mouth every morning. Pt will verify the strength, Disp: , Rfl:     diclofenac sodium (VOLTAREN) 1 % Gel, Apply 2 g topically 3 (three) times daily as needed (pain)., Disp: 50 g, Rfl: 6    diphenhydrAMINE (BENADRYL) 25 mg capsule, Take 25 mg by mouth nightly as needed for Itching., Disp: , Rfl:     flecainide (TAMBOCOR) 100 MG Tab, TAKE 1 TABLET BY MOUTH EVERY 12 HOURS, Disp: 180 tablet, Rfl: 0    fluticasone propionate (FLONASE) 50 mcg/actuation nasal spray, 2 sprays (100 mcg total) by Each Nostril route once daily., Disp: 16 g, Rfl: 11    fosinopriL (MONOPRIL) 20 MG tablet, Take 1 tablet by mouth once daily, Disp: 90 tablet, Rfl: 3    metoprolol succinate (TOPROL-XL) 100 MG 24 hr tablet, Take 1 tablet by mouth  once daily, Disp: 90 tablet, Rfl: 1    rosuvastatin (CRESTOR) 10 MG tablet, Take 10 mg by mouth., Disp: , Rfl:     triamterene-hydrochlorothiazide 37.5-25 mg (DYAZIDE) 37.5-25 mg per capsule, Take 1 capsule by mouth once daily in the morning, Disp: 90 capsule, Rfl: 3    albuterol (VENTOLIN HFA) 90 mcg/actuation inhaler, Inhale 2 puffs into the lungs every 6 (six) hours as needed for Wheezing. Rescue (Patient not taking: Reported on 3/28/2024), Disp: 6.7 g, Rfl: 0    doxycycline (VIBRAMYCIN) 100 MG Cap, Take 1 capsule (100 mg total) by mouth 2 (two) times daily., Disp: 14 capsule, Rfl: 0    methylPREDNISolone (MEDROL DOSEPACK) 4 mg tablet, use as directed, Disp: 1 each, Rfl: 1    methylPREDNISolone (MEDROL DOSEPACK) 4 mg tablet, use as directed, Disp: 21 each, Rfl: 0    Allergies:  Review of patient's allergies indicates:   Allergen Reactions    Norco [hydrocodone-acetaminophen] Hives       Past Medical History:  Past Medical History:   Diagnosis Date    Atrial fibrillation     Endometrial cancer 2018    Hypertension     Lumbar stenosis with neurogenic claudication 10/09/2019        Past Surgical History:  Past Surgical History:   Procedure Laterality Date    BREAST SURGERY      CHOLECYSTECTOMY      LUMBAR LAMINECTOMY Right 2019    Right L3-4 laminectomy, medial facetectomy, and contralateral foraminotomy; right L4-5 laminectomy, medial facetectomy, and microdiskectomy;  Surgeon: Nico Alvarez MD    SALPINGECTOMY      ectopic pregnancy       Social History:  Social History     Occupational History    Not on file   Tobacco Use    Smoking status: Former     Current packs/day: 0.00     Average packs/day: 0.5 packs/day for 40.0 years (20.0 ttl pk-yrs)     Types: Cigarettes     Start date:      Quit date: 2010     Years since quittin.9    Smokeless tobacco: Never   Substance and Sexual Activity    Alcohol use: No    Drug use: No    Sexual activity: Not on file       Family History:  Family History    Problem Relation Name Age of Onset    Cataracts Mother      Cataracts Sister          ROS:  Review of Systems   Musculoskeletal:  Positive for arthritis, joint pain and myalgias.   All other systems reviewed and are negative.      Vitals:  Wt 107.7 kg (237 lb 7 oz)   BMI 31.33 kg/m²     Physical Examination:  Comprehensive Orthopaedic Musculoskeletal Exam    General      Constitutional: appears stated age, mildly obese, well-developed and well-nourished    Scleral icterus: no    Labored breathing: no    Psychiatric: normal mood and affect and no acute distress    Neurological: alert and oriented x3    Skin: intact    Lymphadenopathy: none     Ortho Exam   Left hip exam:  No leg-length discrepancy.    Tenderness to palpation over the trochanteric region.    Pain with a straight leg raise.  Negative Stinchfield.    Left hip range of motion well-preserved.    Imaging:  I have ordered and independently reviewed the following imaging studies performed at [facility] today    X-Ray Hip 2 or 3 views Left with Pelvis when performed  EXAMINATION:  XR HIP WITH PELVIS WHEN PERFORMED 2 OR 3 VIEWS LEFT    CLINICAL HISTORY:  Pain in left hip    FINDINGS:  Three views left hip.    No fracture dislocation bone destruction seen.  There is mild DJD.    Electronically signed by: Zander Montoya MD  Date:    12/18/2024  Time:    14:10    Assessment:  1. Left hip pain      Plan:  I reviewed today's clinical and radiographic findings with the patient in detail.  I have expressed to her that her left hip pain may be originating from the lumbar spine she does have a history of lumbar spinal stenosis which can cause hip and thigh pain.  I have discussed use of a Medrol Dosepak.  We will see if this alleviates some of her current symptoms.  If no improvement, she will need further workup of her lumbar spine     Orders Placed This Encounter    methylPREDNISolone (MEDROL DOSEPACK) 4 mg tablet     No follow-ups on file.

## 2025-02-03 DIAGNOSIS — I48.0 PAROXYSMAL A-FIB: ICD-10-CM

## 2025-02-05 RX ORDER — FLECAINIDE ACETATE 100 MG/1
100 TABLET ORAL EVERY 12 HOURS
Qty: 180 TABLET | Refills: 0 | Status: SHIPPED | OUTPATIENT
Start: 2025-02-05

## 2025-02-14 NOTE — ANESTHESIA POSTPROCEDURE EVALUATION
Anesthesia Post Evaluation    Patient: Jayla Reagan    Procedure(s) Performed: Procedure(s) (LRB):  DILATION-CURETTAGE (N/A)    Final Anesthesia Type: general  Patient location during evaluation: PACU  Patient participation: Yes- Able to Participate  Level of consciousness: awake and alert  Post-procedure vital signs: reviewed and stable  Pain management: adequate  Airway patency: patent  PONV status at discharge: No PONV  Anesthetic complications: no      Cardiovascular status: blood pressure returned to baseline  Respiratory status: unassisted  Hydration status: euvolemic  Follow-up not needed.        Visit Vitals  BP (!) 122/58   Pulse (!) 52   Temp 36.7 °C (98 °F)   Resp 13   Ht 6' (1.829 m)   Wt 112.9 kg (249 lb)   SpO2 96%   Breastfeeding? No   BMI 33.77 kg/m²       Pain/Edna Score: Pain Assessment Performed: Yes (10/25/2017  6:57 AM)  Presence of Pain: denies (10/25/2017  9:10 AM)  Edna Score: 10 (10/25/2017  9:10 AM)

## 2025-02-19 DIAGNOSIS — I25.10 ATHEROSCLEROSIS OF CORONARY ARTERY OF NATIVE HEART, UNSPECIFIED VESSEL OR LESION TYPE, UNSPECIFIED WHETHER ANGINA PRESENT: ICD-10-CM

## 2025-02-19 DIAGNOSIS — I48.0 PAROXYSMAL ATRIAL FIBRILLATION: Primary | Chronic | ICD-10-CM

## 2025-02-19 DIAGNOSIS — I10 PRIMARY HYPERTENSION: Chronic | ICD-10-CM

## 2025-02-19 DIAGNOSIS — E78.5 HYPERLIPIDEMIA, UNSPECIFIED HYPERLIPIDEMIA TYPE: ICD-10-CM

## 2025-02-21 ENCOUNTER — PATIENT MESSAGE (OUTPATIENT)
Dept: CARDIOLOGY | Facility: CLINIC | Age: 75
End: 2025-02-21

## 2025-02-21 ENCOUNTER — HOSPITAL ENCOUNTER (OUTPATIENT)
Dept: CARDIOLOGY | Facility: HOSPITAL | Age: 75
Discharge: HOME | End: 2025-02-21
Attending: INTERNAL MEDICINE
Payer: MEDICARE

## 2025-02-21 ENCOUNTER — RESULTS FOLLOW-UP (OUTPATIENT)
Dept: CARDIOLOGY | Facility: CLINIC | Age: 75
End: 2025-02-21

## 2025-02-21 VITALS — BODY MASS INDEX: 32.1 KG/M2 | WEIGHT: 237 LBS | HEIGHT: 72 IN

## 2025-02-21 DIAGNOSIS — E78.5 HYPERLIPIDEMIA, UNSPECIFIED HYPERLIPIDEMIA TYPE: ICD-10-CM

## 2025-02-21 DIAGNOSIS — I25.10 ATHEROSCLEROSIS OF CORONARY ARTERY OF NATIVE HEART, UNSPECIFIED VESSEL OR LESION TYPE, UNSPECIFIED WHETHER ANGINA PRESENT: ICD-10-CM

## 2025-02-21 DIAGNOSIS — I48.0 PAROXYSMAL ATRIAL FIBRILLATION: Chronic | ICD-10-CM

## 2025-02-21 DIAGNOSIS — I10 PRIMARY HYPERTENSION: Chronic | ICD-10-CM

## 2025-02-21 LAB
APICAL FOUR CHAMBER EJECTION FRACTION: 61 %
APICAL TWO CHAMBER EJECTION FRACTION: 58 %
ASCENDING AORTA: 2.82 CM
AV INDEX (PROSTH): 1.03
AV MEAN GRADIENT: 5 MMHG
AV PEAK GRADIENT: 10 MMHG
AV VALVE AREA BY VELOCITY RATIO: 2.7 CM²
AV VALVE AREA: 3.2 CM²
AV VELOCITY RATIO: 0.88
BSA FOR ECHO PROCEDURE: 2.35 M2
CV ECHO LV RWT: 0.61 CM
DOP CALC AO PEAK VEL: 1.6 M/S
DOP CALC AO VTI: 31.8 CM
DOP CALC LVOT AREA: 3.1 CM2
DOP CALC LVOT DIAMETER: 2 CM
DOP CALC LVOT PEAK VEL: 1.4 M/S
DOP CALC LVOT STROKE VOLUME: 102.7 CM3
DOP CALC MV VTI: 25.9 CM
DOP CALCLVOT PEAK VEL VTI: 32.7 CM
E WAVE DECELERATION TIME: 253 MSEC
E/A RATIO: 0.68
E/E' RATIO: 9 M/S
ECHO LV POSTERIOR WALL: 1.4 CM (ref 0.6–1.1)
FRACTIONAL SHORTENING: 32.6 % (ref 28–44)
INTERVENTRICULAR SEPTUM: 0.9 CM (ref 0.6–1.1)
IVC DIAMETER: 0.75 CM
LEFT ATRIUM AREA SYSTOLIC (APICAL 2 CHAMBER): 18.73 CM2
LEFT ATRIUM AREA SYSTOLIC (APICAL 4 CHAMBER): 25.57 CM2
LEFT ATRIUM VOLUME INDEX MOD: 28 ML/M2
LEFT ATRIUM VOLUME MOD: 65 ML
LEFT INTERNAL DIMENSION IN SYSTOLE: 3.1 CM (ref 2.1–4)
LEFT VENTRICLE DIASTOLIC VOLUME INDEX: 42.74 ML/M2
LEFT VENTRICLE DIASTOLIC VOLUME: 98.72 ML
LEFT VENTRICLE END DIASTOLIC VOLUME APICAL 2 CHAMBER: 39.06 ML
LEFT VENTRICLE END DIASTOLIC VOLUME APICAL 4 CHAMBER: 74.57 ML
LEFT VENTRICLE END SYSTOLIC VOLUME APICAL 2 CHAMBER: 47.41 ML
LEFT VENTRICLE END SYSTOLIC VOLUME APICAL 4 CHAMBER: 72.31 ML
LEFT VENTRICLE MASS INDEX: 83.5 G/M2
LEFT VENTRICLE SYSTOLIC VOLUME INDEX: 16.3 ML/M2
LEFT VENTRICLE SYSTOLIC VOLUME: 37.59 ML
LEFT VENTRICULAR INTERNAL DIMENSION IN DIASTOLE: 4.6 CM (ref 3.5–6)
LEFT VENTRICULAR MASS: 192.9 G
LV LATERAL E/E' RATIO: 8.3 M/S
LV SEPTAL E/E' RATIO: 9.7 M/S
LVED V (TEICH): 98.72 ML
LVES V (TEICH): 37.59 ML
LVOT MG: 4.37 MMHG
LVOT MV: 0.98 CM/S
MV MEAN GRADIENT: 1 MMHG
MV PEAK A VEL: 0.85 M/S
MV PEAK E VEL: 0.58 M/S
MV PEAK GRADIENT: 2 MMHG
MV VALVE AREA BY CONTINUITY EQUATION: 3.96 CM2
OHS CV RV/LV RATIO: 0.78 CM
OHS LV EJECTION FRACTION SIMPSONS BIPLANE MOD: 60 %
PISA MRMAX VEL: 4.2 M/S
PISA TR MAX VEL: 2.4 M/S
PULM VEIN S/D RATIO: 1.35
PV MV: 0.75 M/S
PV PEAK D VEL: 0.4 M/S
PV PEAK GRADIENT: 6 MMHG
PV PEAK S VEL: 0.54 M/S
PV PEAK VELOCITY: 1.22 M/S
RA PRESSURE ESTIMATED: 3 MMHG
RA VOL SYS: 47.35 ML
RIGHT ATRIAL AREA: 16.8 CM2
RIGHT ATRIUM VOLUME AREA LENGTH APICAL 4 CHAMBER: 45.58 ML
RIGHT VENTRICLE DIASTOLIC BASEL DIMENSION: 3.6 CM
RV TB RVSP: 5 MMHG
RV TISSUE DOPPLER FREE WALL SYSTOLIC VELOCITY 1 (APICAL 4 CHAMBER VIEW): 14.21 CM/S
SINUS: 2.66 CM
STJ: 2.21 CM
TDI LATERAL: 0.07 M/S
TDI SEPTAL: 0.06 M/S
TDI: 0.07 M/S
TR MAX PG: 23 MMHG
TRICUSPID ANNULAR PLANE SYSTOLIC EXCURSION: 1.74 CM
TV REST PULMONARY ARTERY PRESSURE: 26 MMHG
Z-SCORE OF LEFT VENTRICULAR DIMENSION IN END DIASTOLE: -6.8
Z-SCORE OF LEFT VENTRICULAR DIMENSION IN END SYSTOLE: -4.5

## 2025-02-21 PROCEDURE — 93306 TTE W/DOPPLER COMPLETE: CPT | Mod: 26,,, | Performed by: INTERNAL MEDICINE

## 2025-02-21 PROCEDURE — 93306 TTE W/DOPPLER COMPLETE: CPT

## 2025-02-21 NOTE — PROGRESS NOTES
Your echocardiogram results is okay.  Normal heart pumping function.  Overall no major abnormalities.  We will discuss in detail during next appointment    Sincerely,  Kaiden Hodge MD.   Interventional Cardiologist  [facility], [place]

## 2025-02-24 DIAGNOSIS — I10 HYPERTENSION, UNSPECIFIED TYPE: Primary | ICD-10-CM

## 2025-02-24 DIAGNOSIS — I48.0 PAROXYSMAL ATRIAL FIBRILLATION: ICD-10-CM

## 2025-03-12 ENCOUNTER — TELEPHONE (OUTPATIENT)
Dept: CARDIOLOGY | Facility: CLINIC | Age: 75
End: 2025-03-12
Payer: MEDICARE

## 2025-03-12 NOTE — TELEPHONE ENCOUNTER
----- Message from Lindsey sent at 3/12/2025 10:09 AM CDT -----  Regarding: Elmer Mcrae  Type: Patient Call Back Who called:Minal  What is the request in detail:need ok to stop meds for procedure on March 18, clearance request is being faxed now  Can the clinic reply by [facility]? No Would the patient rather a call back or a response via [facility]? Call back Best call back number: [phone] Additional Information: Thank you.

## 2025-03-13 ENCOUNTER — OFFICE VISIT (OUTPATIENT)
Dept: CARDIOLOGY | Facility: CLINIC | Age: 75
End: 2025-03-13
Payer: MEDICARE

## 2025-03-13 VITALS
HEART RATE: 57 BPM | HEIGHT: 72 IN | BODY MASS INDEX: 31.29 KG/M2 | SYSTOLIC BLOOD PRESSURE: 123 MMHG | OXYGEN SATURATION: 100 % | DIASTOLIC BLOOD PRESSURE: 77 MMHG | WEIGHT: 231 LBS

## 2025-03-13 DIAGNOSIS — I10 HYPERTENSION, UNSPECIFIED TYPE: ICD-10-CM

## 2025-03-13 DIAGNOSIS — I10 PRIMARY HYPERTENSION: Chronic | ICD-10-CM

## 2025-03-13 DIAGNOSIS — Z79.01 LONG TERM CURRENT USE OF ANTICOAGULANT THERAPY: Chronic | ICD-10-CM

## 2025-03-13 DIAGNOSIS — I48.0 PAROXYSMAL ATRIAL FIBRILLATION: ICD-10-CM

## 2025-03-13 DIAGNOSIS — I25.10 ATHEROSCLEROSIS OF CORONARY ARTERY OF NATIVE HEART, UNSPECIFIED VESSEL OR LESION TYPE, UNSPECIFIED WHETHER ANGINA PRESENT: ICD-10-CM

## 2025-03-13 DIAGNOSIS — E78.5 HYPERLIPIDEMIA, UNSPECIFIED HYPERLIPIDEMIA TYPE: Primary | ICD-10-CM

## 2025-03-13 PROCEDURE — 93000 ELECTROCARDIOGRAM COMPLETE: CPT | Mod: S$GLB,,, | Performed by: INTERNAL MEDICINE

## 2025-03-13 PROCEDURE — 99214 OFFICE O/P EST MOD 30 MIN: CPT | Mod: 25,S$GLB,, | Performed by: INTERNAL MEDICINE

## 2025-03-13 PROCEDURE — 1159F MED LIST DOCD IN RCRD: CPT | Mod: CPTII,S$GLB,, | Performed by: INTERNAL MEDICINE

## 2025-03-13 PROCEDURE — 99999 PR PBB SHADOW E&M-EST. PATIENT-LVL III: CPT | Mod: PBBFAC,,, | Performed by: INTERNAL MEDICINE

## 2025-03-13 PROCEDURE — 3078F DIAST BP <80 MM HG: CPT | Mod: CPTII,S$GLB,, | Performed by: INTERNAL MEDICINE

## 2025-03-13 PROCEDURE — 3074F SYST BP LT 130 MM HG: CPT | Mod: CPTII,S$GLB,, | Performed by: INTERNAL MEDICINE

## 2025-03-13 PROCEDURE — 1101F PT FALLS ASSESS-DOCD LE1/YR: CPT | Mod: CPTII,S$GLB,, | Performed by: INTERNAL MEDICINE

## 2025-03-13 PROCEDURE — 3288F FALL RISK ASSESSMENT DOCD: CPT | Mod: CPTII,S$GLB,, | Performed by: INTERNAL MEDICINE

## 2025-03-13 NOTE — PROGRESS NOTES
Subjective:   @Patient ID:  Jayla Treviño is a 75 y.o. female who presents for follow-up of  PAF, PVD, and HTN.       HPI:   March 2025:  Follow up.  She is doing well.  Plan to have bone marrow biopsy for anemia workup.  EKG today sinus bradycardia.  Stress test last year negative for ischemia.  Echocardiogram with preserved EF.  Compliant with Eliquis      12/2023:  Here for follow-up.  Holter monitor done last visit in February 2023 showed significant paroxysmal atrial fibrillation.  Flecainide was switch it from pill in the pocket strategy to a daily dose of 100 mg twice a day.  She has been doing well without any recurrent palpitations.  For the last 2 weeks she has been feeling tired.  She reports snoring.  He has been compliant with her medications.  Blood pressure is a little elevated this morning but she did not take any of her medications.  EKG today sinus bradycardia rate 55 beats per minute, QRS 96 ms and she did not take her metoprolol this morning        1/2023: Here for f/u post ER visit for palpitations. She was concerned about AFib treated flecainide with no significant improvement. In the ER workup was okay.  EKG was sinus rhythm with no ischemic changes. Of note that night she missed the morning metoprolol dose show she doubled the dose at night and the after she doubled the dose she had that feeling overnight.  Felt like her heart was skipping beats.  No recurrent symptoms since then.  Compliant with Eliquis with no bleeding issues        She has Claudication the LE which believed to be neurogenic from her back.       Pertinent hx: PAF with Pill in the pocket rhythm strategy,    Prior cardiovascular  Hx  --------------------------------    - Arterial U/S 10/14/2021  No abnormal velocity doubling to suggest hemodynamically significant stenosis on the right.     - Exercise CATHY in 5/2017 was WNL    - ECHO 4/2017  EF 55-60%    Echocardiogram February 2025    Left Ventricle: The left  ventricle is normal in size. Normal wall thickness. There is concentric remodeling. Normal wall motion. There is normal systolic function. Quantitated ejection fraction is 60%. There is normal diastolic function.    Right Ventricle: Normal right ventricular cavity size. Systolic function is normal.    Aortic Valve: There is no stenosis. Aortic valve peak velocity is 1.6 m/s. Mean gradient is 5 mmHg. There is trace aortic regurgitation.    Mitral Valve: There is trace regurgitation.    Pulmonary Artery: The estimated pulmonary artery systolic pressure is 26 mmHg.    IVC/SVC: Normal venous pressure at 3 mmHg.    Pericardium: There is no pericardial effusion.  - EKG 11/2019  Sinus bradycardia, no acute ST-T wave changes          Patient Active Problem List    Diagnosis Date Noted    Primary osteoarthritis of right knee 08/16/2022    Chronic kidney disease, stage 3a 11/05/2021    Spinal stenosis of lumbar region with radiculopathy 12/27/2019    Foraminal stenosis of lumbar region 10/09/2019    Atherosclerosis of coronary artery 04/09/2019    Hyperlipidemia 04/09/2019    Seasonal allergic rhinitis 04/09/2019    Gastroesophageal reflux disease 12/13/2018    Endometrial cancer 05/19/2018    s/p RA-TLH/BSO 5/15/2018 05/15/2018    Impaired fasting glucose 08/02/2017    Anemia 05/17/2017    Obesity 05/02/2017    Paroxysmal atrial fibrillation 04/05/2017    Hypertension 04/05/2017    Long term current use of anticoagulant therapy 04/05/2017    Nuclear sclerosis - Both Eyes 02/04/2013            LAST HbA1c  Lab Results   Component Value Date    HGBA1C 5.6 03/22/2023       Lipid panel  Lab Results   Component Value Date    CHOL 148 02/21/2025    CHOL 201 (H) 03/22/2023    CHOL 199 11/04/2021     Lab Results   Component Value Date    HDL 75 02/21/2025    HDL 68 03/22/2023    HDL 66 11/04/2021     Lab Results   Component Value Date    LDLCALC 62.0 (L) 02/21/2025    LDLCALC 125.0 03/22/2023    LDLCALC 118.8 11/04/2021     Lab  Results   Component Value Date    TRIG 55 02/21/2025    TRIG 40 03/22/2023    TRIG 71 11/04/2021     Lab Results   Component Value Date    CHOLHDL 50.7 (H) 02/21/2025    CHOLHDL 33.8 03/22/2023    CHOLHDL 33.2 11/04/2021            Review of Systems   Constitutional: Negative for chills and fever.   HENT:  Negative for hearing loss and nosebleeds.    Eyes:  Negative for blurred vision.   Cardiovascular:  Negative for chest pain and palpitations.   Respiratory:  Negative for hemoptysis and shortness of breath.    Hematologic/Lymphatic: Negative for bleeding problem.   Skin:  Negative for itching.   Musculoskeletal:         As in HPI   Gastrointestinal:  Negative for abdominal pain and hematochezia.   Genitourinary:  Negative for hematuria.   Neurological:  Negative for dizziness and loss of balance.   Psychiatric/Behavioral:  Negative for altered mental status and depression.        Objective:   Physical Exam  Constitutional:       Appearance: She is well-developed.   HENT:      Head: Normocephalic and atraumatic.   Eyes:      Conjunctiva/sclera: Conjunctivae normal.   Neck:      Vascular: No carotid bruit or JVD.   Cardiovascular:      Rate and Rhythm: Regular rhythm. Bradycardia present.      Pulses:           Carotid pulses are 2+ on the right side and 2+ on the left side.       Radial pulses are 2+ on the right side and 2+ on the left side.        Dorsalis pedis pulses are 2+ on the right side and 2+ on the left side.      Heart sounds: Normal heart sounds. No murmur heard.     No friction rub. No gallop.   Pulmonary:      Effort: Pulmonary effort is normal. No respiratory distress.      Breath sounds: Normal breath sounds. No stridor. No wheezing.   Musculoskeletal:      Cervical back: Neck supple.   Skin:     General: Skin is warm and dry.   Neurological:      Mental Status: She is alert and oriented to person, place, and time.   Psychiatric:         Behavior: Behavior normal.         Assessment:     1.  Hyperlipidemia, unspecified hyperlipidemia type    2. Paroxysmal atrial fibrillation    3. Primary hypertension    4. Atherosclerosis of coronary artery of native heart, unspecified vessel or lesion type, unspecified whether angina present    5. Long term current use of anticoagulant therapy          Plan:   1. Paroxysmal atrial fibrillation  - Remains in sinus rhythm with flecainide and metoprolol  Continue Eliquis for anticoagulation  Risk factors reduction    2. Hypertension  BP well-controlled  Continue current regimen    3. Atherosclerotic disease  Cholesterol well-controlled  Continue primary preventive measures    4. Hyperlipidemia  As above  Well-controlled      5. Anemia  Follow up with Hematology team  Okay to hold Eliquis for 2 days before the bone marrow biopsy and restart post         I spent 5-10 minutes asking, assessing, assisting, arranging and advising heart healthy diet improvements. This included low-salt meals, portion control and health food alternatives. I also encourage 30 minutes of moderate exercise 3-4x a week.     Six-months follow-up    - EKG reviewed, sinus bradycardia, no acute ST-T wave changes     Continue with current medical plan and lifestyle changes.  Return sooner for concerns or questions. If symptoms persist go to the ED  I have reviewed all pertinent data including patient's medical history in detail and updated the computerized patient record.     No orders of the defined types were placed in this encounter.      Follow up as scheduled.     She expressed verbal understanding and agreed with the plan    Patient's Medications   New Prescriptions    No medications on file   Previous Medications    ACETAMINOPHEN (TYLENOL) 650 MG TBSR    Take 650 mg by mouth every 8 (eight) hours as needed.    APIXABAN (ELIQUIS) 5 MG TAB    Take 1 tablet by mouth twice daily    ASCORBIC ACID, VITAMIN C, (VITAMIN C) 500 MG TABLET    Take 500 mg by mouth once daily.    AZELASTINE (ASTELIN) 137 MCG  (0.1 %) NASAL SPRAY    1 spray 2 (two) times daily.    CETIRIZINE (ZYRTEC) 10 MG TABLET    Take 10 mg by mouth.    CHOLECALCIFEROL, VITAMIN D3, (VITAMIN D3) 125 MCG (5,000 UNIT) TAB    Take 5,000 Units by mouth once daily. Takes on Saturday    CYANOCOBALAMIN, VITAMIN B-12, 50 MCG TABLET    Take 50 mcg by mouth every morning. Pt will verify the strength    DICLOFENAC SODIUM (VOLTAREN) 1 % GEL    Apply 2 g topically 3 (three) times daily as needed (pain).    DIPHENHYDRAMINE (BENADRYL) 25 MG CAPSULE    Take 25 mg by mouth nightly as needed for Itching.    FLECAINIDE (TAMBOCOR) 100 MG TAB    TAKE 1 TABLET BY MOUTH EVERY 12 HOURS    FLUTICASONE PROPIONATE (FLONASE) 50 MCG/ACTUATION NASAL SPRAY    2 sprays (100 mcg total) by Each Nostril route once daily.    FOSINOPRIL (MONOPRIL) 20 MG TABLET    Take 1 tablet by mouth once daily    METOPROLOL SUCCINATE (TOPROL-XL) 100 MG 24 HR TABLET    Take 1 tablet by mouth once daily    ROSUVASTATIN (CRESTOR) 10 MG TABLET    Take 10 mg by mouth.    TRIAMTERENE-HYDROCHLOROTHIAZIDE 37.5-25 MG (DYAZIDE) 37.5-25 MG PER CAPSULE    Take 1 capsule by mouth once daily in the morning   Modified Medications    No medications on file   Discontinued Medications    No medications on file

## 2025-03-14 LAB
OHS QRS DURATION: 100 MS
OHS QTC CALCULATION: 424 MS

## 2025-04-12 DIAGNOSIS — I48.0 PAROXYSMAL ATRIAL FIBRILLATION: Chronic | ICD-10-CM

## 2025-04-14 RX ORDER — METOPROLOL SUCCINATE 100 MG/1
100 TABLET, EXTENDED RELEASE ORAL
Qty: 90 TABLET | Refills: 0 | Status: SHIPPED | OUTPATIENT
Start: 2025-04-14

## 2025-05-02 DIAGNOSIS — I48.0 PAROXYSMAL A-FIB: ICD-10-CM

## 2025-05-02 RX ORDER — FLECAINIDE ACETATE 100 MG/1
100 TABLET ORAL EVERY 12 HOURS
Qty: 180 TABLET | Refills: 3 | Status: SHIPPED | OUTPATIENT
Start: 2025-05-02

## 2025-05-19 ENCOUNTER — HOSPITAL ENCOUNTER (OUTPATIENT)
Facility: HOSPITAL | Age: 75
Discharge: HOME | End: 2025-05-19
Payer: MEDICARE

## 2025-05-19 ENCOUNTER — PATIENT MESSAGE (OUTPATIENT)
Dept: CARDIOLOGY | Facility: CLINIC | Age: 75
End: 2025-05-19
Payer: MEDICARE

## 2025-05-19 ENCOUNTER — OFFICE VISIT (OUTPATIENT)
Dept: ORTHOPEDICS | Facility: CLINIC | Age: 75
End: 2025-05-19
Payer: MEDICARE

## 2025-05-19 VITALS — HEIGHT: 72 IN | BODY MASS INDEX: 30.48 KG/M2

## 2025-05-19 DIAGNOSIS — R52 PAIN: Primary | ICD-10-CM

## 2025-05-19 DIAGNOSIS — M25.551 RIGHT HIP PAIN: Primary | ICD-10-CM

## 2025-05-19 DIAGNOSIS — M51.362 DEGENERATION OF INTERVERTEBRAL DISC OF LUMBAR REGION WITH DISCOGENIC BACK PAIN AND LOWER EXTREMITY PAIN: ICD-10-CM

## 2025-05-19 DIAGNOSIS — R52 PAIN: ICD-10-CM

## 2025-05-19 PROCEDURE — 1159F MED LIST DOCD IN RCRD: CPT | Mod: CPTII,S$GLB,,

## 2025-05-19 PROCEDURE — 3288F FALL RISK ASSESSMENT DOCD: CPT | Mod: CPTII,S$GLB,,

## 2025-05-19 PROCEDURE — 73502 X-RAY EXAM HIP UNI 2-3 VIEWS: CPT | Mod: TC,PN,RT

## 2025-05-19 PROCEDURE — 73502 X-RAY EXAM HIP UNI 2-3 VIEWS: CPT | Mod: 26,RT,, | Performed by: RADIOLOGY

## 2025-05-19 PROCEDURE — 1125F AMNT PAIN NOTED PAIN PRSNT: CPT | Mod: CPTII,S$GLB,,

## 2025-05-19 PROCEDURE — 1160F RVW MEDS BY RX/DR IN RCRD: CPT | Mod: CPTII,S$GLB,,

## 2025-05-19 PROCEDURE — 99999 PR PBB SHADOW E&M-EST. PATIENT-LVL III: CPT | Mod: PBBFAC,,,

## 2025-05-19 PROCEDURE — 1101F PT FALLS ASSESS-DOCD LE1/YR: CPT | Mod: CPTII,S$GLB,,

## 2025-05-19 PROCEDURE — 99214 OFFICE O/P EST MOD 30 MIN: CPT | Mod: S$GLB,,,

## 2025-05-19 NOTE — PROGRESS NOTES
Subjective:      Patient ID: Jayla Treviño is a 75 y.o. female.    Chief Complaint: Pain of the Right Hip      HPI: Jayla Treviño is a 75 y.o. female who presents to clinic for constant right hip pain. The patient denies known AGGIE.  The pain started 1 week ago and is becoming progressively worse.  Pain is located over (points to) lateral hip and right low back/buttocks. She reports that the pain is a 8 /10 aching pain today. Pain is 10/10 at its worst.  The pain is aggravated by getting out of bed in the morning and certain maneuvers. Denies weakness or feelings of instability. Reports history of lumbar spine pathology with surgery in 2019. Reports numbness, tingling, radiation. The pain is affecting ADLs and limiting desired level of activity. Ambulation reportedly has been impaired. There is not a history of previous surgery to the hip. Previous treatments include Tylenol Arthritis (NSAIDs contraindicated), activity modifications, ice, and heat which have provided minimal relief.     Patient did previously see Dr. Connor ([facility]) on 12/18/2024 for similar issues on her left side.  Dr. Connor informed patient her symptoms were likely coming from her low back as she does have a history of lumbar spinal stenosis.  Patient was prescribed Medrol Dosepak with no relief.  Patient was advised to undergo a workup for her lumbar spine.      Occupation: Retired    Ambulating: unassisted  Diabetic:  No  Smoking:  She quit smoking in 2003.   History of DVT/PE: Negative. On Eliquis 5 mg BID for A-Fib.     PAST MEDICAL HISTORY:    Past Medical History:   Diagnosis Date    Atrial fibrillation     Endometrial cancer 05/19/2018    Hypertension     Lumbar stenosis with neurogenic claudication 10/09/2019     PAST SURGICAL HISTORY:    Past Surgical History:   Procedure Laterality Date    BREAST SURGERY      CHOLECYSTECTOMY      LUMBAR LAMINECTOMY Right 12/27/2019    Right L3-4 laminectomy, medial facetectomy,  and contralateral foraminotomy; right L4-5 laminectomy, medial facetectomy, and microdiskectomy;  Surgeon: Nico Alvarez MD    SALPINGECTOMY      ectopic pregnancy     FAMILY HISTORY:    Family History   Problem Relation Name Age of Onset    Cataracts Mother      Cataracts Sister       SOCIAL HISTORY:    Social History     Occupational History    Not on file   Tobacco Use    Smoking status: Former     Current packs/day: 0.00     Average packs/day: 0.5 packs/day for 40.0 years (20.0 ttl pk-yrs)     Types: Cigarettes     Start date: 1970     Quit date: 2010     Years since quitting: 15.3    Smokeless tobacco: Never   Substance and Sexual Activity    Alcohol use: No    Drug use: No    Sexual activity: Not on file      MEDICATIONS:   Current Medications[1]    ALLERGIES:   Review of patient's allergies indicates:   Allergen Reactions    Norco [hydrocodone-acetaminophen] Hives       Review of Systems:  Constitution: Negative for chills, fever and night sweats.   HENT: Negative for congestion and headaches.    Eyes: Negative for blurred vision or vision loss.  Cardiovascular: Negative for chest pain and syncope.   Respiratory: Negative for cough and shortness of breath.    Endocrine: Negative for polydipsia, polyphagia and polyuria.   Hematologic/Lymphatic: Negative for bleeding problem. Does not bruise/bleed easily.   Skin: Negative for dry skin, itching and rash.   Musculoskeletal: See HPI.   Gastrointestinal: Negative for abdominal pain and bowel incontinence.   Genitourinary: Negative for bladder incontinence and nocturia.   Neurological: Negative for disturbances in coordination, loss of balance and seizures.   Psychiatric/Behavioral: Negative for depression. The patient does not have insomnia.    Allergic/Immunologic: Negative for hives and persistent infections.        Objective:      There were no vitals filed for this visit.    PHYSICAL EXAM:  General: Alert & oriented x3, well-developed and well-nourished, in  no acute distress, sitting comfortably in the exam room.  Skin: Warm and dry. Capillary refill less than 2 seconds.   Head: Normocephalic and atraumatic.   Eyes: Sclera appear normal.   Nose: No deformities seen.   Ears: No deformities seen.   Neck: No tracheal deviation present.   Pulmonary/Chest: Breathing unlabored.   Neurological: Alert and oriented to person, place, and time.   Psychiatric: Mood is pleasant and affect appropriate.     RIGHT HIP:        Body habitus is: overweight.         The patient walks with a mild limp.         Resisted SLR:  positive.        Sciatic tension findings:  positive.        The skin over the hip is intact.        Tenderness:  lateral hip        Range of Motion:    Flexion:  90°  External Rotation:  40°  Internal Rotation:  20°        Pain with rotation:  positive with all ROM.        Shortening/lengthening compared to the contralateral side:  slightly shortened compared to the left.        Pulses DP present, PT present.        Motor:  normal 5/5 strength in all tested muscle groups.         Sensory:  normal.    Imaging:   X-Rays: 3 views of right hip obtained in the Orthopedic clinic today, and independently reviewed, show: Degenerative changes of the hips including loss of hip joint space bilaterally. There is no fracture, dislocation, or bony erosion.        Assessment:       1. Right hip pain    2. Degeneration of intervertebral disc of lumbar region with discogenic back pain and lower extremity pain        Plan:       Orders Placed This Encounter    Ambulatory referral/consult to Interventional RAD    Ambulatory referral/consult to Spine Care       I explained the nature of the problem to the patient.     I discussed at length with the patient all the different treatment options available for her right hip including anti-inflammatories, acetaminophen, rest, ice, heat, physical therapy, and corticosteroid injections. I explained the potential role of surgery in the treatment  of this condition. The patient understands that if non-surgical measures do not adequately control symptoms, surgery will be considered in the future.     I again explained to the patient it is likely majority of her symptoms are coming from her low back.  I recommended a referral to Back and Spine for evaluation and treatment of lumbar spine issues.     Medications:  Continue OTC Tylenol as needed for pain. NSAIDs contraindicated due to patient being on Eliquis.    Patient declined suggestion for Medrol Dosepak.  Physical Therapy:  Patient declined suggestion for referral to physical therapy for hip and lumbar spine.  Pain Management: Ice compress to the affected area 2-3x a day for 15-20 minutes as needed for pain management.  Referrals:    Referral to Back and Spine for evaluation and treatment of low back pain  Referral to IR for intra-articular right hip injection.    Follow-Up: 2-3 months for follow-up.     All of the patient's questions were answered and the patient will contact us if they have any questions or concerns in the interim.    Cesia Baker PA-C  [facility]  Orthopedic Surgery    Medical Dictation software was used during the dictation of portions or the entirety of this medical record.  Phonetic or grammatic errors may exist due to the use of this software. For clarification, refer to the author of the document.            [1]   Current Outpatient Medications:     acetaminophen (TYLENOL) 650 MG TbSR, Take 650 mg by mouth every 8 (eight) hours as needed., Disp: , Rfl:     apixaban (ELIQUIS) 5 mg Tab, Take 1 tablet by mouth twice daily, Disp: 180 tablet, Rfl: 3    ascorbic acid, vitamin C, (VITAMIN C) 500 MG tablet, Take 500 mg by mouth once daily., Disp: , Rfl:     azelastine (ASTELIN) 137 mcg (0.1 %) nasal spray, 1 spray 2 (two) times daily., Disp: , Rfl:     cetirizine (ZYRTEC) 10 MG tablet, Take 10 mg by mouth., Disp: , Rfl:     cholecalciferol, vitamin D3, (VITAMIN D3) 125 mcg (5,000  unit) Tab, Take 5,000 Units by mouth once daily. Takes on Saturday, Disp: , Rfl:     cyanocobalamin, vitamin B-12, 50 mcg tablet, Take 50 mcg by mouth every morning. Pt will verify the strength, Disp: , Rfl:     diclofenac sodium (VOLTAREN) 1 % Gel, Apply 2 g topically 3 (three) times daily as needed (pain)., Disp: 50 g, Rfl: 6    diphenhydrAMINE (BENADRYL) 25 mg capsule, Take 25 mg by mouth nightly as needed for Itching., Disp: , Rfl:     flecainide (TAMBOCOR) 100 MG Tab, TAKE 1 TABLET BY MOUTH EVERY 12 HOURS, Disp: 180 tablet, Rfl: 3    fluticasone propionate (FLONASE) 50 mcg/actuation nasal spray, 2 sprays (100 mcg total) by Each Nostril route once daily., Disp: 16 g, Rfl: 11    fosinopriL (MONOPRIL) 20 MG tablet, Take 1 tablet by mouth once daily, Disp: 90 tablet, Rfl: 3    metoprolol succinate (TOPROL-XL) 100 MG 24 hr tablet, Take 1 tablet by mouth once daily, Disp: 90 tablet, Rfl: 0    rosuvastatin (CRESTOR) 10 MG tablet, Take 10 mg by mouth., Disp: , Rfl:     triamterene-hydrochlorothiazide 37.5-25 mg (DYAZIDE) 37.5-25 mg per capsule, Take 1 capsule by mouth once daily in the morning, Disp: 90 capsule, Rfl: 3

## 2025-05-20 ENCOUNTER — TELEPHONE (OUTPATIENT)
Dept: ADMINISTRATIVE | Facility: OTHER | Age: 75
End: 2025-05-20
Payer: MEDICARE

## 2025-05-21 ENCOUNTER — TELEPHONE (OUTPATIENT)
Dept: INTERVENTIONAL RADIOLOGY/VASCULAR | Facility: CLINIC | Age: 75
End: 2025-05-21
Payer: MEDICARE

## 2025-05-21 NOTE — TELEPHONE ENCOUNTER
----- Message from Cornell Blank MD sent at 2025  3:28 PM CDT -----  Regarding: RE: Order for ADOLPH VALDEZMAXIMLUCAS HAYS  Procedure: R hip injectionProcedure duration: 1 hourLevel of sedation: localCampus: [place] Performing physician (if any specific): AnyProcedure room: Angio Clinic visit: NoPriority: Non- UrgentSnapboard comments: R hip steroid injectionOther comments: N/A  ----- Message -----  From: Cesia Hoffmann PA-C  Sent: 2025   3:07 PM CDT  To: [facility] Ir Body Request Pool  Subject: Order for JOSÉ MIGUELLONDONLUCAS HAYS                  Patient Name: RASHMI VALDEZ([id number])  Sex: Female  : 1950      PCP: NORA VELASQUEZ    Center: [facility] CENTRAL BILLING OFFICE     Types of orders made on 2025: Outpatient Referral    Order Date:2025  Ordering User:CESIA HOFFMANN [[id number]]  Encounter Provider:Cesia Hoffmann PA-C [[id number]]  Authorizing Provider: Cesia Hoffmann PA-C [[id number]]  Supervising Provider:LEENA SARAVIA JR [[id number]]  Type of Supervision:Supervision Required  Department:[facility] ORTHOPEDICS[[id number]]    Order Specific Information  Order: Ambulatory referral/consult to Interventional RAD [Custom: [id number]]           Order #: [id number]: 1 FUTURE    Priority: Routine  Class: Internal Referral    Future Order Information      Expires on:2026            Expected by:2025                   Associated Diagnoses      M25.551 Right hip pain      Type of consult: -> IR Body         Procedure Requested: -> Other (specify) Cmt: right hip intra-articular                            injection        Patient taking meds (hold 5 days) -> ASA Cmt: on Eliquis        Contrast allergy: -> No           Priority: Routine  Class: Internal Referral    Future Order Information      Expires on:2026            Expected by:2025                   Associated Diagnoses      M25.551 Right hip pain      Type of consult: -> IR Body         Procedure Requested:  -> Other (specify) Cmt: right hip intra-articular                            injection        Patient taking meds (hold 5 days) -> ASA Cmt: on Eliquis        Contrast allergy: -> No

## 2025-05-22 ENCOUNTER — TELEPHONE (OUTPATIENT)
Dept: INTERVENTIONAL RADIOLOGY/VASCULAR | Facility: CLINIC | Age: 75
End: 2025-05-22
Payer: MEDICARE

## 2025-05-26 ENCOUNTER — TELEPHONE (OUTPATIENT)
Dept: INTERVENTIONAL RADIOLOGY/VASCULAR | Facility: HOSPITAL | Age: 75
End: 2025-05-26
Payer: MEDICARE

## 2025-05-26 NOTE — NURSING
Pre-procedure call complete.  2 patient identifier used (name and ).   Arrival time 7:30.      Patient aware will need someone to provide transport home.  Arrival time and location given.   Patient verbalized understanding of all pre-procedure instructions.  Written instructions and directions sent to patient in Mychart/portal.

## 2025-05-29 ENCOUNTER — HOSPITAL ENCOUNTER (OUTPATIENT)
Dept: INTERVENTIONAL RADIOLOGY/VASCULAR | Facility: HOSPITAL | Age: 75
Discharge: HOME | End: 2025-05-29
Attending: NURSE PRACTITIONER
Payer: MEDICARE

## 2025-05-29 VITALS
OXYGEN SATURATION: 100 % | SYSTOLIC BLOOD PRESSURE: 156 MMHG | BODY MASS INDEX: 30.34 KG/M2 | RESPIRATION RATE: 18 BRPM | HEART RATE: 50 BPM | WEIGHT: 230 LBS | TEMPERATURE: 97 F | DIASTOLIC BLOOD PRESSURE: 72 MMHG

## 2025-05-29 DIAGNOSIS — M19.90 OSTEOARTHRITIS, UNSPECIFIED OSTEOARTHRITIS TYPE, UNSPECIFIED SITE: Primary | ICD-10-CM

## 2025-05-29 PROCEDURE — 94760 N-INVAS EAR/PLS OXIMETRY 1: CPT

## 2025-05-29 PROCEDURE — 99900035 HC TECH TIME PER 15 MIN (STAT)

## 2025-05-29 PROCEDURE — 25500020 PHARM REV CODE 255: Performed by: RADIOLOGY

## 2025-05-29 PROCEDURE — 63600175 PHARM REV CODE 636 W HCPCS: Performed by: RADIOLOGY

## 2025-05-29 RX ORDER — BUPIVACAINE HYDROCHLORIDE 2.5 MG/ML
INJECTION, SOLUTION EPIDURAL; INFILTRATION; INTRACAUDAL; PERINEURAL
Status: COMPLETED | OUTPATIENT
Start: 2025-05-29 | End: 2025-05-29

## 2025-05-29 RX ORDER — LIDOCAINE HYDROCHLORIDE 10 MG/ML
1 INJECTION, SOLUTION EPIDURAL; INFILTRATION; INTRACAUDAL; PERINEURAL ONCE
Status: DISCONTINUED | OUTPATIENT
Start: 2025-05-29 | End: 2025-05-30 | Stop reason: HOSPADM

## 2025-05-29 RX ORDER — LIDOCAINE HYDROCHLORIDE 10 MG/ML
INJECTION, SOLUTION INFILTRATION; PERINEURAL
Status: COMPLETED | OUTPATIENT
Start: 2025-05-29 | End: 2025-05-29

## 2025-05-29 RX ORDER — METHYLPREDNISOLONE ACETATE 40 MG/ML
INJECTION, SUSPENSION INTRA-ARTICULAR; INTRALESIONAL; INTRAMUSCULAR; SOFT TISSUE
Status: COMPLETED | OUTPATIENT
Start: 2025-05-29 | End: 2025-05-29

## 2025-05-29 RX ADMIN — IOHEXOL 1 ML: 180 INJECTION INTRAVENOUS at 08:05

## 2025-05-29 RX ADMIN — LIDOCAINE HYDROCHLORIDE 5 ML: 10 INJECTION, SOLUTION INFILTRATION; PERINEURAL at 08:05

## 2025-05-29 RX ADMIN — METHYLPREDNISOLONE ACETATE 40 MG: 40 INJECTION, SUSPENSION INTRA-ARTICULAR; INTRALESIONAL; INTRAMUSCULAR; SOFT TISSUE at 08:05

## 2025-05-29 RX ADMIN — BUPIVACAINE HYDROCHLORIDE 1 MG: 2.5 INJECTION, SOLUTION EPIDURAL; INFILTRATION; INTRACAUDAL; PERINEURAL at 08:05

## 2025-05-29 NOTE — PLAN OF CARE
Discharge instructions given to patient with verbalization of understanding all.  VSS, denies n/v and tolerating PO, rates pain level tolerable. Family notified for Patient discharge home. Patient ambulated to bathroom without assist and voided.

## 2025-05-29 NOTE — Clinical Note
Right: Hip.   Scrubbed with Chlorohexidine.   Painted with Chlorohexidine.  Skin prep dry before draping.

## 2025-05-29 NOTE — H&P
Radiology History & Physical      SUBJECTIVE:     Chief Complaint: Right hip pain    History of Present Illness:  Jayla Treviño is a 75 y.o. female who presents for right hip joint steroid injection.  Past Medical History:   Diagnosis Date    Atrial fibrillation     Endometrial cancer 05/19/2018    Hypertension     Lumbar stenosis with neurogenic claudication 10/09/2019     Past Surgical History:   Procedure Laterality Date    BREAST SURGERY      CHOLECYSTECTOMY      LUMBAR LAMINECTOMY Right 12/27/2019    Right L3-4 laminectomy, medial facetectomy, and contralateral foraminotomy; right L4-5 laminectomy, medial facetectomy, and microdiskectomy;  Surgeon: Nico Alvarez MD    SALPINGECTOMY      ectopic pregnancy       Home Meds:   Prior to Admission medications    Medication Sig Start Date End Date Taking? Authorizing Provider   acetaminophen (TYLENOL) 650 MG TbSR Take 650 mg by mouth every 8 (eight) hours as needed.    Provider, Historical   apixaban (ELIQUIS) 5 mg Tab Take 1 tablet by mouth twice daily 9/4/24   Rambo Palmer MD   ascorbic acid, vitamin C, (VITAMIN C) 500 MG tablet Take 500 mg by mouth once daily.    Provider, Historical   azelastine (ASTELIN) 137 mcg (0.1 %) nasal spray 1 spray 2 (two) times daily. 2/17/24   Provider, Historical   cetirizine (ZYRTEC) 10 MG tablet Take 10 mg by mouth. 2/17/24   Provider, Historical   cholecalciferol, vitamin D3, (VITAMIN D3) 125 mcg (5,000 unit) Tab Take 5,000 Units by mouth once daily. Takes on Saturday    Provider, Historical   cyanocobalamin, vitamin B-12, 50 mcg tablet Take 50 mcg by mouth every morning. Pt will verify the strength    Provider, Historical   diclofenac sodium (VOLTAREN) 1 % Gel Apply 2 g topically 3 (three) times daily as needed (pain). 2/13/23   Rambo Palmer MD   diphenhydrAMINE (BENADRYL) 25 mg capsule Take 25 mg by mouth nightly as needed for Itching.    Provider, Historical   flecainide (TAMBOCOR) 100 MG Tab  TAKE 1 TABLET BY MOUTH EVERY 12 HOURS 5/2/25   Kaiden Hodge MD   fluticasone propionate (FLONASE) 50 mcg/actuation nasal spray 2 sprays (100 mcg total) by Each Nostril route once daily. 12/5/22   Rambo Palmer MD   fosinopriL (MONOPRIL) 20 MG tablet Take 1 tablet by mouth once daily 7/22/24   Rambo Palmer MD   metoprolol succinate (TOPROL-XL) 100 MG 24 hr tablet Take 1 tablet by mouth once daily 4/14/25   Kaiden Hodge MD   rosuvastatin (CRESTOR) 10 MG tablet Take 10 mg by mouth. 3/12/24   Provider, Historical   triamterene-hydrochlorothiazide 37.5-25 mg (DYAZIDE) 37.5-25 mg per capsule Take 1 capsule by mouth once daily in the morning 7/22/24   Rambo Palmer MD     Anticoagulants/Antiplatelets: apixaban    Allergies:   Review of patient's allergies indicates:   Allergen Reactions    Norco [hydrocodone-acetaminophen] Hives     Sedation History:  no adverse reactions    Review of Systems:   Hematological: no known coagulopathies  Respiratory: no shortness of breath  Cardiovascular: no chest pain  Gastrointestinal: no abdominal pain  Genito-Urinary: no dysuria  Musculoskeletal: negative  Neurological: no TIA or stroke symptoms         OBJECTIVE:     Vital Signs (Most Recent)       Physical Exam:  ASA: 3  Mallampati: 2    General: no acute distress  Mental Status: alert and oriented to person, place and time  HEENT: normocephalic, atraumatic  Chest: unlabored breathing  Heart: regular heart rate  Abdomen: nondistended  Extremity: moves all extremities    Laboratory  Lab Results   Component Value Date    INR 1.1 02/03/2023       Lab Results   Component Value Date    WBC 3.67 (L) 12/07/2023    HGB 10.7 (L) 12/07/2023    HCT 31.5 (L) 12/07/2023    MCV 87 12/07/2023     12/07/2023      Lab Results   Component Value Date     12/07/2023     12/07/2023    K 3.9 12/07/2023     12/07/2023    CO2 25 12/07/2023    BUN 25 (H) 12/07/2023    CREATININE 1.2  12/07/2023    CALCIUM 9.8 12/07/2023    MG 1.8 01/19/2023    ALT 10 12/07/2023    AST 15 12/07/2023    ALBUMIN 3.5 12/07/2023    BILITOT 0.4 12/07/2023       ASSESSMENT/PLAN:     Sedation Plan: Local only  Patient will undergo right hip steroid injection with fluoroscopic guidance.    Todd Cedillo M.D.  Interventional Radiology  Department of Radiology

## 2025-05-29 NOTE — DISCHARGE SUMMARY
Radiology Discharge Summary      Hospital Course: No complications    Admit Date: 5/29/2025  Discharge Date: 05/29/2025     Instructions Given to Patient: Yes  Diet: Resume prior diet  Activity: activity as tolerated and no driving for today    Description of Condition on Discharge: Stable  Vital Signs (Most Recent): Temp: 98.1 °F (36.7 °C) (05/29/25 0741)  Pulse: (!) 54 (05/29/25 0741)  Resp: 16 (05/29/25 0741)  BP: (!) 163/74 (05/29/25 0741)  SpO2: 100 % (05/29/25 0741)    Discharge Disposition: Home    Discharge Diagnosis: Right hip pain s/p right hip steroid injection    Follow up: As scheduled    Todd Cedillo M.D.  Interventional Radiology  Department of Radiology

## 2025-05-29 NOTE — DISCHARGE INSTRUCTIONS
Joint injection/ Aspiration Instructions    Follow-up Care   Follow-up care is an important part of your treatment and safety. Be sure to make and go to all follow-up appointments after your tube has been placed. You should expect to have follow-up care from both the provider who ordered the joint injection/ aspiration.     After the procedure  - You can expect some increased discomfort or sorenessa the site site for the first 24-26 hours, after which the symptoms should go back to baseline or improve     Call your doctor if you are having problems or increased pain, swelling, redness or oozing at the site.     Diet and Medication   1. Unless specified on your personalized discharge instructions, continue previous medications and/or diet recommendations.   2. For questions about this please contact your primary care provider or the provider who ordered the procedure    Activity    Avoid any strenuous activity for the day, after that you can proceed with activities as tolerated.    Bandage  - You may remove the bandage 24 hours after the procedure    Showering   You may shower the day of the procedure, but do not submerge the sire for 24 hours      If you have any of the following symptoms, call the Interventional Radiology Clinic at [phone], 8:00 am - 5:00 pm, Monday through Friday.     Increased pain at the site   Increased or more short of breath    Continuous vomiting    Fever (greater than 100.6 degrees F) or chills    Foul smelling drainage or abnormal bleeding from the access site       Call [phone] if you have any of the following signs and symptoms, or if you think you need emergency care.    Shortness of breath    Chest pain    Passing out, fainting (loss of consciousness)

## 2025-05-29 NOTE — PLAN OF CARE
Pt in preop bay 29, VSS. Pt denies any open wounds on body or the use of any immunizations or antibiotics in the past 2 weeks. Pt needs site marking, otherwise ready to roll.

## 2025-05-29 NOTE — PROCEDURES
Radiology Post-Procedure Note    Pre Op Diagnosis: Hip pain    Post Op Diagnosis: Same    Procedure: Right hip steroid injection    Procedure performed by: Todd Cedillo MD    Written Informed Consent Obtained: Yes  Specimen Removed: NO  Estimated Blood Loss: Minimal    Findings:   Under fluoroscopic guidance, 22 gauge needle advanced to right hip join space. Contrast injected which demonstrates intra articular distribution. 40 mg depomedrol and 5 cc 0.25 Bupivacaine were then injected to joint space. Needle removed. No complications. See dictation.    Patient tolerated procedure well.    Todd Cedillo M.D.  Interventional Radiology  Department of Radiology

## 2025-06-24 ENCOUNTER — OFFICE VISIT (OUTPATIENT)
Dept: NEUROSURGERY | Facility: CLINIC | Age: 75
End: 2025-06-24
Payer: MEDICARE

## 2025-06-24 VITALS
DIASTOLIC BLOOD PRESSURE: 79 MMHG | BODY MASS INDEX: 30.34 KG/M2 | TEMPERATURE: 98 F | SYSTOLIC BLOOD PRESSURE: 157 MMHG | WEIGHT: 229.94 LBS | HEART RATE: 58 BPM

## 2025-06-24 DIAGNOSIS — M54.16 LUMBAR RADICULOPATHY: ICD-10-CM

## 2025-06-24 DIAGNOSIS — M48.07 SPINAL STENOSIS, LUMBOSACRAL REGION: ICD-10-CM

## 2025-06-24 DIAGNOSIS — M51.362 DEGENERATION OF INTERVERTEBRAL DISC OF LUMBAR REGION WITH DISCOGENIC BACK PAIN AND LOWER EXTREMITY PAIN: ICD-10-CM

## 2025-06-24 DIAGNOSIS — M54.9 DORSALGIA, UNSPECIFIED: Primary | ICD-10-CM

## 2025-06-24 PROCEDURE — 3078F DIAST BP <80 MM HG: CPT | Mod: CPTII,S$GLB,, | Performed by: NURSE PRACTITIONER

## 2025-06-24 PROCEDURE — 1160F RVW MEDS BY RX/DR IN RCRD: CPT | Mod: CPTII,S$GLB,, | Performed by: NURSE PRACTITIONER

## 2025-06-24 PROCEDURE — 1159F MED LIST DOCD IN RCRD: CPT | Mod: CPTII,S$GLB,, | Performed by: NURSE PRACTITIONER

## 2025-06-24 PROCEDURE — 3288F FALL RISK ASSESSMENT DOCD: CPT | Mod: CPTII,S$GLB,, | Performed by: NURSE PRACTITIONER

## 2025-06-24 PROCEDURE — 1101F PT FALLS ASSESS-DOCD LE1/YR: CPT | Mod: CPTII,S$GLB,, | Performed by: NURSE PRACTITIONER

## 2025-06-24 PROCEDURE — 99999 PR PBB SHADOW E&M-EST. PATIENT-LVL IV: CPT | Mod: PBBFAC,,, | Performed by: NURSE PRACTITIONER

## 2025-06-24 PROCEDURE — 99204 OFFICE O/P NEW MOD 45 MIN: CPT | Mod: S$GLB,,, | Performed by: NURSE PRACTITIONER

## 2025-06-24 PROCEDURE — 3077F SYST BP >= 140 MM HG: CPT | Mod: CPTII,S$GLB,, | Performed by: NURSE PRACTITIONER

## 2025-06-24 PROCEDURE — 1125F AMNT PAIN NOTED PAIN PRSNT: CPT | Mod: CPTII,S$GLB,, | Performed by: NURSE PRACTITIONER

## 2025-06-24 NOTE — PROGRESS NOTES
Neurosurgery  History & Physical    SUBJECTIVE:     History of Present Illness: Jayla Treviño is a 75 y.o. female presents today as a referral from orthopedics to further evaluate her concerns with hip and leg pain. She has a hx of L3-4, L4-5 laminectomy for spinal stenosis with Dr. Alvarez in 2020. Describes the pain as constant and aching across the low back with radiation down the legs. Rates the pain as a 8/10. Aggravating factors include standing, walking, bending, and twisting. Alleviating factors include rest.  The pain is affecting ADLs and limiting desired level of activity. Ambulation reportedly has been impaired. Denies weakness, numbness or tingling, b/b dysfunction, saddle anesthesia, or gait instability.      Review of patient's allergies indicates:   Allergen Reactions    Norco [hydrocodone-acetaminophen] Hives       Current Medications[1]    Past Medical History:   Diagnosis Date    Atrial fibrillation     Endometrial cancer 05/19/2018    Hypertension     Lumbar stenosis with neurogenic claudication 10/09/2019     Past Surgical History:   Procedure Laterality Date    BREAST SURGERY      CHOLECYSTECTOMY      LUMBAR LAMINECTOMY Right 12/27/2019    Right L3-4 laminectomy, medial facetectomy, and contralateral foraminotomy; right L4-5 laminectomy, medial facetectomy, and microdiskectomy;  Surgeon: Nico Alvarez MD    SALPINGECTOMY      ectopic pregnancy     Family History       Problem Relation (Age of Onset)    Cataracts Mother, Sister          Social History     Socioeconomic History    Marital status: Legally    Tobacco Use    Smoking status: Former     Current packs/day: 0.00     Average packs/day: 0.5 packs/day for 40.0 years (20.0 ttl pk-yrs)     Types: Cigarettes     Start date: 1970     Quit date: 2010     Years since quitting: 15.4    Smokeless tobacco: Never   Substance and Sexual Activity    Alcohol use: No    Drug use: No   Social History Narrative    ** Merged History  Encounter **          Social Drivers of Health     Food Insecurity: High Risk (4/1/2025)    Received from [facility] SDOH Screening     In the past year have you or any family members you live with been unable to get any of the following when it was really needed?  check all that apply: Food       Review of Systems    OBJECTIVE:     Vital Signs  Temp: 97.7 °F (36.5 °C)  Pulse: (!) 58  BP: (!) 157/79  Pain Score:   8  Weight: 104.3 kg (229 lb 15 oz)  Body mass index is 30.34 kg/m².      Neurosurgery Physical Exam  General: well developed, well nourished, no distress.   Head: normocephalic, atraumatic  Neurologic: Alert and oriented. Thought content appropriate.  GCS: Motor: 6/Verbal: 5/Eyes: 4 GCS Total: 15  Mental Status: Awake, Alert, Oriented x 4  Language: No aphasia  Speech: No dysarthria  Cranial nerves: face symmetric, tongue midline, CN II-XII grossly intact.   Eyes: pupils equal, round, reactive to light with accomodation, EOMI.   Pulmonary: normal respirations, no signs of respiratory distress  Abdomen: soft, non-distended  Skin: Skin is warm, dry and intact.  Sensory: intact to light touch throughout  Motor Strength:Moves all extremities spontaneously with good tone.       Diagnostic Results:  There is no new imaging to review for this encounter.      ASSESSMENT/PLAN:     Jayla Treviño is a 75 y.o. female presents today as a referral from orthopedics to further evaluate her concerns with hip and leg pain that could be coming from her low back. She has a hx of L3-4, L4-5 laminectomy for spinal stenosis with Dr. Alvarez in 2020. The patient is apprehensive about additional surgery and would like to exhaust all conservative treatment. I have ordered an updated MRI to evaluate her concerns. A referral to pain management has also been placed to discuss injection options.       MARK Zamora  Neurosurgery  [facility].        Note dictated with voice recognition  software, please excuse any grammatical errors.             [1]   Current Outpatient Medications   Medication Sig Dispense Refill    acetaminophen (TYLENOL) 650 MG TbSR Take 650 mg by mouth every 8 (eight) hours as needed.      apixaban (ELIQUIS) 5 mg Tab Take 1 tablet by mouth twice daily 180 tablet 3    ascorbic acid, vitamin C, (VITAMIN C) 500 MG tablet Take 500 mg by mouth once daily.      azelastine (ASTELIN) 137 mcg (0.1 %) nasal spray 1 spray 2 (two) times daily.      cetirizine (ZYRTEC) 10 MG tablet Take 10 mg by mouth.      cholecalciferol, vitamin D3, (VITAMIN D3) 125 mcg (5,000 unit) Tab Take 5,000 Units by mouth once daily. Takes on Saturday      cyanocobalamin, vitamin B-12, 50 mcg tablet Take 50 mcg by mouth every morning. Pt will verify the strength      diclofenac sodium (VOLTAREN) 1 % Gel Apply 2 g topically 3 (three) times daily as needed (pain). 50 g 6    diphenhydrAMINE (BENADRYL) 25 mg capsule Take 25 mg by mouth nightly as needed for Itching.      flecainide (TAMBOCOR) 100 MG Tab TAKE 1 TABLET BY MOUTH EVERY 12 HOURS 180 tablet 3    fluticasone propionate (FLONASE) 50 mcg/actuation nasal spray 2 sprays (100 mcg total) by Each Nostril route once daily. 16 g 11    fosinopriL (MONOPRIL) 20 MG tablet Take 1 tablet by mouth once daily 90 tablet 3    metoprolol succinate (TOPROL-XL) 100 MG 24 hr tablet Take 1 tablet by mouth once daily 90 tablet 0    rosuvastatin (CRESTOR) 10 MG tablet Take 10 mg by mouth.      triamterene-hydrochlorothiazide 37.5-25 mg (DYAZIDE) 37.5-25 mg per capsule Take 1 capsule by mouth once daily in the morning 90 capsule 3     No current facility-administered medications for this visit.

## 2025-07-11 ENCOUNTER — PATIENT MESSAGE (OUTPATIENT)
Dept: CARDIOLOGY | Facility: CLINIC | Age: 75
End: 2025-07-11
Payer: MEDICARE

## 2025-07-11 DIAGNOSIS — I48.0 PAROXYSMAL ATRIAL FIBRILLATION: Chronic | ICD-10-CM

## 2025-07-11 RX ORDER — METOPROLOL SUCCINATE 100 MG/1
100 TABLET, EXTENDED RELEASE ORAL
Qty: 90 TABLET | Refills: 0 | Status: SHIPPED | OUTPATIENT
Start: 2025-07-11

## 2025-07-15 ENCOUNTER — E-CONSULT (OUTPATIENT)
Dept: CARDIOLOGY | Facility: CLINIC | Age: 75
End: 2025-07-15
Payer: MEDICARE

## 2025-07-15 ENCOUNTER — TELEPHONE (OUTPATIENT)
Dept: PAIN MEDICINE | Facility: CLINIC | Age: 75
End: 2025-07-15

## 2025-07-15 ENCOUNTER — OFFICE VISIT (OUTPATIENT)
Dept: PAIN MEDICINE | Facility: CLINIC | Age: 75
End: 2025-07-15
Payer: MEDICARE

## 2025-07-15 ENCOUNTER — TELEPHONE (OUTPATIENT)
Dept: PAIN MEDICINE | Facility: CLINIC | Age: 75
End: 2025-07-15
Payer: MEDICARE

## 2025-07-15 VITALS
HEIGHT: 72 IN | DIASTOLIC BLOOD PRESSURE: 78 MMHG | SYSTOLIC BLOOD PRESSURE: 140 MMHG | BODY MASS INDEX: 31.95 KG/M2 | WEIGHT: 235.88 LBS | HEART RATE: 60 BPM

## 2025-07-15 DIAGNOSIS — M51.362 DEGENERATION OF INTERVERTEBRAL DISC OF LUMBAR REGION WITH DISCOGENIC BACK PAIN AND LOWER EXTREMITY PAIN: Primary | ICD-10-CM

## 2025-07-15 DIAGNOSIS — M51.362 DEGENERATION OF INTERVERTEBRAL DISC OF LUMBAR REGION WITH DISCOGENIC BACK PAIN AND LOWER EXTREMITY PAIN: ICD-10-CM

## 2025-07-15 DIAGNOSIS — M54.16 LUMBAR RADICULOPATHY: ICD-10-CM

## 2025-07-15 DIAGNOSIS — M48.07 SPINAL STENOSIS, LUMBOSACRAL REGION: ICD-10-CM

## 2025-07-15 DIAGNOSIS — M70.62 GREATER TROCHANTERIC BURSITIS OF BOTH HIPS: ICD-10-CM

## 2025-07-15 DIAGNOSIS — M70.61 GREATER TROCHANTERIC BURSITIS OF BOTH HIPS: ICD-10-CM

## 2025-07-15 DIAGNOSIS — I48.0 PAROXYSMAL ATRIAL FIBRILLATION: Primary | Chronic | ICD-10-CM

## 2025-07-15 DIAGNOSIS — M47.816 LUMBAR SPONDYLOSIS: ICD-10-CM

## 2025-07-15 DIAGNOSIS — M54.16 LUMBAR RADICULOPATHY: Primary | ICD-10-CM

## 2025-07-15 DIAGNOSIS — M54.9 DORSALGIA, UNSPECIFIED: ICD-10-CM

## 2025-07-15 PROCEDURE — 3077F SYST BP >= 140 MM HG: CPT | Mod: CPTII,S$GLB,, | Performed by: STUDENT IN AN ORGANIZED HEALTH CARE EDUCATION/TRAINING PROGRAM

## 2025-07-15 PROCEDURE — 1125F AMNT PAIN NOTED PAIN PRSNT: CPT | Mod: CPTII,S$GLB,, | Performed by: STUDENT IN AN ORGANIZED HEALTH CARE EDUCATION/TRAINING PROGRAM

## 2025-07-15 PROCEDURE — 1101F PT FALLS ASSESS-DOCD LE1/YR: CPT | Mod: CPTII,S$GLB,, | Performed by: STUDENT IN AN ORGANIZED HEALTH CARE EDUCATION/TRAINING PROGRAM

## 2025-07-15 PROCEDURE — 1159F MED LIST DOCD IN RCRD: CPT | Mod: CPTII,S$GLB,, | Performed by: STUDENT IN AN ORGANIZED HEALTH CARE EDUCATION/TRAINING PROGRAM

## 2025-07-15 PROCEDURE — G2211 COMPLEX E/M VISIT ADD ON: HCPCS | Mod: S$GLB,,, | Performed by: STUDENT IN AN ORGANIZED HEALTH CARE EDUCATION/TRAINING PROGRAM

## 2025-07-15 PROCEDURE — 99204 OFFICE O/P NEW MOD 45 MIN: CPT | Mod: S$GLB,,, | Performed by: STUDENT IN AN ORGANIZED HEALTH CARE EDUCATION/TRAINING PROGRAM

## 2025-07-15 PROCEDURE — 3288F FALL RISK ASSESSMENT DOCD: CPT | Mod: CPTII,S$GLB,, | Performed by: STUDENT IN AN ORGANIZED HEALTH CARE EDUCATION/TRAINING PROGRAM

## 2025-07-15 PROCEDURE — 99999 PR PBB SHADOW E&M-EST. PATIENT-LVL IV: CPT | Mod: PBBFAC,,, | Performed by: STUDENT IN AN ORGANIZED HEALTH CARE EDUCATION/TRAINING PROGRAM

## 2025-07-15 PROCEDURE — 3078F DIAST BP <80 MM HG: CPT | Mod: CPTII,S$GLB,, | Performed by: STUDENT IN AN ORGANIZED HEALTH CARE EDUCATION/TRAINING PROGRAM

## 2025-07-15 RX ORDER — PREGABALIN 25 MG/1
25 CAPSULE ORAL 3 TIMES DAILY
Qty: 90 CAPSULE | Refills: 1 | Status: SHIPPED | OUTPATIENT
Start: 2025-07-15 | End: 2025-09-13

## 2025-07-15 NOTE — H&P (VIEW-ONLY)
[facility] - New Patient Evaluation    Referred by: Deandra Salvador   Reason for referral: Degeneration of intervertebral disc of lumbar region with discogenic back pain and lower extremity pain  Dorsalgia, unspecified  Spinal stenosis, lumbosacral region     CC:   Chief Complaint   Patient presents with    Back Pain         7/15/2025    10:21 AM   Last 3 PDI Scores   Pain Disability Index (PDI) 49       Subjective 07/15/2025:   Jayla Treviño is a 75 y.o. female who presents complaining of lower back pain that radiates to the right hip and down the right lower extremity to the foot.  She also complains of pain over the bilateral lateral hips with tenderness to touch.  Denies any recent falls or trauma.  Was previously enrolled in physical therapy last year.  She attempts to continue with the home exercises, but no limitations secondary to pain.  In 2019, she underwent Right L3-4 Laminectomy Medial Facetectomy and Contralateral Foraminotomy right L4-5 Laminectomy medial facetectomy and microdiscectomy.     Initial Pain Assessment:  Location:  Lower back    Onset (Approx Date Pain started): 2019  Current Pain Score: 7/10  Daily Pain of Range: 7-9/10  Quality: Aching, Throbbing, Sharp, and Shooting  Radiation: yes back to right hip and down the right leg to the foot.  Worsened by: nothing in particular  Improved by: nothing     Patient denies night fever/night sweats, urinary incontinence, bowel incontinence, significant weight loss, significant motor weakness, and loss of sensations.      Previous Interventions:  - no    Previous Therapies:  PT/OT:  Physical therapy last year  Chiropractor:   HEP:  Yes, limited by pain  Relevant Surgery: yes back surgery  2019 - Right L3-4 Laminectomy Medial Facetectomy and Contralateral Foraminotomy right L4-5 Laminectomy medial facetectomy and microdiscectomy     Previous Medications:   - Tylenol or NSAIDS: yes  - Muscle Relaxants: no    - TCAs:  no  - SNRIs: no  - Topicals: Voltrean   - Anticonvulsants: no   - Opioids: no  - Adjuvants: no    Current Pain Medications:  tylenol     Anticoagulation: no    Review of Systems:  ROS    GENERAL:  No weight loss, malaise or fevers.  HEENT:   No recent changes in vision or hearing  NECK:  No difficulty with swallowing. No stridor.   RESPIRATORY:  Negative for cough, wheezing or shortness of breath, patient denies any recent URI.  CARDIOVASCULAR:  Negative for chest pain, leg swelling or palpitations.  GI:  Negative for abdominal discomfort, blood in stools or black stools or change in bowel habits.  MUSCULOSKELETAL:  See HPI.  SKIN:  Negative for lesions, rash, and itching.  PSYCH:  No mood disorder or recent psychosocial stressors.    HEMATOLOGY/LYMPHOLOGY:  Negative for prolonged bleeding, bruising easily or swollen nodes.  Patient is not currently taking any anti-coagulants  NEURO:   No history of headaches, syncope, paralysis, seizures or tremors.  All other reviewed and negative other than HPI.    History:  Current medications, allergies, medical history, surgical history,   family history, and social history were reviewed in the chart as marked.    Full Medication List:  Current Medications[1]     Allergies:  Norco [hydrocodone-acetaminophen]     Medical History:   has a past medical history of Atrial fibrillation, Endometrial cancer (05/19/2018), Hypertension, and Lumbar stenosis with neurogenic claudication (10/09/2019).    Surgical History:   has a past surgical history that includes Breast surgery; Salpingectomy; Cholecystectomy; and Lumbar laminectomy (Right, 12/27/2019).    Family History:  family history includes Cataracts in her mother and sister.    Social History:   reports that she quit smoking about 15 years ago. Her smoking use included cigarettes. She started smoking about 55 years ago. She has a 20 pack-year smoking history. She has never used smokeless tobacco. She reports that she does not  "drink alcohol and does not use drugs.    Physical Exam:  Vitals:    07/15/25 1021   BP: (!) 140/78   Pulse: 60   Weight: 107 kg (235 lb 14.3 oz)   Height: 6' 1" (1.854 m)   PainSc:   7   PainLoc: Back       GENERAL: Well appearing, in no acute distress, alert and oriented x3.  PSYCH:  Mood and affect appropriate.  SKIN: Skin color, texture, turgor normal, no rashes or lesions.  HEAD/FACE:  Normocephalic, atraumatic. Cranial nerves grossly intact.  NECK: Normal ROM. Supple.   CV: RRR with palpation of the radial artery.  PULM: No evidence of respiratory difficulty, symmetric chest rise.  GI:  Soft and non-distended.  MSK: Straight leg raising is positive to radicular pain. + pain to palpation over the facet joints and paraspinal muscles of the lumbar spine. No pain with lumbar facet loading. No pain over the SI joints. MIRANDA test is negative.  + pain over the GTB bilaterally.  Normal range of motion of the lumbar spine. Peripheral joint ROM is full and pain free without obvious instability or laxity in all four extremities. No obvious deformities, edema, or skin discoloration.  No atrophy or tone abnormalities are noted.   NEURO: Bilateral upper and lower extremity coordination and strength is symmetric.  No loss of sensation is noted. No clonus.  MENTAL STATUS: A x O x 3, good concentration, speech is fluent and goal directed  MOTOR: 5/5 in all muscle groups  GAIT: Normal. Ambulates unassisted.    Imaging:      Labs:  BMP  Lab Results   Component Value Date     12/07/2023    K 3.9 12/07/2023     12/07/2023    CO2 25 12/07/2023    BUN 25 (H) 12/07/2023    CREATININE 1.2 12/07/2023    CALCIUM 9.8 12/07/2023    ANIONGAP 11 12/07/2023    EGFRNORACEVR 48 (A) 12/07/2023     Lab Results   Component Value Date    ALT 10 12/07/2023    AST 15 12/07/2023    ALKPHOS 81 12/07/2023    BILITOT 0.4 12/07/2023     Lab Results   Component Value Date    WBC 3.67 (L) 12/07/2023    HGB 10.7 (L) 12/07/2023    HCT 31.5 (L) " 12/07/2023    MCV 87 12/07/2023     12/07/2023           Assessment:  Problem List Items Addressed This Visit    None  Visit Diagnoses         Lumbar radiculopathy    -  Primary    Relevant Medications    pregabalin (LYRICA) 25 MG capsule    Other Relevant Orders    Procedure Request Order for Pain Management    Ambulatory Referral/Consult to Physical Therapy      Degeneration of intervertebral disc of lumbar region with discogenic back pain and lower extremity pain        Relevant Medications    pregabalin (LYRICA) 25 MG capsule    Other Relevant Orders    Procedure Request Order for Pain Management    Ambulatory Referral/Consult to Physical Therapy      Dorsalgia, unspecified          Spinal stenosis, lumbosacral region        Relevant Medications    pregabalin (LYRICA) 25 MG capsule    Other Relevant Orders    Procedure Request Order for Pain Management      Greater trochanteric bursitis of both hips        Relevant Orders    Ambulatory Referral/Consult to Physical Therapy      Lumbar spondylosis                07/15/2025 - Emanuelruss Treviño is a 75 y.o. female who  has a past medical history of Atrial fibrillation, Endometrial cancer (05/19/2018), Hypertension, and Lumbar stenosis with neurogenic claudication (10/09/2019).  By history and examination this patient has chronic low back pain with right-sided radicular symptoms, as well as bilateral GTB tenderness secondary to greater trochanteric bursitis.  She has multilevel neuroforaminal narrowing likely contributing to her pain.  We discussed the underlying diagnoses and multiple treatment options including non-opioid medications, interventional procedures, and physical therapy.  The risks and benefits of each treatment option were discussed and all questions were answered.      Treatment Plan:   Procedures:   Schedule patient for Right L2/L3, L3/L4, and L4/L5 TFESI.  Needs blood thinner clearance.  Consider b/l GTB injection if bursitis persists    PT/OT/HEP: I have stressed the importance of physical activity and a home exercise plan to help with pain and improve health. Referral PT.   Medications:    - Lyrica 25 mg.  Start with q.h.s. and increase to t.i.d. if tolerated.  Renal dosing.   -  Reviewed and consistent with medication use as prescribed.  Imaging:  Outside lumbar MRI reviewed.  Radiologist read under media.  Follow Up: RTC 2 weeks postprocedure to assess response to injection, Lyrica, and for for ongoing care and management of these conditions    Jen Núñez DO   Interventional Pain Medicine / Anesthesiology    Disclaimer: This note was partly generated using dictation software which may occasionally result in transcription errors.         [1]   Current Outpatient Medications:     acetaminophen (TYLENOL) 650 MG TbSR, Take 650 mg by mouth every 8 (eight) hours as needed., Disp: , Rfl:     apixaban (ELIQUIS) 5 mg Tab, Take 1 tablet by mouth twice daily, Disp: 180 tablet, Rfl: 3    ascorbic acid, vitamin C, (VITAMIN C) 500 MG tablet, Take 500 mg by mouth once daily., Disp: , Rfl:     azelastine (ASTELIN) 137 mcg (0.1 %) nasal spray, 1 spray 2 (two) times daily., Disp: , Rfl:     cetirizine (ZYRTEC) 10 MG tablet, Take 10 mg by mouth., Disp: , Rfl:     cholecalciferol, vitamin D3, (VITAMIN D3) 125 mcg (5,000 unit) Tab, Take 5,000 Units by mouth once daily. Takes on Saturday, Disp: , Rfl:     cyanocobalamin, vitamin B-12, 50 mcg tablet, Take 50 mcg by mouth every morning. Pt will verify the strength, Disp: , Rfl:     diclofenac sodium (VOLTAREN) 1 % Gel, Apply 2 g topically 3 (three) times daily as needed (pain)., Disp: 50 g, Rfl: 6    diphenhydrAMINE (BENADRYL) 25 mg capsule, Take 25 mg by mouth nightly as needed for Itching., Disp: , Rfl:     flecainide (TAMBOCOR) 100 MG Tab, TAKE 1 TABLET BY MOUTH EVERY 12 HOURS, Disp: 180 tablet, Rfl: 3    fluticasone propionate (FLONASE) 50 mcg/actuation nasal spray, 2 sprays (100 mcg total) by Each  Nostril route once daily., Disp: 16 g, Rfl: 11    fosinopriL (MONOPRIL) 20 MG tablet, Take 1 tablet by mouth once daily, Disp: 90 tablet, Rfl: 3    metoprolol succinate (TOPROL-XL) 100 MG 24 hr tablet, Take 1 tablet by mouth once daily, Disp: 90 tablet, Rfl: 0    rosuvastatin (CRESTOR) 10 MG tablet, Take 10 mg by mouth., Disp: , Rfl:     triamterene-hydrochlorothiazide 37.5-25 mg (DYAZIDE) 37.5-25 mg per capsule, Take 1 capsule by mouth once daily in the morning, Disp: 90 capsule, Rfl: 3    pregabalin (LYRICA) 25 MG capsule, Take 1 capsule (25 mg total) by mouth 3 (three) times daily., Disp: 90 capsule, Rfl: 1

## 2025-07-15 NOTE — TELEPHONE ENCOUNTER
----- Message from Shaun Núñez sent at 7/15/2025 11:06 AM CDT -----  Regarding: Order for RASHMI VALDEZ    Patient Name: RASHMI VALDEZ([id number])  Sex: Female  : 1950      PCP: NORA VELASQUEZ    Center: [facility] CENTRAL BILLING OFFICE     Types of orders made on 07/15/2025: Medications, Outpatient Referral, Procedure                                       Request    Order Date:7/15/2025  Ordering User:SHAUN NÚÑEZ [[id number]]  Encounter Provider:Shaun Núñez DO [[id number]]  Authorizing Provider: Shaun Núñez DO [[id number]]  Department:[facility] PAIN MANAGEMENT[[id number]]    Common Order Information  Procedure -> Transforaminal Injection (Specify level and laterality) Cmt: Right             L2/L3, L3/L4, and L4/L5    Pre-op Diagnosis -> Lumbar radiculopathy, chronic       -> Lumbar radiculopathy, right     Order Specific Information  Order: Procedure Request Order for Pain Management [Custom: [id number]]  Order #:          [id number]: 1 FUTURE    Priority: Routine  Class: Clinic Performed    Future Order Information      Expires on:07/15/2026            Expected by:07/15/2025                   Associated Diagnoses      M51.362 Degeneration of intervertebral disc of lumbar region with       discogenic back pain and lower extremity pain      M48.07 Spinal stenosis, lumbosacral region      M54.16 Lumbar radiculopathy      Physician -> Niya         Is patient on anti-coagulants? -> Yes Cmt: Needs clearance        Facility Name: -> [facility]         Follow-up: -> 2 weeks           Priority: Routine  Class: Clinic Performed    Future Order Information      Expires on:07/15/2026            Expected by:07/15/2025                   Associated Diagnoses      M51.362 Degeneration of intervertebral disc of lumbar region with       discogenic back pain and lower extremity pain      M48.07 Spinal stenosis, lumbosacral region      M54.16 Lumbar radiculopathy      Procedure -> Transforaminal  Injection (Specify level and laterality) Cmt:                 Right L2/L3, L3/L4, and L4/L5        Physician -> Gelter         Is patient on anti-coagulants? -> Yes Cmt: Needs clearance        Pre-op Diagnosis -> Lumbar radiculopathy, chronic           -> Lumbar radiculopathy, right         Facility Name: -> [facility]         Follow-up: -> 2 weeks

## 2025-07-15 NOTE — CONSULTS
[place] - Cardiology  Response for E-Consult     Patient Name: Jayla Treviño  MRN: [id number]  Primary Care Provider: Rambo Palmer MD   Requesting Provider: Jen Núñez DO  Consults    Recommendation:  Okay to hold Eliquis for 3 days before the procedure and restart post    Additional future steps to consider:  Non    Total time of Consultation: 5 minute    I did not speak to the requesting provider verbally about this.     *This eConsult is based on the clinical data available to me and is furnished without benefit of a physical examination. The eConsult will need to be interpreted in light of any clinical issues or changes in patient status not available to me at the time of filing this eConsults. Significant changes in patient condition or level of acuity should result in immediate formal consultation and reevaluation. Please alert me if you have further questions.    Thank you for this eConsult referral.     MD Zeke Barbosa - Cardiology

## 2025-07-16 ENCOUNTER — TELEPHONE (OUTPATIENT)
Dept: PAIN MEDICINE | Facility: CLINIC | Age: 75
End: 2025-07-16
Payer: MEDICARE

## 2025-07-18 ENCOUNTER — TELEPHONE (OUTPATIENT)
Dept: PAIN MEDICINE | Facility: HOSPITAL | Age: 75
End: 2025-07-18
Payer: MEDICARE

## 2025-07-23 ENCOUNTER — HOSPITAL ENCOUNTER (OUTPATIENT)
Facility: HOSPITAL | Age: 75
Discharge: HOME | End: 2025-07-23
Attending: STUDENT IN AN ORGANIZED HEALTH CARE EDUCATION/TRAINING PROGRAM | Admitting: STUDENT IN AN ORGANIZED HEALTH CARE EDUCATION/TRAINING PROGRAM
Payer: MEDICARE

## 2025-07-23 VITALS
TEMPERATURE: 97 F | OXYGEN SATURATION: 100 % | BODY MASS INDEX: 31.15 KG/M2 | HEIGHT: 72 IN | RESPIRATION RATE: 18 BRPM | SYSTOLIC BLOOD PRESSURE: 141 MMHG | WEIGHT: 230 LBS | DIASTOLIC BLOOD PRESSURE: 65 MMHG | HEART RATE: 54 BPM

## 2025-07-23 DIAGNOSIS — G89.29 CHRONIC PAIN: ICD-10-CM

## 2025-07-23 DIAGNOSIS — M54.16 LUMBAR RADICULOPATHY: Primary | ICD-10-CM

## 2025-07-23 PROCEDURE — 25500020 PHARM REV CODE 255: Performed by: STUDENT IN AN ORGANIZED HEALTH CARE EDUCATION/TRAINING PROGRAM

## 2025-07-23 PROCEDURE — 64484 NJX AA&/STRD TFRM EPI L/S EA: CPT | Mod: RT,,, | Performed by: STUDENT IN AN ORGANIZED HEALTH CARE EDUCATION/TRAINING PROGRAM

## 2025-07-23 PROCEDURE — 64483 NJX AA&/STRD TFRM EPI L/S 1: CPT | Mod: RT | Performed by: STUDENT IN AN ORGANIZED HEALTH CARE EDUCATION/TRAINING PROGRAM

## 2025-07-23 PROCEDURE — 64484 NJX AA&/STRD TFRM EPI L/S EA: CPT | Mod: RT | Performed by: STUDENT IN AN ORGANIZED HEALTH CARE EDUCATION/TRAINING PROGRAM

## 2025-07-23 PROCEDURE — 63600175 PHARM REV CODE 636 W HCPCS: Performed by: STUDENT IN AN ORGANIZED HEALTH CARE EDUCATION/TRAINING PROGRAM

## 2025-07-23 PROCEDURE — 64483 NJX AA&/STRD TFRM EPI L/S 1: CPT | Mod: RT,,, | Performed by: STUDENT IN AN ORGANIZED HEALTH CARE EDUCATION/TRAINING PROGRAM

## 2025-07-23 RX ORDER — LIDOCAINE HYDROCHLORIDE 20 MG/ML
INJECTION, SOLUTION EPIDURAL; INFILTRATION; INTRACAUDAL; PERINEURAL
Status: DISCONTINUED | OUTPATIENT
Start: 2025-07-23 | End: 2025-07-23 | Stop reason: HOSPADM

## 2025-07-23 RX ORDER — FENTANYL CITRATE 50 UG/ML
INJECTION, SOLUTION INTRAMUSCULAR; INTRAVENOUS
Status: DISCONTINUED | OUTPATIENT
Start: 2025-07-23 | End: 2025-07-23 | Stop reason: HOSPADM

## 2025-07-23 RX ORDER — SODIUM CHLORIDE 9 MG/ML
INJECTION, SOLUTION INTRAVENOUS CONTINUOUS
Status: DISCONTINUED | OUTPATIENT
Start: 2025-07-23 | End: 2025-07-23 | Stop reason: HOSPADM

## 2025-07-23 RX ORDER — DEXAMETHASONE SODIUM PHOSPHATE 10 MG/ML
INJECTION, SOLUTION INTRA-ARTICULAR; INTRALESIONAL; INTRAMUSCULAR; INTRAVENOUS; SOFT TISSUE
Status: DISCONTINUED | OUTPATIENT
Start: 2025-07-23 | End: 2025-07-23 | Stop reason: HOSPADM

## 2025-07-23 RX ORDER — LIDOCAINE HYDROCHLORIDE 10 MG/ML
INJECTION, SOLUTION EPIDURAL; INFILTRATION; INTRACAUDAL; PERINEURAL
Status: DISCONTINUED | OUTPATIENT
Start: 2025-07-23 | End: 2025-07-23 | Stop reason: HOSPADM

## 2025-07-23 RX ORDER — MIDAZOLAM HYDROCHLORIDE 1 MG/ML
INJECTION INTRAMUSCULAR; INTRAVENOUS
Status: DISCONTINUED | OUTPATIENT
Start: 2025-07-23 | End: 2025-07-23 | Stop reason: HOSPADM

## 2025-07-23 NOTE — DISCHARGE INSTRUCTIONS
[facility]  Dr. Jen Núñez  Messaging service # [phone]    POST-PROCEDURE INSTRUCTIONS:    Today you had an injection that included a steroid medications.  The steroid may or may not have been mixed with a local anesthetic when it was injected.   If the injection was in the neck, you may feel some pressure, numbness, or slight weakness in the arm after the procedure for a short period of time (this is a normal response), if this persists for longer than 1 day please contact our office or go to the emergency room.  If the injection was in the low back, you may feel some pressure, numbness, or slight weakness in the leg after the procedure for a short period of time (this is a normal response), if this persists for longer than 1 day please contact our office or go to the emergency room.  You may get side effects from the steroid.  This is not uncommon.  Symptoms include: elevated blood sugar, elevated blood pressure, headache, flushing, nausea, insomnia.  These symptoms are transient and will resolve within 1-3 days.  If symptoms last longer than this please contact our office or head to the emergency room.  Steroid medications can take anywhere from 3-14 days to take effect (rarely longer).  You may notice that your pain worsens for a short period of time after the injection, this would not be unusual due to the pressure and trauma from the needle.    If you do not have a follow up appointment scheduled, please contact my office (or the office of the physician who referred you for the procedure) to get a post-procedure follow up scheduled 2-4 weeks after the procedure.  This can be done as a virtual visit if that is more convenient for you.      What you need to do:    Keep a record of your response to the injection you had today.    How much relief did you get?   When did the relief start and how long did it last?  Were you able to decrease the use of any of your pain  medications?  Were you able to increase your level of activity?  How long did the relief last?    What to watch out for:    If you experience any of the following symptoms after your procedure, please notify the messaging service immediately (see above for contact information):   fever (increased oral temperature)   bleeding or swelling at the injection site,    drainage, rash or redness at the injection site    possible signs of infection    increased pain at the injection site   worsening of your usual pain   severe headache   new or worsening numbness    new arm and/or leg weakness, or    changes in bowel and/or bladder function: urinating or defecating on yourself and not knowing that you did it.    PLEASE FOLLOW ALL INSTRUCTIONS CAREFULLY     Do not engage in strenuous activity (e.g., lifting or pushing heavy objects or repeated bending) for 24 hours.     Do not take a bath, swim or use Jacuzzi for 24 hours after procedure. (A shower is fine).   Remove any Band-Aids when you get home.    Use cold/ice, as needed for comfort.  We recommend the use of cold therapy alternating on for 20 minutes, off for 20 minutes.    Do not apply direct heat (heating pad or heat packs) to the injection site for 24 hours.     Resume your usual medications, unless instructed otherwise by your Pain Physician.     If you are on warfarin (Coumadin) or other blood thinner, resume this medication as instructed by your prescribing Physician.    IF AT ANY POINT YOU ARE VERY CONCERNED ABOUT YOUR SYMPTOMS, PLEASE GO TO THE EMERGENCY ROOM.    If you develop worsening pain, weakness, numbness, lose bowel or bladder control (i.e., having an accident where you did not even know you had to go to the bathroom and suddenly noticed you soiled yourself), saddle anesthesia (a loss of sensation restricted to the area of the buttocks, anus and between the legs -- i.e., those parts of your body that would touch a saddle if you were sitting on one) you  need to go immediately to the emergency department for evaluation and treatment.    ----------------------------------------------------------------------------------------------------------------------------------------------------------------  If you received Sedation please read the following instructions:  POST SEDATION INSTRUCTIONS    Today you received intravenous medication (also known as sedation) that was used to help you relax and/or decrease discomfort during your procedure. This medication will be acting in your body for the next 24 hours, so you might feel a little tired or sleepy. This feeling will slowly wear off.   Common side effects associated with these medications include: drowsiness, dizziness, sleepiness, confusion, feeling excited, difficulty remembering things, lack of steadiness with walking or balance, loss of fine muscle control, slowed reflexes, difficulty focusing, and blurred vision.  Some over-the-counter and prescription medications (e.g., muscle relaxants, opioids, mood-altering medications, sedatives/hypnotics, antihistamines) can interact with the intravenous medication you received and cause an increased risk of the side effects listed above in addition to other potentially life threatening side effects. Use extreme caution if you are taking such medications, and consult with your Pain Physician or prescribing physician if you have any questions.  For the next 12-24 hours:    DO NOT--Drive a car, operate machinery or power tools   DO NOT--Drink any alcoholic beverages (not even beer), they may dangerously increase the risk of side effects.    DO NOT--Make any important legal or business decisions or sign important documents.  We advise you to have someone to assist you at home. Move slowly and carefully. Do not make sudden changes in position. Be aware of dizziness or light-headedness and move accordingly.   If you seek medical treatment within 24 hours, let the nurse or doctor  caring for you know that you have received the above medications. If you have any questions or concerns related to your sedation or treatment today please contact us.

## 2025-07-23 NOTE — OP NOTE
Lumbar Transforaminal Epidural Steroid Injection under Fluoroscopic Guidance    The procedure, risks, benefits, and options were discussed with the patient. There are no contraindications to the procedure. The patent expressed understanding and agreed to the procedure. Informed written consent was obtained prior to the start of the procedure and can be found in the patient's chart.    PATIENT NAME: Jayla Treviño   MRN: [id number]     DATE OF PROCEDURE: 07/23/2025    PROCEDURE:  Right  L2/3, L3/4, and L4/5 Lumbar Transforaminal Epidural Steroid Injection under Fluoroscopic Guidance    PRE-OP DIAGNOSIS: Degeneration of intervertebral disc of lumbar region with discogenic back pain and lower extremity pain [M51.362]  Spinal stenosis, lumbosacral region [M48.07]  Lumbar radiculopathy [M54.16] Lumbar radiculopathy [M54.16]    POST-OP DIAGNOSIS: Same    PHYSICIAN: Jen Núñez DO    ASSISTANTS: None     MEDICATIONS INJECTED: Preservative-free Decadron 10mg with 8 cc of Lidocaine 1% MPF     LOCAL ANESTHETIC INJECTED: Xylocaine 2%     SEDATION: Versed 1mg and Fentanyl 50mcg                                                                                                                                                                                     Conscious sedation ordered by M.D. Patient re-evaluation prior to administration of conscious sedation. No changes noted in patient's status from initial evaluation. The patient's vital signs were monitored by RN and patient remained hemodynamically stable throughout the procedure.    Event Time In   Sedation Start 1124   Sedation End 1133       ESTIMATED BLOOD LOSS: None    COMPLICATIONS: None    TECHNIQUE: Time-out was performed to identify the patient and procedure to be performed. With the patient laying in a prone position, the surgical area was prepped and draped in the usual sterile fashion using ChloraPrep and a fenestrated drape.The levels were determined under  fluoroscopy guidance. Skin anesthesia was achieved by injecting Lidocaine 2% over the injection sites. The transforaminal spaces were then approached with a 22 gauge, 5 inch spinal quinke needle that was introduced under fluoroscopic guidance in the AP and Lateral views. Once the needle tip was in the area of the transforaminal space, and there was no blood, CSF or paraesthesias, contrast dye Omnipaque (300mg/mL) was injected to confirm placement and there was no vascular runoff. Fluoroscopic imaging in the AP and lateral views revealed a clear outline of the spinal nerve with proximal spread of agent through the neural foramen into the epidural space. 3 mL of the medication mixture listed above was injected slowly at each site. Displacement of the radio opaque contrast after injection of the medication confirmed that the medication went into the area of the transforaminal spaces. The needles were removed and bleeding was nil. A sterile dressing was applied. No specimens collected. The patient tolerated the procedure well.       The patient was monitored after the procedure in the recovery area. They were given post-procedure and discharge instructions to follow at home. The patient was discharged in a stable condition.    Jen Núñez DO

## 2025-07-23 NOTE — PLAN OF CARE
Pt in preop bay 21, VSS, meds given and IV inserted. Pt denies any open wounds on body or the use of any immunizations or antibiotics in the past 2 weeks.  ready to roll.

## 2025-07-23 NOTE — DISCHARGE SUMMARY
Discharge Note  Short Stay      SUMMARY     Admit Date: 7/23/2025    Attending Physician: Jen Núñez      Discharge Physician: Jen Núñez      Discharge Date: 7/23/2025 11:34 AM    Procedure(s) (LRB):  TFESI RT L2/3, L3/4, L4/5 (Right)    Final Diagnosis: Degeneration of intervertebral disc of lumbar region with discogenic back pain and lower extremity pain [M51.362]  Spinal stenosis, lumbosacral region [M48.07]  Lumbar radiculopathy [M54.16]    Disposition: Home or self care    Patient Instructions:   Current Discharge Medication List        CONTINUE these medications which have NOT CHANGED    Details   acetaminophen (TYLENOL) 650 MG TbSR Take 650 mg by mouth every 8 (eight) hours as needed.      cholecalciferol, vitamin D3, (VITAMIN D3) 125 mcg (5,000 unit) Tab Take 5,000 Units by mouth once daily. Takes on Saturday      cyanocobalamin, vitamin B-12, 50 mcg tablet Take 50 mcg by mouth every morning. Pt will verify the strength      diclofenac sodium (VOLTAREN) 1 % Gel Apply 2 g topically 3 (three) times daily as needed (pain).  Qty: 50 g, Refills: 6    Associated Diagnoses: Lumbar stenosis with neurogenic claudication      flecainide (TAMBOCOR) 100 MG Tab TAKE 1 TABLET BY MOUTH EVERY 12 HOURS  Qty: 180 tablet, Refills: 3    Associated Diagnoses: Paroxysmal A-fib      fosinopriL (MONOPRIL) 20 MG tablet Take 1 tablet by mouth once daily  Qty: 90 tablet, Refills: 3    Associated Diagnoses: Primary hypertension      metoprolol succinate (TOPROL-XL) 100 MG 24 hr tablet Take 1 tablet by mouth once daily  Qty: 90 tablet, Refills: 0    Comments: .  Associated Diagnoses: Paroxysmal atrial fibrillation      triamterene-hydrochlorothiazide 37.5-25 mg (DYAZIDE) 37.5-25 mg per capsule Take 1 capsule by mouth once daily in the morning  Qty: 90 capsule, Refills: 3    Comments: .  Associated Diagnoses: Primary hypertension      apixaban (ELIQUIS) 5 mg Tab Take 1 tablet by mouth twice daily  Qty: 180 tablet, Refills:  3    Associated Diagnoses: Paroxysmal atrial fibrillation      azelastine (ASTELIN) 137 mcg (0.1 %) nasal spray 1 spray 2 (two) times daily.      pregabalin (LYRICA) 25 MG capsule Take 1 capsule (25 mg total) by mouth 3 (three) times daily.  Qty: 90 capsule, Refills: 1    Associated Diagnoses: Degeneration of intervertebral disc of lumbar region with discogenic back pain and lower extremity pain; Spinal stenosis, lumbosacral region; Lumbar radiculopathy                 Discharge Diagnosis: Degeneration of intervertebral disc of lumbar region with discogenic back pain and lower extremity pain [M51.362]  Spinal stenosis, lumbosacral region [M48.07]  Lumbar radiculopathy [M54.16]  Condition on Discharge: Stable with no complications to procedure   Diet on Discharge: Same as before.  Activity: as per instruction sheet.  Discharge to: Home with a responsible adult.  Follow up: 2-4 weeks       Please call my office or pager at [phone] if experienced any weakness or loss of sensation, fever > 101.5, pain uncontrolled with oral medications, persistent nausea/vomiting/or diarrhea, redness or drainage from the incisions, or any other worrisome concerns. If physician on call was not reached or could not communicate with our office for any reason please go to the nearest emergency department

## 2025-07-30 ENCOUNTER — TELEPHONE (OUTPATIENT)
Dept: PAIN MEDICINE | Facility: CLINIC | Age: 75
End: 2025-07-30
Payer: MEDICARE

## 2025-08-14 ENCOUNTER — TELEPHONE (OUTPATIENT)
Dept: PAIN MEDICINE | Facility: CLINIC | Age: 75
End: 2025-08-14

## 2025-08-14 ENCOUNTER — OFFICE VISIT (OUTPATIENT)
Dept: PAIN MEDICINE | Facility: CLINIC | Age: 75
End: 2025-08-14
Payer: MEDICARE

## 2025-08-14 VITALS — HEART RATE: 66 BPM | DIASTOLIC BLOOD PRESSURE: 78 MMHG | SYSTOLIC BLOOD PRESSURE: 135 MMHG

## 2025-08-14 DIAGNOSIS — M48.07 SPINAL STENOSIS, LUMBOSACRAL REGION: ICD-10-CM

## 2025-08-14 DIAGNOSIS — G89.29 CHRONIC PAIN OF RIGHT KNEE: Primary | ICD-10-CM

## 2025-08-14 DIAGNOSIS — M54.16 LUMBAR RADICULOPATHY: ICD-10-CM

## 2025-08-14 DIAGNOSIS — M17.0 OSTEOARTHRITIS OF BOTH KNEES, UNSPECIFIED OSTEOARTHRITIS TYPE: ICD-10-CM

## 2025-08-14 DIAGNOSIS — M25.561 CHRONIC PAIN OF RIGHT KNEE: Primary | ICD-10-CM

## 2025-08-14 DIAGNOSIS — G89.4 CHRONIC PAIN SYNDROME: ICD-10-CM

## 2025-08-14 DIAGNOSIS — M47.816 LUMBAR SPONDYLOSIS: ICD-10-CM

## 2025-08-14 PROCEDURE — 99999 PR PBB SHADOW E&M-EST. PATIENT-LVL III: CPT | Mod: PBBFAC,,, | Performed by: STUDENT IN AN ORGANIZED HEALTH CARE EDUCATION/TRAINING PROGRAM

## 2025-08-22 ENCOUNTER — PATIENT MESSAGE (OUTPATIENT)
Dept: PAIN MEDICINE | Facility: CLINIC | Age: 75
End: 2025-08-22
Payer: MEDICARE

## (undated) DEVICE — DRAPE ARM DAVINCI XI

## (undated) DEVICE — DRAPE STERI-DRAPE 1000 17X11IN

## (undated) DEVICE — PAD PREP 50/CA

## (undated) DEVICE — SEE L#152161

## (undated) DEVICE — DRAPE ABDOMINAL TIBURON 14X11

## (undated) DEVICE — SEE MEDLINE ITEM 154981

## (undated) DEVICE — Device

## (undated) DEVICE — SPONGE LAP 4X18 PREWASHED

## (undated) DEVICE — KNIFE BAYONET SURGICAL

## (undated) DEVICE — GLOVE BIOGEL ECLIPSE SZ 7.5

## (undated) DEVICE — SEE MEDLINE ITEM 157117

## (undated) DEVICE — SOL CLEARIFY VISUALIZATION LAP

## (undated) DEVICE — TOWEL OR NONABSORB ADH 17X26

## (undated) DEVICE — SUT MCRYL PLUS 4-0 PS2 27IN

## (undated) DEVICE — SUT ETHILON 3-0 PS2 18 BLK

## (undated) DEVICE — KIT ANTIFOG

## (undated) DEVICE — COVER OVERHEAD SURG LT BLUE

## (undated) DEVICE — TRAY FOLEY 16FR INFECTION CONT

## (undated) DEVICE — SEE MEDLINE ITEM 157116

## (undated) DEVICE — DRESSING AQUACEL SACRAL 9 X 9

## (undated) DEVICE — DRAPE COLUMN DAVINCI XI

## (undated) DEVICE — DRESSING AQUACEL FOAM 5 X 5

## (undated) DEVICE — CLIPPER BLADE MOD 4406 (CAREF)

## (undated) DEVICE — SEE MEDLINE ITEM 152622

## (undated) DEVICE — COVER LIGHT HANDLE

## (undated) DEVICE — SUT CTD VICRYL 0 UND BR

## (undated) DEVICE — SUT 0 VICRYL / UR6 (J603)

## (undated) DEVICE — DRAPE OPMI STERILE

## (undated) DEVICE — KIT SPINAL PATIENT CARE JACK

## (undated) DEVICE — BLADE ELECTRO EDGE INSULATED

## (undated) DEVICE — IRRIGATOR ENDOSCOPY DISP.

## (undated) DEVICE — SUT MONOCYRL 4-0 PS2 UND

## (undated) DEVICE — DRESSING SURGICAL 1/2X1/2

## (undated) DEVICE — SUT CTD VICRYL 2-0 CR/CT-2

## (undated) DEVICE — BLADE SURG CARBON STEEL SZ11

## (undated) DEVICE — SEE MEDLINE ITEM 156905

## (undated) DEVICE — ELECTRODE REM PLYHSV RETURN 9

## (undated) DEVICE — GELATIN SURGIPOWDER ABSORBABLE

## (undated) DEVICE — DRESSING MEPILEX BORDER 4 X 4

## (undated) DEVICE — CATH URETHRAL 16FR RED

## (undated) DEVICE — COVER TIP CURVED SCISSORS XI

## (undated) DEVICE — BURR MIS CURVED 3.0MM

## (undated) DEVICE — TUBING ARTRL PRESS MNTR M-F 4

## (undated) DEVICE — DRESSING TELFA N ADH 3X8

## (undated) DEVICE — SOL ELECTROLUBE ANTI-STIC

## (undated) DEVICE — MANIPULATOR VCARE PLUS 34MM

## (undated) DEVICE — DRAIN TLS 7MM POREX

## (undated) DEVICE — DRESSING TRANS 4X4 TEGADERM

## (undated) DEVICE — SEE MEDLINE ITEM 157181

## (undated) DEVICE — TRAY MINOR GEN SURG

## (undated) DEVICE — TUBE FRAZIER 5MM 2FT SOFT TIP

## (undated) DEVICE — CORD BIPOLAR 12 FOOT

## (undated) DEVICE — DRAPE SCOPE PILLOW WARMER

## (undated) DEVICE — NDL SPINAL SPINOCAN 22GX3.5

## (undated) DEVICE — SEE MEDLINE ITEM 157150

## (undated) DEVICE — DRAPE UNDER BUTTOCKS WITH POUCH

## (undated) DEVICE — STRIP STERI 1/8 X 3

## (undated) DEVICE — SEAL UNIVERSAL 5MM-8MM XI

## (undated) DEVICE — SEE MEDLINE ITEM 146372

## (undated) DEVICE — ADHESIVE DERMABOND ADVANCED

## (undated) DEVICE — ADHESIVE MASTISOL VIAL 48/BX

## (undated) DEVICE — SET TRI-LUMEN FILTERED TUBE

## (undated) DEVICE — PORT ACCESS 8MM W/120MM LOW

## (undated) DEVICE — OBTURATOR BLADELESS 8MM XI

## (undated) DEVICE — CLOSURE SKIN STERI STRIP 1/2X4

## (undated) DEVICE — SEE MEDLINE ITEM 156955

## (undated) DEVICE — NDL INSUF ULTRA VERESS 120MM

## (undated) DEVICE — LUBRICANT SURGILUBE 2 OZ

## (undated) DEVICE — GLOVE PROTEXIS LTX PF SURG 7.5

## (undated) DEVICE — BUR BONE CUT MICRO TPS 3X3.8MM

## (undated) DEVICE — LEGGINGS 48X31 INCH

## (undated) DEVICE — SEE MEDLINE ITEM 146355

## (undated) DEVICE — TROCAR ENDOPATH XCEL 12MM 15CM

## (undated) DEVICE — DRAPE C ARM 42 X 120 10/BX